# Patient Record
Sex: MALE | Race: WHITE | NOT HISPANIC OR LATINO | URBAN - METROPOLITAN AREA
[De-identification: names, ages, dates, MRNs, and addresses within clinical notes are randomized per-mention and may not be internally consistent; named-entity substitution may affect disease eponyms.]

---

## 2017-03-08 ENCOUNTER — INPATIENT (INPATIENT)
Facility: HOSPITAL | Age: 62
LOS: 10 days | Discharge: HOME HEALTH | End: 2017-03-19
Attending: THORACIC SURGERY (CARDIOTHORACIC VASCULAR SURGERY)

## 2017-04-05 PROBLEM — Z00.00 ENCOUNTER FOR PREVENTIVE HEALTH EXAMINATION: Status: ACTIVE | Noted: 2017-04-05

## 2017-06-27 DIAGNOSIS — I11.0 HYPERTENSIVE HEART DISEASE WITH HEART FAILURE: ICD-10-CM

## 2017-06-27 DIAGNOSIS — D69.6 THROMBOCYTOPENIA, UNSPECIFIED: ICD-10-CM

## 2017-06-27 DIAGNOSIS — I50.22 CHRONIC SYSTOLIC (CONGESTIVE) HEART FAILURE: ICD-10-CM

## 2017-06-27 DIAGNOSIS — Z89.412 ACQUIRED ABSENCE OF LEFT GREAT TOE: ICD-10-CM

## 2017-06-27 DIAGNOSIS — I25.110 ATHEROSCLEROTIC HEART DISEASE OF NATIVE CORONARY ARTERY WITH UNSTABLE ANGINA PECTORIS: ICD-10-CM

## 2017-06-27 DIAGNOSIS — I25.2 OLD MYOCARDIAL INFARCTION: ICD-10-CM

## 2017-06-27 DIAGNOSIS — E87.6 HYPOKALEMIA: ICD-10-CM

## 2017-06-27 DIAGNOSIS — T82.855A STENOSIS OF CORONARY ARTERY STENT, INITIAL ENCOUNTER: ICD-10-CM

## 2017-06-27 DIAGNOSIS — E11.42 TYPE 2 DIABETES MELLITUS WITH DIABETIC POLYNEUROPATHY: ICD-10-CM

## 2017-06-27 DIAGNOSIS — I25.10 ATHEROSCLEROTIC HEART DISEASE OF NATIVE CORONARY ARTERY WITHOUT ANGINA PECTORIS: ICD-10-CM

## 2017-06-27 DIAGNOSIS — M79.7 FIBROMYALGIA: ICD-10-CM

## 2017-06-27 DIAGNOSIS — I97.89 OTHER POSTPROCEDURAL COMPLICATIONS AND DISORDERS OF THE CIRCULATORY SYSTEM, NOT ELSEWHERE CLASSIFIED: ICD-10-CM

## 2017-06-27 DIAGNOSIS — E78.00 PURE HYPERCHOLESTEROLEMIA, UNSPECIFIED: ICD-10-CM

## 2017-06-27 DIAGNOSIS — E11.65 TYPE 2 DIABETES MELLITUS WITH HYPERGLYCEMIA: ICD-10-CM

## 2017-06-27 DIAGNOSIS — D62 ACUTE POSTHEMORRHAGIC ANEMIA: ICD-10-CM

## 2017-06-27 DIAGNOSIS — E87.70 FLUID OVERLOAD, UNSPECIFIED: ICD-10-CM
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
## 2017-06-27 DIAGNOSIS — I48.91 UNSPECIFIED ATRIAL FIBRILLATION: ICD-10-CM

## 2017-06-27 DIAGNOSIS — Z79.4 LONG TERM (CURRENT) USE OF INSULIN: ICD-10-CM

## 2017-06-27 DIAGNOSIS — E11.621 TYPE 2 DIABETES MELLITUS WITH FOOT ULCER: ICD-10-CM

## 2018-08-04 ENCOUNTER — EMERGENCY (EMERGENCY)
Facility: HOSPITAL | Age: 63
LOS: 0 days | Discharge: HOME | End: 2018-08-04
Attending: EMERGENCY MEDICINE | Admitting: EMERGENCY MEDICINE

## 2018-08-04 VITALS
OXYGEN SATURATION: 98 % | HEIGHT: 75 IN | TEMPERATURE: 96 F | DIASTOLIC BLOOD PRESSURE: 50 MMHG | WEIGHT: 240.08 LBS | HEART RATE: 104 BPM | SYSTOLIC BLOOD PRESSURE: 80 MMHG | RESPIRATION RATE: 20 BRPM

## 2018-08-04 VITALS
HEART RATE: 81 BPM | SYSTOLIC BLOOD PRESSURE: 129 MMHG | OXYGEN SATURATION: 100 % | DIASTOLIC BLOOD PRESSURE: 68 MMHG | RESPIRATION RATE: 20 BRPM | TEMPERATURE: 99 F

## 2018-08-04 DIAGNOSIS — E78.00 PURE HYPERCHOLESTEROLEMIA, UNSPECIFIED: ICD-10-CM

## 2018-08-04 DIAGNOSIS — I10 ESSENTIAL (PRIMARY) HYPERTENSION: ICD-10-CM

## 2018-08-04 DIAGNOSIS — E11.9 TYPE 2 DIABETES MELLITUS WITHOUT COMPLICATIONS: ICD-10-CM

## 2018-08-04 DIAGNOSIS — Z95.1 PRESENCE OF AORTOCORONARY BYPASS GRAFT: ICD-10-CM

## 2018-08-04 DIAGNOSIS — R68.83 CHILLS (WITHOUT FEVER): ICD-10-CM

## 2018-08-04 DIAGNOSIS — R42 DIZZINESS AND GIDDINESS: ICD-10-CM

## 2018-08-04 DIAGNOSIS — Z88.1 ALLERGY STATUS TO OTHER ANTIBIOTIC AGENTS STATUS: ICD-10-CM

## 2018-08-04 DIAGNOSIS — R11.2 NAUSEA WITH VOMITING, UNSPECIFIED: ICD-10-CM

## 2018-08-04 DIAGNOSIS — R10.32 LEFT LOWER QUADRANT PAIN: ICD-10-CM

## 2018-08-04 DIAGNOSIS — T81.72XA COMPLICATION OF VEIN FOLLOWING A PROCEDURE, NOT ELSEWHERE CLASSIFIED, INITIAL ENCOUNTER: Chronic | ICD-10-CM

## 2018-08-04 DIAGNOSIS — Z91.041 RADIOGRAPHIC DYE ALLERGY STATUS: ICD-10-CM

## 2018-08-04 LAB
ALBUMIN SERPL ELPH-MCNC: 3.6 G/DL — SIGNIFICANT CHANGE UP (ref 3.5–5.2)
ALP SERPL-CCNC: 107 U/L — SIGNIFICANT CHANGE UP (ref 30–115)
ALT FLD-CCNC: 12 U/L — SIGNIFICANT CHANGE UP (ref 0–41)
ANION GAP SERPL CALC-SCNC: 16 MMOL/L — HIGH (ref 7–14)
APTT BLD: 30.3 SEC — SIGNIFICANT CHANGE UP (ref 27–39.2)
AST SERPL-CCNC: 12 U/L — SIGNIFICANT CHANGE UP (ref 0–41)
BASE EXCESS BLDV CALC-SCNC: -0.6 MMOL/L — SIGNIFICANT CHANGE UP (ref -2–2)
BASOPHILS # BLD AUTO: 0.09 K/UL — SIGNIFICANT CHANGE UP (ref 0–0.2)
BASOPHILS NFR BLD AUTO: 0.8 % — SIGNIFICANT CHANGE UP (ref 0–1)
BILIRUB SERPL-MCNC: 0.4 MG/DL — SIGNIFICANT CHANGE UP (ref 0.2–1.2)
BUN SERPL-MCNC: 22 MG/DL — HIGH (ref 10–20)
CA-I SERPL-SCNC: 1.23 MMOL/L — SIGNIFICANT CHANGE UP (ref 1.12–1.3)
CALCIUM SERPL-MCNC: 9.6 MG/DL — SIGNIFICANT CHANGE UP (ref 8.5–10.1)
CHLORIDE SERPL-SCNC: 97 MMOL/L — LOW (ref 98–110)
CO2 SERPL-SCNC: 19 MMOL/L — SIGNIFICANT CHANGE UP (ref 17–32)
CREAT SERPL-MCNC: 2 MG/DL — HIGH (ref 0.7–1.5)
EOSINOPHIL # BLD AUTO: 0.38 K/UL — SIGNIFICANT CHANGE UP (ref 0–0.7)
EOSINOPHIL NFR BLD AUTO: 3.4 % — SIGNIFICANT CHANGE UP (ref 0–8)
GAS PNL BLDV: 134 MMOL/L — LOW (ref 136–145)
GAS PNL BLDV: SIGNIFICANT CHANGE UP
GLUCOSE SERPL-MCNC: 142 MG/DL — HIGH (ref 70–99)
HCO3 BLDV-SCNC: 23 MMOL/L — SIGNIFICANT CHANGE UP (ref 22–29)
HCT VFR BLD CALC: 34.9 % — LOW (ref 42–52)
HCT VFR BLDA CALC: 36 % — SIGNIFICANT CHANGE UP (ref 34–44)
HGB BLD CALC-MCNC: 11.8 G/DL — LOW (ref 14–18)
HGB BLD-MCNC: 10.9 G/DL — LOW (ref 14–18)
IMM GRANULOCYTES NFR BLD AUTO: 0.3 % — SIGNIFICANT CHANGE UP (ref 0.1–0.3)
INR BLD: 1.17 RATIO — SIGNIFICANT CHANGE UP (ref 0.65–1.3)
LACTATE BLDV-MCNC: 2 MMOL/L — HIGH (ref 0.5–1.6)
LIDOCAIN IGE QN: 39 U/L — SIGNIFICANT CHANGE UP (ref 7–60)
LYMPHOCYTES # BLD AUTO: 29.4 % — SIGNIFICANT CHANGE UP (ref 20.5–51.1)
LYMPHOCYTES # BLD AUTO: 3.28 K/UL — SIGNIFICANT CHANGE UP (ref 1.2–3.4)
MAGNESIUM SERPL-MCNC: 1.9 MG/DL — SIGNIFICANT CHANGE UP (ref 1.8–2.4)
MCHC RBC-ENTMCNC: 22.4 PG — LOW (ref 27–31)
MCHC RBC-ENTMCNC: 31.2 G/DL — LOW (ref 32–37)
MCV RBC AUTO: 71.8 FL — LOW (ref 80–94)
MONOCYTES # BLD AUTO: 0.96 K/UL — HIGH (ref 0.1–0.6)
MONOCYTES NFR BLD AUTO: 8.6 % — SIGNIFICANT CHANGE UP (ref 1.7–9.3)
NEUTROPHILS # BLD AUTO: 6.43 K/UL — SIGNIFICANT CHANGE UP (ref 1.4–6.5)
NEUTROPHILS NFR BLD AUTO: 57.5 % — SIGNIFICANT CHANGE UP (ref 42.2–75.2)
NRBC # BLD: 0 /100 WBCS — SIGNIFICANT CHANGE UP (ref 0–0)
NT-PROBNP SERPL-SCNC: 173 PG/ML — SIGNIFICANT CHANGE UP (ref 0–300)
PCO2 BLDV: 34 MMHG — LOW (ref 41–51)
PH BLDV: 7.44 — HIGH (ref 7.26–7.43)
PLATELET # BLD AUTO: 220 K/UL — SIGNIFICANT CHANGE UP (ref 130–400)
PO2 BLDV: 49 MMHG — HIGH (ref 20–40)
POTASSIUM BLDV-SCNC: 4.2 MMOL/L — SIGNIFICANT CHANGE UP (ref 3.3–5.6)
POTASSIUM SERPL-MCNC: 4.5 MMOL/L — SIGNIFICANT CHANGE UP (ref 3.5–5)
POTASSIUM SERPL-SCNC: 4.5 MMOL/L — SIGNIFICANT CHANGE UP (ref 3.5–5)
PROT SERPL-MCNC: 7.1 G/DL — SIGNIFICANT CHANGE UP (ref 6–8)
PROTHROM AB SERPL-ACNC: 12.6 SEC — SIGNIFICANT CHANGE UP (ref 9.95–12.87)
RBC # BLD: 4.86 M/UL — SIGNIFICANT CHANGE UP (ref 4.7–6.1)
RBC # FLD: 17 % — HIGH (ref 11.5–14.5)
SAO2 % BLDV: 86 % — SIGNIFICANT CHANGE UP
SODIUM SERPL-SCNC: 132 MMOL/L — LOW (ref 135–146)
TROPONIN T SERPL-MCNC: <0.01 NG/ML — SIGNIFICANT CHANGE UP
WBC # BLD: 11.17 K/UL — HIGH (ref 4.8–10.8)
WBC # FLD AUTO: 11.17 K/UL — HIGH (ref 4.8–10.8)

## 2018-08-04 RX ORDER — ONDANSETRON 8 MG/1
4 TABLET, FILM COATED ORAL ONCE
Qty: 0 | Refills: 0 | Status: COMPLETED | OUTPATIENT
Start: 2018-08-04 | End: 2018-08-04

## 2018-08-04 RX ORDER — SODIUM CHLORIDE 9 MG/ML
1500 INJECTION, SOLUTION INTRAVENOUS ONCE
Qty: 0 | Refills: 0 | Status: COMPLETED | OUTPATIENT
Start: 2018-08-04 | End: 2018-08-04

## 2018-08-04 RX ADMIN — SODIUM CHLORIDE 1500 MILLILITER(S): 9 INJECTION, SOLUTION INTRAVENOUS at 22:05

## 2018-08-04 RX ADMIN — ONDANSETRON 4 MILLIGRAM(S): 8 TABLET, FILM COATED ORAL at 22:17

## 2018-08-04 NOTE — ED PROVIDER NOTE - ATTENDING CONTRIBUTION TO CARE
I personally evaluated the patient. I reviewed the Resident’s or Physician Assistant’s note (as assigned above), and agree with the findings and plan except as documented in my note.  pt presents with n/v and ab pain times 4 days. pt denies fever.   ab soft with llq tenderness.   labs, ekg, ct ab, supportive care and re eval.

## 2018-08-04 NOTE — ED PROVIDER NOTE - OBJECTIVE STATEMENT
61y/o HTN, cholesterol, 5 vessel cardiac bypass, diabetes, dfu, toe amputations presents 63y/o HTN, cholesterol, 5 vessel cardiac bypass, diabetes, dfu, toe amputations presents for nb vomiting and abdominal pain for the past 4 days. denies diarrhea.  pt felt some chills. no cough, congestion, runny nose.  denies cp or sob.

## 2018-08-04 NOTE — ED PROVIDER NOTE - NS ED ROS FT
ROS: No fever/chills, No headache/photophobia/eye pain/changes in vision, No ear pain/sore throat/dysphagia, No chest pain/palpitations, no SOB/cough/wheeze/stridor, no /D/melena, no dysuria/frequency/discharge, No neck/back pain, no rash, no changes in neurological status/function.    +abdominal pain

## 2018-08-04 NOTE — ED PROVIDER NOTE - PROGRESS NOTE DETAILS
hg at baseline pt feels well. wants to go home.  discussed precautions to return pt feeling much improved. pt wants to go home. pt instructed to return to ed immediately if symptoms return or worsen. pt verbalizes understanding and agrees with plan.

## 2018-08-04 NOTE — ED PROVIDER NOTE - PHYSICAL EXAMINATION
VITAL SIGNS: I have reviewed nursing notes and confirm.  CONSTITUTIONAL: Well-developed; well-nourished; in no acute distress.  SKIN: skin exam is warm and dry, no acute rash.   HEAD: Normocephalic; atraumatic.  EYES:  EOM intact; conjunctiva and sclera clear.  ENT: No nasal discharge; airway clear. moist oral mucosa;   NECK: Supple; non tender.  CARD: S1, S2 normal; no murmurs, gallops, or rubs. Regular rate and rhythm. posterior tibial and radial pulses 2+  RESP: No wheezes, rales or rhonchi. cta b/l. no use of accessory muscles. no retractions  ABD: Normal bowel sounds; soft; mild tenderness  to palpation to LLQ  EXT: Normal ROM. No  cyanosis or edema.  BACK: No cva tenderness  LYMPH: No acute cervical adenopathy.  NEURO: Alert, oriented, grossly unremarkable.    PSYCH: Cooperative, appropriate.

## 2018-08-04 NOTE — ED ADULT NURSE NOTE - NSIMPLEMENTINTERV_GEN_ALL_ED
Implemented All Fall with Harm Risk Interventions:  [place] to call system. Call bell, personal items and telephone within reach. Instruct patient to call for assistance. Room bathroom lighting operational. Non-slip footwear when patient is off stretcher. Physically safe environment: no spills, clutter or unnecessary equipment. Stretcher in lowest position, wheels locked, appropriate side rails in place. Provide visual cue, wrist band, yellow gown, etc. Monitor gait and stability. Monitor for mental status changes and reorient to person, place, and time. Review medications for side effects contributing to fall risk. Reinforce activity limits and safety measures with patient and family. Provide visual clues: red socks.

## 2018-08-05 RX ORDER — ONDANSETRON 8 MG/1
1 TABLET, FILM COATED ORAL
Qty: 5 | Refills: 0 | OUTPATIENT
Start: 2018-08-05

## 2018-08-20 ENCOUNTER — EMERGENCY (EMERGENCY)
Facility: HOSPITAL | Age: 63
LOS: 1 days | Discharge: LEFT BEFORE DISCHARGE | End: 2018-08-20

## 2018-08-20 VITALS
SYSTOLIC BLOOD PRESSURE: 117 MMHG | HEART RATE: 120 BPM | OXYGEN SATURATION: 97 % | RESPIRATION RATE: 18 BRPM | DIASTOLIC BLOOD PRESSURE: 68 MMHG | TEMPERATURE: 96 F

## 2018-08-20 VITALS
HEART RATE: 105 BPM | SYSTOLIC BLOOD PRESSURE: 124 MMHG | OXYGEN SATURATION: 99 % | RESPIRATION RATE: 18 BRPM | DIASTOLIC BLOOD PRESSURE: 79 MMHG

## 2018-08-20 DIAGNOSIS — Z88.1 ALLERGY STATUS TO OTHER ANTIBIOTIC AGENTS STATUS: ICD-10-CM

## 2018-08-20 DIAGNOSIS — R10.9 UNSPECIFIED ABDOMINAL PAIN: ICD-10-CM

## 2018-08-20 DIAGNOSIS — Z79.899 OTHER LONG TERM (CURRENT) DRUG THERAPY: ICD-10-CM

## 2018-08-20 DIAGNOSIS — Z91.041 RADIOGRAPHIC DYE ALLERGY STATUS: ICD-10-CM

## 2018-08-21 PROBLEM — Z89.429 ACQUIRED ABSENCE OF OTHER TOE(S), UNSPECIFIED SIDE: Chronic | Status: ACTIVE | Noted: 2018-08-04

## 2018-08-21 PROBLEM — I73.9 PERIPHERAL VASCULAR DISEASE, UNSPECIFIED: Chronic | Status: ACTIVE | Noted: 2018-08-04

## 2018-08-21 PROBLEM — E11.621 TYPE 2 DIABETES MELLITUS WITH FOOT ULCER: Chronic | Status: ACTIVE | Noted: 2018-08-04

## 2018-08-21 PROBLEM — E11.9 TYPE 2 DIABETES MELLITUS WITHOUT COMPLICATIONS: Chronic | Status: ACTIVE | Noted: 2018-08-04

## 2018-08-21 PROBLEM — I10 ESSENTIAL (PRIMARY) HYPERTENSION: Chronic | Status: ACTIVE | Noted: 2018-08-04

## 2018-08-21 PROBLEM — E78.4 OTHER HYPERLIPIDEMIA: Chronic | Status: ACTIVE | Noted: 2018-08-04

## 2018-09-25 ENCOUNTER — INPATIENT (INPATIENT)
Facility: HOSPITAL | Age: 63
LOS: 0 days | Discharge: HOME | End: 2018-09-26
Attending: HOSPITALIST | Admitting: HOSPITALIST
Payer: SELF-PAY

## 2018-09-25 VITALS
HEIGHT: 75 IN | RESPIRATION RATE: 20 BRPM | HEART RATE: 81 BPM | SYSTOLIC BLOOD PRESSURE: 137 MMHG | TEMPERATURE: 98 F | OXYGEN SATURATION: 98 % | DIASTOLIC BLOOD PRESSURE: 86 MMHG | WEIGHT: 244.93 LBS
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
## 2018-09-25 DIAGNOSIS — E11.40 TYPE 2 DIABETES MELLITUS WITH DIABETIC NEUROPATHY, UNSPECIFIED: ICD-10-CM
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
## 2018-09-25 DIAGNOSIS — S22.089A UNSPECIFIED FRACTURE OF T11-T12 VERTEBRA, INITIAL ENCOUNTER FOR CLOSED FRACTURE: ICD-10-CM

## 2018-09-25 DIAGNOSIS — Z95.1 PRESENCE OF AORTOCORONARY BYPASS GRAFT: ICD-10-CM

## 2018-09-25 DIAGNOSIS — Z88.1 ALLERGY STATUS TO OTHER ANTIBIOTIC AGENTS STATUS: ICD-10-CM
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
## 2018-09-25 DIAGNOSIS — R55 SYNCOPE AND COLLAPSE: ICD-10-CM

## 2018-09-25 DIAGNOSIS — Z91.041 RADIOGRAPHIC DYE ALLERGY STATUS: ICD-10-CM

## 2018-09-25 DIAGNOSIS — I95.1 ORTHOSTATIC HYPOTENSION: ICD-10-CM

## 2018-09-25 DIAGNOSIS — Z89.429 ACQUIRED ABSENCE OF OTHER TOE(S), UNSPECIFIED SIDE: ICD-10-CM

## 2018-09-25 DIAGNOSIS — M79.7 FIBROMYALGIA: ICD-10-CM

## 2018-09-25 DIAGNOSIS — E78.5 HYPERLIPIDEMIA, UNSPECIFIED: ICD-10-CM

## 2018-09-25 DIAGNOSIS — I25.10 ATHEROSCLEROTIC HEART DISEASE OF NATIVE CORONARY ARTERY WITHOUT ANGINA PECTORIS: ICD-10-CM

## 2018-09-25 RX ORDER — MORPHINE SULFATE 50 MG/1
4 CAPSULE, EXTENDED RELEASE ORAL ONCE
Qty: 0 | Refills: 0 | Status: DISCONTINUED | OUTPATIENT
Start: 2018-09-25 | End: 2018-09-25

## 2018-09-25 NOTE — ED PROVIDER NOTE - PHYSICAL EXAMINATION
Vital Signs: I have reviewed the initial vital signs.  Constitutional: NAD, well-nourished, appears stated age, no acute distress.  HEENT: Airway patent, moist MM, no erythema/swelling/deformity of oral structures. EOMI, PERRLA.  CV: regular rate, regular rhythm, well-perfused extremities, 2+ b/l DP and radial pulses equal.  Lungs: BCTA, no increased WOB.  ABD: Mild diffuse tenderness, no guarding or rebound, no pulsatile mass, no hernias.   MSK: Neck supple, nontender, nl ROM, no stepoff. Chest mildly tender along left ribs. (+) Tenderness along L-spine and lower thoracic spine. Ext nontender, nl rom, no deformity.   INTEG: Skin warm, dry, no rash.  NEURO: A&Ox3, CN II-XII intact, normal strength 5/5 all 4 ext, nl sensation throughout, normal speech and coordination.  PSYCH: Calm, cooperative, normal affect and interaction.

## 2018-09-25 NOTE — ED PROVIDER NOTE - MEDICAL DECISION MAKING DETAILS
Patient presented s/p fall, found to have T11-T12 fractures. Spoke with trauma who will admit patient. Also spoke with neurosurgery who will be on consult and give official recommendations. Patient presented s/p fall, found to have T11-T12 fractures. Spoke with trauma who will admit patient. Also spoke with neurosurgery who will be on consult and give official recommendations. Patient otherwise HD stable, neuro intact but slightly limited due to pain. Pain controlled in ED.

## 2018-09-25 NOTE — ED PROVIDER NOTE - PROGRESS NOTE DETAILS
Patient seen and evaluated by me. Labs/imaging ordered. Given 4mg morphine IV. Will Pan-scan given diffuse body pain and tenderness, unwitnessed fall. Lightheadedness causing the fall may be cardiac related given it seems to be occurring with exertion from patient's story. Labs/imaging back and results discussed with patient. Still feels lightheaded. Will admit for cardiology eval given high risk patient. received call from radiology - patient with T11 and T12 fractures, "chopstick" fractures per radiologist. Will require surgical eval, possibly trauma eval as well. received call from radiology - patient with T11 and T12 fractures, "chopstick" fractures per radiologist, which are potentially unstable fractures. Will require trauma eval. Trauma consult placed. received call from radiology - patient with T11 and T12 fractures, "chalk stick" fractures per radiologist, which are potentially unstable fractures. Will require trauma eval. Trauma consult placed. Per trauma - admit to their service for pain control. Requesting neurosurgery consult as well. Spoke with neurosurgery who will come see patient and give official recommendations.

## 2018-09-25 NOTE — ED PROVIDER NOTE - OBJECTIVE STATEMENT
63 year old male, pmhx HTN, HLD, DM, CAD s/p CABG, presenting with intermittent lightheadedness mainly with exertion, over the past week. States tonight he was walking his dog at which time he had sudden onset lightheadedness, causing him to lose his balance and fall, hitting his head. He states he does not remember the whole event and is unsure if he had LOC. States since the fall he is having diffuse body pain, most pronounced in his head and his lower back. Denies fevers, vision changes, weakness/numbness, confusion, URI symptoms, neck pain, chest pain, back pain, dyspnea, cough, palpitations, nausea, vomiting, abdominal pain, diarrhea, constipation, blood in stool/dark stools, urinary symptoms, penile discharge/testicular pain, leg swelling, rash, recent travel or sick contacts.

## 2018-09-25 NOTE — ED PROVIDER NOTE - PRINCIPAL DIAGNOSIS
Lightheadedness Other closed fracture of thoracic vertebra, unspecified thoracic vertebral level, initial encounter

## 2018-09-25 NOTE — ED ADULT TRIAGE NOTE - CHIEF COMPLAINT QUOTE
BIBA with complaints of dizziness and fell, with low back pain, denies LOC,  did not hit head, no blood thinners

## 2018-09-25 NOTE — ED PROVIDER NOTE - CARE PLAN
Principal Discharge DX:	Lightheadedness  Secondary Diagnosis:	Fall, initial encounter Principal Discharge DX:	Other closed fracture of thoracic vertebra, unspecified thoracic vertebral level, initial encounter  Secondary Diagnosis:	Fall, initial encounter  Secondary Diagnosis:	Lightheadedness

## 2018-09-26 VITALS
OXYGEN SATURATION: 100 % | HEART RATE: 61 BPM | TEMPERATURE: 96 F | SYSTOLIC BLOOD PRESSURE: 120 MMHG | RESPIRATION RATE: 18 BRPM | DIASTOLIC BLOOD PRESSURE: 66 MMHG

## 2018-09-26 DIAGNOSIS — S22.089S UNSPECIFIED FRACTURE OF T11-T12 VERTEBRA, SEQUELA: ICD-10-CM

## 2018-09-26 DIAGNOSIS — Z95.1 PRESENCE OF AORTOCORONARY BYPASS GRAFT: Chronic | ICD-10-CM

## 2018-09-26 LAB
ALBUMIN SERPL ELPH-MCNC: 3.9 G/DL — SIGNIFICANT CHANGE UP (ref 3.5–5.2)
ALP SERPL-CCNC: 133 U/L — HIGH (ref 30–115)
ALT FLD-CCNC: 19 U/L — SIGNIFICANT CHANGE UP (ref 0–41)
ANION GAP SERPL CALC-SCNC: 11 MMOL/L — SIGNIFICANT CHANGE UP (ref 7–14)
AST SERPL-CCNC: 17 U/L — SIGNIFICANT CHANGE UP (ref 0–41)
BASE EXCESS BLDV CALC-SCNC: 1.6 MMOL/L — SIGNIFICANT CHANGE UP (ref -2–2)
BASOPHILS # BLD AUTO: 0.1 K/UL — SIGNIFICANT CHANGE UP (ref 0–0.2)
BASOPHILS NFR BLD AUTO: 0.8 % — SIGNIFICANT CHANGE UP (ref 0–1)
BILIRUB SERPL-MCNC: 0.4 MG/DL — SIGNIFICANT CHANGE UP (ref 0.2–1.2)
BUN SERPL-MCNC: 21 MG/DL — HIGH (ref 10–20)
CA-I SERPL-SCNC: 1.22 MMOL/L — SIGNIFICANT CHANGE UP (ref 1.12–1.3)
CALCIUM SERPL-MCNC: 9.1 MG/DL — SIGNIFICANT CHANGE UP (ref 8.5–10.1)
CHLORIDE SERPL-SCNC: 101 MMOL/L — SIGNIFICANT CHANGE UP (ref 98–110)
CO2 SERPL-SCNC: 25 MMOL/L — SIGNIFICANT CHANGE UP (ref 17–32)
CREAT SERPL-MCNC: 1 MG/DL — SIGNIFICANT CHANGE UP (ref 0.7–1.5)
EOSINOPHIL # BLD AUTO: 0.58 K/UL — SIGNIFICANT CHANGE UP (ref 0–0.7)
EOSINOPHIL NFR BLD AUTO: 4.4 % — SIGNIFICANT CHANGE UP (ref 0–8)
GAS PNL BLDV: 139 MMOL/L — SIGNIFICANT CHANGE UP (ref 136–145)
GAS PNL BLDV: SIGNIFICANT CHANGE UP
GLUCOSE BLDC GLUCOMTR-MCNC: 146 MG/DL — HIGH (ref 70–99)
GLUCOSE BLDC GLUCOMTR-MCNC: 248 MG/DL — HIGH (ref 70–99)
GLUCOSE BLDC GLUCOMTR-MCNC: 64 MG/DL — LOW (ref 70–99)
GLUCOSE BLDC GLUCOMTR-MCNC: 84 MG/DL — SIGNIFICANT CHANGE UP (ref 70–99)
GLUCOSE SERPL-MCNC: 62 MG/DL — LOW (ref 70–99)
HCO3 BLDV-SCNC: 28 MMOL/L — SIGNIFICANT CHANGE UP (ref 22–29)
HCT VFR BLD CALC: 35.5 % — LOW (ref 42–52)
HCT VFR BLDA CALC: 38.7 % — SIGNIFICANT CHANGE UP (ref 34–44)
HGB BLD CALC-MCNC: 12.6 G/DL — LOW (ref 14–18)
HGB BLD-MCNC: 11.3 G/DL — LOW (ref 14–18)
IMM GRANULOCYTES NFR BLD AUTO: 0.5 % — HIGH (ref 0.1–0.3)
LACTATE BLDV-MCNC: 1.3 MMOL/L — SIGNIFICANT CHANGE UP (ref 0.5–1.6)
LYMPHOCYTES # BLD AUTO: 18.7 % — LOW (ref 20.5–51.1)
LYMPHOCYTES # BLD AUTO: 2.44 K/UL — SIGNIFICANT CHANGE UP (ref 1.2–3.4)
MCHC RBC-ENTMCNC: 22.8 PG — LOW (ref 27–31)
MCHC RBC-ENTMCNC: 31.8 G/DL — LOW (ref 32–37)
MCV RBC AUTO: 71.7 FL — LOW (ref 80–94)
MONOCYTES # BLD AUTO: 0.87 K/UL — HIGH (ref 0.1–0.6)
MONOCYTES NFR BLD AUTO: 6.7 % — SIGNIFICANT CHANGE UP (ref 1.7–9.3)
NEUTROPHILS # BLD AUTO: 9 K/UL — HIGH (ref 1.4–6.5)
NEUTROPHILS NFR BLD AUTO: 68.9 % — SIGNIFICANT CHANGE UP (ref 42.2–75.2)
NRBC # BLD: 0 /100 WBCS — SIGNIFICANT CHANGE UP (ref 0–0)
NT-PROBNP SERPL-SCNC: 223 PG/ML — SIGNIFICANT CHANGE UP (ref 0–300)
PCO2 BLDV: 50 MMHG — SIGNIFICANT CHANGE UP (ref 41–51)
PH BLDV: 7.36 — SIGNIFICANT CHANGE UP (ref 7.26–7.43)
PLATELET # BLD AUTO: 167 K/UL — SIGNIFICANT CHANGE UP (ref 130–400)
PO2 BLDV: 32 MMHG — SIGNIFICANT CHANGE UP (ref 20–40)
POTASSIUM BLDV-SCNC: 4 MMOL/L — SIGNIFICANT CHANGE UP (ref 3.3–5.6)
POTASSIUM SERPL-MCNC: 3.9 MMOL/L — SIGNIFICANT CHANGE UP (ref 3.5–5)
POTASSIUM SERPL-SCNC: 3.9 MMOL/L — SIGNIFICANT CHANGE UP (ref 3.5–5)
PROT SERPL-MCNC: 7.2 G/DL — SIGNIFICANT CHANGE UP (ref 6–8)
RBC # BLD: 4.95 M/UL — SIGNIFICANT CHANGE UP (ref 4.7–6.1)
RBC # FLD: 17.8 % — HIGH (ref 11.5–14.5)
SAO2 % BLDV: 54 % — SIGNIFICANT CHANGE UP
SODIUM SERPL-SCNC: 137 MMOL/L — SIGNIFICANT CHANGE UP (ref 135–146)
TROPONIN T SERPL-MCNC: <0.01 NG/ML — SIGNIFICANT CHANGE UP
WBC # BLD: 13.05 K/UL — HIGH (ref 4.8–10.8)
WBC # FLD AUTO: 13.05 K/UL — HIGH (ref 4.8–10.8)

## 2018-09-26 PROCEDURE — 99222 1ST HOSP IP/OBS MODERATE 55: CPT

## 2018-09-26 PROCEDURE — 93880 EXTRACRANIAL BILAT STUDY: CPT | Mod: 26

## 2018-09-26 RX ORDER — IBUPROFEN 200 MG
600 TABLET ORAL EVERY 8 HOURS
Qty: 0 | Refills: 0 | Status: DISCONTINUED | OUTPATIENT
Start: 2018-09-26 | End: 2018-09-26

## 2018-09-26 RX ORDER — INSULIN LISPRO 100/ML
VIAL (ML) SUBCUTANEOUS
Qty: 0 | Refills: 0 | Status: DISCONTINUED | OUTPATIENT
Start: 2018-09-26 | End: 2018-09-26

## 2018-09-26 RX ORDER — MORPHINE SULFATE 50 MG/1
8 CAPSULE, EXTENDED RELEASE ORAL ONCE
Qty: 0 | Refills: 0 | Status: DISCONTINUED | OUTPATIENT
Start: 2018-09-26 | End: 2018-09-26

## 2018-09-26 RX ORDER — PANTOPRAZOLE SODIUM 20 MG/1
40 TABLET, DELAYED RELEASE ORAL DAILY
Qty: 0 | Refills: 0 | Status: DISCONTINUED | OUTPATIENT
Start: 2018-09-26 | End: 2018-09-26

## 2018-09-26 RX ORDER — SODIUM CHLORIDE 9 MG/ML
1000 INJECTION, SOLUTION INTRAVENOUS
Qty: 0 | Refills: 0 | Status: DISCONTINUED | OUTPATIENT
Start: 2018-09-26 | End: 2018-09-26

## 2018-09-26 RX ORDER — HEPARIN SODIUM 5000 [USP'U]/ML
5000 INJECTION INTRAVENOUS; SUBCUTANEOUS EVERY 8 HOURS
Qty: 0 | Refills: 0 | Status: DISCONTINUED | OUTPATIENT
Start: 2018-09-26 | End: 2018-09-26

## 2018-09-26 RX ORDER — ACETAMINOPHEN 500 MG
650 TABLET ORAL EVERY 6 HOURS
Qty: 0 | Refills: 0 | Status: DISCONTINUED | OUTPATIENT
Start: 2018-09-26 | End: 2018-09-26

## 2018-09-26 RX ORDER — GLUCAGON INJECTION, SOLUTION 0.5 MG/.1ML
1 INJECTION, SOLUTION SUBCUTANEOUS ONCE
Qty: 0 | Refills: 0 | Status: DISCONTINUED | OUTPATIENT
Start: 2018-09-26 | End: 2018-09-26

## 2018-09-26 RX ORDER — IBUPROFEN 200 MG
400 TABLET ORAL EVERY 8 HOURS
Qty: 0 | Refills: 0 | Status: DISCONTINUED | OUTPATIENT
Start: 2018-09-26 | End: 2018-09-26

## 2018-09-26 RX ORDER — PANTOPRAZOLE SODIUM 20 MG/1
40 TABLET, DELAYED RELEASE ORAL
Qty: 0 | Refills: 0 | Status: DISCONTINUED | OUTPATIENT
Start: 2018-09-26 | End: 2018-09-26

## 2018-09-26 RX ORDER — DEXTROSE 50 % IN WATER 50 %
25 SYRINGE (ML) INTRAVENOUS ONCE
Qty: 0 | Refills: 0 | Status: DISCONTINUED | OUTPATIENT
Start: 2018-09-26 | End: 2018-09-26

## 2018-09-26 RX ORDER — DEXTROSE 50 % IN WATER 50 %
15 SYRINGE (ML) INTRAVENOUS ONCE
Qty: 0 | Refills: 0 | Status: DISCONTINUED | OUTPATIENT
Start: 2018-09-26 | End: 2018-09-26

## 2018-09-26 RX ORDER — DEXTROSE 50 % IN WATER 50 %
12.5 SYRINGE (ML) INTRAVENOUS ONCE
Qty: 0 | Refills: 0 | Status: DISCONTINUED | OUTPATIENT
Start: 2018-09-26 | End: 2018-09-26

## 2018-09-26 RX ADMIN — Medication 600 MILLIGRAM(S): at 14:23

## 2018-09-26 RX ADMIN — HEPARIN SODIUM 5000 UNIT(S): 5000 INJECTION INTRAVENOUS; SUBCUTANEOUS at 14:25

## 2018-09-26 RX ADMIN — HEPARIN SODIUM 5000 UNIT(S): 5000 INJECTION INTRAVENOUS; SUBCUTANEOUS at 06:06

## 2018-09-26 RX ADMIN — SODIUM CHLORIDE 50 MILLILITER(S): 9 INJECTION, SOLUTION INTRAVENOUS at 08:08

## 2018-09-26 RX ADMIN — Medication 650 MILLIGRAM(S): at 14:24

## 2018-09-26 RX ADMIN — MORPHINE SULFATE 8 MILLIGRAM(S): 50 CAPSULE, EXTENDED RELEASE ORAL at 03:21

## 2018-09-26 RX ADMIN — Medication 650 MILLIGRAM(S): at 06:09

## 2018-09-26 RX ADMIN — MORPHINE SULFATE 4 MILLIGRAM(S): 50 CAPSULE, EXTENDED RELEASE ORAL at 00:04

## 2018-09-26 NOTE — H&P ADULT - HISTORY OF PRESENT ILLNESS
TRAUMA SURGERY  ==============================================================================================================  HPI: 63y with PMH of CAD, s/p CABGx3-5 vessel (patient does not recall) approx 1 year ago, DM, neuropathy, fibromyalgia, who was brought to [facility] ED after falling while walking his dog.  Patient states he was walking his dog when he became lightheaded, got dizzy, and fell onto the sidewalk. When questioned again about the fall, he does recall the fall occurring, denies head trauma.  Patient adds that upon impact to the sidewalk, his back contacted the curb of the sidewalk and since this time he has had severe pain.  Over the past 2-3 weeks, the patient has been experiencing this feeling of lightheadedness/dizziness especially when rising from sitting or from bed quickly to standing.  He denies chest pain or palpitations in association.  No nausea, vomiting.    PAST MEDICAL & SURGICAL HISTORY:  Diabetic foot ulcer  PVD (peripheral vascular disease)  Diabetes  Other hyperlipidemia  Hypertension, unspecified type  Amputated toe, unspecified laterality    Home Meds: Home Medications:    Allergies: Allergies    Cipro (Unknown)  IV contrast (Short breath)  Keflex (Unknown)    Intolerances      Soc:   Advanced Directives: Presumed Full Code     CURRENT MEDICATIONS:   --------------------------------------------------------------------------------------  Neurologic Medications  morphine  - Injectable 8 milliGRAM(s) IV Push Once  --------------------------------------------------------------------------------------    VITAL SIGNS, INS/OUTS (last 24 hours):  --------------------------------------------------------------------------------------  ICU Vital Signs Last 24 Hrs  T(C): 36.4 (25 Sep 2018 21:58), Max: 36.4 (25 Sep 2018 21:58)  T(F): 97.6 (25 Sep 2018 21:58), Max: 97.6 (25 Sep 2018 21:58)  HR: 81 (25 Sep 2018 21:58) (81 - 81)  BP: 137/86 (25 Sep 2018 21:58) (137/86 - 137/86)  RR: 20 (25 Sep 2018 21:58) (20 - 20)  SpO2: 98% (25 Sep 2018 21:58) (98% - 98%)    I&O's Summary    --------------------------------------------------------------------------------------

## 2018-09-26 NOTE — CONSULT NOTE ADULT - SUBJECTIVE AND OBJECTIVE BOX
HPI: 63y with PMH of CAD, s/p CABGx3-5 vessel (patient does not recall) approx 1 year ago, DM, neuropathy, fibromyalgia, who was brought to [facility] ED after falling while walking his dog.  Patient states he was walking his dog when he became lightheaded, got dizzy, and fell onto the sidewalk. When questioned again about the fall, he does recall the fall occurring, denies head trauma.  Patient adds that upon impact to the sidewalk, his back contacted the curb of the sidewalk and since this time he has had severe pain.  Over the past 2-3 weeks, the patient has been experiencing this feeling of lightheadedness/dizziness especially when rising from sitting or from bed quickly to standing.  He denies chest pain or palpitations in association.  No nausea, vomiting.        MEDICATIONS  (STANDING):  heparin  Injectable 5000 Unit(s) SubCutaneous every 8 hours    MEDICATIONS  (PRN):  acetaminophen   Tablet .. 650 milliGRAM(s) Oral every 6 hours PRN Moderate Pain (4 - 6)  ibuprofen  Tablet. 400 milliGRAM(s) Oral every 8 hours PRN Moderate Pain (4 - 6)      Allergies    Cipro (Unknown)  IV contrast (Short breath)  Keflex (Unknown)    Intolerances      Vital Signs Last 24 Hrs  T(C): 36.4 (25 Sep 2018 21:58), Max: 36.4 (25 Sep 2018 21:58)  T(F): 97.6 (25 Sep 2018 21:58), Max: 97.6 (25 Sep 2018 21:58)  HR: 81 (25 Sep 2018 21:58) (81 - 81)  BP: 137/86 (25 Sep 2018 21:58) (137/86 - 137/86)  BP(mean): --  RR: 20 (25 Sep 2018 21:58) (20 - 20)  SpO2: 98% (25 Sep 2018 21:58) (98% - 98%)    PHYSICAL EXAM:    GENERAL: NAD, well-groomed, well-developed  HEAD:  Atraumatic, Normocephalic  EYES: EOMI, PERRLA, conjunctiva and sclera clear  NERVOUS SYSTEM:  Awake  alert oriented to self, place situation   Fund of knowledge, recent and remote memory are intact   Mood; normal affect   CN II-XII intact PERRRL, EOMI, no ptosis, no Nystagmus, normal shoulder shrug   Face symmetrical tongue is midline speech is clear and fluent  Motor: 5/5 UE/LE all muscle groups   Sensory: No deficit to light touch, + tenderness from mid back to lumbar region  Gait is not tested      EXTREMITIES:  2+ Peripheral Pulses, No clubbing, cyanosis, or edema      LABS:                        11.3   13.05 )-----------( 167      ( 25 Sep 2018 23:08 )             35.5     09-25    137  |  101  |  21<H>  ----------------------------<  62<L>  3.9   |  25  |  1.0    Ca    9.1      25 Sep 2018 23:08    TPro  7.2  /  Alb  3.9  /  TBili  0.4  /  DBili  x   /  AST  17  /  ALT  19  /  AlkPhos  133<H>  09-25          RADIOLOGY & ADDITIONAL TESTS:  < from: CT Chest No Cont (09.26.18 @ 01:18) >  IMPRESSION:        No evidence of acute intrathoracic, abdominal or pelvic traumatic injury.    Additional Findings/Recommendations After Attending Radiologist Review:    Acute fracture through the T11-T12 disc space and osteophyte (series 7,   image 47), "chalk stick" fracture with slight anterior widening of the   disc space. No posterior element involvement.    Dr. Newman discussed finding with Dr. Castro 9/26/18 at 2:30 AM    < end of copied text >

## 2018-09-26 NOTE — H&P ADULT - NSHPPHYSICALEXAM_GEN_ALL_CORE
EXAM:  General/Neuro  GCS:   Exam: NAD, although appears uncomfortable.     Respiratory  Exam: Lungs clear to auscultation, Normal expansion/effort.  On palpation to the back, tenderness present.    Cardiovascular  Exam: S1, S2.  Regular rate and rhythm.       GI  Exam: Abdomen soft, Non-tender, Non-distended.        Extremities  Exam: Extremities warm, symmetrical   .

## 2018-09-26 NOTE — ED ADULT NURSE NOTE - PMH
Amputated toe, unspecified laterality    Diabetes    Diabetic foot ulcer    Dyslipidemia    Hypertension, unspecified type    NSTEMI (non-ST elevated myocardial infarction)    Other hyperlipidemia    PVD (peripheral vascular disease)

## 2018-09-26 NOTE — CONSULT NOTE ADULT - REASON FOR ADMISSION
SYNCOPE, FALL FROM STANDING, WITH T11-T12 FRACTURES
SYNCOPE, FALL FROM STANDING, WITH T11-T12 FRACTURES

## 2018-09-26 NOTE — CONSULT NOTE ADULT - SUBJECTIVE AND OBJECTIVE BOX
HPI:  63y male with PMH of CAD s/p CABG (5 vessel according to patient; March 2016); DM, PAD, HTN, HLD brought in after he fell while walking his dog.  Patient states light-headedness followed by dizziness before he fell. He does not recall what happened after but feels that he must have lost consciousness for a couple of minutes.  Patient adds that upon impact to the sidewalk, his back contacted the curb of the sidewalk and since this time he has had severe pain.  Patient denies chest pain and SOB prior to fall.  Over the past 2-3 weeks, the patient has been experiencing this feeling of lightheadedness/dizziness when coming from a supine position to a sitting position or from sitting from sitting to stand.  The light-headedness and dizziness always last for a couple of minutes.  Patient states blurry vision but denies any ringing in the ears or sensation of the room spinning. Patient states nausea and vomiting for several weeks in August for which he sought medical attention.  No vomiting in the last 2 weeks.  Patient denies recent history of exertional chest pain, SOB, orthopnea, PND, or ADAMS. Last follow up visit with cardiologist 4 months ago at [facility] in [place] with no concerns.  Never smoked.       Home medications  Lyrica; metoprolol; enalapril, Humalog, Lantus. ASA,     PAST MEDICAL & SURGICAL HISTORY:  Diabetic foot ulcer  PVD (peripheral vascular disease)  Diabetes  Other hyperlipidemia  Hypertension, unspecified type  Amputated toe, unspecified laterality    Home Meds: Home Medications:    Allergies: Allergies    Cipro (Unknown)  IV contrast (Short breath)  Keflex (Unknown)    Intolerances      Soc:   Advanced Directives: Presumed Full Code     CURRENT MEDICATIONS:   --------------------------------------------------------------------------------------  Neurologic Medications  morphine  - Injectable 8 milliGRAM(s) IV Push Once  --------------------------------------------------------------------------------------    VITAL SIGNS, INS/OUTS (last 24 hours):  --------------------------------------------------------------------------------------  ICU Vital Signs Last 24 Hrs  T(C): 36.4 (25 Sep 2018 21:58), Max: 36.4 (25 Sep 2018 21:58)  T(F): 97.6 (25 Sep 2018 21:58), Max: 97.6 (25 Sep 2018 21:58)  HR: 81 (25 Sep 2018 21:58) (81 - 81)  BP: 137/86 (25 Sep 2018 21:58) (137/86 - 137/86)  RR: 20 (25 Sep 2018 21:58) (20 - 20)  SpO2: 98% (25 Sep 2018 21:58) (98% - 98%)    I&O's Summary    -------------------------------------------------------------------------------------- (26 Sep 2018 03:18)      PAST MEDICAL & SURGICAL HISTORY  Dyslipidemia  NSTEMI (non-ST elevated myocardial infarction)  Diabetic foot ulcer  PVD (peripheral vascular disease)  Diabetes  Other hyperlipidemia  Hypertension, unspecified type  Amputated toe, unspecified laterality  S/P CABG (coronary artery bypass graft)      FAMILY HISTORY:  FAMILY HISTORY:  No pertinent family history in first degree relatives      SOCIAL HISTORY:  []smoker  []Alcohol  []Drug    ALLERGIES:  Cipro (Unknown)  IV contrast (Short breath)  Keflex (Unknown)      MEDICATIONS:  MEDICATIONS  (STANDING):  dextrose 5% + sodium chloride 0.45%. 1000 milliLiter(s) (50 mL/Hr) IV Continuous <Continuous>  heparin  Injectable 5000 Unit(s) SubCutaneous every 8 hours  pantoprazole  Injectable 40 milliGRAM(s) IV Push daily    MEDICATIONS  (PRN):  acetaminophen   Tablet .. 650 milliGRAM(s) Oral every 6 hours PRN Moderate Pain (4 - 6)  ibuprofen  Tablet. 400 milliGRAM(s) Oral every 8 hours PRN Moderate Pain (4 - 6)      HOME MEDICATIONS:  Home Medications:      VITALS:   T(F): 96.4 (09-26 @ 07:40), Max: 97.6 (09-25 @ 21:58)  HR: 61 (09-26 @ 07:40) (61 - 81)  BP: 120/66 (09-26 @ 07:40) (120/66 - 137/86)  BP(mean): --  RR: 18 (09-26 @ 07:40) (18 - 20)  SpO2: 100% (09-26 @ 07:40) (98% - 100%)    I&O's Summary      REVIEW OF SYSTEMS:  CONSTITUTIONAL: No weakness, fevers or chills  EYES/ENT: See HPI    NECK: See HPI   RESPIRATORY: No cough, wheezing, hemoptysis; No shortness of breath  CARDIOVASCULAR: No chest pain or palpitations  GASTROINTESTINAL: No abdominal or epigastric pain. No nausea, vomiting, or hematemesis; No diarrhea or constipation. No melena or hematochezia.  GENITOURINARY: No dysuria, frequency or hematuria  NEUROLOGICAL: No numbness or weakness  SKIN: No itching, no rashes    PHYSICAL EXAM:  NEURO: patient is awake , alert and oriented  GEN: Not in acute distress  NECK: no thyroid enlargement, no JVD  LUNGS: Clear to auscultation bilaterally   CARDIOVASCULAR: S1/S2 present, RRR , no murmurs or rubs,   ABD: Soft, non-tender, non-distended, +BS  EXT: No ADAMS; amputated toes right foot; dressing heel of left foot (dry);   SKIN: Intact    LABS:                        11.3   13.05 )-----------( 167      ( 25 Sep 2018 23:08 )             35.5     09-25    137  |  101  |  21<H>  ----------------------------<  62<L>  3.9   |  25  |  1.0    Ca    9.1      25 Sep 2018 23:08    TPro  7.2  /  Alb  3.9  /  TBili  0.4  /  DBili  x   /  AST  17  /  ALT  19  /  AlkPhos  133<H>  09-25      Troponin T, Serum: <0.01 ng/mL (09-25-18 @ 23:08)    CARDIAC MARKERS ( 25 Sep 2018 23:08 )  x     / <0.01 ng/mL / x     / x     / x            Troponin trend:    Serum Pro-Brain Natriuretic Peptide: 223 pg/mL (09-25-18 @ 23:08)      Hemoglobin A1C   Thyroid      RADIOLOGY:  -CXR:< from: Xray Chest 1 View- PORTABLE-Urgent (08.04.18 @ 22:46) >  Impression:      Status post a median sternotomy.    No radiographicevidence of acute cardiopulmonary disease.      < end of copied text >    -TTE:  -CCTA:  -STRESS TEST:  -CATHETERIZATION:    ECG:< from: 12 Lead ECG (09.25.18 @ 22:15) >  Sinus rhythm with 1st degree A-V block  Cannot rule out Anterior infarct , age undetermined  Abnormal ECG    < end of copied text >      TELEMETRY EVENTS: HPI:  63y male with PMH of CAD s/p CABG (5 vessel according to patient; March 2016); DM, PAD, HTN, HLD brought in after he fell while walking his dog.  Patient states light-headedness followed by dizziness before he fell. He does not recall what happened after but feels that he must have lost consciousness for a couple of minutes.  Patient adds that upon impact to the sidewalk, his back contacted the curb of the sidewalk and since this time he has had severe pain.  Patient denies chest pain and SOB prior to fall.  Over the past 2-3 weeks, the patient has been experiencing this feeling of lightheadedness/dizziness everytime he comes from a supine position to a sitting position or from sitting from sitting to standing.  The light-headedness and dizziness always last for a couple of minutes.  Patient states blurry vision during these episodes but denies any ringing in the ears or sensation of the room spinning. Patient states nausea and vomiting for several weeks in August for which he sought medical attention.  No vomiting/diarrhea in the last 2 weeks.  Patient denies recent history of exertional chest pain, SOB, orthopnea, PND, or ADAMS. Last follow up visit with cardiologist 4 months ago at [facility] in [place] with no concerns.  No cough, fever, or acute illness.  No recent changes in medication dosages.  Never smoked. Occasional alcohol.        Home medications  Lyrica; metoprolol; enalapril, Humalog, Lantus. ASA (pending confirmation)    PAST MEDICAL & SURGICAL HISTORY:  Diabetic foot ulcer  PVD (peripheral vascular disease)  Diabetes  Other hyperlipidemia  Hypertension, unspecified type  Amputated toe, unspecified laterality    Home Meds: Home Medications:    Allergies: Allergies    Cipro (Unknown)  IV contrast (Short breath)  Keflex (Unknown)    Intolerances      Soc:   Advanced Directives: Presumed Full Code     CURRENT MEDICATIONS:   --------------------------------------------------------------------------------------  Neurologic Medications  morphine  - Injectable 8 milliGRAM(s) IV Push Once  --------------------------------------------------------------------------------------    VITAL SIGNS, INS/OUTS (last 24 hours):  --------------------------------------------------------------------------------------  ICU Vital Signs Last 24 Hrs  T(C): 36.4 (25 Sep 2018 21:58), Max: 36.4 (25 Sep 2018 21:58)  T(F): 97.6 (25 Sep 2018 21:58), Max: 97.6 (25 Sep 2018 21:58)  HR: 81 (25 Sep 2018 21:58) (81 - 81)  BP: 137/86 (25 Sep 2018 21:58) (137/86 - 137/86)  RR: 20 (25 Sep 2018 21:58) (20 - 20)  SpO2: 98% (25 Sep 2018 21:58) (98% - 98%)    I&O's Summary    -------------------------------------------------------------------------------------- (26 Sep 2018 03:18)      PAST MEDICAL & SURGICAL HISTORY  Dyslipidemia  NSTEMI (non-ST elevated myocardial infarction)  Diabetic foot ulcer  PVD (peripheral vascular disease)  Diabetes  Other hyperlipidemia  Hypertension, unspecified type  Amputated toe, unspecified laterality  S/P CABG (coronary artery bypass graft)      FAMILY HISTORY:  FAMILY HISTORY:  No pertinent family history in first degree relatives      SOCIAL HISTORY:  []smoker  []Alcohol  []Drug    ALLERGIES:  Cipro (Unknown)  IV contrast (Short breath)  Keflex (Unknown)      MEDICATIONS:  MEDICATIONS  (STANDING):  dextrose 5% + sodium chloride 0.45%. 1000 milliLiter(s) (50 mL/Hr) IV Continuous <Continuous>  heparin  Injectable 5000 Unit(s) SubCutaneous every 8 hours  pantoprazole  Injectable 40 milliGRAM(s) IV Push daily    MEDICATIONS  (PRN):  acetaminophen   Tablet .. 650 milliGRAM(s) Oral every 6 hours PRN Moderate Pain (4 - 6)  ibuprofen  Tablet. 400 milliGRAM(s) Oral every 8 hours PRN Moderate Pain (4 - 6)      HOME MEDICATIONS:  Home Medications:      VITALS:   T(F): 96.4 (09-26 @ 07:40), Max: 97.6 (09-25 @ 21:58)  HR: 61 (09-26 @ 07:40) (61 - 81)  BP: 120/66 (09-26 @ 07:40) (120/66 - 137/86)  BP(mean): --  RR: 18 (09-26 @ 07:40) (18 - 20)  SpO2: 100% (09-26 @ 07:40) (98% - 100%)    I&O's Summary      REVIEW OF SYSTEMS:  CONSTITUTIONAL: No weakness, fevers or chills  EYES/ENT: See HPI    NECK: See HPI   RESPIRATORY: No cough, wheezing, hemoptysis; No shortness of breath  CARDIOVASCULAR: No chest pain or palpitations  GASTROINTESTINAL: No abdominal or epigastric pain. No nausea, vomiting, or hematemesis; No diarrhea or constipation. No melena or hematochezia.  GENITOURINARY: No dysuria, frequency or hematuria  NEUROLOGICAL: No numbness or weakness  SKIN: No itching, no rashes    PHYSICAL EXAM:  NEURO: patient is awake , alert and oriented  GEN: Not in acute distress  NECK: no thyroid enlargement, no JVD  LUNGS: Clear to auscultation bilaterally   CARDIOVASCULAR: S1/S2 present, RRR , no murmurs or rubs,   ABD: Soft, non-tender, non-distended, +BS  EXT: No ADAMS; amputated toes right foot; dressing heel of left foot (dry);   SKIN: Intact    LABS:                        11.3   13.05 )-----------( 167      ( 25 Sep 2018 23:08 )             35.5     09-25    137  |  101  |  21<H>  ----------------------------<  62<L>  3.9   |  25  |  1.0    Ca    9.1      25 Sep 2018 23:08    TPro  7.2  /  Alb  3.9  /  TBili  0.4  /  DBili  x   /  AST  17  /  ALT  19  /  AlkPhos  133<H>  09-25      Troponin T, Serum: <0.01 ng/mL (09-25-18 @ 23:08)    CARDIAC MARKERS ( 25 Sep 2018 23:08 )  x     / <0.01 ng/mL / x     / x     / x            Troponin trend:    Serum Pro-Brain Natriuretic Peptide: 223 pg/mL (09-25-18 @ 23:08)      Hemoglobin A1C   Thyroid      RADIOLOGY:  -CXR:< from: Xray Chest 1 View- PORTABLE-Urgent (08.04.18 @ 22:46) >  Impression:      Status post a median sternotomy.    No radiographicevidence of acute cardiopulmonary disease.      < end of copied text >    -TTE:  -CCTA:  -STRESS TEST:  -CATHETERIZATION:    ECG:< from: 12 Lead ECG (09.25.18 @ 22:15) >  Sinus rhythm with 1st degree A-V block  Cannot rule out Anterior infarct , age undetermined  Abnormal ECG    < end of copied text >      TELEMETRY EVENTS: HPI:  63y male with PMH of CAD, NSTEMI s/p CABG (5 vessel according to patient; March 2016); DM, PAD, HTN, HLD brought in after he fell while walking his dog.  Patient states light-headedness followed by dizziness before he fell. He does not recall what happened after but feels that he must have lost consciousness for a couple of minutes.  Patient adds that upon impact to the sidewalk, his back contacted the curb of the sidewalk and since this time he has had severe pain.  Patient denies trauma to the head.  Patient denies chest pain, chest palpitations, or SOB prior to fall.  Over the past 2-3 weeks, the patient has been experiencing this feeling of lightheadedness/dizziness every time he comes from a supine position to a sitting position or from sitting from sitting to standing.  The light-headedness and dizziness always last for a couple of minutes.  Patient states blurry vision during these episodes but denies any ringing in the ears or sensation of the room spinning. Patient states nausea and vomiting for several weeks in August for which he sought medical attention.  No vomiting/diarrhea in the last 2 weeks.  Patient denies recent history of exertional chest pain, SOB, orthopnea, PND, or ADAMS. Patient does state intermittent left arm numbness and weakness for the past month.  Arm numbness not associated with chest pain.  Last follow up visit with cardiologist 4 months ago at [facility] in [place] with no concerns.  No cough, fever, or chills.  No recent changes in medication dosages.  Never smoked. Occasional alcohol.        Home medications  Lyrica; metoprolol; enalapril, Humalog, Lantus. ASA (pending confirmation)    PAST MEDICAL & SURGICAL HISTORY:  Diabetic foot ulcer  PVD (peripheral vascular disease)  Diabetes  Other hyperlipidemia  Hypertension, unspecified type  Amputated toe, unspecified laterality    Home Meds: Home Medications:    Allergies: Allergies    Cipro (Unknown)  IV contrast (Short breath)  Keflex (Unknown)    Intolerances      Soc:   Advanced Directives: Presumed Full Code     CURRENT MEDICATIONS:   --------------------------------------------------------------------------------------  Neurologic Medications  morphine  - Injectable 8 milliGRAM(s) IV Push Once  --------------------------------------------------------------------------------------    VITAL SIGNS, INS/OUTS (last 24 hours):  --------------------------------------------------------------------------------------  ICU Vital Signs Last 24 Hrs  T(C): 36.4 (25 Sep 2018 21:58), Max: 36.4 (25 Sep 2018 21:58)  T(F): 97.6 (25 Sep 2018 21:58), Max: 97.6 (25 Sep 2018 21:58)  HR: 81 (25 Sep 2018 21:58) (81 - 81)  BP: 137/86 (25 Sep 2018 21:58) (137/86 - 137/86)  RR: 20 (25 Sep 2018 21:58) (20 - 20)  SpO2: 98% (25 Sep 2018 21:58) (98% - 98%)    I&O's Summary    -------------------------------------------------------------------------------------- (26 Sep 2018 03:18)      PAST MEDICAL & SURGICAL HISTORY  Dyslipidemia  NSTEMI (non-ST elevated myocardial infarction)  Diabetic foot ulcer  PVD (peripheral vascular disease)  Diabetes  Other hyperlipidemia  Hypertension, unspecified type  Amputated toe, unspecified laterality  S/P CABG (coronary artery bypass graft)      FAMILY HISTORY:  FAMILY HISTORY:  No pertinent family history in first degree relatives      SOCIAL HISTORY:  []smoker  []Alcohol  []Drug    ALLERGIES:  Cipro (Unknown)  IV contrast (Short breath)  Keflex (Unknown)      MEDICATIONS:  MEDICATIONS  (STANDING):  dextrose 5% + sodium chloride 0.45%. 1000 milliLiter(s) (50 mL/Hr) IV Continuous <Continuous>  heparin  Injectable 5000 Unit(s) SubCutaneous every 8 hours  pantoprazole  Injectable 40 milliGRAM(s) IV Push daily    MEDICATIONS  (PRN):  acetaminophen   Tablet .. 650 milliGRAM(s) Oral every 6 hours PRN Moderate Pain (4 - 6)  ibuprofen  Tablet. 400 milliGRAM(s) Oral every 8 hours PRN Moderate Pain (4 - 6)      HOME MEDICATIONS:  Home Medications:      VITALS:   T(F): 96.4 (09-26 @ 07:40), Max: 97.6 (09-25 @ 21:58)  HR: 61 (09-26 @ 07:40) (61 - 81)  BP: 120/66 (09-26 @ 07:40) (120/66 - 137/86)  BP(mean): --  RR: 18 (09-26 @ 07:40) (18 - 20)  SpO2: 100% (09-26 @ 07:40) (98% - 100%)    I&O's Summary      REVIEW OF SYSTEMS:  CONSTITUTIONAL: No weakness, fevers or chills  EYES/ENT: See HPI    NECK: See HPI   RESPIRATORY: No cough, wheezing, hemoptysis; No shortness of breath  CARDIOVASCULAR: See HPI   GASTROINTESTINAL: See HPI   GENITOURINARY: No dysuria, frequency or hematuria  NEUROLOGICAL: No numbness or weakness  SKIN: No itching, no rashes    PHYSICAL EXAM:  NEURO: patient is awake , alert and oriented  GEN: Not in acute distress  NECK: no thyroid enlargement, no JVD  LUNGS: Clear to auscultation bilaterally   CARDIOVASCULAR: S1/S2 present, RRR , no murmurs or rubs,   ABD: Soft, non-tender, non-distended, +BS  EXT: No ADAMS; amputated toes right foot; dressing heel of left foot (dry);   SKIN: Intact    LABS:                        11.3   13.05 )-----------( 167      ( 25 Sep 2018 23:08 )             35.5     09-25    137  |  101  |  21<H>  ----------------------------<  62<L>  3.9   |  25  |  1.0    Ca    9.1      25 Sep 2018 23:08    TPro  7.2  /  Alb  3.9  /  TBili  0.4  /  DBili  x   /  AST  17  /  ALT  19  /  AlkPhos  133<H>  09-25      Troponin T, Serum: <0.01 ng/mL (09-25-18 @ 23:08)    CARDIAC MARKERS ( 25 Sep 2018 23:08 )  x     / <0.01 ng/mL / x     / x     / x            Troponin trend:    Serum Pro-Brain Natriuretic Peptide: 223 pg/mL (09-25-18 @ 23:08)      Hemoglobin A1C   Thyroid      RADIOLOGY:  -CXR:< from: Xray Chest 1 View- PORTABLE-Urgent (08.04.18 @ 22:46) >  Impression:      Status post a median sternotomy.    No radiographicevidence of acute cardiopulmonary disease.      < end of copied text >    < from: CT Head No Cont (09.26.18 @ 01:13) >  IMPRESSION:     No evidence of acute intracranial pathology.      < end of copied text >  < from: CT Cervical Spine No Cont (09.26.18 @ 01:13) >  IMPRESSION:    No evidence of a cervical spine fracture or subluxation.        < end of copied text >      < from: CT Abdomen and Pelvis No Cont (09.26.18 @ 01:18) >  IMPRESSION:        No evidence of acute intrathoracic, abdominal or pelvic traumatic injury.    Additional Findings/Recommendations After Attending Radiologist Review:    Acute fracture through the T11-T12 disc space and osteophyte (series 7,   image 47), "chalk stick" fracture with slight anterior widening of the   disc space. No posterior element involvement.      < end of copied text >    -TTE:  -CCTA:  -STRESS TEST:  -CATHETERIZATION:    ECG:< from: 12 Lead ECG (09.25.18 @ 22:15) >  Sinus rhythm with 1st degree A-V block  Cannot rule out Anterior infarct , age undetermined  Abnormal ECG    < end of copied text >      TELEMETRY EVENTS: HPI:  63y male with PMH of CAD, NSTEMI s/p CABG (5 vessel according to patient; March 2016); DM, PAD, HTN, HLD brought in after he fell while walking his dog.  Patient states light-headedness followed by dizziness before he fell. He does not recall what happened after but feels that he must have lost consciousness for a couple of minutes.  Patient adds that upon impact to the sidewalk, his back contacted the curb of the sidewalk and since this time he has had severe pain.  Patient denies trauma to the head.  Patient denies chest pain, chest palpitations, or SOB prior to fall.  Over the past 2-3 weeks, the patient has been experiencing this feeling of lightheadedness/dizziness every time he comes from a supine position to a sitting position or from sitting from sitting to standing.  The light-headedness and dizziness always last for a couple of minutes.  Patient states blurry vision during these episodes but denies any ringing in the ears or sensation of the room spinning. Patient states nausea and vomiting for several weeks in August for which he sought medical attention.  No vomiting/diarrhea in the last 2 weeks.  Patient denies recent history of exertional chest pain, SOB, orthopnea, PND, or ADAMS. Patient does state intermittent left arm numbness and weakness for the past month.  Arm numbness not associated with chest pain.  Last follow up visit with cardiologist 4 months ago at [facility] in [place] with no concerns.  No cough, fever, or chills.  No recent changes in medication dosages.  Never smoked. Occasional alcohol.        Home medications  Lyrica; metoprolol; enalapril, Humalog, Lantus. ASA (pending confirmation)    PAST MEDICAL & SURGICAL HISTORY:  Diabetic foot ulcer  PVD (peripheral vascular disease)  Diabetes  Other hyperlipidemia  Hypertension, unspecified type  Amputated toe, unspecified laterality    Home Meds: Home Medications:    Allergies: Allergies    Cipro (Unknown)  IV contrast (Short breath)  Keflex (Unknown)    Intolerances      Soc:   Advanced Directives: Presumed Full Code     CURRENT MEDICATIONS:   --------------------------------------------------------------------------------------  Neurologic Medications  morphine  - Injectable 8 milliGRAM(s) IV Push Once  --------------------------------------------------------------------------------------    VITAL SIGNS, INS/OUTS (last 24 hours):  --------------------------------------------------------------------------------------  ICU Vital Signs Last 24 Hrs  T(C): 36.4 (25 Sep 2018 21:58), Max: 36.4 (25 Sep 2018 21:58)  T(F): 97.6 (25 Sep 2018 21:58), Max: 97.6 (25 Sep 2018 21:58)  HR: 81 (25 Sep 2018 21:58) (81 - 81)  BP: 137/86 (25 Sep 2018 21:58) (137/86 - 137/86)  RR: 20 (25 Sep 2018 21:58) (20 - 20)  SpO2: 98% (25 Sep 2018 21:58) (98% - 98%)    I&O's Summary    -------------------------------------------------------------------------------------- (26 Sep 2018 03:18)      PAST MEDICAL & SURGICAL HISTORY  Dyslipidemia  NSTEMI (non-ST elevated myocardial infarction)  Diabetic foot ulcer  PVD (peripheral vascular disease)  Diabetes  Other hyperlipidemia  Hypertension, unspecified type  Amputated toe, unspecified laterality  S/P CABG (coronary artery bypass graft)      FAMILY HISTORY:  FAMILY HISTORY:  No pertinent family history in first degree relatives      SOCIAL HISTORY:  []smoker  []Alcohol  []Drug    ALLERGIES:  Cipro (Unknown)  IV contrast (Short breath)  Keflex (Unknown)      MEDICATIONS:  MEDICATIONS  (STANDING):  dextrose 5% + sodium chloride 0.45%. 1000 milliLiter(s) (50 mL/Hr) IV Continuous <Continuous>  heparin  Injectable 5000 Unit(s) SubCutaneous every 8 hours  pantoprazole  Injectable 40 milliGRAM(s) IV Push daily    MEDICATIONS  (PRN):  acetaminophen   Tablet .. 650 milliGRAM(s) Oral every 6 hours PRN Moderate Pain (4 - 6)  ibuprofen  Tablet. 400 milliGRAM(s) Oral every 8 hours PRN Moderate Pain (4 - 6)      HOME MEDICATIONS:  Home Medications:      VITALS:   T(F): 96.4 (09-26 @ 07:40), Max: 97.6 (09-25 @ 21:58)  HR: 61 (09-26 @ 07:40) (61 - 81)  BP: 120/66 (09-26 @ 07:40) (120/66 - 137/86)  BP(mean): --  RR: 18 (09-26 @ 07:40) (18 - 20)  SpO2: 100% (09-26 @ 07:40) (98% - 100%)    I&O's Summary      REVIEW OF SYSTEMS:  CONSTITUTIONAL: No weakness, fevers or chills  EYES/ENT: See HPI    NECK: See HPI   RESPIRATORY: No cough, wheezing, hemoptysis; No shortness of breath  CARDIOVASCULAR: See HPI   GASTROINTESTINAL: See HPI   GENITOURINARY: No dysuria, frequency or hematuria  NEUROLOGICAL: No numbness or weakness  SKIN: No itching, no rashes    PHYSICAL EXAM:  NEURO: patient is awake , alert and oriented  PSYCH: AAO x 3  GEN: Not in acute distress  NECK: no thyroid enlargement, no JVD  LUNGS: Clear to auscultation bilaterally   CARDIOVASCULAR: S1/S2 present, RRR , no murmurs or rubs,   ABD: Soft, non-tender, non-distended, +BS  EXT: No ADAMS; amputated toes right foot; dressing heel of left foot (dry);   SKIN: Intact    LABS:                        11.3   13.05 )-----------( 167      ( 25 Sep 2018 23:08 )             35.5     09-25    137  |  101  |  21<H>  ----------------------------<  62<L>  3.9   |  25  |  1.0    Ca    9.1      25 Sep 2018 23:08    TPro  7.2  /  Alb  3.9  /  TBili  0.4  /  DBili  x   /  AST  17  /  ALT  19  /  AlkPhos  133<H>  09-25      Troponin T, Serum: <0.01 ng/mL (09-25-18 @ 23:08)    CARDIAC MARKERS ( 25 Sep 2018 23:08 )  x     / <0.01 ng/mL / x     / x     / x            Troponin trend:    Serum Pro-Brain Natriuretic Peptide: 223 pg/mL (09-25-18 @ 23:08)      Hemoglobin A1C   Thyroid      RADIOLOGY:  -CXR:< from: Xray Chest 1 View- PORTABLE-Urgent (08.04.18 @ 22:46) >  Impression:      Status post a median sternotomy.    No radiographicevidence of acute cardiopulmonary disease.      < end of copied text >    < from: CT Head No Cont (09.26.18 @ 01:13) >  IMPRESSION:     No evidence of acute intracranial pathology.      < end of copied text >  < from: CT Cervical Spine No Cont (09.26.18 @ 01:13) >  IMPRESSION:    No evidence of a cervical spine fracture or subluxation.        < end of copied text >      < from: CT Abdomen and Pelvis No Cont (09.26.18 @ 01:18) >  IMPRESSION:        No evidence of acute intrathoracic, abdominal or pelvic traumatic injury.    Additional Findings/Recommendations After Attending Radiologist Review:    Acute fracture through the T11-T12 disc space and osteophyte (series 7,   image 47), "chalk stick" fracture with slight anterior widening of the   disc space. No posterior element involvement.      < end of copied text >    -TTE:  -CCTA:  -STRESS TEST:  -CATHETERIZATION:    ECG:< from: 12 Lead ECG (09.25.18 @ 22:15) >  Sinus rhythm with 1st degree A-V block  Cannot rule out Anterior infarct , age undetermined  Abnormal ECG    < end of copied text >      TELEMETRY EVENTS:

## 2018-09-26 NOTE — CHART NOTE - NSCHARTNOTEFT_GEN_A_CORE
63y Male 09-26-18 transferred to medicine from trauma service.    SUBJECTIVE: 63y with PMH of CAD, s/p CABGx3-5 vessel  approx 1 year ago, DM, neuropathy, fibromyalgia, who was brought to [facility] ED after falling while walking his dog.  Patient states he was walking his dog when he became lightheaded, got dizzy, and fell onto the sidewalk.  Over the past 2-3 weeks, the patient has been experiencing this feeling of lightheadedness/dizziness especially when rising from sitting or from bed quickly to standing.  Patient was found to have       PMHX:  Dyslipidemia  NSTEMI (non-ST elevated myocardial infarction)  Diabetic foot ulcer  PVD (peripheral vascular disease)  Diabetes  Other hyperlipidemia  Hypertension, unspecified type  Amputated toe, unspecified laterality  Other closed fracture of thoracic vertebra, unspecified thoracic vertebral level, initial encounter  Lightheadedness  Closed fracture of eleventh thoracic vertebra, unspecified fracture morphology, sequela  S/P CABG (coronary artery bypass graft)  Deep vein thrombosis associated with coronary artery bypass graft surgery  FALL                                                                         Lightheadedness  Fall, initial encounter    ALLERGY:  Cipro (Unknown)  IV contrast (Short breath)  Keflex (Unknown)      OBJECTIVE:  VITAL SIGNS (Last 24 hrs):  HR: 61 (09-26-18 @ 07:40) (61 - 81)  BP: 120/66 (09-26-18 @ 07:40) (120/66 - 137/86)  RR: 18 (09-26-18 @ 07:40) (18 - 20)  SpO2: 100% (09-26-18 @ 07:40) (98% - 100%)  Height: 190.5cm (25 Sep 2018 21:58)        LABS:  POCT Blood Glucose.: 146 mg/dL (26 Sep 2018 11:52)  POCT Blood Glucose.: 84 mg/dL (26 Sep 2018 08:33)  POCT Blood Glucose.: 64 mg/dL (26 Sep 2018 07:53)                 11.3   13.05 )-----------( 167      ( 25 Sep 2018 23:08 )             35.5       Auto Neutrophil %: 68.9 % (09-25-18 @ 23:08)  Auto Immature Granulocyte %: 0.5 % (09-25-18 @ 23:08)      137  |  101  |  21<H>  ----------------------------<  62<L>  3.9   |  25  |  1.0    Calcium, Total Serum: 9.1 mg/dL (09-25-18 @ 23:08)    LFTs:             7.2  | 0.4  | 17       ------------------[133     ( 25 Sep 2018 23:08 )  3.9  | x    | 19           Blood Gas Venous - Lactate: 1.3 mmoL/L (09-26-18 @ 00:23)    Coags:    CARDIAC MARKERS ( 25 Sep 2018 23:08 )  x     / <0.01 ng/mL / x     / x     / x          MEDICATIONS  (STANDING):  acetaminophen   Tablet .. 650 milliGRAM(s) Oral every 6 hours  dextrose 5%. 1000 milliLiter(s) (50 mL/Hr) IV Continuous <Continuous>  dextrose 50% Injectable 12.5 Gram(s) IV Push once  dextrose 50% Injectable 25 Gram(s) IV Push once  dextrose 50% Injectable 25 Gram(s) IV Push once  heparin  Injectable 5000 Unit(s) SubCutaneous every 8 hours  ibuprofen  Tablet. 600 milliGRAM(s) Oral every 8 hours  insulin lispro (HumaLOG) corrective regimen sliding scale   SubCutaneous three times a day before meals  insulin lispro (HumaLOG) corrective regimen sliding scale   SubCutaneous three times a day before meals  pantoprazole    Tablet 40 milliGRAM(s) Oral before breakfast    MEDICATIONS  (PRN):  dextrose 40% Gel 15 Gram(s) Oral once PRN Blood Glucose LESS THAN 70 milliGRAM(s)/deciliter  glucagon  Injectable 1 milliGRAM(s) IntraMuscular once PRN Glucose LESS THAN 70 milligrams/deciliter      IMAGING/TESTS:    < from: CT Abdomen and Pelvis No Cont (09.26.18 @ 01:18) >    IMPRESSION:      Acute fracture through the T11-T12 disc space and osteophyte (series 7,   image 47), "chalk stick" fracture with slight anterior widening of the   disc space. No posterior element involvement.    < end of copied text >      < from: VA Duplex Carotid, Bilat (09.26.18 @ 13:09) >    Impression:  20-39% stenosis of the right internal carotid artery.  20-39% stenosis of the left internal carotid artery.    < end of copied text >      A/P: 63 year old male s/p fall with T11-T12 fracture transferred to medical service for medical management and syncope work-up  -follow-up neurosurgery  -follow-up cardiology  -MRI T-spine pending  -echo pending  -eeg pending  -PM labs ordered  -home medications need review as patient is unsure of all doses/medications and unable to contact pharmacy despite multiple attempts to contact (patient is follow at [facility])      Case discussed with Dr. Agee who accepts the patient to medical service (spectra [id number])  Patient signed out to Dr. Galicia (spectra [phone])

## 2018-09-26 NOTE — H&P ADULT - PMH
Amputated toe, unspecified laterality    Diabetes    Diabetic foot ulcer    Hypertension, unspecified type    Other hyperlipidemia    PVD (peripheral vascular disease)

## 2018-09-26 NOTE — CONSULT NOTE ADULT - ASSESSMENT
63y male with PMH of CAD, NSTEMI s/p CABG (5 vessel according to patient; March 2016); DM, PAD, HTN, HLD brought in after syncopal episode while walking his dog.  Acute fracture through the T11-T12 disc space and osteophyte.   Associated presyncopal symptoms of light headedness and dizziness with no chest pain, chest palpitations, SOB.  2-3 week history of postural light headedness and dizziness.  History of vomiting several weeks in August.  Blood sugar of 62 on admission.  Negative troponin. Pro-bnp 223.  EKG unchanged from 08/2018: 1st degree A-V block; Awaiting TTE results       Syncopal episode secondary to orthostatic hypotension vs cardiac arrthymia  - 63y male with PMH of CAD, NSTEMI s/p CABG (5 vessel according to patient; March 2016); DM, PAD, HTN, HLD brought in after syncopal episode while walking his dog.  Acute fracture through the T11-T12 disc space and osteophyte.   Associated presyncopal symptoms of light headedness and dizziness with no chest pain, chest palpitations, SOB.  2-3 week history of postural light headedness and dizziness.  History of vomiting several weeks in August.  Blood sugar of 62 on admission.  Negative troponin. Pro-bnp 223.  EKG unchanged from 08/2018: 1st degree A-V block; Awaiting TTE results       Syncopal episode secondary to orthostatic hypotension vs cardiac arrthymia  - Obtain orthostatic vitals  - Verify home medication and doses  - Continue with medical management for CAD disease 63y male with PMH of CAD, NSTEMI s/p CABG (5 vessel according to patient; March 2016); DM, PAD, HTN, HLD brought in after syncopal episode while walking his dog.  Acute fracture through the T11-T12 disc space and osteophyte.   Associated presyncopal symptoms of light headedness and dizziness with no chest pain, chest palpitations, SOB.  2-3 week history of postural light headedness and dizziness.  History of vomiting several weeks in August.  Blood sugar of 62 on admission.  Negative troponin. Pro-bnp 223.  EKG unchanged from 08/2018: 1st degree A-V block; Awaiting TTE results       Syncopal episode secondary to orthostatic hypotension   - Obtain orthostatic vitals  - Hydration  - Verify home medication and doses  - Continue with medical management for CAD diseas  - check 2D echo  - if stable, d/c home

## 2018-09-26 NOTE — H&P ADULT - ASSESSMENT
ASSESSMENT/ PLAN:  63y Male with significant coronary history s/p syncopal fall from standing, no head trauma, found to have T11-T12 fractures on CT and back tenderness to palpation  -Admit  -Neurosurgery consult  -Cardiology consult  -Incentive spirometry  -Echo  -Labs, troponins   -EKG  -PT/Rehab  -SW  -DVT ppx  -GI ppx  -Fluids    Attending aware and agrees with plan

## 2018-09-26 NOTE — H&P ADULT - NSHPLABSRESULTS_GEN_ALL_CORE
LABS  --------------------------------------------------------------------------------------  Labs:  CAPILLARY BLOOD GLUCOSE                              11.3   13.05 )-----------( 167      ( 25 Sep 2018 23:08 )             35.5       Auto Neutrophil %: 68.9 % (09-25-18 @ 23:08)  Auto Immature Granulocyte %: 0.5 % (09-25-18 @ 23:08)    09-25    137  |  101  |  21<H>  ----------------------------<  62<L>  3.9   |  25  |  1.0      Calcium, Total Serum: 9.1 mg/dL (09-25-18 @ 23:08)      LFTs:             7.2  | 0.4  | 17       ------------------[133     ( 25 Sep 2018 23:08 )  3.9  | x    | 19             Blood Gas Venous - Lactate: 1.3 mmoL/L (09-26-18 @ 00:23)      Coags:    CARDIAC MARKERS ( 25 Sep 2018 23:08 )  x     / <0.01 ng/mL / x     / x     / x                --------------------------------------------------------------------------------------    OTHER LABS    IMAGING RESULTS    < from: CT Abdomen and Pelvis No Cont (09.26.18 @ 01:18) >    IMPRESSION:        No evidence of acute intrathoracic, abdominal or pelvic traumatic injury.    Additional Findings/Recommendations After Attending Radiologist Review:    Acute fracture through the T11-T12 disc space and osteophyte (series 7,   image 47), "chalk stick" fracture with slight anterior widening of the   disc space. No posterior element involvement.    Dr. Newman discussed finding with Dr. Castro 9/26/18 at 2:30 AM        < end of copied text >      < from: CT Chest No Cont (09.26.18 @ 01:18) >        < from: CT Head No Cont (09.26.18 @ 01:13) >      IMPRESSION:     No evidence of acute intracranial pathology.              < end of copied text >

## 2018-09-26 NOTE — ED ADULT NURSE NOTE - NSIMPLEMENTINTERV_GEN_ALL_ED
Implemented All Fall with Harm Risk Interventions:  [place] to call system. Call bell, personal items and telephone within reach. Instruct patient to call for assistance. Room bathroom lighting operational. Non-slip footwear when patient is off stretcher. Physically safe environment: no spills, clutter or unnecessary equipment. Stretcher in lowest position, wheels locked, appropriate side rails in place. Provide visual cue, wrist band, yellow gown, etc. Monitor gait and stability. Monitor for mental status changes and reorient to person, place, and time. Review medications for side effects contributing to fall risk. Reinforce activity limits and safety measures with patient and family. Provide visual clues: red socks.

## 2018-10-29 ENCOUNTER — EMERGENCY (EMERGENCY)
Facility: HOSPITAL | Age: 63
LOS: 0 days | Discharge: HOME | End: 2018-10-29
Attending: EMERGENCY MEDICINE | Admitting: EMERGENCY MEDICINE

## 2018-10-29 VITALS
OXYGEN SATURATION: 94 % | HEART RATE: 124 BPM | SYSTOLIC BLOOD PRESSURE: 111 MMHG | DIASTOLIC BLOOD PRESSURE: 61 MMHG | RESPIRATION RATE: 22 BRPM | HEIGHT: 75 IN | WEIGHT: 240.08 LBS | TEMPERATURE: 100 F

## 2018-10-29 VITALS
TEMPERATURE: 98 F | DIASTOLIC BLOOD PRESSURE: 78 MMHG | HEART RATE: 98 BPM | OXYGEN SATURATION: 100 % | RESPIRATION RATE: 19 BRPM | SYSTOLIC BLOOD PRESSURE: 141 MMHG

## 2018-10-29 DIAGNOSIS — I25.10 ATHEROSCLEROTIC HEART DISEASE OF NATIVE CORONARY ARTERY WITHOUT ANGINA PECTORIS: ICD-10-CM

## 2018-10-29 DIAGNOSIS — R07.89 OTHER CHEST PAIN: ICD-10-CM

## 2018-10-29 DIAGNOSIS — J06.9 ACUTE UPPER RESPIRATORY INFECTION, UNSPECIFIED: ICD-10-CM

## 2018-10-29 DIAGNOSIS — N39.0 URINARY TRACT INFECTION, SITE NOT SPECIFIED: ICD-10-CM

## 2018-10-29 DIAGNOSIS — I10 ESSENTIAL (PRIMARY) HYPERTENSION: ICD-10-CM

## 2018-10-29 DIAGNOSIS — E78.5 HYPERLIPIDEMIA, UNSPECIFIED: ICD-10-CM

## 2018-10-29 DIAGNOSIS — R11.2 NAUSEA WITH VOMITING, UNSPECIFIED: ICD-10-CM

## 2018-10-29 DIAGNOSIS — Z79.899 OTHER LONG TERM (CURRENT) DRUG THERAPY: ICD-10-CM

## 2018-10-29 DIAGNOSIS — Z91.041 RADIOGRAPHIC DYE ALLERGY STATUS: ICD-10-CM

## 2018-10-29 DIAGNOSIS — E11.9 TYPE 2 DIABETES MELLITUS WITHOUT COMPLICATIONS: ICD-10-CM

## 2018-10-29 DIAGNOSIS — R05 COUGH: ICD-10-CM

## 2018-10-29 DIAGNOSIS — Z95.1 PRESENCE OF AORTOCORONARY BYPASS GRAFT: Chronic | ICD-10-CM

## 2018-10-29 DIAGNOSIS — Z88.1 ALLERGY STATUS TO OTHER ANTIBIOTIC AGENTS STATUS: ICD-10-CM

## 2018-10-29 LAB
ALBUMIN SERPL ELPH-MCNC: 3.4 G/DL — LOW (ref 3.5–5.2)
ALP SERPL-CCNC: 173 U/L — HIGH (ref 30–115)
ALT FLD-CCNC: 30 U/L — SIGNIFICANT CHANGE UP (ref 0–41)
ANION GAP SERPL CALC-SCNC: 12 MMOL/L — SIGNIFICANT CHANGE UP (ref 7–14)
APPEARANCE UR: ABNORMAL
AST SERPL-CCNC: 24 U/L — SIGNIFICANT CHANGE UP (ref 0–41)
BACTERIA # UR AUTO: ABNORMAL /HPF
BASE EXCESS BLDV CALC-SCNC: 2.2 MMOL/L — HIGH (ref -2–2)
BASOPHILS # BLD AUTO: 0.06 K/UL — SIGNIFICANT CHANGE UP (ref 0–0.2)
BASOPHILS NFR BLD AUTO: 0.8 % — SIGNIFICANT CHANGE UP (ref 0–1)
BILIRUB SERPL-MCNC: 1.3 MG/DL — HIGH (ref 0.2–1.2)
BILIRUB UR-MCNC: ABNORMAL
BUN SERPL-MCNC: 16 MG/DL — SIGNIFICANT CHANGE UP (ref 10–20)
CA-I SERPL-SCNC: 1.19 MMOL/L — SIGNIFICANT CHANGE UP (ref 1.12–1.3)
CALCIUM SERPL-MCNC: 8.9 MG/DL — SIGNIFICANT CHANGE UP (ref 8.5–10.1)
CHLORIDE SERPL-SCNC: 93 MMOL/L — LOW (ref 98–110)
CO2 SERPL-SCNC: 23 MMOL/L — SIGNIFICANT CHANGE UP (ref 17–32)
COLOR SPEC: SIGNIFICANT CHANGE UP
CREAT SERPL-MCNC: 1.1 MG/DL — SIGNIFICANT CHANGE UP (ref 0.7–1.5)
DIFF PNL FLD: NEGATIVE — SIGNIFICANT CHANGE UP
EOSINOPHIL # BLD AUTO: 0.12 K/UL — SIGNIFICANT CHANGE UP (ref 0–0.7)
EOSINOPHIL NFR BLD AUTO: 1.6 % — SIGNIFICANT CHANGE UP (ref 0–8)
EPI CELLS # UR: ABNORMAL /HPF
GAS PNL BLDV: 134 MMOL/L — LOW (ref 136–145)
GAS PNL BLDV: SIGNIFICANT CHANGE UP
GLUCOSE SERPL-MCNC: 152 MG/DL — HIGH (ref 70–99)
GLUCOSE UR QL: 500 MG/DL
HCO3 BLDV-SCNC: 26 MMOL/L — SIGNIFICANT CHANGE UP (ref 22–29)
HCT VFR BLD CALC: 33.3 % — LOW (ref 42–52)
HCT VFR BLDA CALC: 34.7 % — SIGNIFICANT CHANGE UP (ref 34–44)
HGB BLD CALC-MCNC: 11.3 G/DL — LOW (ref 14–18)
HGB BLD-MCNC: 10.8 G/DL — LOW (ref 14–18)
IMM GRANULOCYTES NFR BLD AUTO: 0.3 % — SIGNIFICANT CHANGE UP (ref 0.1–0.3)
KETONES UR-MCNC: NEGATIVE — SIGNIFICANT CHANGE UP
LACTATE BLDV-MCNC: 1.1 MMOL/L — SIGNIFICANT CHANGE UP (ref 0.5–1.6)
LEUKOCYTE ESTERASE UR-ACNC: ABNORMAL
LYMPHOCYTES # BLD AUTO: 0.68 K/UL — LOW (ref 1.2–3.4)
LYMPHOCYTES # BLD AUTO: 9.1 % — LOW (ref 20.5–51.1)
MCHC RBC-ENTMCNC: 23.3 PG — LOW (ref 27–31)
MCHC RBC-ENTMCNC: 32.4 G/DL — SIGNIFICANT CHANGE UP (ref 32–37)
MCV RBC AUTO: 71.9 FL — LOW (ref 80–94)
MONOCYTES # BLD AUTO: 0.76 K/UL — HIGH (ref 0.1–0.6)
MONOCYTES NFR BLD AUTO: 10.1 % — HIGH (ref 1.7–9.3)
NEUTROPHILS # BLD AUTO: 5.86 K/UL — SIGNIFICANT CHANGE UP (ref 1.4–6.5)
NEUTROPHILS NFR BLD AUTO: 78.1 % — HIGH (ref 42.2–75.2)
NITRITE UR-MCNC: NEGATIVE — SIGNIFICANT CHANGE UP
NRBC # BLD: 0 /100 WBCS — SIGNIFICANT CHANGE UP (ref 0–0)
PCO2 BLDV: 37 MMHG — LOW (ref 41–51)
PH BLDV: 7.46 — HIGH (ref 7.26–7.43)
PH UR: 6.5 — SIGNIFICANT CHANGE UP (ref 5–8)
PLATELET # BLD AUTO: 167 K/UL — SIGNIFICANT CHANGE UP (ref 130–400)
PO2 BLDV: 42 MMHG — HIGH (ref 20–40)
POTASSIUM BLDV-SCNC: 3.8 MMOL/L — SIGNIFICANT CHANGE UP (ref 3.3–5.6)
POTASSIUM SERPL-MCNC: 3.8 MMOL/L — SIGNIFICANT CHANGE UP (ref 3.5–5)
POTASSIUM SERPL-SCNC: 3.8 MMOL/L — SIGNIFICANT CHANGE UP (ref 3.5–5)
PROT SERPL-MCNC: 7.4 G/DL — SIGNIFICANT CHANGE UP (ref 6–8)
PROT UR-MCNC: 30 MG/DL
RBC # BLD: 4.63 M/UL — LOW (ref 4.7–6.1)
RBC # FLD: 18 % — HIGH (ref 11.5–14.5)
RBC CASTS # UR COMP ASSIST: ABNORMAL /HPF
SAO2 % BLDV: 80 % — SIGNIFICANT CHANGE UP
SODIUM SERPL-SCNC: 128 MMOL/L — LOW (ref 135–146)
SP GR SPEC: 1.02 — SIGNIFICANT CHANGE UP (ref 1.01–1.03)
TROPONIN T SERPL-MCNC: <0.01 NG/ML — SIGNIFICANT CHANGE UP
UROBILINOGEN FLD QL: 1 MG/DL (ref 0.2–0.2)
WBC # BLD: 7.5 K/UL — SIGNIFICANT CHANGE UP (ref 4.8–10.8)
WBC # FLD AUTO: 7.5 K/UL — SIGNIFICANT CHANGE UP (ref 4.8–10.8)
WBC UR QL: ABNORMAL /HPF

## 2018-10-29 RX ORDER — SODIUM CHLORIDE 9 MG/ML
3400 INJECTION INTRAMUSCULAR; INTRAVENOUS; SUBCUTANEOUS ONCE
Qty: 0 | Refills: 0 | Status: COMPLETED | OUTPATIENT
Start: 2018-10-29 | End: 2018-10-29

## 2018-10-29 RX ORDER — CEFPODOXIME PROXETIL 100 MG
1 TABLET ORAL
Qty: 14 | Refills: 0 | OUTPATIENT
Start: 2018-10-29 | End: 2018-11-04

## 2018-10-29 RX ORDER — ACETAMINOPHEN 500 MG
650 TABLET ORAL ONCE
Qty: 0 | Refills: 0 | Status: COMPLETED | OUTPATIENT
Start: 2018-10-29 | End: 2018-10-29

## 2018-10-29 RX ADMIN — SODIUM CHLORIDE 3400 MILLILITER(S): 9 INJECTION INTRAMUSCULAR; INTRAVENOUS; SUBCUTANEOUS at 08:31

## 2018-10-29 RX ADMIN — Medication 650 MILLIGRAM(S): at 08:10

## 2018-10-29 NOTE — ED PROVIDER NOTE - NS ED ROS FT
Constitutional: (-) fever  (-)chills  (-)sweats  Eyes/ENT: (-) blurry vision, (-) epistaxis  (-)rhinorrhea   (-) sore throat    Cardiovascular: (+) chest pain with cough, (-) palpitations (-) edema   Respiratory: (+) cough, (-) shortness of breath   Gastrointestinal: (+)nausea  (+)vomiting, (-) diarrhea  (-) abdominal pain   Musculoskeletal: (-) neck pain, (-) back pain, (-) joint pain  Integumentary: (-) rash, (-) edema  Neurological: (-) headache, (-) altered mental status  (-)LOC  Psychiatric: (-) hallucinations  Allergic/Immunologic: (-) pruritus

## 2018-10-29 NOTE — ED PROVIDER NOTE - OBJECTIVE STATEMENT
62 y/o male with hx of CAD with Stent, CABG, IDDM, HTN, Dyslipidemia presents to ED with non productive cough x 3 days.  No fever or chills.  (+) nausea and vomiting.  (+) CP with cough, no SOB/BREWSTER.  No abdominal/flank pain.  No dysuria/frequency or urgency.  PMD Dr. Abreu.

## 2018-10-29 NOTE — ED PROVIDER NOTE - PHYSICAL EXAMINATION
VITAL SIGNS: I have reviewed nursing notes and confirm.  CONSTITUTIONAL: Well-developed; well-nourished; in no acute distress.  SKIN: Skin exam is warm and dry, no acute rash.  HEAD: Normocephalic; atraumatic.  EYES: PERRL, EOM intact; conjunctiva clear.  No pallor or icterus  ENT: No nasal discharge; airway clear. TMs clear.  NECK: Supple; non tender.  CARD: S1, S2 normal; no murmurs, gallops, or rubs. Tachycardic  RESP: No wheezes, rales or rhonchi.  ABD: Soft; non-distended; non-tender; no rebound or guarding.  EXT: Normal ROM. No clubbing, cyanosis or edema.  LYMPH: No acute cervical adenopathy.  NEURO: Alert, oriented. Grossly unremarkable. No focal deficits.  PSYCH: Cooperative, appropriate.

## 2018-10-29 NOTE — ED ADULT NURSE NOTE - NSIMPLEMENTINTERV_GEN_ALL_ED
Implemented All Universal Safety Interventions:  [place] to call system. Call bell, personal items and telephone within reach. Instruct patient to call for assistance. Room bathroom lighting operational. Non-slip footwear when patient is off stretcher. Physically safe environment: no spills, clutter or unnecessary equipment. Stretcher in lowest position, wheels locked, appropriate side rails in place.

## 2018-10-29 NOTE — ED PROVIDER NOTE - MEDICAL DECISION MAKING DETAILS
CXR negative.  UA with (+) LE.  Possible URI vs. early UTI given Fever and likely hx of BPH.  Will Rx Vantin (patient denies Keflex allergy and report he has a Flexeril allergy).  D/C home to F/U with Dr. Abreu.

## 2018-11-03 LAB
CULTURE RESULTS: SIGNIFICANT CHANGE UP
SPECIMEN SOURCE: SIGNIFICANT CHANGE UP

## 2018-11-04 ENCOUNTER — INPATIENT (INPATIENT)
Facility: HOSPITAL | Age: 63
LOS: 9 days | Discharge: HOME HEALTH | End: 2018-11-14
Attending: INTERNAL MEDICINE | Admitting: INTERNAL MEDICINE
Payer: SELF-PAY

## 2018-11-04 VITALS
RESPIRATION RATE: 20 BRPM | SYSTOLIC BLOOD PRESSURE: 92 MMHG | TEMPERATURE: 97 F | HEART RATE: 80 BPM | OXYGEN SATURATION: 100 % | DIASTOLIC BLOOD PRESSURE: 57 MMHG

## 2018-11-04 DIAGNOSIS — Z95.1 PRESENCE OF AORTOCORONARY BYPASS GRAFT: Chronic | ICD-10-CM

## 2018-11-04 LAB
ALBUMIN SERPL ELPH-MCNC: 3.3 G/DL — LOW (ref 3.5–5.2)
ALP SERPL-CCNC: 233 U/L — HIGH (ref 30–115)
ALT FLD-CCNC: 24 U/L — SIGNIFICANT CHANGE UP (ref 0–41)
ANION GAP SERPL CALC-SCNC: 20 MMOL/L — HIGH (ref 7–14)
APPEARANCE UR: CLEAR — SIGNIFICANT CHANGE UP
AST SERPL-CCNC: 22 U/L — SIGNIFICANT CHANGE UP (ref 0–41)
BACTERIA # UR AUTO: ABNORMAL /HPF
BASE EXCESS BLDV CALC-SCNC: 4 MMOL/L — HIGH (ref -2–2)
BASOPHILS # BLD AUTO: 0.06 K/UL — SIGNIFICANT CHANGE UP (ref 0–0.2)
BASOPHILS NFR BLD AUTO: 0.4 % — SIGNIFICANT CHANGE UP (ref 0–1)
BILIRUB SERPL-MCNC: 1.5 MG/DL — HIGH (ref 0.2–1.2)
BILIRUB UR-MCNC: ABNORMAL
BUN SERPL-MCNC: 23 MG/DL — HIGH (ref 10–20)
CA-I SERPL-SCNC: 1.18 MMOL/L — SIGNIFICANT CHANGE UP (ref 1.12–1.3)
CALCIUM SERPL-MCNC: 9.5 MG/DL — SIGNIFICANT CHANGE UP (ref 8.5–10.1)
CHLORIDE SERPL-SCNC: 94 MMOL/L — LOW (ref 98–110)
CK MB CFR SERPL CALC: <1 NG/ML — SIGNIFICANT CHANGE UP (ref 0.6–6.3)
CO2 SERPL-SCNC: 22 MMOL/L — SIGNIFICANT CHANGE UP (ref 17–32)
COLOR SPEC: SIGNIFICANT CHANGE UP
CREAT SERPL-MCNC: 1.2 MG/DL — SIGNIFICANT CHANGE UP (ref 0.7–1.5)
DIFF PNL FLD: NEGATIVE — SIGNIFICANT CHANGE UP
EOSINOPHIL # BLD AUTO: 0.13 K/UL — SIGNIFICANT CHANGE UP (ref 0–0.7)
EOSINOPHIL NFR BLD AUTO: 0.9 % — SIGNIFICANT CHANGE UP (ref 0–8)
GAS PNL BLDV: 136 MMOL/L — SIGNIFICANT CHANGE UP (ref 136–145)
GAS PNL BLDV: SIGNIFICANT CHANGE UP
GAS PNL BLDV: SIGNIFICANT CHANGE UP
GLUCOSE BLDC GLUCOMTR-MCNC: 166 MG/DL — HIGH (ref 70–99)
GLUCOSE BLDC GLUCOMTR-MCNC: 214 MG/DL — HIGH (ref 70–99)
GLUCOSE SERPL-MCNC: 159 MG/DL — HIGH (ref 70–99)
GLUCOSE UR QL: 500 MG/DL
HCO3 BLDV-SCNC: 28 MMOL/L — SIGNIFICANT CHANGE UP (ref 22–29)
HCT VFR BLD CALC: 34.6 % — LOW (ref 42–52)
HCT VFR BLDA CALC: 34.9 % — SIGNIFICANT CHANGE UP (ref 34–44)
HGB BLD CALC-MCNC: 11.4 G/DL — LOW (ref 14–18)
HGB BLD-MCNC: 11.2 G/DL — LOW (ref 14–18)
IMM GRANULOCYTES NFR BLD AUTO: 0.7 % — HIGH (ref 0.1–0.3)
KETONES UR-MCNC: ABNORMAL
LACTATE BLDV-MCNC: 2.4 MMOL/L — HIGH (ref 0.5–1.6)
LEUKOCYTE ESTERASE UR-ACNC: ABNORMAL
LYMPHOCYTES # BLD AUTO: 1.56 K/UL — SIGNIFICANT CHANGE UP (ref 1.2–3.4)
LYMPHOCYTES # BLD AUTO: 10.2 % — LOW (ref 20.5–51.1)
MCHC RBC-ENTMCNC: 23 PG — LOW (ref 27–31)
MCHC RBC-ENTMCNC: 32.4 G/DL — SIGNIFICANT CHANGE UP (ref 32–37)
MCV RBC AUTO: 71 FL — LOW (ref 80–94)
MONOCYTES # BLD AUTO: 1.47 K/UL — HIGH (ref 0.1–0.6)
MONOCYTES NFR BLD AUTO: 9.6 % — HIGH (ref 1.7–9.3)
NEUTROPHILS # BLD AUTO: 11.92 K/UL — HIGH (ref 1.4–6.5)
NEUTROPHILS NFR BLD AUTO: 78.2 % — HIGH (ref 42.2–75.2)
NITRITE UR-MCNC: NEGATIVE — SIGNIFICANT CHANGE UP
NRBC # BLD: 0 /100 WBCS — SIGNIFICANT CHANGE UP (ref 0–0)
PCO2 BLDV: 37 MMHG — LOW (ref 41–51)
PH BLDV: 7.48 — HIGH (ref 7.26–7.43)
PH UR: 6.5 — SIGNIFICANT CHANGE UP (ref 5–8)
PLATELET # BLD AUTO: 292 K/UL — SIGNIFICANT CHANGE UP (ref 130–400)
PO2 BLDV: 41 MMHG — HIGH (ref 20–40)
POTASSIUM BLDV-SCNC: 3.8 MMOL/L — SIGNIFICANT CHANGE UP (ref 3.3–5.6)
POTASSIUM SERPL-MCNC: 4.3 MMOL/L — SIGNIFICANT CHANGE UP (ref 3.5–5)
POTASSIUM SERPL-SCNC: 4.3 MMOL/L — SIGNIFICANT CHANGE UP (ref 3.5–5)
PROT SERPL-MCNC: 7.8 G/DL — SIGNIFICANT CHANGE UP (ref 6–8)
PROT UR-MCNC: 30 MG/DL
RBC # BLD: 4.87 M/UL — SIGNIFICANT CHANGE UP (ref 4.7–6.1)
RBC # FLD: 17.9 % — HIGH (ref 11.5–14.5)
SAO2 % BLDV: 74 % — SIGNIFICANT CHANGE UP
SODIUM SERPL-SCNC: 136 MMOL/L — SIGNIFICANT CHANGE UP (ref 135–146)
SP GR SPEC: 1.02 — SIGNIFICANT CHANGE UP (ref 1.01–1.03)
TROPONIN T SERPL-MCNC: <0.01 NG/ML — SIGNIFICANT CHANGE UP
UROBILINOGEN FLD QL: 2 MG/DL (ref 0.2–0.2)
WBC # BLD: 15.25 K/UL — HIGH (ref 4.8–10.8)
WBC # FLD AUTO: 15.25 K/UL — HIGH (ref 4.8–10.8)
WBC UR QL: ABNORMAL /HPF

## 2018-11-04 PROCEDURE — 93925 LOWER EXTREMITY STUDY: CPT | Mod: 26

## 2018-11-04 PROCEDURE — 93970 EXTREMITY STUDY: CPT | Mod: 26

## 2018-11-04 RX ORDER — SODIUM CHLORIDE 9 MG/ML
1000 INJECTION, SOLUTION INTRAVENOUS ONCE
Qty: 0 | Refills: 0 | Status: COMPLETED | OUTPATIENT
Start: 2018-11-04 | End: 2018-11-04

## 2018-11-04 RX ORDER — INSULIN LISPRO 100/ML
6 VIAL (ML) SUBCUTANEOUS
Qty: 0 | Refills: 0 | Status: DISCONTINUED | OUTPATIENT
Start: 2018-11-04 | End: 2018-11-10

## 2018-11-04 RX ORDER — SENNA PLUS 8.6 MG/1
1 TABLET ORAL AT BEDTIME
Qty: 0 | Refills: 0 | Status: DISCONTINUED | OUTPATIENT
Start: 2018-11-04 | End: 2018-11-10

## 2018-11-04 RX ORDER — CEFEPIME 1 G/1
2000 INJECTION, POWDER, FOR SOLUTION INTRAMUSCULAR; INTRAVENOUS EVERY 8 HOURS
Qty: 0 | Refills: 0 | Status: DISCONTINUED | OUTPATIENT
Start: 2018-11-04 | End: 2018-11-06

## 2018-11-04 RX ORDER — MORPHINE SULFATE 50 MG/1
4 CAPSULE, EXTENDED RELEASE ORAL ONCE
Qty: 0 | Refills: 0 | Status: DISCONTINUED | OUTPATIENT
Start: 2018-11-04 | End: 2018-11-04

## 2018-11-04 RX ORDER — CLOPIDOGREL BISULFATE 75 MG/1
75 TABLET, FILM COATED ORAL DAILY
Qty: 0 | Refills: 0 | Status: DISCONTINUED | OUTPATIENT
Start: 2018-11-04 | End: 2018-11-10

## 2018-11-04 RX ORDER — ONDANSETRON 8 MG/1
4 TABLET, FILM COATED ORAL ONCE
Qty: 0 | Refills: 0 | Status: COMPLETED | OUTPATIENT
Start: 2018-11-04 | End: 2018-11-04

## 2018-11-04 RX ORDER — DOCUSATE SODIUM 100 MG
100 CAPSULE ORAL DAILY
Qty: 0 | Refills: 0 | Status: DISCONTINUED | OUTPATIENT
Start: 2018-11-04 | End: 2018-11-10

## 2018-11-04 RX ORDER — ENOXAPARIN SODIUM 100 MG/ML
40 INJECTION SUBCUTANEOUS DAILY
Qty: 0 | Refills: 0 | Status: DISCONTINUED | OUTPATIENT
Start: 2018-11-04 | End: 2018-11-10

## 2018-11-04 RX ORDER — ONDANSETRON 8 MG/1
4 TABLET, FILM COATED ORAL EVERY 6 HOURS
Qty: 0 | Refills: 0 | Status: DISCONTINUED | OUTPATIENT
Start: 2018-11-04 | End: 2018-11-09

## 2018-11-04 RX ORDER — METOPROLOL TARTRATE 50 MG
25 TABLET ORAL DAILY
Qty: 0 | Refills: 0 | Status: DISCONTINUED | OUTPATIENT
Start: 2018-11-04 | End: 2018-11-10

## 2018-11-04 RX ORDER — PIPERACILLIN AND TAZOBACTAM 4; .5 G/20ML; G/20ML
4.5 INJECTION, POWDER, LYOPHILIZED, FOR SOLUTION INTRAVENOUS ONCE
Qty: 0 | Refills: 0 | Status: COMPLETED | OUTPATIENT
Start: 2018-11-04 | End: 2018-11-04

## 2018-11-04 RX ORDER — ASPIRIN/CALCIUM CARB/MAGNESIUM 324 MG
81 TABLET ORAL DAILY
Qty: 0 | Refills: 0 | Status: DISCONTINUED | OUTPATIENT
Start: 2018-11-04 | End: 2018-11-10

## 2018-11-04 RX ORDER — VANCOMYCIN HCL 1 G
2000 VIAL (EA) INTRAVENOUS ONCE
Qty: 0 | Refills: 0 | Status: COMPLETED | OUTPATIENT
Start: 2018-11-04 | End: 2018-11-04

## 2018-11-04 RX ORDER — ATORVASTATIN CALCIUM 80 MG/1
40 TABLET, FILM COATED ORAL AT BEDTIME
Qty: 0 | Refills: 0 | Status: DISCONTINUED | OUTPATIENT
Start: 2018-11-04 | End: 2018-11-10

## 2018-11-04 RX ORDER — VANCOMYCIN HCL 1 G
1750 VIAL (EA) INTRAVENOUS EVERY 12 HOURS
Qty: 0 | Refills: 0 | Status: DISCONTINUED | OUTPATIENT
Start: 2018-11-04 | End: 2018-11-06

## 2018-11-04 RX ORDER — OXYCODONE AND ACETAMINOPHEN 5; 325 MG/1; MG/1
1 TABLET ORAL EVERY 6 HOURS
Qty: 0 | Refills: 0 | Status: DISCONTINUED | OUTPATIENT
Start: 2018-11-04 | End: 2018-11-10

## 2018-11-04 RX ORDER — INSULIN GLARGINE 100 [IU]/ML
27 INJECTION, SOLUTION SUBCUTANEOUS AT BEDTIME
Qty: 0 | Refills: 0 | Status: DISCONTINUED | OUTPATIENT
Start: 2018-11-04 | End: 2018-11-10

## 2018-11-04 RX ADMIN — Medication 250 MILLIGRAM(S): at 16:53

## 2018-11-04 RX ADMIN — PIPERACILLIN AND TAZOBACTAM 200 GRAM(S): 4; .5 INJECTION, POWDER, LYOPHILIZED, FOR SOLUTION INTRAVENOUS at 15:59

## 2018-11-04 RX ADMIN — ONDANSETRON 4 MILLIGRAM(S): 8 TABLET, FILM COATED ORAL at 21:15

## 2018-11-04 RX ADMIN — SODIUM CHLORIDE 1000 MILLILITER(S): 9 INJECTION, SOLUTION INTRAVENOUS at 13:08

## 2018-11-04 RX ADMIN — SODIUM CHLORIDE 1000 MILLILITER(S): 9 INJECTION, SOLUTION INTRAVENOUS at 14:28

## 2018-11-04 RX ADMIN — MORPHINE SULFATE 4 MILLIGRAM(S): 50 CAPSULE, EXTENDED RELEASE ORAL at 16:23

## 2018-11-04 RX ADMIN — ONDANSETRON 4 MILLIGRAM(S): 8 TABLET, FILM COATED ORAL at 13:08

## 2018-11-04 RX ADMIN — ATORVASTATIN CALCIUM 40 MILLIGRAM(S): 80 TABLET, FILM COATED ORAL at 21:15

## 2018-11-04 RX ADMIN — OXYCODONE AND ACETAMINOPHEN 1 TABLET(S): 5; 325 TABLET ORAL at 19:36

## 2018-11-04 RX ADMIN — INSULIN GLARGINE 27 UNIT(S): 100 INJECTION, SOLUTION SUBCUTANEOUS at 21:16

## 2018-11-04 RX ADMIN — OXYCODONE AND ACETAMINOPHEN 1 TABLET(S): 5; 325 TABLET ORAL at 20:06

## 2018-11-04 RX ADMIN — SODIUM CHLORIDE 1000 MILLILITER(S): 9 INJECTION, SOLUTION INTRAVENOUS at 14:50

## 2018-11-04 RX ADMIN — Medication 250 MILLIGRAM(S): at 18:42

## 2018-11-04 RX ADMIN — SENNA PLUS 1 TABLET(S): 8.6 TABLET ORAL at 21:16

## 2018-11-04 RX ADMIN — MORPHINE SULFATE 4 MILLIGRAM(S): 50 CAPSULE, EXTENDED RELEASE ORAL at 13:53

## 2018-11-04 RX ADMIN — CEFEPIME 100 MILLIGRAM(S): 1 INJECTION, POWDER, FOR SOLUTION INTRAMUSCULAR; INTRAVENOUS at 21:16

## 2018-11-04 NOTE — H&P ADULT - NSHPREVIEWOFSYSTEMS_GEN_ALL_CORE
REVIEW OF SYSTEMS:    CONSTITUTIONAL: No weakness, fevers or chills  EYES/ENT: No visual changes;  No vertigo or throat pain   NECK: No pain or stiffness  RESPIRATORY: No cough, wheezing, hemoptysis; No shortness of breath  CARDIOVASCULAR: No chest pain or palpitations  GASTROINTESTINAL: No abdominal or epigastric pain. No nausea, vomiting, or hematemesis; No diarrhea or constipation. No melena or hematochezia.  GENITOURINARY: No dysuria, frequency or hematuria  NEUROLOGICAL: No numbness or weakness  SKIN: No itching, + left foot ulcer REVIEW OF SYSTEMS:    CONSTITUTIONAL: No weakness, fevers or chills  EYES/ENT: No visual changes;  No vertigo or throat pain   NECK: No pain or stiffness  RESPIRATORY: No cough, wheezing, hemoptysis; No shortness of breath  CARDIOVASCULAR: No chest pain or palpitations  GASTROINTESTINAL: No abdominal or epigastric pain. No nausea, vomiting, or hematemesis; No diarrhea or constipation. No melena or hematochezia.  GENITOURINARY: No dysuria, frequency or hematuria  NEUROLOGICAL: No numbness or weakness  SKIN: No itching, + left foot ulcer  MSK: + back pain

## 2018-11-04 NOTE — ED ADULT NURSE NOTE - LOCATION
Called patient in offered appt, patient agreed to be seen 8/5/17 @ 10:15 am at [facility], patient was provided with clinic's address, phone and doctors name, there was no additional questions or concerns. Patient scheduled.  
Not urgent. Please contact pt to schedule follow up in 2-3 weeks.  
Patient called in mentioned that he was seen in the [place] E/R on 7/12/17 for left shoulder pain.     There were no images taken during that visit, please advise on when patient may be seen. Patient can be reached at [phone] to schedule.      
foot

## 2018-11-04 NOTE — ED ADULT NURSE NOTE - NSIMPLEMENTINTERV_GEN_ALL_ED
Implemented All Fall with Harm Risk Interventions:  [place] to call system. Call bell, personal items and telephone within reach. Instruct patient to call for assistance. Room bathroom lighting operational. Non-slip footwear when patient is off stretcher. Physically safe environment: no spills, clutter or unnecessary equipment. Stretcher in lowest position, wheels locked, appropriate side rails in place. Provide visual cue, wrist band, yellow gown, etc. Monitor gait and stability. Monitor for mental status changes and reorient to person, place, and time. Review medications for side effects contributing to fall risk. Reinforce activity limits and safety measures with patient and family. Provide visual clues: red socks.

## 2018-11-04 NOTE — H&P ADULT - ATTENDING COMMENTS
Patient was evaluated and examined by bedside, no c/o abdominal pain, tolerating diet well.    All labs, radiology studies, VS was reviewed  I agree with medical plan outlined by Medical resident as stated above.  -continue IV abx, f/up Podiatry  -for nausea and cholelithiasis- if symptoms persist consider GI eval  -for chronic medical conditions -resumed on home regimen tx.  -anemia- complete further work up, monitor H/H  -DM2- f/up hga1c, bsfs with ins. co prn.

## 2018-11-04 NOTE — H&P ADULT - HISTORY OF PRESENT ILLNESS
IN PROGRESS    63y male with PMH of CAD, NSTEMI s/p CABG (5 vessel according to patient; March 2016); DM with peripheral neuropathy, HTN, HLD brought in for SOB for 2 days. States that it was associated with cough and nausea and 5 episodes of non-bloody vomiting. Has had chills without fever. Wound care nurse came today to check on his open left foot ulcer earlier today and referred the patient to the ER due to worsening appearance. Denies chest pain, palpitations, dizziness, changes in urination, or diarrhea.      In ED pt received  2L LR stat  Vancomycin 2gm IV stat  Zosyn 4.5gm IV stat  Zofran 4mg IV push  Morphine 4 mg IV push 63y male with PMH of CAD, NSTEMI s/p CABG (5 vessel according to patient; March 2016); DM with peripheral neuropathy, HTN, HLD was sent for worsening chronic left foot ulcer. For the past 2 yrs pt has left foot ulcer that has been taken care by podiatry at [facility] and has wound care nurse (3 days/week) for dressing. Today during dressing, nurse notice significant bleeding from the ulcer site and Q tip goes down to bone and referred the patient to the ER. He also complains of frequent falls approx. once every 2 months. On his last fall, he hit his back on the side walk and now suffering from back pain since. Denies fever, chills, trauma, chest pain, palpitations, dizziness, changes in urination, or diarrhea. Patient was recently discharged from the ED (10/29/18) with UTI and sent home on Vantin. At baseline, patient walk with cane; wife passed away Feb 2018 - coping ok but finds it difficult to manage ADL's.    In ED, Xray L Foot: Erosive change to the medial cuneiform, new since prior examination consistent with osteomyelitis.  He also started to have some nausea with no vomiting or abd pain. US Abdomen Limited (11.04.18): Cholelithiasis without evidence of cholecystitis.    Pt received:  2L LR stat  Vancomycin 2gm IV stat  Zosyn 4.5gm IV stat  Zofran 4mg IV push  Morphine 4 mg IV push 63y male with PMH of CAD, NSTEMI s/p CABG (5 vessel according to patient; March 2016); DM with peripheral neuropathy, Left DFU, HTN, HLD was sent for worsening chronic left foot ulcer. For the past 2 yrs pt has chronic diabetic left foot ulcer that has been taken care by podiatry at [facility] and has wound care nurse (3 days/week) for dressing. Today during dressing, nurse notice significant bleeding from the ulcer site and Q tip goes down to bone and referred the patient to the ER. He also complains of frequent falls approx. once every 2 months. On his last fall, he hit his back on the side walk and now suffering from back pain since. Denies fever, chills, trauma, chest pain, palpitations, dizziness, changes in urination, or diarrhea. Patient was recently discharged from the ED (10/29/18) with UTI and sent home on Vantin. At baseline, patient walk with cane; wife passed away Feb 2018 - coping ok but finds it difficult to manage ADL's.    In ED, Xray L Foot: Erosive change to the medial cuneiform, new since prior examination consistent with osteomyelitis.  He also started to have some nausea with no vomiting or abd pain. US Abdomen Limited (11.04.18): Cholelithiasis without evidence of cholecystitis.    Pt received:  2L LR stat  Vancomycin 2gm IV stat  Zosyn 4.5gm IV stat  Zofran 4mg IV push  Morphine 4 mg IV push

## 2018-11-04 NOTE — H&P ADULT - NSHPSOCIALHISTORY_GEN_ALL_CORE
Marital Status:  ( x  )    (   ) Single    (   )    (  )   Lives with: (  ) alone  (  ) children   ( x ) spouse   (  ) parents  (  ) other  Recent Travel: none    Substance Use (street drugs): ( x ) never used  (  ) other:  Tobacco Usage:  ( x  ) never smoked   (   ) former smoker   (   ) current smoker  (     ) pack year  Alcohol Usage: socially Marital Status:  (   )    (   ) Single    (   )    ( x )   Lives with: ( x ) alone  (  ) children   (  ) spouse   (  ) parents  (  ) other  Recent Travel: none    Substance Use (street drugs): ( x ) never used  (  ) other:  Tobacco Usage:  ( x  ) never smoked   (   ) former smoker   (   ) current smoker  (     ) pack year  Alcohol Usage: socially

## 2018-11-04 NOTE — ED PROVIDER NOTE - OBJECTIVE STATEMENT
64yo M w/ PMH of DM w/ peripheral neuropathy, HTN, HLD, and CAD s/p CABG presents with SOB for 2 days. States that it was associated with cough and nausea and 5 episodes of non-bloody vomiting. Has had chills without fever. Wound care nurse came today to check on his open left foot ulcer earlier today and referred the patient to the ER due to worsening appearance. Denies chest pain, palpitations, dizziness, changes in urination, or diarrhea.

## 2018-11-04 NOTE — H&P ADULT - ASSESSMENT
#) Osteomyelitis of the left foot  - start   - f/u blood culture  - podiatry consulted  - pain control with percocet  - senna and colace    #) CAD, NSTEMI s/p CABG  - c/w ASA, metoprolol, enalapril    #) HTN  - c/w metoprolol, enalapril     #) DM  - monitor finger stick  - start insulin sliding scale (Lantus: ; Lispro: )  - f/u HbA1C  - c/w Lyrica for peripheral neuropathy    #) Diet   - DASH, Carb consistent    #) DVT ppx  - start Lovenox 40mg daily    #) Activity  - out of bed with assistance    #) Disposition  - from home    #) Full code status #) Osteomyelitis of the left foot  - start cefepime 2g IV q8 and Vancomycin 1750mg IV q12  - f/u blood culture  - podiatry consulted  - pain control with percocet  - senna and colace    #) CAD, NSTEMI s/p CABG  - c/w ASA, metoprolol, enalapril    #) HTN  - c/w metoprolol, enalapril     #) DM  - monitor finger stick  - start insulin sliding scale (Lantus: ; Lispro: )  - f/u HbA1C  - c/w Lyrica for peripheral neuropathy    #) Diet   - DASH, Carb consistent    #) DVT ppx  - start Lovenox 40mg daily    #) Activity  - out of bed to chair    #) Disposition  - from home    #) Full code status 63y male with PMH of CAD, NSTEMI s/p CABG (5 vessel according to patient; March 2016); DM with peripheral neuropathy, HTN, HLD was sent for worsening chronic left foot ulcer.     #) Osteomyelitis of the left foot  - start cefepime 2g IV q8 and Vancomycin 1750mg IV q12  - f/u blood culture  - podiatry consulted  - pain control with percocet  - senna and colace    #) Nausea  - zofran 4mg IV prn q6 hrs  - f/u CE 2nd set   - US Abdomen Limited (11.04.18): Cholelithiasis without evidence of cholecystitis --> outpt management    #) Right CVA tenderness  - f/u US kidney    #) Frequent falls  - Rehab consult placed    #) CAD, NSTEMI s/p CABG  - c/w ASA, metoprolol, statin  - will hold Plavix for now    #) HTN  - c/w metoprolol     #) DM  - monitor finger stick  - start insulin sliding scale (Lantus: ; Lispro: )  - f/u HbA1C  - c/w Lyrica for peripheral neuropathy    #) Diet   - DASH, Carb consistent    #) DVT ppx  - start Lovenox 40mg daily    #) Activity  - out of bed to chair    #) Disposition  - from home    #) Full code status 63y male with PMH of CAD, NSTEMI s/p CABG (5 vessel according to patient; March 2016); DM with peripheral neuropathy, HTN, HLD was sent for worsening chronic left foot ulcer.     #) Osteomyelitis of the left foot  - start cefepime 2g IV q8 and Vancomycin 1750mg IV q12  - f/u blood culture  - podiatry consulted  - pain control with percocet  - senna and colace    #) Nausea  - zofran 4mg IV prn q6 hrs  - f/u CE 2nd set   - US Abdomen Limited (11.04.18): Cholelithiasis without evidence of cholecystitis --> outpt management    #) Right CVA tenderness  - f/u US kidney    #) Frequent falls  - Rehab consult placed    #) CAD, NSTEMI s/p CABG  - c/w ASA, metoprolol, statin  - will hold Plavix for now    #) HTN  - c/w metoprolol     #) DM  - monitor finger stick  - start insulin   - f/u HbA1C  - c/w Lyrica for peripheral neuropathy    #) Diet   - DASH, Carb consistent    #) DVT ppx  - start Lovenox 40mg daily    #) Activity  - out of bed to chair    #) Disposition  - from home    #) Full code status 63y male with PMH of CAD, NSTEMI s/p CABG (5 vessel according to patient; March 2016); DM with peripheral neuropathy, Left DFU, HTN, HLD was sent for worsening chronic left foot ulcer.     #) Osteomyelitis of the left foot  - start cefepime 2g IV q8 and Vancomycin 1750mg IV q12  - f/u blood culture  - podiatry consulted  - pain control with percocet  - senna and colace    #) Nausea  - zofran 4mg IV prn q6 hrs  - f/u CE 2nd set   - US Abdomen Limited (11.04.18): Cholelithiasis without evidence of cholecystitis --> outpt management    #) Right CVA tenderness  - f/u US kidney    #) Frequent falls  - Rehab consult placed    #) CAD, NSTEMI s/p CABG  - c/w ASA, metoprolol, statin  - will hold Plavix for now    #) HTN  - c/w metoprolol     #) DM  - monitor finger stick  - start insulin   - f/u HbA1C  - c/w Lyrica for peripheral neuropathy    #) Diet   - DASH, Carb consistent    #) DVT ppx  - start Lovenox 40mg daily    #) Activity  - out of bed to chair    #) Disposition  - from home    #) Full code status

## 2018-11-04 NOTE — ED PROVIDER NOTE - NS ED ROS FT
Eyes:  No visual changes, eye pain or discharge.  ENMT:  No hearing changes, pain, no sore throat or runny nose, no difficulty swallowing  Cardiac:  No chest pain, or edema. No chest pain with exertion. + SOB  Respiratory:  No respiratory distress. No hemoptysis. No history of asthma or RAD. + cough  GI:  No diarrhea or abdominal pain. + nausea/ vomiting   :  No dysuria, frequency or burning.  MS:  No myalgia, muscle weakness, joint pain or back pain.  Neuro:  No headache or weakness.  No LOC.  Skin:  No skin rash. + foot ulcer  Endocrine: No history of thyroid disease or diabetes.

## 2018-11-04 NOTE — ED PROVIDER NOTE - CARE PLAN
Principal Discharge DX:	Osteomyelitis of left foot Principal Discharge DX:	Osteomyelitis of left foot  Secondary Diagnosis:	Sepsis, due to unspecified organism

## 2018-11-04 NOTE — ED ADULT NURSE NOTE - ALCOHOL PRE SCREEN (AUDIT - C)
7/2/2018      Fern Alston  [address] [facility] [address]    Dear Ms. Alston,      Surgery Date Change To 9/20/18      Your procedure is scheduled with Dr. TATE Madera on September 20, 2018 at 7:30 am at:    [facility]   [address] [id number]  [phone]      Please register at [facility] on September 20 2018 at 6:00 am.    You can expect to be contacted 1 to 3 days prior to the surgery to confirm arrival and surgery time. These times may change due to various OR schedule needs. We will call you ASAP if this happens.    The following appointment(s) have been scheduled for you:       · Post-op with Dr. TATE Madera at the [facility] on October 8, 2018 at 9:00 am.    To better prepare for your surgery, please follow these instructions:    Do not take any aspirin, aspirin products or other anti-inflammatories for five (5) days prior surgery.    You will need  to schedule a  pre-op exam two weeks prior to your surgery date with your  primary care doctor .    Do not eat or drink after midnight the night before your surgery.        Our PreAuthorization Team will be contacting your insurance company to ensure that they have all of the information they need from us.  We will let you know if we encounter any issues or have any concerns in advance of your scheduled surgery.  If you have any  questions regarding your surgery authorization, please check with your insurance company or call our PreAut Inquiry Line at [phone]. (Please see attached for more information.)    If you have any work related and/or disability forms that need to be completed, please contact the Forms Completion Department at [phone]. Forms can be dropped off at any of our [facility] locations. Please be advised that it can take 7 to 10 business days to complete these requests.    If you have questions regarding the procedure,  medications, rehab, etc., please contact the nursing staff at [facility] scheduling line at [phone].    If you have any scheduling questions or need to reschedule, please contact me at the telephone number and extension listed below.       Thank you,        Joyce VO at [phone]  Surgery Scheduler for Dr. TATE Madera  [facility]    Enclosures: [facility] [place] Booklet      \"Help us grow our quality of service. We want to improve - and you can help us. You may receive a survey in the mail. This is your opportunity to tell us what we did well, and where we could use some improvement. We value your input.\"                                                                Insurance Authorization Need to Know’s    Prior to your surgical procedure, our team will contact your Insurance Company to initiate a PreAuthorization request.      This is not a guarantee of payment from your insurance company, but rather a step taken to ensure that we have all of the information and documentation for them to confirm the procedure is one that is eligible for coverage under your plan.    We will contact you if we either need more information from you to fulfill the requirements of your insurance company, or if we need to discuss any concerns that may lead to postponement or cancellation of your procedure.     What to do if… My Insurance Changes:  If, at any time, your insurance company, plan or even card changes… Please call our office so that our team can be sure to update your records.  We will need to make sure to submit any PreAuth or shikha to the correct, up-to-date insurance plan.      What to do if… My Insurance Requires A Referral:  If your insurance company requires a Referral for Specialty Care or to see a Specialist, you will need to confirm with them if you have one on file.    - If your insurance carrier does not have a referral, then you will need to contact your Primary Care Physician  to have one directly submitted to your insurance company ASAP.    - Without a referral on file, your insurance company will not Pre-Authorize your surgery and may not cover any of your care with our specialty.    What to do if… I have a Work Comp (W/C) Claim:  If you have a W/C claim, please be sure to provide our reception team with the information you have regarding your claim ASAP.  We will contact your W/C carrier/adjustor to inform them of your upcoming surgery and check the status of your claim (open vs closed).  We will let you know if they advise of any concerns or issues with your claim.  - Even if you have an open W/C claim, please also provide us with your personal/family insurance.  We will want to be sure this plan is loaded into your account.  We always PreAuth with personal insurance as a back-up to W/C.  Otherwise, if W/C doesn’t cover something along the way, you will receive a bill for the services.    What to do if… I have Month-to-Month Coverage/Premiums:  If you have an insurance plan that is paid for month to month, or is subject to plan change on a monthly basis, please be aware we cannot initiate PreAuth until just before the month of your surgery, as your insurance company will need to verify your premium payments/eligibility first.    What to do if… I Do Not Have Insurance Coverage:  If you do not have insurance coverage, please call our Patient Contact Center:  [phone] or a  at the hospital to discuss possible coverage options and/or billing options.     What to do if… I have other Insurance/Billing questions:If you have questions regarding our billing process, setting up payment plans, our fee schedule, etc… Please call our Patient Contact Center:  [phone].    If you need information regarding your level of benefits or out-of-pocket expenses, please contact your insurance company directly.  They can also confirm for you whether or not we (the surgeon and the  hospital/surgery center) are in your plan’s preferred network (aka ‘in-network’).   Statement Selected

## 2018-11-04 NOTE — ED PROVIDER NOTE - CRITICAL CARE PROVIDED
documentation/direct patient care (not related to procedure)/interpretation of diagnostic studies/consultation with other physicians/consult w/ pt's family directly relating to pts condition/additional history taking

## 2018-11-04 NOTE — H&P ADULT - NSHPPHYSICALEXAM_GEN_ALL_CORE
T(C): 36.6 (11-04-18 @ 16:55), Max: 36.6 (11-04-18 @ 16:55)  HR: 92 (11-04-18 @ 16:55) (76 - 119)  BP: 156/80 (11-04-18 @ 16:55) (92/50 - 156/80)  RR: 18 (11-04-18 @ 16:55) (18 - 20)  SpO2: 98% (11-04-18 @ 16:55) (98% - 100%)    PHYSICAL EXAM:  GENERAL: NAD, well-developed  HEAD:  Atraumatic, Normocephalic  EYES: EOMI, PERRLA, conjunctiva and sclera clear  ENT: Normal tympanic membrane. No nasal obstruction or discharge. No tonsillar exudate, swelling or erythema.  NECK: Supple, No JVD  CHEST/LUNG: Clear to auscultation bilaterally; No wheeze  HEART: Regular rate and rhythm; No murmurs, rubs, or gallops  ABDOMEN: Soft, Nontender, Nondistended; Bowel sounds present  EXTREMITIES:  2+ Peripheral Pulses, No clubbing, cyanosis, or edema  PSYCH: AAOx3  NEUROLOGY: non-focal  SKIN: left foot 1cm x 1 cm ulcer in the plantar aspect T(C): 36.6 (11-04-18 @ 16:55), Max: 36.6 (11-04-18 @ 16:55)  HR: 92 (11-04-18 @ 16:55) (76 - 119)  BP: 156/80 (11-04-18 @ 16:55) (92/50 - 156/80)  RR: 18 (11-04-18 @ 16:55) (18 - 20)  SpO2: 98% (11-04-18 @ 16:55) (98% - 100%)    PHYSICAL EXAM:  GENERAL: NAD, well-developed  HEAD:  Atraumatic, Normocephalic  EYES: EOMI, PERRLA, conjunctiva and sclera clear  ENT: Normal tympanic membrane. No nasal obstruction or discharge. No tonsillar exudate, swelling or erythema.  NECK: Supple, No JVD  CHEST/LUNG: Clear to auscultation bilaterally; No wheeze  HEART: Regular rate and rhythm; No murmurs, rubs, or gallops  ABDOMEN: Soft, Right CVA tenderness, Nondistended; Bowel sounds present  EXTREMITIES:  2+ Peripheral Pulses, No clubbing, cyanosis, or edema  PSYCH: AAOx3  NEUROLOGY: non-focal  SKIN: left foot bandage, multiple scabs on both shin from frequent falls  MSK: LE B/L strength 4/5, straight leg test negative

## 2018-11-04 NOTE — ED PROVIDER NOTE - MEDICAL DECISION MAKING DETAILS
62yo M w/ PMH of DM w/ peripheral neuropathy, HTN, HLD, and CAD s/p CABG presents with SOB for 2 days. States that it was associated with cough and nausea and 5 episodes of non-bloody vomiting. Has had chills without fever. Wound care nurse came today to check on his open left foot ulcer earlier today and referred the patient to the ER due to worsening appearance. Denies chest pain, palpitations, dizziness, changes in urination, or diarrhea.    Patient has a benign abdominal exam but was vomiting bilious fluid. Patient given GI meds. X-ray done and concerning for osteomyelitis. Podiatry consulted and patient already received broad spectrum antibiotics to cover MRSA/Pseudomonas.  RUQ Ultrasound done and shows cholecystitis but not signs of cholecystitis.  Repeat abdominal exam remains benign.  Labs show elevated WBC count.  Patient pending urine sample.  Will admit for IV antibiotics and further resuscitation/treatment.

## 2018-11-04 NOTE — ED PROVIDER NOTE - PROGRESS NOTE DETAILS
Discussed with podiatry. Recommends venous and arterial duplex of the affected extremity. Will follow up with patient. Sepsis suspected at this time due to eleavted WBC count and HR > 90 in the setting of osteomyelitis. IV fluids given as bolus (Total of 3.5 L of LR) and appropriate IV antibiotics.

## 2018-11-04 NOTE — CHART NOTE - NSCHARTNOTEFT_GEN_A_CORE
Was called by RN to start insulin on this patient for elevated FSG.   I personally spoke with patient who said that he takes the following diabetic medications:  Humalog 10 U TID with meals  Victoza- unsure of the dose- 1 SubQ injection in AM   Lantus 40 U at bedtime    ----    The basal/bolus insulin was restarted at slightly lower than home doses.   Monitor FSG

## 2018-11-04 NOTE — H&P ADULT - NSHPLABSRESULTS_GEN_ALL_CORE
US Abdomen Limited (11.04.18)  Cholelithiasis without evidence of cholecystitis.    Xray Foot AP + Lateral, Left (11.04.18)  Erosive change to the medial cuneiform, new since prior examination consistent with osteomyelitis.  Diffuse soft tissue swelling consistent with cellulitis.    Xray Chest 1 View-PORTABLE IMMEDIATE (11.04.18)  Left lower lobe atelectasis

## 2018-11-04 NOTE — ED PROVIDER NOTE - PHYSICAL EXAMINATION
CONSTITUTIONAL: Fatigue-appearing obese male.  SKIN: 1cm x 1cm open foot ulcer on the plantar surface proximal to the 1st/2nd digit without purulence.   HEAD: Normocephalic; atraumatic.  EYES: PERRL, EOMI, no conjunctival erythema  ENT: No nasal discharge; airway clear.  NECK: Supple; non tender.  CARD: S1, S2 normal; no murmurs, gallops, or rubs. Regular rate and rhythm.   RESP: No wheezes, rales or rhonchi.  ABD: soft ntnd  EXT: Normal ROM.  No clubbing, cyanosis or edema.   LYMPH: No acute cervical adenopathy.  NEURO: Alert, oriented, grossly unremarkable  PSYCH: Cooperative, appropriate.

## 2018-11-05 LAB
ANION GAP SERPL CALC-SCNC: 13 MMOL/L — SIGNIFICANT CHANGE UP (ref 7–14)
BASOPHILS # BLD AUTO: 0.05 K/UL — SIGNIFICANT CHANGE UP (ref 0–0.2)
BASOPHILS NFR BLD AUTO: 0.4 % — SIGNIFICANT CHANGE UP (ref 0–1)
BLD GP AB SCN SERPL QL: SIGNIFICANT CHANGE UP
BUN SERPL-MCNC: 16 MG/DL — SIGNIFICANT CHANGE UP (ref 10–20)
CALCIUM SERPL-MCNC: 9.3 MG/DL — SIGNIFICANT CHANGE UP (ref 8.5–10.1)
CHLORIDE SERPL-SCNC: 95 MMOL/L — LOW (ref 98–110)
CO2 SERPL-SCNC: 25 MMOL/L — SIGNIFICANT CHANGE UP (ref 17–32)
CREAT SERPL-MCNC: 1 MG/DL — SIGNIFICANT CHANGE UP (ref 0.7–1.5)
EOSINOPHIL # BLD AUTO: 0.37 K/UL — SIGNIFICANT CHANGE UP (ref 0–0.7)
EOSINOPHIL NFR BLD AUTO: 3.3 % — SIGNIFICANT CHANGE UP (ref 0–8)
ESTIMATED AVERAGE GLUCOSE: 154 MG/DL — HIGH (ref 68–114)
GLUCOSE BLDC GLUCOMTR-MCNC: 123 MG/DL — HIGH (ref 70–99)
GLUCOSE BLDC GLUCOMTR-MCNC: 131 MG/DL — HIGH (ref 70–99)
GLUCOSE BLDC GLUCOMTR-MCNC: 133 MG/DL — HIGH (ref 70–99)
GLUCOSE BLDC GLUCOMTR-MCNC: 170 MG/DL — HIGH (ref 70–99)
GLUCOSE BLDC GLUCOMTR-MCNC: 84 MG/DL — SIGNIFICANT CHANGE UP (ref 70–99)
GLUCOSE SERPL-MCNC: 146 MG/DL — HIGH (ref 70–99)
HBA1C BLD-MCNC: 7 % — HIGH (ref 4–5.6)
HCT VFR BLD CALC: 31.1 % — LOW (ref 42–52)
HGB BLD-MCNC: 9.8 G/DL — LOW (ref 14–18)
IMM GRANULOCYTES NFR BLD AUTO: 0.8 % — HIGH (ref 0.1–0.3)
LYMPHOCYTES # BLD AUTO: 1.09 K/UL — LOW (ref 1.2–3.4)
LYMPHOCYTES # BLD AUTO: 9.8 % — LOW (ref 20.5–51.1)
MAGNESIUM SERPL-MCNC: 1.9 MG/DL — SIGNIFICANT CHANGE UP (ref 1.8–2.4)
MCHC RBC-ENTMCNC: 22.8 PG — LOW (ref 27–31)
MCHC RBC-ENTMCNC: 31.5 G/DL — LOW (ref 32–37)
MCV RBC AUTO: 72.5 FL — LOW (ref 80–94)
MONOCYTES # BLD AUTO: 0.7 K/UL — HIGH (ref 0.1–0.6)
MONOCYTES NFR BLD AUTO: 6.3 % — SIGNIFICANT CHANGE UP (ref 1.7–9.3)
NEUTROPHILS # BLD AUTO: 8.82 K/UL — HIGH (ref 1.4–6.5)
NEUTROPHILS NFR BLD AUTO: 79.4 % — HIGH (ref 42.2–75.2)
NRBC # BLD: 0 /100 WBCS — SIGNIFICANT CHANGE UP (ref 0–0)
PLATELET # BLD AUTO: 289 K/UL — SIGNIFICANT CHANGE UP (ref 130–400)
POTASSIUM SERPL-MCNC: 4 MMOL/L — SIGNIFICANT CHANGE UP (ref 3.5–5)
POTASSIUM SERPL-SCNC: 4 MMOL/L — SIGNIFICANT CHANGE UP (ref 3.5–5)
RBC # BLD: 4.29 M/UL — LOW (ref 4.7–6.1)
RBC # FLD: 17.9 % — HIGH (ref 11.5–14.5)
SODIUM SERPL-SCNC: 133 MMOL/L — LOW (ref 135–146)
TROPONIN T SERPL-MCNC: <0.01 NG/ML — SIGNIFICANT CHANGE UP
TYPE + AB SCN PNL BLD: SIGNIFICANT CHANGE UP
WBC # BLD: 11.12 K/UL — HIGH (ref 4.8–10.8)
WBC # FLD AUTO: 11.12 K/UL — HIGH (ref 4.8–10.8)

## 2018-11-05 RX ADMIN — CLOPIDOGREL BISULFATE 75 MILLIGRAM(S): 75 TABLET, FILM COATED ORAL at 12:37

## 2018-11-05 RX ADMIN — CEFEPIME 100 MILLIGRAM(S): 1 INJECTION, POWDER, FOR SOLUTION INTRAMUSCULAR; INTRAVENOUS at 05:24

## 2018-11-05 RX ADMIN — Medication 250 MILLIGRAM(S): at 18:26

## 2018-11-05 RX ADMIN — OXYCODONE AND ACETAMINOPHEN 1 TABLET(S): 5; 325 TABLET ORAL at 19:30

## 2018-11-05 RX ADMIN — Medication 100 MILLIGRAM(S): at 12:37

## 2018-11-05 RX ADMIN — ONDANSETRON 4 MILLIGRAM(S): 8 TABLET, FILM COATED ORAL at 03:48

## 2018-11-05 RX ADMIN — Medication 100 MILLIGRAM(S): at 18:27

## 2018-11-05 RX ADMIN — OXYCODONE AND ACETAMINOPHEN 1 TABLET(S): 5; 325 TABLET ORAL at 18:50

## 2018-11-05 RX ADMIN — Medication 6 UNIT(S): at 09:07

## 2018-11-05 RX ADMIN — Medication 81 MILLIGRAM(S): at 12:37

## 2018-11-05 RX ADMIN — Medication 100 MILLIGRAM(S): at 05:26

## 2018-11-05 RX ADMIN — Medication 6 UNIT(S): at 18:22

## 2018-11-05 RX ADMIN — CEFEPIME 100 MILLIGRAM(S): 1 INJECTION, POWDER, FOR SOLUTION INTRAMUSCULAR; INTRAVENOUS at 22:01

## 2018-11-05 RX ADMIN — SENNA PLUS 1 TABLET(S): 8.6 TABLET ORAL at 22:01

## 2018-11-05 RX ADMIN — ENOXAPARIN SODIUM 40 MILLIGRAM(S): 100 INJECTION SUBCUTANEOUS at 12:37

## 2018-11-05 RX ADMIN — ONDANSETRON 4 MILLIGRAM(S): 8 TABLET, FILM COATED ORAL at 22:27

## 2018-11-05 RX ADMIN — OXYCODONE AND ACETAMINOPHEN 1 TABLET(S): 5; 325 TABLET ORAL at 12:39

## 2018-11-05 RX ADMIN — Medication 250 MILLIGRAM(S): at 07:40

## 2018-11-05 RX ADMIN — ATORVASTATIN CALCIUM 40 MILLIGRAM(S): 80 TABLET, FILM COATED ORAL at 22:02

## 2018-11-05 RX ADMIN — Medication 6 UNIT(S): at 12:36

## 2018-11-05 RX ADMIN — CEFEPIME 100 MILLIGRAM(S): 1 INJECTION, POWDER, FOR SOLUTION INTRAMUSCULAR; INTRAVENOUS at 14:57

## 2018-11-05 RX ADMIN — Medication 25 MILLIGRAM(S): at 05:26

## 2018-11-05 RX ADMIN — OXYCODONE AND ACETAMINOPHEN 1 TABLET(S): 5; 325 TABLET ORAL at 03:47

## 2018-11-05 NOTE — CONSULT NOTE ADULT - SUBJECTIVE AND OBJECTIVE BOX
PROGRESS NOTE   Patient is a 63y old  Male who presents with a chief complaint of worsening chronic left foot ulcer (05 Nov 2018 10:25).       HPI:  63y male with PMH of CAD, NSTEMI s/p CABG (5 vessel according to patient; March 2016); DM with peripheral neuropathy, Left DFU, HTN, HLD was sent for worsening chronic left foot ulcer. For the past 2 yrs pt has chronic diabetic left foot ulcer that has been taken care by podiatry at [facility] and has wound care nurse (3 days/week) for dressing. Today during dressing, nurse notice significant bleeding from the ulcer site and Q tip goes down to bone and referred the patient to the ER. He also complains of frequent falls approx. once every 2 months. On his last fall, he hit his back on the side walk and now suffering from back pain since. Denies fever, chills, trauma, chest pain, palpitations, dizziness, changes in urination, or diarrhea. Patient was recently discharged from the ED (10/29/18) with UTI and sent home on Vantin. At baseline, patient walk with cane; wife passed away Feb 2018 - coping ok but finds it difficult to manage ADL's.     In ED, Xray L Foot: Erosive change to the medial cuneiform, new since prior examination consistent with osteomyelitis.  He also started to have some nausea with no vomiting or abd pain. US Abdomen Limited (11.04.18): Cholelithiasis without evidence of cholecystitis.    Pt received:  2L LR stat  Vancomycin 2gm IV stat  Zosyn 4.5gm IV stat  Zofran 4mg IV push  Morphine 4 mg IV push (04 Nov 2018 17:17)      OSTEOMYELITIS OF FOOT  ^OSTEOMYELITIS OF FOOT  No pertinent family history in first degree relatives  Handoff  MEWS Score  Dyslipidemia  NSTEMI (non-ST elevated myocardial infarction)  Diabetic foot ulcer  PVD (peripheral vascular disease)  Diabetes  Other hyperlipidemia  Hypertension, unspecified type  Amputated toe, unspecified laterality  Osteomyelitis of left foot  S/P CABG (coronary artery bypass graft)  Deep vein thrombosis associated with coronary artery bypass graft surgery  MED EVAL                                                                      UPDATED CONTACTS 11/4 MM  MED EVAL  6  Sepsis, due to unspecified organism      VITALS:  Vital Signs Last 24 Hrs  T(C): 36.3 (05 Nov 2018 05:39), Max: 36.6 (04 Nov 2018 16:55)  T(F): 97.4 (05 Nov 2018 05:39), Max: 97.9 (04 Nov 2018 16:55)  HR: 100 (05 Nov 2018 05:39) (76 - 119)  BP: 138/72 (05 Nov 2018 05:39) (92/50 - 170/89)  BP(mean): --  RR: 17 (05 Nov 2018 05:39) (17 - 20)  SpO2: 97% (04 Nov 2018 19:27) (97% - 100%)    LABS:                        9.8    11.12 )-----------( 289      ( 05 Nov 2018 10:01 )             31.1     11-04    136  |  94<L>  |  23<H>  ----------------------------<  159<H>  4.3   |  22  |  1.2    Ca    9.5      04 Nov 2018 12:40    TPro  7.8  /  Alb  3.3<L>  /  TBili  1.5<H>  /  DBili  x   /  AST  22  /  ALT  24  /  AlkPhos  233<H>  11-04      Hemoglobin A1C     PHYSICAL EXAM  GEN: ROSALIE ESCOBEDO is a pleasant well-nourished, well developed 63y Male in no acute distress, alert awake, and oriented to person, place and time.     Medication(s):   aspirin  chewable 81 milliGRAM(s) Oral daily  atorvastatin 40 milliGRAM(s) Oral at bedtime  cefepime   IVPB 2000 milliGRAM(s) IV Intermittent every 8 hours  clopidogrel Tablet 75 milliGRAM(s) Oral daily  docusate sodium 100 milliGRAM(s) Oral daily  enoxaparin Injectable 40 milliGRAM(s) SubCutaneous daily  insulin glargine Injectable (LANTUS) 27 Unit(s) SubCutaneous at bedtime  insulin lispro Injectable (HumaLOG) 6 Unit(s) SubCutaneous three times a day before meals  metoprolol succinate ER 25 milliGRAM(s) Oral daily  ondansetron    Tablet 4 milliGRAM(s) Oral every 6 hours PRN  oxyCODONE    5 mG/acetaminophen 325 mG 1 Tablet(s) Oral every 6 hours PRN  pregabalin 100 milliGRAM(s) Oral two times a day  senna 1 Tablet(s) Oral at bedtime  vancomycin  IVPB 1750 milliGRAM(s) IV Intermittent every 12 hours      LE Focused Exam:      Vasc:    - DP/PT pulses 1/4 B/L   - Skin temp warm to warm, proximal to distal B/L  - CFT < 3 sec B/L    Neuro:   - Gross sensation diminished to level of digits B/L    Derm:   - No interdigital macerations B/L   - Left sub 1st metatarsal ulceration +erythema, + edema , - purulence    MSK:   - Muscle strength 3/5 in all quadrants w/ no defects B/L         < from: Xray Foot AP + Lateral, Left (11.04.18 @ 13:21) >  Erosive change to the medial cuneiform, new since prior examination   consistent with osteomyelitis.    < from: VA Duplex Lower Extrem Arterial, Bilat (11.04.18 @ 15:47) >  Moderate atherosclerotic plaque is noted. All vessels appear to be patent   however thereis retrograde flow noted in the right posterior tibial   artery suggestive of proximal  occlusion. Please correlate clinically.

## 2018-11-05 NOTE — CONSULT NOTE ADULT - ASSESSMENT
Assessment:   - left sub 1st metatarsal ulceration + edema, + erythema, - purulence  - Foot xray consistent w/ osteomyelitis medial cuneiform    Plan:   - pt seen/evaluated @ bedside  - c/w IV abx  - dressed w/ hydrogel/DSD  - plan discussed w/ attending   - podiatry will f/u

## 2018-11-05 NOTE — PROGRESS NOTE ADULT - ASSESSMENT
63y male with PMH of CAD, NSTEMI s/p CABG (5 vessel according to patient; March 2016); DM with peripheral neuropathy, Left DFU, HTN, HLD was sent for worsening chronic left foot ulcer.     #) Worsening chronic Left foot ulcer w/ overlying Osteomyelitis   -X-ray: Erosive changes to medial cuneiform consistent w/ OM. New since prior examination.  - started on cefepime 2g IV q8 and Vancomycin 1750mg IV q12 in ED. Vanco trough tmrw AM  - F/u Blood CX  - podiatry consulted  - pain control with percocet      #) Nausea  - zofran 4mg IV prn q6 hrs  -CE x3 -ve  - US Abdomen Limited (11.04.18): Cholelithiasis without evidence of cholecystitis --> outpt management    #) Right CVA tenderness - resolved  -Renal US - unremarkable      #) Frequent falls likely 2/2 unstable weight bearing on left foot  - F/U podiatry for recs regarding ulcer  -Follow up Pt/rehab recs    #) CAD, NSTEMI s/p CABG  - c/w ASA, metoprolol, statin  - will hold Plavix for now    #) HTN  - c/w metoprolol     #) DM  - FS qAC and qHS  - On Lantus/Lispro 27/6/6/6  - c/w Lyrica for peripheral neuropathy    #) Diet   - DASH, Carb consistent    #) DVT ppx  - Lovenox 40mg daily    #) Activity  - out of bed to chair    #) Disposition  - from home    #) Full code status 63y male with PMH of CAD, NSTEMI s/p CABG (5 vessel according to patient; March 2016); DM with peripheral neuropathy, Left DFU, HTN, HLD was sent for worsening chronic left foot ulcer.     #) Worsening chronic Left foot ulcer w/ overlying Osteomyelitis   -X-ray: Erosive changes to medial cuneiform consistent w/ OM. New since prior examination.  - started on cefepime 2g IV q8 and Vancomycin 1750mg IV q12 in ED. Vanco trough tmrw AM  - F/u Blood CX  - podiatry consulted  - pain control with percocet  -F/U ID consult      #) Nausea  - zofran 4mg IV prn q6 hrs  -CE x3 -ve  - US Abdomen Limited (11.04.18): Cholelithiasis without evidence of cholecystitis --> outpt management    #) Right CVA tenderness - resolved  -Renal US - unremarkable      #) Frequent falls likely 2/2 unstable weight bearing on left foot  - F/U podiatry for recs regarding ulcer  -Follow up Pt/rehab recs    #) CAD, NSTEMI s/p CABG  - c/w ASA, metoprolol, statin  - will hold Plavix for now    #) HTN  - c/w metoprolol     #) DM  - FS qAC and qHS  - On Lantus/Lispro 27/6/6/6  - c/w Lyrica for peripheral neuropathy    #) Diet   - DASH, Carb consistent    #) DVT ppx  - Lovenox 40mg daily    #) Activity  - out of bed to chair    #) Disposition  - from home    #) Full code status

## 2018-11-05 NOTE — PROGRESS NOTE ADULT - SUBJECTIVE AND OBJECTIVE BOX
ROSALIE ESCOBEDO, male, 63y (51),   MRN-[id number]  Admit Date: 18 (1d)      Chief Complaint:  Patient is a 63y old  Male who presents with a chief complaint of worsening chronic left foot ulcer (2018 17:17)      Interval History:  Pt lying comfortably in bed. US abdomen today. Podiatry consulted for L foot OM.    Past Medical and Surgical History:  PAST MEDICAL & SURGICAL HISTORY:  Dyslipidemia  NSTEMI (non-ST elevated myocardial infarction)  Diabetic foot ulcer  PVD (peripheral vascular disease)  Diabetes  Other hyperlipidemia  Hypertension, unspecified type  Amputated toe, unspecified laterality  S/P CABG (coronary artery bypass graft)      Current Medications:  MEDICATIONS  (STANDING):  aspirin  chewable 81 milliGRAM(s) Oral daily  atorvastatin 40 milliGRAM(s) Oral at bedtime  cefepime   IVPB 2000 milliGRAM(s) IV Intermittent every 8 hours  clopidogrel Tablet 75 milliGRAM(s) Oral daily  docusate sodium 100 milliGRAM(s) Oral daily  enoxaparin Injectable 40 milliGRAM(s) SubCutaneous daily  insulin glargine Injectable (LANTUS) 27 Unit(s) SubCutaneous at bedtime  insulin lispro Injectable (HumaLOG) 6 Unit(s) SubCutaneous three times a day before meals  metoprolol succinate ER 25 milliGRAM(s) Oral daily  pregabalin 100 milliGRAM(s) Oral two times a day  senna 1 Tablet(s) Oral at bedtime  vancomycin  IVPB 1750 milliGRAM(s) IV Intermittent every 12 hours    MEDICATIONS  (PRN):  ondansetron    Tablet 4 milliGRAM(s) Oral every 6 hours PRN Nausea and/or Vomiting  oxyCODONE    5 mG/acetaminophen 325 mG 1 Tablet(s) Oral every 6 hours PRN Moderate Pain (4 - 6)        Vital Signs:  T(F): 97.4 (18 @ 05:39), Max: 97.9 (18 @ 16:55)  HR: 100 (18 @ 05:39) (76 - 119)  BP: 138/72 (18 @ 05:39) (92/50 - 170/89)  RR: 17 (18 @ 05:39) (17 - 20)  SpO2: 97% (18 @ 19:27) (97% - 100%)  CAPILLARY BLOOD GLUCOSE      POCT Blood Glucose.: 131 mg/dL (2018 08:09)  POCT Blood Glucose.: 166 mg/dL (2018 21:14)  POCT Blood Glucose.: 214 mg/dL (2018 18:00)      Physical Exam:  General: Not in distress.   HEENT: Moist mucus membranes. PERRLA.  Cardio: Regular rate and rhythm, S1, S@  Pulm: Clear to auscultation bilaterally. No wheezing, or rhonchi  Abdomen: Soft, non-tender, non-distended. Normoactive bowel sounds.  Extremities: L foot dressing  Neuro: A&O x3. No focal deficits.     Labs and Imaging:  CBC Full  -  ( 2018 12:40 )  WBC Count : 15.25 K/uL  Hemoglobin : 11.2 g/dL  Hematocrit : 34.6 %  Platelet Count - Automated : 292 K/uL  Mean Cell Volume : 71.0 fL  Mean Cell Hemoglobin : 23.0 pg  Mean Cell Hemoglobin Concentration : 32.4 g/dL  Auto Neutrophil # : 11.92 K/uL  Auto Lymphocyte # : 1.56 K/uL  Auto Monocyte # : 1.47 K/uL  Auto Eosinophil # : 0.13 K/uL  Auto Basophil # : 0.06 K/uL  Auto Neutrophil % : 78.2 %  Auto Lymphocyte % : 10.2 %  Auto Monocyte % : 9.6 %  Auto Eosinophil % : 0.9 %  Auto Basophil % : 0.4 %    RDW: 17.9      BMP: 18 @ 12:40  136 | 94 | 23   -----------------< 159  4.3  | 22 | 1.2  eGFR(AA): 73, eGFR (non-AA): 63  Ca 9.5, Mg --, P --    LFTs: 18 @ 12:40  TP  7.8  | 3.3 Alb   ---------------  TB  1.5  | --  DB   ---------------  ALT 24  | 22  AST            ^          233 ALK      LFTs: 18 @ 12:40  Ca  9.5  | 22 AST   -----------------  TP  7.8  | 24 ALT  -----------------  Alb 3.3  | 233 ALK          ^        1.5         TB      Cardiac Enzymes:    Urinalysis:  Urinalysis Basic - ( 2018 18:00 )    Color: Dark Yellow / Appearance: Clear / S.025 / pH: x  Gluc: x / Ketone: Trace  / Bili: Small / Urobili: 2.0 mg/dL   Blood: x / Protein: 30 mg/dL / Nitrite: Negative   Leuk Esterase: Small / RBC: x / WBC 10-25 /HPF   Sq Epi: x / Non Sq Epi: x / Bacteria: Few /HPF      Cultures:     (collected 10-29-18 @ 07:33)  Source: .Blood Blood-Peripheral  Final Report:    No growth at 5 days.          Radiology:     < from: US Kidney and Bladder (18 @ 09:21) >  Impression:  Normal renal ultrasound.    Decompressed urinary bladder limiting evaluation    < end of copied text >      < from: US Abdomen Limited (18 @ 15:49) >  IMPRESSION:    Cholelithiasis without evidence of cholecystitis.    < end of copied text >      < from: VA Duplex Lower Extrem Arterial, Bilat (18 @ 15:47) >    Impression:    Moderate atherosclerotic plaque is noted. All vessels appear to be patent   however thereis retrograde flow noted in the right posterior tibial   artery suggestive of proximal  occlusion. Please correlate clinically.    < end of copied text >      < from: VA Duplex Lower Ext Vein Scan, Bilat (18 @ 15:46) >  Impression:    No evidence of deep venous thrombosis in the bilateral lower extremities.    < end of copied text >

## 2018-11-06 ENCOUNTER — TRANSCRIPTION ENCOUNTER (OUTPATIENT)
Age: 63
End: 2018-11-06

## 2018-11-06 LAB
ALBUMIN SERPL ELPH-MCNC: 2.6 G/DL — LOW (ref 3.5–5.2)
ALP SERPL-CCNC: 180 U/L — HIGH (ref 30–115)
ALT FLD-CCNC: 13 U/L — SIGNIFICANT CHANGE UP (ref 0–41)
ANION GAP SERPL CALC-SCNC: 13 MMOL/L — SIGNIFICANT CHANGE UP (ref 7–14)
AST SERPL-CCNC: 14 U/L — SIGNIFICANT CHANGE UP (ref 0–41)
BILIRUB SERPL-MCNC: 1.2 MG/DL — SIGNIFICANT CHANGE UP (ref 0.2–1.2)
BUN SERPL-MCNC: 12 MG/DL — SIGNIFICANT CHANGE UP (ref 10–20)
CALCIUM SERPL-MCNC: 8.8 MG/DL — SIGNIFICANT CHANGE UP (ref 8.5–10.1)
CHLORIDE SERPL-SCNC: 99 MMOL/L — SIGNIFICANT CHANGE UP (ref 98–110)
CO2 SERPL-SCNC: 25 MMOL/L — SIGNIFICANT CHANGE UP (ref 17–32)
CREAT SERPL-MCNC: 0.8 MG/DL — SIGNIFICANT CHANGE UP (ref 0.7–1.5)
GLUCOSE BLDC GLUCOMTR-MCNC: 124 MG/DL — HIGH (ref 70–99)
GLUCOSE BLDC GLUCOMTR-MCNC: 145 MG/DL — HIGH (ref 70–99)
GLUCOSE BLDC GLUCOMTR-MCNC: 153 MG/DL — HIGH (ref 70–99)
GLUCOSE BLDC GLUCOMTR-MCNC: 157 MG/DL — HIGH (ref 70–99)
GLUCOSE BLDC GLUCOMTR-MCNC: 87 MG/DL — SIGNIFICANT CHANGE UP (ref 70–99)
GLUCOSE SERPL-MCNC: 95 MG/DL — SIGNIFICANT CHANGE UP (ref 70–99)
HCT VFR BLD CALC: 27 % — LOW (ref 42–52)
HGB BLD-MCNC: 8.6 G/DL — LOW (ref 14–18)
MAGNESIUM SERPL-MCNC: 1.9 MG/DL — SIGNIFICANT CHANGE UP (ref 1.8–2.4)
MCHC RBC-ENTMCNC: 23.2 PG — LOW (ref 27–31)
MCHC RBC-ENTMCNC: 31.9 G/DL — LOW (ref 32–37)
MCV RBC AUTO: 72.8 FL — LOW (ref 80–94)
NRBC # BLD: 0 /100 WBCS — SIGNIFICANT CHANGE UP (ref 0–0)
PLATELET # BLD AUTO: 300 K/UL — SIGNIFICANT CHANGE UP (ref 130–400)
POTASSIUM SERPL-MCNC: 4.5 MMOL/L — SIGNIFICANT CHANGE UP (ref 3.5–5)
POTASSIUM SERPL-SCNC: 4.5 MMOL/L — SIGNIFICANT CHANGE UP (ref 3.5–5)
PROT SERPL-MCNC: 6.1 G/DL — SIGNIFICANT CHANGE UP (ref 6–8)
RBC # BLD: 3.71 M/UL — LOW (ref 4.7–6.1)
RBC # FLD: 17.8 % — HIGH (ref 11.5–14.5)
SODIUM SERPL-SCNC: 137 MMOL/L — SIGNIFICANT CHANGE UP (ref 135–146)
VANCOMYCIN TROUGH SERPL-MCNC: 23.4 UG/ML — HIGH (ref 5–10)
VIT B12 SERPL-MCNC: 1731 PG/ML — HIGH (ref 232–1245)
WBC # BLD: 10.25 K/UL — SIGNIFICANT CHANGE UP (ref 4.8–10.8)
WBC # FLD AUTO: 10.25 K/UL — SIGNIFICANT CHANGE UP (ref 4.8–10.8)

## 2018-11-06 RX ORDER — CEFEPIME 1 G/1
2000 INJECTION, POWDER, FOR SOLUTION INTRAMUSCULAR; INTRAVENOUS EVERY 12 HOURS
Qty: 0 | Refills: 0 | Status: DISCONTINUED | OUTPATIENT
Start: 2018-11-06 | End: 2018-11-10

## 2018-11-06 RX ORDER — VANCOMYCIN HCL 1 G
1250 VIAL (EA) INTRAVENOUS EVERY 12 HOURS
Qty: 0 | Refills: 0 | Status: DISCONTINUED | OUTPATIENT
Start: 2018-11-06 | End: 2018-11-10

## 2018-11-06 RX ORDER — VANCOMYCIN HCL 1 G
1500 VIAL (EA) INTRAVENOUS EVERY 12 HOURS
Qty: 0 | Refills: 0 | Status: DISCONTINUED | OUTPATIENT
Start: 2018-11-06 | End: 2018-11-06

## 2018-11-06 RX ADMIN — Medication 25 MILLIGRAM(S): at 05:41

## 2018-11-06 RX ADMIN — OXYCODONE AND ACETAMINOPHEN 1 TABLET(S): 5; 325 TABLET ORAL at 06:25

## 2018-11-06 RX ADMIN — OXYCODONE AND ACETAMINOPHEN 1 TABLET(S): 5; 325 TABLET ORAL at 12:56

## 2018-11-06 RX ADMIN — OXYCODONE AND ACETAMINOPHEN 1 TABLET(S): 5; 325 TABLET ORAL at 05:48

## 2018-11-06 RX ADMIN — INSULIN GLARGINE 27 UNIT(S): 100 INJECTION, SOLUTION SUBCUTANEOUS at 21:50

## 2018-11-06 RX ADMIN — Medication 166.67 MILLIGRAM(S): at 19:16

## 2018-11-06 RX ADMIN — ONDANSETRON 4 MILLIGRAM(S): 8 TABLET, FILM COATED ORAL at 12:57

## 2018-11-06 RX ADMIN — Medication 6 UNIT(S): at 09:14

## 2018-11-06 RX ADMIN — Medication 250 MILLIGRAM(S): at 06:41

## 2018-11-06 RX ADMIN — OXYCODONE AND ACETAMINOPHEN 1 TABLET(S): 5; 325 TABLET ORAL at 20:34

## 2018-11-06 RX ADMIN — Medication 100 MILLIGRAM(S): at 11:22

## 2018-11-06 RX ADMIN — OXYCODONE AND ACETAMINOPHEN 1 TABLET(S): 5; 325 TABLET ORAL at 20:04

## 2018-11-06 RX ADMIN — CLOPIDOGREL BISULFATE 75 MILLIGRAM(S): 75 TABLET, FILM COATED ORAL at 11:22

## 2018-11-06 RX ADMIN — Medication 100 MILLIGRAM(S): at 05:42

## 2018-11-06 RX ADMIN — Medication 100 MILLIGRAM(S): at 17:31

## 2018-11-06 RX ADMIN — Medication 81 MILLIGRAM(S): at 11:22

## 2018-11-06 RX ADMIN — Medication 6 UNIT(S): at 13:02

## 2018-11-06 RX ADMIN — ENOXAPARIN SODIUM 40 MILLIGRAM(S): 100 INJECTION SUBCUTANEOUS at 11:22

## 2018-11-06 RX ADMIN — CEFEPIME 100 MILLIGRAM(S): 1 INJECTION, POWDER, FOR SOLUTION INTRAMUSCULAR; INTRAVENOUS at 05:41

## 2018-11-06 RX ADMIN — CEFEPIME 100 MILLIGRAM(S): 1 INJECTION, POWDER, FOR SOLUTION INTRAMUSCULAR; INTRAVENOUS at 19:06

## 2018-11-06 RX ADMIN — Medication 6 UNIT(S): at 17:29

## 2018-11-06 RX ADMIN — SENNA PLUS 1 TABLET(S): 8.6 TABLET ORAL at 21:50

## 2018-11-06 RX ADMIN — ATORVASTATIN CALCIUM 40 MILLIGRAM(S): 80 TABLET, FILM COATED ORAL at 21:50

## 2018-11-06 NOTE — DISCHARGE NOTE ADULT - CARE PROVIDER_API CALL
Edilma Arellano), Internal Medicine  [address] [place] [address]  Phone: [phone]  Fax: [phone]    Lance Humphreys (MALATHI), Foot and Ankle Surgery; Podiatric Medicine and Surgery  [address] [place] [address]  Phone: [phone]  Fax: [phone]    Dallin Abreu  Primary Care Doctor  Phone: (   )    -  Fax: (   )    -    John Parker), Neurological Surgery  [address] [place] [address]  Phone: [phone]  Fax: [phone]

## 2018-11-06 NOTE — CONSULT NOTE ADULT - ASSESSMENT
63M    DM  NSTEMI (non-ST elevated myocardial infarction)  PVD  HTN  Amputated toe  CAD S/P CABG (coronary artery bypass graft)    Admitted with worsening L DFU, xray with cuneiform OM  US with R post tib occlusion  Sepsis on admission P119, WBC 11  Systolic hypotension on admission  Hyponatremia  BMI 30    - Decrease vanc to 1.25 q12h  - Decrease cefepime to 2g q12h  - Pending MRI  - ESR/CRP  - Podiatry following    Spectra [id number]

## 2018-11-06 NOTE — PROGRESS NOTE ADULT - ASSESSMENT
63y male with PMH of CAD, NSTEMI s/p CABG (5 vessel according to patient; March 2016); DM with peripheral neuropathy, Left DFU, HTN, HLD was sent for worsening chronic left foot ulcer.     #) L sub 1st metatarsal ulceration w/ overlying osteomyelitis  +erythema +edema - purulence  -X-ray: Erosive changes to medial cuneiform consistent w/ OM.   VA Duplex: Retrograde flow in R posterior tibial artery suggestive of proximal occlusion  - On cefepime 2g IV q8 and Vancomycin 1750mg IV q12 in ED. Vanco trough: 23.3 --> Vancomycin adjusted to 1500mg q12hr  -Podiatry: dressed w/ plain gauze packing/dsd/kerlix left foot. MRI to r/o OM: If evident--> OR by podiatry   - Blood Cx: NGTD  -F/u wound cx  - pain control with percocet  -F/U ID consult      #) Nausea - resolved  - zofran 4mg IV prn q6 hrs  -If recurs, will consult GI  -CE x3 -ve  - US Abdomen Limited (11.04.18): Cholelithiasis without evidence of cholecystitis --> outpt management      #) Frequent falls likely 2/2 unstable weight bearing on left foot  - F/U podiatry for recs regarding ulcer  -Follow up Pt/rehab recs    #) CAD, NSTEMI s/p CABG  - c/w ASA, Plavix, metoprolol, statin      #) HTN  - c/w metoprolol     #) DM  - FS qAC and qHS  - On Lantus/Lispro 27/6/6/6  - c/w Lyrica for peripheral neuropathy    #) Diet   - DASH, Carb consistent    #) DVT ppx  - Lovenox 40mg daily    #) Activity  - out of bed to chair    #) Disposition  - from home    #) Full code status 63y male with PMH of CAD, NSTEMI s/p CABG (5 vessel according to patient; March 2016); DM with peripheral neuropathy, Left DFU, HTN, HLD was sent for worsening chronic left foot ulcer.     #) L sub 1st metatarsal ulceration w/ overlying osteomyelitis  +erythema +edema - purulence  -X-ray: Erosive changes to medial cuneiform consistent w/ OM.   VA Duplex: Retrograde flow in R posterior tibial artery suggestive of proximal occlusion  - On cefepime 2g IV q12 and Vancomycin 1750mg IV q12 in ED. Vanco trough: 23.3 --> Vancomycin adjusted to 1250mg q12hr  -Podiatry: dressed w/ plain gauze packing/dsd/kerlix left foot. MRI to r/o OM: If evident--> OR by podiatry   - Blood Cx: NGTD  -F/u wound cx  - pain control with percocet  -F/U ID consult      #) Nausea - resolved  - zofran 4mg IV prn q6 hrs  -If recurs, will consult GI  -CE x3 -ve  - US Abdomen Limited (11.04.18): Cholelithiasis without evidence of cholecystitis --> outpt management      #) Frequent falls likely 2/2 unstable weight bearing on left foot  - F/U podiatry for recs regarding ulcer  -Follow up Pt/rehab recs    #) CAD, NSTEMI s/p CABG  - c/w ASA, Plavix, metoprolol, statin      #) HTN  - c/w metoprolol     #) DM  - FS qAC and qHS  - On Lantus/Lispro 27/6/6/6  - c/w Lyrica for peripheral neuropathy    #) Diet   - DASH, Carb consistent    #) DVT ppx  - Lovenox 40mg daily    #) Activity  - out of bed to chair    #) Disposition  - from home    #) Full code status 63y male with PMH of CAD, NSTEMI s/p CABG (5 vessel according to patient; March 2016); DM with peripheral neuropathy, Left DFU, HTN, HLD was sent for worsening chronic left foot ulcer.     #) L sub 1st metatarsal ulceration w/ overlying osteomyelitis  +erythema +edema - purulence  -X-ray: Erosive changes to medial cuneiform consistent w/ OM.   VA Duplex: Retrograde flow in R posterior tibial artery suggestive of proximal occlusion  - On cefepime 2g IV q12 and Vancomycin 1750mg IV q12 in ED. Vanco trough: 23.3 --> Vancomycin adjusted to 1250mg q12hr. ID eval appreciated  -Podiatry: dressed w/ plain gauze packing/dsd/kerlix left foot. MRI to r/o OM: If evident--> OR by podiatry   - Blood Cx: NGTD  -F/u wound cx  - pain control with percocet        #) Nausea - resolved  - zofran 4mg IV prn q6 hrs  -If recurs, will consult GI  -CE x3 -ve  - US Abdomen Limited (11.04.18): Cholelithiasis without evidence of cholecystitis --> outpt management      #) Frequent falls likely 2/2 unstable weight bearing on left foot  - F/U podiatry for recs regarding ulcer  -Follow up Pt/rehab recs    #) CAD, NSTEMI s/p CABG  - c/w ASA, Plavix, metoprolol, statin      #) HTN  - c/w metoprolol     #) DM  - FS qAC and qHS  - On Lantus/Lispro 27/6/6/6  - c/w Lyrica for peripheral neuropathy    #) Diet   - DASH, Carb consistent    #) DVT ppx  - Lovenox 40mg daily    #) Activity  - out of bed to chair    #) Disposition  - from home    #) Full code status    #PT/REhab: Home w/ outpt or VN services

## 2018-11-06 NOTE — CONSULT NOTE ADULT - SUBJECTIVE AND OBJECTIVE BOX
HPI:  63y male with PMH of CAD, NSTEMI s/p CABG (5 vessel according to patient; 2016); DM with peripheral neuropathy, Left DFU, HTN, HLD was sent for worsening chronic left foot ulcer. For the past 2 yrs pt has chronic diabetic left foot ulcer that has been taken care by podiatry at [facility] and has wound care nurse (3 days/week) for dressing. Today during dressing, nurse notice significant bleeding from the ulcer site and Q tip goes down to bone and referred the patient to the ER. He also complains of frequent falls approx. once every 2 months. On his last fall, he hit his back on the side walk and now suffering from back pain since. Denies fever, chills, trauma, chest pain, palpitations, dizziness, changes in urination, or diarrhea. Patient was recently discharged from the ED (10/29/18) with UTI and sent home on Vantin. At baseline, patient walk with cane; wife passed away 2018 - coping ok but finds it difficult to manage ADL's.    In ED, Xray L Foot: Erosive change to the medial cuneiform, new since prior examination consistent with osteomyelitis.  He also started to have some nausea with no vomiting or abd pain. US Abdomen Limited (18): Cholelithiasis without evidence of cholecystitis.    Pt received:  2L LR stat  Vancomycin 2gm IV stat  Zosyn 4.5gm IV stat  Zofran 4mg IV push  Morphine 4 mg IV push (2018 17:17)      PAST MEDICAL & SURGICAL HISTORY:  Dyslipidemia  NSTEMI (non-ST elevated myocardial infarction)  Diabetic foot ulcer  PVD (peripheral vascular disease)  Diabetes  Other hyperlipidemia  Hypertension, unspecified type  Amputated toe, unspecified laterality  S/P CABG (coronary artery bypass graft)      Hospital Course:  He had fallen 3 weeks ago and has some back pain. CT shows T11-T12 FX. I discussed with intern that a neurosurgery consult is suggested. He is going for an MRI of the left foot to see the extent of the disease OM in the left foot. He has been falling every 2 weeks ambulating with a straight cane. He should do better with a walker. He can try to walk with a walker shifting weight off the fromt of his left foot on to his heel.   TODAY'S SUBJECTIVE & REVIEW OF SYMPTOMS:     Constitutional WNL   Cardio WNL   Resp WNL   GI WNL  Heme WNL  Endo WNL  Skin WNL  MSK WNL  Neuro WNL  Cognitive WNL  Psych WNL      MEDICATIONS  (STANDING):  aspirin  chewable 81 milliGRAM(s) Oral daily  atorvastatin 40 milliGRAM(s) Oral at bedtime  cefepime   IVPB 2000 milliGRAM(s) IV Intermittent every 12 hours  clopidogrel Tablet 75 milliGRAM(s) Oral daily  docusate sodium 100 milliGRAM(s) Oral daily  enoxaparin Injectable 40 milliGRAM(s) SubCutaneous daily  insulin glargine Injectable (LANTUS) 27 Unit(s) SubCutaneous at bedtime  insulin lispro Injectable (HumaLOG) 6 Unit(s) SubCutaneous three times a day before meals  metoprolol succinate ER 25 milliGRAM(s) Oral daily  pregabalin 100 milliGRAM(s) Oral two times a day  senna 1 Tablet(s) Oral at bedtime  vancomycin  IVPB 1250 milliGRAM(s) IV Intermittent every 12 hours    MEDICATIONS  (PRN):  ondansetron    Tablet 4 milliGRAM(s) Oral every 6 hours PRN Nausea and/or Vomiting  oxyCODONE    5 mG/acetaminophen 325 mG 1 Tablet(s) Oral every 6 hours PRN Moderate Pain (4 - 6)      FAMILY HISTORY:  No pertinent family history in first degree relatives      Allergies    Cipro (Unknown)  IV contrast (Short breath)  Keflex (Unknown)    Intolerances        SOCIAL HISTORY:    [  ] Etoh  [  ] Smoking  [  ] Substance abuse     Home Environment:  [x  ] Home Alone  [  ] Lives with Family  [  ] Home Health Aid    Dwelling:  [  ] Apartment  [  ] Private House  [  ] Adult Home  [  ] Skilled Nursing Facility      [  ] Short Term  [  ] Long Term  [ x ] Stairs- 3 steps to enter       Elevator [  ]    FUNCTIONAL STATUS PTA: (Check all that apply)  Ambulation: [ x  ]Independent    [  ] Dependent     [  ] Non-Ambulatory  Assistive Device: [ x ] SA Cane  [  ]  Q Cane  [  ] Walker  [  ]  Wheelchair  ADL : [  ] Independent  [  ]  Dependent       Vital Signs Last 24 Hrs  T(C): 36.1 (2018 06:22), Max: 36.3 (2018 20:10)  T(F): 96.9 (2018 06:22), Max: 97.3 (2018 20:10)  HR: 78 (2018 06:22) (73 - 78)  BP: 127/79 (2018 06:22) (127/79 - 148/69)  BP(mean): --  RR: 18 (2018 06:22) (15 - 18)  SpO2: 98% (2018 20:56) (98% - 98%)      PHYSICAL EXAM: Alert & Oriented X3  GENERAL: NAD, well-groomed, well-developed  HEAD:  Atraumatic, Normocephalic  EYES: EOMI, PERRLA, conjunctiva and sclera clear  NECK: Supple, No JVD, Normal thyroid  CHEST/LUNG: Clear to percussion bilaterally; No rales, rhonchi, wheezing, or rubs  HEART: Regular rate and rhythm; No murmurs, rubs, or gallops  ABDOMEN: Soft, Nontender, Nondistended; Bowel sounds present  EXTREMITIES:  2+ Peripheral Pulses, No clubbing, cyanosis, or edema    NERVOUS SYSTEM:  Cranial Nerves 2-12 intact [  ] Abnormal  [  ]  ROM: WFL all extremities [  ]  Abnormal [  ]  Motor Strength: WFL all extremities  [ x ]  Abnormal [  ]  Sensation: intact to light touch [  ] Abnormal [  ]  Reflexes: Symmetric [  ]  Abnormal [  ]    FUNCTIONAL STATUS:  Bed Mobility: Independent [  ]  Supervision [  ]  Needs Assistance [  ]  N/A [  ]  Transfers: Independent [  ]  Supervision [  ]  Needs Assistance [  ]  N/A [  ]   Ambulation: Independent [  ]  Supervision [  ]  Needs Assistance [  ]  N/A [  ]  ADL: Independent [  ] Requires Assistance [  ] N/A [  ]      LABS:                        8.6    10.25 )-----------( 300      ( 2018 06:58 )             27.0         137  |  99  |  12  ----------------------------<  95  4.5   |  25  |  0.8    Ca    8.8      2018 06:58  Mg     1.9     -    TPro  6.1  /  Alb  2.6<L>  /  TBili  1.2  /  DBili  x   /  AST  14  /  ALT  13  /  AlkPhos  180<H>  11      Urinalysis Basic - ( 2018 18:00 )    Color: Dark Yellow / Appearance: Clear / S.025 / pH: x  Gluc: x / Ketone: Trace  / Bili: Small / Urobili: 2.0 mg/dL   Blood: x / Protein: 30 mg/dL / Nitrite: Negative   Leuk Esterase: Small / RBC: x / WBC 10-25 /HPF   Sq Epi: x / Non Sq Epi: x / Bacteria: Few /HPF        RADIOLOGY & ADDITIONAL STUDIES:    Assesment:

## 2018-11-06 NOTE — CONSULT NOTE ADULT - SUBJECTIVE AND OBJECTIVE BOX
ROSALIE ESCOBEDO  63y, Male  Allergy: Cipro (Unknown)  IV contrast (Short breath)  Keflex (Unknown)      HPI:  63y male with PMH of CAD, NSTEMI s/p CABG (5 vessel according to patient; 2016); DM with peripheral neuropathy, Left DFU, HTN, HLD was sent for worsening chronic left foot ulcer. For the past 2 yrs pt has chronic diabetic left foot ulcer that has been taken care by podiatry at [facility] and has wound care nurse (3 days/week) for dressing. Today during dressing, nurse notice significant bleeding from the ulcer site and Q tip goes down to bone and referred the patient to the ER. He also complains of frequent falls approx. once every 2 months. On his last fall, he hit his back on the side walk and now suffering from back pain since. Denies fever, chills, trauma, chest pain, palpitations, dizziness, changes in urination, or diarrhea. Patient was recently discharged from the ED (10/29/18) with UTI and sent home on Vantin. At baseline, patient walk with cane; wife passed away 2018 - coping ok but finds it difficult to manage ADL's.    In ED, Xray L Foot: Erosive change to the medial cuneiform, new since prior examination consistent with osteomyelitis.  He also started to have some nausea with no vomiting or abd pain. US Abdomen Limited (18): Cholelithiasis without evidence of cholecystitis.    Pt received:  2L LR stat  Vancomycin 2gm IV stat  Zosyn 4.5gm IV stat  Zofran 4mg IV push  Morphine 4 mg IV push (2018 17:17)    FAMILY HISTORY:  No pertinent family history in first degree relatives    PAST MEDICAL & SURGICAL HISTORY:  Dyslipidemia  NSTEMI (non-ST elevated myocardial infarction)  Diabetic foot ulcer  PVD (peripheral vascular disease)  Diabetes  Other hyperlipidemia  Hypertension, unspecified type  Amputated toe, unspecified laterality  S/P CABG (coronary artery bypass graft)      ROS negative except as per HPI    VITALS:  T(F): 96.9, Max: 97.3 (18 @ 20:10)  HR: 78  BP: 127/79  RR: 18Vital Signs Last 24 Hrs  T(C): 36.1 (2018 06:22), Max: 36.3 (2018 20:10)  T(F): 96.9 (2018 06:22), Max: 97.3 (2018 20:10)  HR: 78 (2018 06:22) (73 - 78)  BP: 127/79 (2018 06:22) (127/79 - 148/69)  BP(mean): --  RR: 18 (2018 06:22) (18 - 18)  SpO2: 98% (2018 20:56) (98% - 98%)    PHYSICAL EXAM:  Gen: Awake and alert, non-toxic appearing, NAD  HEENT: NCAT. EOMI. MMM.   Neck: Supple, no cervical LAD  CV: RRR, no murmurs  Lungs: CTAB, no w/r/r  Abd: Soft. NTND  Extr: wwp L 1st met. DFU + tract, no purulence  Skin: no rash  Neuro: No focal deficits  Lines: clean      TESTS & MEASUREMENTS:                        8.6    10.25 )-----------( 300      ( 2018 06:58 )             27.0         137  |  99  |  12  ----------------------------<  95  4.5   |  25  |  0.8    Ca    8.8      2018 06:58  Mg     1.9         TPro  6.1  /  Alb  2.6<L>  /  TBili  1.2  /  DBili  x   /  AST  14  /  ALT  13  /  AlkPhos  180<H>      LIVER FUNCTIONS - ( 2018 06:58 )  Alb: 2.6 g/dL / Pro: 6.1 g/dL / ALK PHOS: 180 U/L / ALT: 13 U/L / AST: 14 U/L / GGT: x             Culture - Blood (collected 18 @ 12:40)  Source: .Blood Blood-Peripheral  Preliminary Report (18 @ 23:01):    No growth to date.    Culture - Blood (collected 18 @ 12:32)  Source: .Blood Blood-Peripheral  Preliminary Report (18 @ 21:01):    No growth to date.      Urinalysis Basic - ( 2018 18:00 )    Color: Dark Yellow / Appearance: Clear / S.025 / pH: x  Gluc: x / Ketone: Trace  / Bili: Small / Urobili: 2.0 mg/dL   Blood: x / Protein: 30 mg/dL / Nitrite: Negative   Leuk Esterase: Small / RBC: x / WBC 10-25 /HPF   Sq Epi: x / Non Sq Epi: x / Bacteria: Few /HPF          RADIOLOGY & ADDITIONAL TESTS:    ANTIBIOTICS:  cefepime   IVPB   100 mL/Hr IV Intermittent (18 @ 05:41)   100 mL/Hr IV Intermittent (18 @ 22:01)   100 mL/Hr IV Intermittent (18 @ 14:57)   100 mL/Hr IV Intermittent (18 @ 05:24)   100 mL/Hr IV Intermittent (18 @ 21:16)    piperacillin/tazobactam IVPB.   200 mL/Hr IV Intermittent (18 @ 15:59)    vancomycin  IVPB   250 mL/Hr IV Intermittent (18 @ 16:53)    vancomycin  IVPB   250 mL/Hr IV Intermittent (18 @ 06:41)   250 mL/Hr IV Intermittent (18 @ 18:26)   250 mL/Hr IV Intermittent (18 @ 07:40)   250 mL/Hr IV Intermittent (18 @ 18:42)

## 2018-11-06 NOTE — DISCHARGE NOTE ADULT - PLAN OF CARE
symptom resolution - take all medication as prescribed.   - follow up with primary care doctor, Dr. Abreu within 1-2 weeks of discharge.   - follow up with podiatry, Dr. Humphreys within 2 weeks of discharge.   - use CAM boot and 4 point walker at all times  - Visiting nurse services to flush and pack wound daily.  - follow up with infectious disease, at [facility]. Call [phone] to make an appointment. - take all medication as prescribed.   - use TLSO brace for comfort.  - make appointment to follow up with neurosurgery, Dr. Parker [phone] resume baseline functional status - take all medication as prescribed.   - follow up with primary care doctor within 2 weeks of discharge.   - return to ER if symptoms persist or worsen. maintain baseline health

## 2018-11-06 NOTE — DISCHARGE NOTE ADULT - MEDICATION SUMMARY - MEDICATIONS TO TAKE
I will START or STAY ON the medications listed below when I get home from the hospital:    aspirin 81 mg oral tablet  -- 1 tab(s) by mouth once a day  -- Indication: For CAD    Lyrica 100 mg oral capsule  -- 1 cap(s) by mouth 2 times a day  -- Indication: For peripheral neuropathy    atorvastatin 40 mg oral tablet  -- 1 tab(s) by mouth once a day  -- Indication: For Dyslipidemia     Plavix 75 mg oral tablet  -- 1 tab(s) by mouth once a day  -- Indication: For CAD    metoprolol succinate 25 mg oral tablet, extended release  -- 1 tab(s) by mouth once a day  -- Indication: For CAD    bacitracin 500 units/g topical ointment  -- 1 application on skin once a day  -- Indication: For OSTEOMYELITIS OF FOOT    docusate sodium 100 mg oral capsule  -- 1 cap(s) by mouth once a day PRN for constipation    -- Indication: For constipation    senna oral tablet  -- 1 tab(s) by mouth once a day (at bedtime) PRN for constipation  -- Indication: For constipation    Augmentin 875 mg-125 mg oral tablet  -- 1 tab(s) by mouth 2 times a day   -- Finish all this medication unless otherwise directed by prescriber.  Take with food or milk.    -- Indication: For OSTEOMYELITIS OF FOOT I will START or STAY ON the medications listed below when I get home from the hospital:    aspirin 81 mg oral tablet  -- 1 tab(s) by mouth once a day  -- Indication: For CAD    traMADol 50 mg oral tablet  -- 1 tab(s) by mouth every 8 hours, As Needed MDD:3  for moderate to severe pain  -- Caution federal law prohibits the transfer of this drug to any person other  than the person for whom it was prescribed.  May cause drowsiness.  Alcohol may intensify this effect.  Use care when operating dangerous machinery.  Obtain medical advice before taking any non-prescription drugs as some may affect the action of this medication.    -- Indication: For pain    Lyrica 100 mg oral capsule  -- 1 cap(s) by mouth 2 times a day  -- Indication: For peripheral neuropathy    atorvastatin 40 mg oral tablet  -- 1 tab(s) by mouth once a day  -- Indication: For Dyslipidemia     Plavix 75 mg oral tablet  -- 1 tab(s) by mouth once a day  -- Indication: For CAD    metoprolol succinate 25 mg oral tablet, extended release  -- 1 tab(s) by mouth once a day  -- Indication: For CAD    bacitracin 500 units/g topical ointment  -- 1 application on skin once a day  -- Indication: For OSTEOMYELITIS OF FOOT    docusate sodium 100 mg oral capsule  -- 1 cap(s) by mouth once a day PRN for constipation    -- Indication: For constipation    senna oral tablet  -- 1 tab(s) by mouth once a day (at bedtime) PRN for constipation  -- Indication: For constipation    Augmentin 875 mg-125 mg oral tablet  -- 1 tab(s) by mouth 2 times a day   -- Finish all this medication unless otherwise directed by prescriber.  Take with food or milk.    -- Indication: For OSTEOMYELITIS OF FOOT

## 2018-11-06 NOTE — DISCHARGE NOTE ADULT - CARE PLAN
Principal Discharge DX:	Osteomyelitis of left foot  Goal:	symptom resolution  Assessment and plan of treatment:	- take all medication as prescribed.   - follow up with primary care doctor, Dr. Abreu within 1-2 weeks of discharge.   - follow up with podiatry, Dr. Humphreys within 2 weeks of discharge.   - use CAM boot and 4 point walker at all times  - Visiting nurse services to flush and pack wound daily.  - follow up with infectious disease, at [facility]. Call [phone] to make an appointment.  Secondary Diagnosis:	Other closed fracture of eleventh thoracic vertebra, initial encounter  Goal:	symptom resolution  Assessment and plan of treatment:	- take all medication as prescribed.   - use TLSO brace for comfort.  - make appointment to follow up with neurosurgery, Dr. Parker [phone]  Secondary Diagnosis:	S/P CABG (coronary artery bypass graft)  Goal:	resume baseline functional status  Assessment and plan of treatment:	- take all medication as prescribed.   - follow up with primary care doctor within 2 weeks of discharge.   - return to ER if symptoms persist or worsen.  Secondary Diagnosis:	Hypertension, unspecified type  Goal:	maintain baseline health  Assessment and plan of treatment:	- take all medication as prescribed.   - follow up with primary care doctor within 2 weeks of discharge.   - return to ER if symptoms persist or worsen.  Secondary Diagnosis:	Type 2 diabetes mellitus with autonomic neuropathy, unspecified whether long term insulin use

## 2018-11-06 NOTE — CONSULT NOTE ADULT - SUBJECTIVE AND OBJECTIVE BOX
Pt Name: ROSALIE ESCOBEDO 63y Male  MRN: [id number]      Current Issues:  Patient recalls having a fall last month presented to the ER 9.26.18 and was discharged home.  since then has been c/o back pain to lower back.  denies  radicular pain.  no motor or sensory changes reported.    baseline neuropathy with ulcers to foot which did not change after the fall.        HPI:  63y male with PMH of CAD, NSTEMI s/p CABG (5 vessel according to patient; March 2016); DM with peripheral neuropathy, Left DFU, HTN, HLD was sent for worsening chronic left foot ulcer. For the past 2 yrs pt has chronic diabetic left foot ulcer that has been taken care by podiatry at [facility] and has wound care nurse (3 days/week) for dressing. Today during dressing, nurse notice significant bleeding from the ulcer site and Q tip goes down to bone and referred the patient to the ER. He also complains of frequent falls approx. once every 2 months. On his last fall, he hit his back on the side walk and now suffering from back pain since. Denies fever, chills, trauma, chest pain, palpitations, dizziness, changes in urination, or diarrhea. Patient was recently discharged from the ED (10/29/18) with UTI and sent home on V[place]. At baseline, patient walk with cane; wife passed away Feb 2018 - coping ok but finds it difficult to manage ADL's.    In ED, Xray L Foot: Erosive change to the medial cuneiform, new since prior examination consistent with osteomyelitis.  He also started to have some nausea with no vomiting or abd pain. US Abdomen Limited (11.04.18): Cholelithiasis without evidence of cholecystitis.    Pt received:  2L LR stat  Vancomycin 2gm IV stat  Zosyn 4.5gm IV stat  Zofran 4mg IV push  Morphine 4 mg IV push (04 Nov 2018 17:17)      PAST MEDICAL & SURGICAL HISTORY:  Dyslipidemia  NSTEMI (non-ST elevated myocardial infarction)  Diabetic foot ulcer  PVD (peripheral vascular disease)  Diabetes  Other hyperlipidemia  Hypertension, unspecified type  Amputated toe, unspecified laterality  S/P CABG (coronary artery bypass graft)    Social History:   Allergies: Cipro (Unknown)  IV contrast (Short breath)  Keflex (Unknown)      PE:   AA&0 x 3,    speech clear, follows commands  Neurological: Sensation is grossly intact to light touch. No focal deficits or weaknesses found.        tender to L3-4 region off to the right flank.    Motor exam: [  ]          [ ] Upper extremity                   Deltoid   Bicep      Tricep     Strength                                               R         5/5          5/5        5/5             5/5                                               L          5/5         5/5         5/5            5/5           [ ] Lower extremeity                  HF/HE       KF/KE       EHL          DF/PF                                               R        5/5           5/5          5/5            5/5                                                     L         5/5          5/5           5/5            5/5             Straight Leg Raise:  RLE :             neg              LLE: neg                            8.6    10.25 )-----------( 300      ( 06 Nov 2018 06:58 )             27.0     11-06    137  |  99  |  12  ----------------------------<  95  4.5   |  25  |  0.8    Ca    8.8      06 Nov 2018 06:58  Mg     1.9     11-06    TPro  6.1  /  Alb  2.6<L>  /  TBili  1.2  /  DBili  x   /  AST  14  /  ALT  13  /  AlkPhos  180<H>  11-06        Vital Signs Last 24 Hrs  T(C): 36.9 (06 Nov 2018 14:15), Max: 36.9 (06 Nov 2018 14:15)  T(F): 98.5 (06 Nov 2018 14:15), Max: 98.5 (06 Nov 2018 14:15)  HR: 73 (06 Nov 2018 14:15) (73 - 78)  BP: 147/79 (06 Nov 2018 14:15) (127/79 - 148/69)  RR: 18 (06 Nov 2018 14:15) (18 - 18)  SpO2: 98% (05 Nov 2018 20:56) (98% - 98%)      Medications:   aspirin  chewable 81 milliGRAM(s) Oral daily  atorvastatin 40 milliGRAM(s) Oral at bedtime  cefepime   IVPB 2000 milliGRAM(s) IV Intermittent every 12 hours  clopidogrel Tablet 75 milliGRAM(s) Oral daily  docusate sodium 100 milliGRAM(s) Oral daily  enoxaparin Injectable 40 milliGRAM(s) SubCutaneous daily  insulin glargine Injectable (LANTUS) 27 Unit(s) SubCutaneous at bedtime  insulin lispro Injectable (HumaLOG) 6 Unit(s) SubCutaneous three times a day before meals  metoprolol succinate ER 25 milliGRAM(s) Oral daily  ondansetron    Tablet 4 milliGRAM(s) Oral every 6 hours PRN  oxyCODONE    5 mG/acetaminophen 325 mG 1 Tablet(s) Oral every 6 hours PRN  pregabalin 100 milliGRAM(s) Oral two times a day  senna 1 Tablet(s) Oral at bedtime  vancomycin  IVPB 1250 milliGRAM(s) IV Intermittent every 12 hours      Imaging Studies:  	  < from: CT Chest No Cont (09.26.18 @ 01:18) >  Acute fracture through the T11-T12 disc space and osteophyte (series 7,   image 47), "chalk stick" fracture with slight anterior widening of the   disc space.    < end of copied text >      PLAN:   needs an MRI T LS spine non contract if no contraindications.

## 2018-11-06 NOTE — DISCHARGE NOTE ADULT - SECONDARY DIAGNOSIS.
Other closed fracture of eleventh thoracic vertebra, initial encounter S/P CABG (coronary artery bypass graft) Hypertension, unspecified type Type 2 diabetes mellitus with autonomic neuropathy, unspecified whether long term insulin use

## 2018-11-06 NOTE — CONSULT NOTE ADULT - ASSESSMENT
IMPRESSION: Rehab of left foot OM, T11-T2 FX--neurosurgery to see    PRECAUTIONS: [ x ] Cardiac  [  ] Respiratory  [  ] Seizures [  ] Contact Isolation  [  ] Droplet Isolation  [  ] Other    Weight Bearing Status:     RECOMMENDATION:  Amb with walker to shift weight off front of foot to the heel WBAT LLE.    Out of Bed to Chair     DVT/Decubiti Prophylaxis    REHAB PLAN:     [xx   ] Bedside P/T 3-5 times a week   [   ]   Bedside O/T  2-3 times a week             [   ] No Rehab Therapy Indicated                   [   ]  Speech Therapy   Conditioning/ROM                                    ADL  Bed Mobility                                               Conditioning/ROM  Transfers                                                     Bed Mobility  Sitting /Standing Balance                         Transfers                                        Gait Training                                               Sitting/Standing Balance  Stair Training [   ]Applicable                    Home equipment Eval                                                                        Splinting  [   ] Only      GOALS:   ADL   [x   ]   Independent                    Transfers  [x   ] Independent                          Ambulation  [x   ] Independent     [    ] With device                            [  ]  CG                                                         [   ]  CG                                                                  [   ] CG                            [    ] Min A                                                   [   ] Min A                                                              [   ] Min  A          DISCHARGE PLAN:   [   ]  Good candidate for Intensive Rehabilitation/Hospital based-[facility]                                             Will tolerate 3hrs Intensive Rehab Daily                                       [    ]  Short Term Rehab in Skilled Nursing Facility                                       [ xx   ]  Home with Outpatient or VN services                                         [    ]  Possible Candidate for Intensive Hospital based Rehab

## 2018-11-06 NOTE — PROGRESS NOTE ADULT - SUBJECTIVE AND OBJECTIVE BOX
ROSALIE ESCOBEDO, male, 63y (51),   MRN-[id number]  Admit Date: 18 (2d)      Chief Complaint:  Patient is a 63y old  Male who presents with a chief complaint of worsening chronic left foot ulcer (2018 09:59)      Interval History:  Pt lying comfortably in bed. Seen by Podiatry today, wound dressing changed. plan for MRI of the foot, and OR if +ve for Osteomyelitis.    Past Medical and Surgical History:  PAST MEDICAL & SURGICAL HISTORY:  Dyslipidemia  NSTEMI (non-ST elevated myocardial infarction)  Diabetic foot ulcer  PVD (peripheral vascular disease)  Diabetes  Other hyperlipidemia  Hypertension, unspecified type  Amputated toe, unspecified laterality  S/P CABG (coronary artery bypass graft)      Current Medications:  MEDICATIONS  (STANDING):  aspirin  chewable 81 milliGRAM(s) Oral daily  atorvastatin 40 milliGRAM(s) Oral at bedtime  cefepime   IVPB 2000 milliGRAM(s) IV Intermittent every 8 hours  clopidogrel Tablet 75 milliGRAM(s) Oral daily  docusate sodium 100 milliGRAM(s) Oral daily  enoxaparin Injectable 40 milliGRAM(s) SubCutaneous daily  insulin glargine Injectable (LANTUS) 27 Unit(s) SubCutaneous at bedtime  insulin lispro Injectable (HumaLOG) 6 Unit(s) SubCutaneous three times a day before meals  metoprolol succinate ER 25 milliGRAM(s) Oral daily  pregabalin 100 milliGRAM(s) Oral two times a day  senna 1 Tablet(s) Oral at bedtime  vancomycin  IVPB 1750 milliGRAM(s) IV Intermittent every 12 hours    MEDICATIONS  (PRN):  ondansetron    Tablet 4 milliGRAM(s) Oral every 6 hours PRN Nausea and/or Vomiting  oxyCODONE    5 mG/acetaminophen 325 mG 1 Tablet(s) Oral every 6 hours PRN Moderate Pain (4 - 6)        Vital Signs:  T(F): 96.9 (18 @ 06:22), Max: 97.9 (18 @ 16:55)  HR: 78 (18 @ 06:22) (73 - 119)  BP: 127/79 (18 @ 06:22) (92/50 - 170/89)  RR: 18 (18 @ 06:22) (15 - 20)  SpO2: 98% (18 @ 20:56) (97% - 100%)  CAPILLARY BLOOD GLUCOSE      POCT Blood Glucose.: 145 mg/dL (2018 08:20)  POCT Blood Glucose.: 87 mg/dL (2018 02:06)  POCT Blood Glucose.: 84 mg/dL (2018 21:56)  POCT Blood Glucose.: 123 mg/dL (2018 18:08)  POCT Blood Glucose.: 133 mg/dL (2018 17:04)  POCT Blood Glucose.: 170 mg/dL (2018 11:49)      Physical Exam:  General: Not in distress.   HEENT: Moist mucus membranes. PERRLA.  Cardio: Regular rate and rhythm, S1, S2, no murmur, rub, or gallop.  Pulm: Clear to auscultation bilaterally. No wheezing, or rhonchi  Abdomen: Soft, non-tender, non-distended. Normoactive bowel sounds.  Extremities: L foot wrapped in dressing, no edema appreciated outside the dressing area  Neuro: A&O x3. No focal deficits.     Labs and Imaging:  CBC Full  -  ( 2018 06:58 )  WBC Count : 10.25 K/uL  Hemoglobin : 8.6 g/dL  Hematocrit : 27.0 %  Platelet Count - Automated : 300 K/uL  Mean Cell Volume : 72.8 fL  Mean Cell Hemoglobin : 23.2 pg  Mean Cell Hemoglobin Concentration : 31.9 g/dL  Auto Neutrophil # : x  Auto Lymphocyte # : x  Auto Monocyte # : x  Auto Eosinophil # : x  Auto Basophil # : x  Auto Neutrophil % : x  Auto Lymphocyte % : x  Auto Monocyte % : x  Auto Eosinophil % : x  Auto Basophil % : x    RDW: 17.8      BMP: 18 @ 06:58  137 | 99 | 12   -----------------< 95  4.5  | 25 | 0.8  eGFR(AA): 109, eGFR (non-AA): 94  Ca 8.8, Mg 1.9, P --    LFTs: 18 @ 06:58  TP  6.1  | 2.6 Alb   ---------------  TB  1.2  | --  DB   ---------------  ALT 13  | 14  AST            ^          180 ALK  LFTs: 18 @ 12:40  TP  7.8  | 3.3 Alb   ---------------  TB  1.5  | --  DB   ---------------  ALT 24  | 22  AST            ^          233 ALK      LFTs: 18 @ 06:58  Ca  8.8  | 14 AST   -----------------  TP  6.1  | 13 ALT  -----------------  Alb 2.6  | 180 ALK          ^        1.2         TB      Cardiac Enzymes:    Urinalysis:  Urinalysis Basic - ( 2018 18:00 )    Color: Dark Yellow / Appearance: Clear / S.025 / pH: x  Gluc: x / Ketone: Trace  / Bili: Small / Urobili: 2.0 mg/dL   Blood: x / Protein: 30 mg/dL / Nitrite: Negative   Leuk Esterase: Small / RBC: x / WBC 10-25 /HPF   Sq Epi: x / Non Sq Epi: x / Bacteria: Few /HPF      Cultures:     (collected 18 @ 12:40)  Source: .Blood Blood-Peripheral  Preliminary Report:    No growth to date.     (collected 18 @ 12:32)  Source: .Blood Blood-Peripheral  Preliminary Report:    No growth to date.     (collected 10-29-18 @ 07:33)  Source: .Blood Blood-Peripheral  Final Report:    No growth at 5 days.          Radiology:     < from: US Kidney and Bladder (18 @ 09:21) >  Impression:  Normal renal ultrasound.    Decompressed urinary bladder limiting evaluation.    < end of copied text >      < from: US Abdomen Limited (18 @ 15:49) >  MPRESSION:    Cholelithiasis without evidence of cholecystitis.    < end of copied text >    < from: VA Duplex Lower Extrem Arterial, Bilat (18 @ 15:47) >    Moderate atherosclerotic plaque is noted. All vessels appear to be patent   however thereis retrograde flow noted in the right posterior tibial   artery suggestive of proximal  occlusion. Please correlate clinically.    < end of copied text >      < from: VA Duplex Lower Ext Vein Scan, Bilat (18 @ 15:46) >  Impression:    No evidence of deep venous thrombosis in the bilateral lower extremities.    ICD-10: M79.89      < end of copied text >    < from: Xray Foot AP + Lateral, Left (18 @ 13:21) >    Impression    Erosive change to the medial cuneiform, new since prior examination   consistent with osteomyelitis.    Diffuse soft tissue swelling consistent with cellulitis.    < end of copied text >      < from: CT Chest No Cont (18 @ 01:18) >  IMPRESSION:        No evidence of acute intrathoracic, abdominal or pelvic traumatic injury.    Additional Findings/Recommendations After Attending Radiologist Review:    Acute fracture through the T11-T12 disc space and osteophyte (series 7,   image 47), "chalk stick" fracture with slight anterior widening of the   disc space. No posterior element involvement.    < end of copied text >

## 2018-11-06 NOTE — PROGRESS NOTE ADULT - SUBJECTIVE AND OBJECTIVE BOX
PODIATRY PROGRESS NOTE   [id number] ROSALIE ESCOBEDO is a pleasant well-nourished, well developed 63y Male in no acute distress, alert awake, and oriented to person, place and time.   Patient is a 63y old  Male who presents with a chief complaint of worsening chronic left foot ulcer (05 Nov 2018 11:03)    HPI:  63y male with PMH of CAD, NSTEMI s/p CABG (5 vessel according to patient; March 2016); DM with peripheral neuropathy, Left DFU, HTN, HLD was sent for worsening chronic left foot ulcer. For the past 2 yrs pt has chronic diabetic left foot ulcer that has been taken care by podiatry at [facility] and has wound care nurse (3 days/week) for dressing. Today during dressing, nurse notice significant bleeding from the ulcer site and Q tip goes down to bone and referred the patient to the ER. He also complains of frequent falls approx. once every 2 months. On his last fall, he hit his back on the side walk and now suffering from back pain since. Denies fever, chills, trauma, chest pain, palpitations, dizziness, changes in urination, or diarrhea. Patient was recently discharged from the ED (10/29/18) with UTI and sent home on Vantin. At baseline, patient walk with cane; wife passed away Feb 2018 - coping ok but finds it difficult to manage ADL's.    In ED, Xray L Foot: Erosive change to the medial cuneiform, new since prior examination consistent with osteomyelitis.  He also started to have some nausea with no vomiting or abd pain. US Abdomen Limited (11.04.18): Cholelithiasis without evidence of cholecystitis.    Pt received:  2L LR stat  Vancomycin 2gm IV stat  Zosyn 4.5gm IV stat  Zofran 4mg IV push  Morphine 4 mg IV push (04 Nov 2018 17:17)    pt was seen and assessed am rounds at bedside with attending Dr. Humphreys.  pt denies any n/v/f/c/sob at this time.  dressing clean dry intact.    Vital Signs Last 24 Hrs  T(C): 36.1 (06 Nov 2018 06:22), Max: 36.3 (05 Nov 2018 20:10)  T(F): 96.9 (06 Nov 2018 06:22), Max: 97.3 (05 Nov 2018 20:10)  HR: 78 (06 Nov 2018 06:22) (73 - 78)  BP: 127/79 (06 Nov 2018 06:22) (127/79 - 148/69)  RR: 18 (06 Nov 2018 06:22) (15 - 18)  SpO2: 98% (05 Nov 2018 20:56) (98% - 98%)                        8.6    10.25 )-----------( 300      ( 06 Nov 2018 06:58 )             27.0                 11-06    137  |  99  |  12  ----------------------------<  95  4.5   |  25  |  0.8    Ca    8.8      06 Nov 2018 06:58  Mg     1.9     11-06    TPro  6.1  /  Alb  2.6<L>  /  TBili  1.2  /  DBili  x   /  AST  14  /  ALT  13  /  AlkPhos  180<H>  11-06    < from: Xray Foot AP + Lateral, Left (11.04.18 @ 13:21) >    EXAM:  XR FOOT 2 VIEWS LT            PROCEDURE DATE:  11/04/2018            INTERPRETATION:  Clinical History / Reason for exam: Evaluate for   infection.    Multiple views of left foot are provided for review and compared to   September 25, 2016.    The patient status post amputation of the first digit to the level of the   base of the first metatarsal. Again seen is amputation of the second   digit at the level of the proximal interphalangeal joint. There is no   acute displaced fracture or dislocation. There is erosive change to the   medial cuneiform, new since prior examination. There is adjacent soft   tissue swelling. There is soft tissue swelling along the dorsum of the   foot. There are chronic changes in the midfoot. There is a calcaneal spur   There are chronic changes at the Lisfranc joint.    Impression    Erosive change to the medial cuneiform, new since prior examination   consistent with osteomyelitis.    Diffuse soft tissue swelling consistent with cellulitis.    JUDI KEATING M.D., ATTENDING RADIOLOGIST  This document has been electronically signed. Nov 4 2018  2:25PM    < end of copied text>    Derm: fibrogranular wound base with yellowish drainage noted on the dressing, +tunneling, + PTB, macerated rim around the wound, measures about 3 cm x 2.8cm x 5cm, no clinical signs of infection, no malodor.  Neuro: Gross sensation diminished to level of digits left foot    assessment  - left sub 1st metatarsal DFU  - Foot xray consistent w/ osteomyelitis medial cuneiform    Plan:   - pt seen/evaluated @ bedside  - c/w IV abx  - dressed w/ plain gauze packing/dsd/kerlix left foot  - obtained wound culture  - ordered MRI to r/o OM  - plan discussed w/ attending   - podiatry will f/u    11-06-18 @ 09:59 PODIATRY PROGRESS NOTE   [id number] ROSALIE ESCOBEDO is a pleasant well-nourished, well developed 63y Male in no acute distress, alert awake, and oriented to person, place and time.   Patient is a 63y old  Male who presents with a chief complaint of worsening chronic left foot ulcer (05 Nov 2018 11:03)    HPI:  63y male with PMH of CAD, NSTEMI s/p CABG (5 vessel according to patient; March 2016); DM with peripheral neuropathy, Left DFU, HTN, HLD was sent for worsening chronic left foot ulcer. For the past 2 yrs pt has chronic diabetic left foot ulcer that has been taken care by podiatry at [facility] and has wound care nurse (3 days/week) for dressing. Today during dressing, nurse notice significant bleeding from the ulcer site and Q tip goes down to bone and referred the patient to the ER. He also complains of frequent falls approx. once every 2 months. On his last fall, he hit his back on the side walk and now suffering from back pain since. Denies fever, chills, trauma, chest pain, palpitations, dizziness, changes in urination, or diarrhea. Patient was recently discharged from the ED (10/29/18) with UTI and sent home on Vantin. At baseline, patient walk with cane; wife passed away Feb 2018 - coping ok but finds it difficult to manage ADL's.    In ED, Xray L Foot: Erosive change to the medial cuneiform, new since prior examination consistent with osteomyelitis.  He also started to have some nausea with no vomiting or abd pain. US Abdomen Limited (11.04.18): Cholelithiasis without evidence of cholecystitis.    Pt received:  2L LR stat  Vancomycin 2gm IV stat  Zosyn 4.5gm IV stat  Zofran 4mg IV push  Morphine 4 mg IV push (04 Nov 2018 17:17)    pt was seen and assessed am rounds at bedside with attending Dr. Humphreys.  pt denies any n/v/f/c/sob at this time.  dressing clean dry intact.    Vital Signs Last 24 Hrs  T(C): 36.1 (06 Nov 2018 06:22), Max: 36.3 (05 Nov 2018 20:10)  T(F): 96.9 (06 Nov 2018 06:22), Max: 97.3 (05 Nov 2018 20:10)  HR: 78 (06 Nov 2018 06:22) (73 - 78)  BP: 127/79 (06 Nov 2018 06:22) (127/79 - 148/69)  RR: 18 (06 Nov 2018 06:22) (15 - 18)  SpO2: 98% (05 Nov 2018 20:56) (98% - 98%)                        8.6    10.25 )-----------( 300      ( 06 Nov 2018 06:58 )             27.0                 11-06    137  |  99  |  12  ----------------------------<  95  4.5   |  25  |  0.8    Ca    8.8      06 Nov 2018 06:58  Mg     1.9     11-06    TPro  6.1  /  Alb  2.6<L>  /  TBili  1.2  /  DBili  x   /  AST  14  /  ALT  13  /  AlkPhos  180<H>  11-06    < from: Xray Foot AP + Lateral, Left (11.04.18 @ 13:21) >    EXAM:  XR FOOT 2 VIEWS LT            PROCEDURE DATE:  11/04/2018            INTERPRETATION:  Clinical History / Reason for exam: Evaluate for   infection.    Multiple views of left foot are provided for review and compared to   September 25, 2016.    The patient status post amputation of the first digit to the level of the   base of the first metatarsal. Again seen is amputation of the second   digit at the level of the proximal interphalangeal joint. There is no   acute displaced fracture or dislocation. There is erosive change to the   medial cuneiform, new since prior examination. There is adjacent soft   tissue swelling. There is soft tissue swelling along the dorsum of the   foot. There are chronic changes in the midfoot. There is a calcaneal spur   There are chronic changes at the Lisfranc joint.    Impression    Erosive change to the medial cuneiform, new since prior examination   consistent with osteomyelitis.    Diffuse soft tissue swelling consistent with cellulitis.    JUDI KEATING M.D., ATTENDING RADIOLOGIST  This document has been electronically signed. Nov 4 2018  2:25PM    < end of copied text>    Derm: fibrogranular wound base with yellowish drainage noted on the dressing, +tunneling, + PTB, macerated rim around the wound, measures about 3 cm x 2.8cm x 5cm, no clinical signs of infection, no malodor.  Neuro: Gross sensation diminished to level of digits left foot    assessment  - left sub 1st metatarsal DFU  - Foot xray consistent w/ osteomyelitis medial cuneiform    Plan:   - pt seen/evaluated @ bedside  - c/w IV abx  - dressed w/ plain gauze packing/dsd/kerlix left foot  - obtained wound culture  - ordered MRI to r/o OM.  - plan discussed w/ attending   - podiatry will f/u    11-06-18 @ 09:59 PODIATRY PROGRESS NOTE   [id number] ROSALIE ESCOBEDO is a pleasant well-nourished, well developed 63y Male in no acute distress, alert awake, and oriented to person, place and time.   Patient is a 63y old  Male who presents with a chief complaint of worsening chronic left foot ulcer (05 Nov 2018 11:03)    HPI:  63y male with PMH of CAD, NSTEMI s/p CABG (5 vessel according to patient; March 2016); DM with peripheral neuropathy, Left DFU, HTN, HLD was sent for worsening chronic left foot ulcer. For the past 2 yrs pt has chronic diabetic left foot ulcer that has been taken care by podiatry at [facility] and has wound care nurse (3 days/week) for dressing. Today during dressing, nurse notice significant bleeding from the ulcer site and Q tip goes down to bone and referred the patient to the ER. He also complains of frequent falls approx. once every 2 months. On his last fall, he hit his back on the side walk and now suffering from back pain since. Denies fever, chills, trauma, chest pain, palpitations, dizziness, changes in urination, or diarrhea. Patient was recently discharged from the ED (10/29/18) with UTI and sent home on Vantin. At baseline, patient walk with cane; wife passed away Feb 2018 - coping ok but finds it difficult to manage ADL's.    In ED, Xray L Foot: Erosive change to the medial cuneiform, new since prior examination consistent with osteomyelitis.  He also started to have some nausea with no vomiting or abd pain. US Abdomen Limited (11.04.18): Cholelithiasis without evidence of cholecystitis.    Pt received:  2L LR stat  Vancomycin 2gm IV stat  Zosyn 4.5gm IV stat  Zofran 4mg IV push  Morphine 4 mg IV push (04 Nov 2018 17:17)    pt was seen and assessed am rounds at bedside with attending Dr. Humphreys.  pt denies any n/v/f/c/sob at this time.  dressing clean dry intact.    Vital Signs Last 24 Hrs  T(C): 36.1 (06 Nov 2018 06:22), Max: 36.3 (05 Nov 2018 20:10)  T(F): 96.9 (06 Nov 2018 06:22), Max: 97.3 (05 Nov 2018 20:10)  HR: 78 (06 Nov 2018 06:22) (73 - 78)  BP: 127/79 (06 Nov 2018 06:22) (127/79 - 148/69)  RR: 18 (06 Nov 2018 06:22) (15 - 18)  SpO2: 98% (05 Nov 2018 20:56) (98% - 98%)                        8.6    10.25 )-----------( 300      ( 06 Nov 2018 06:58 )             27.0                 11-06    137  |  99  |  12  ----------------------------<  95  4.5   |  25  |  0.8    Ca    8.8      06 Nov 2018 06:58  Mg     1.9     11-06    TPro  6.1  /  Alb  2.6<L>  /  TBili  1.2  /  DBili  x   /  AST  14  /  ALT  13  /  AlkPhos  180<H>  11-06    < from: Xray Foot AP + Lateral, Left (11.04.18 @ 13:21) >    EXAM:  XR FOOT 2 VIEWS LT            PROCEDURE DATE:  11/04/2018            INTERPRETATION:  Clinical History / Reason for exam: Evaluate for   infection.    Multiple views of left foot are provided for review and compared to   September 25, 2016.    The patient status post amputation of the first digit to the level of the   base of the first metatarsal. Again seen is amputation of the second   digit at the level of the proximal interphalangeal joint. There is no   acute displaced fracture or dislocation. There is erosive change to the   medial cuneiform, new since prior examination. There is adjacent soft   tissue swelling. There is soft tissue swelling along the dorsum of the   foot. There are chronic changes in the midfoot. There is a calcaneal spur   There are chronic changes at the Lisfranc joint.    Impression    Erosive change to the medial cuneiform, new since prior examination   consistent with osteomyelitis.    Diffuse soft tissue swelling consistent with cellulitis.    JUDI KEATING M.D., ATTENDING RADIOLOGIST  This document has been electronically signed. Nov 4 2018  2:25PM    < end of copied text>    Derm: fibrogranular wound base with yellowish drainage noted on the dressing, +tunneling, + PTB, macerated rim around the wound, measures about 3 cm x 2.8cm x 5cm, no clinical signs of infection, no malodor.  Neuro: Gross sensation diminished to level of digits left foot    assessment  - left sub 1st metatarsal DFU  - Foot xray consistent w/ osteomyelitis medial cuneiform    Plan:   - pt seen/evaluated @ bedside  - c/w IV abx  - dressed w/ plain gauze packing/dsd/kerlix left foot  - obtained wound culture  - ordered MRI to r/o OM. if comes back positive, podiatry will planning on doing surgical intervention.  - plan discussed w/ attending   - podiatry will f/u    11-06-18 @ 09:59

## 2018-11-06 NOTE — DISCHARGE NOTE ADULT - PATIENT PORTAL LINK FT
You can access the [facility] Patient Portal, offered by [facility], by registering with the following website: [email or web address]

## 2018-11-06 NOTE — DISCHARGE NOTE ADULT - HOSPITAL COURSE
63y male with PMH of CAD, NSTEMI s/p CABG (5 vessel according to patient; March 2016); DM with peripheral neuropathy, Left DFU, HTN, HLD was sent for worsening chronic left foot ulcer. For the past 2 yrs pt has chronic diabetic left foot ulcer that has been taken care by podiatry at [facility] and has wound care nurse (3 days/week) for dressing. Today during dressing, nurse notice significant bleeding from the ulcer site and Q tip goes down to bone and referred the patient to the ER. He also complains of frequent falls approx. once every 2 months. On his last fall, he hit his back on the side walk and now suffering from back pain since. Patient was recently discharged from the ED (10/29/18) with UTI and sent home on Vantin. At baseline, patient walk with cane.    In ED, Xray L Foot: Erosive change to the medial cuneiform, new since prior examination consistent with osteomyelitis.  Was seen by podiatry team, who took him to the OR for excisional debridement. Will have visiting nurse services come to his home daily for dressing changes. Will use CAM boot and 4 point walker for ambulation at all times. Seen ambulating 25 ft in hallway. To be discharged home with visiting nurse services with close outpatient follow up with podiatry and infectious disease.   Workup revealed T11 vertebral fracture. To use brace while at home. Follow up with neurosurgery.

## 2018-11-07 LAB
ALBUMIN SERPL ELPH-MCNC: 2.6 G/DL — LOW (ref 3.5–5.2)
ALP SERPL-CCNC: 199 U/L — HIGH (ref 30–115)
ALT FLD-CCNC: 13 U/L — SIGNIFICANT CHANGE UP (ref 0–41)
ANION GAP SERPL CALC-SCNC: 12 MMOL/L — SIGNIFICANT CHANGE UP (ref 7–14)
APTT BLD: 32.1 SEC — SIGNIFICANT CHANGE UP (ref 27–39.2)
AST SERPL-CCNC: 15 U/L — SIGNIFICANT CHANGE UP (ref 0–41)
BILIRUB SERPL-MCNC: 0.8 MG/DL — SIGNIFICANT CHANGE UP (ref 0.2–1.2)
BUN SERPL-MCNC: 9 MG/DL — LOW (ref 10–20)
CALCIUM SERPL-MCNC: 9 MG/DL — SIGNIFICANT CHANGE UP (ref 8.5–10.1)
CHLORIDE SERPL-SCNC: 99 MMOL/L — SIGNIFICANT CHANGE UP (ref 98–110)
CO2 SERPL-SCNC: 27 MMOL/L — SIGNIFICANT CHANGE UP (ref 17–32)
CREAT SERPL-MCNC: 0.9 MG/DL — SIGNIFICANT CHANGE UP (ref 0.7–1.5)
ERYTHROCYTE [SEDIMENTATION RATE] IN BLOOD: >140 MM/HR — HIGH (ref 0–10)
FOLATE SERPL-MCNC: <2 NG/ML — LOW
GLUCOSE BLDC GLUCOMTR-MCNC: 105 MG/DL — HIGH (ref 70–99)
GLUCOSE BLDC GLUCOMTR-MCNC: 126 MG/DL — HIGH (ref 70–99)
GLUCOSE BLDC GLUCOMTR-MCNC: 128 MG/DL — HIGH (ref 70–99)
GLUCOSE BLDC GLUCOMTR-MCNC: 160 MG/DL — HIGH (ref 70–99)
GLUCOSE SERPL-MCNC: 100 MG/DL — HIGH (ref 70–99)
HCT VFR BLD CALC: 28.6 % — LOW (ref 42–52)
HGB BLD-MCNC: 9.2 G/DL — LOW (ref 14–18)
INR BLD: 1.14 RATIO — SIGNIFICANT CHANGE UP (ref 0.65–1.3)
MAGNESIUM SERPL-MCNC: 2 MG/DL — SIGNIFICANT CHANGE UP (ref 1.8–2.4)
MCHC RBC-ENTMCNC: 23.3 PG — LOW (ref 27–31)
MCHC RBC-ENTMCNC: 32.2 G/DL — SIGNIFICANT CHANGE UP (ref 32–37)
MCV RBC AUTO: 72.4 FL — LOW (ref 80–94)
NRBC # BLD: 0 /100 WBCS — SIGNIFICANT CHANGE UP (ref 0–0)
PLATELET # BLD AUTO: 336 K/UL — SIGNIFICANT CHANGE UP (ref 130–400)
POTASSIUM SERPL-MCNC: 4.3 MMOL/L — SIGNIFICANT CHANGE UP (ref 3.5–5)
POTASSIUM SERPL-SCNC: 4.3 MMOL/L — SIGNIFICANT CHANGE UP (ref 3.5–5)
PROT SERPL-MCNC: 6.6 G/DL — SIGNIFICANT CHANGE UP (ref 6–8)
PROTHROM AB SERPL-ACNC: 13.1 SEC — HIGH (ref 9.95–12.87)
RBC # BLD: 3.95 M/UL — LOW (ref 4.7–6.1)
RBC # FLD: 18 % — HIGH (ref 11.5–14.5)
SODIUM SERPL-SCNC: 138 MMOL/L — SIGNIFICANT CHANGE UP (ref 135–146)
WBC # BLD: 8.38 K/UL — SIGNIFICANT CHANGE UP (ref 4.8–10.8)
WBC # FLD AUTO: 8.38 K/UL — SIGNIFICANT CHANGE UP (ref 4.8–10.8)

## 2018-11-07 PROCEDURE — 99221 1ST HOSP IP/OBS SF/LOW 40: CPT

## 2018-11-07 RX ORDER — CHLORHEXIDINE GLUCONATE 213 G/1000ML
1 SOLUTION TOPICAL
Qty: 0 | Refills: 0 | Status: DISCONTINUED | OUTPATIENT
Start: 2018-11-07 | End: 2018-11-10

## 2018-11-07 RX ADMIN — Medication 6 UNIT(S): at 12:30

## 2018-11-07 RX ADMIN — Medication 100 MILLIGRAM(S): at 05:18

## 2018-11-07 RX ADMIN — Medication 166.67 MILLIGRAM(S): at 17:47

## 2018-11-07 RX ADMIN — OXYCODONE AND ACETAMINOPHEN 1 TABLET(S): 5; 325 TABLET ORAL at 11:46

## 2018-11-07 RX ADMIN — Medication 100 MILLIGRAM(S): at 11:22

## 2018-11-07 RX ADMIN — ENOXAPARIN SODIUM 40 MILLIGRAM(S): 100 INJECTION SUBCUTANEOUS at 11:22

## 2018-11-07 RX ADMIN — SENNA PLUS 1 TABLET(S): 8.6 TABLET ORAL at 21:26

## 2018-11-07 RX ADMIN — CEFEPIME 100 MILLIGRAM(S): 1 INJECTION, POWDER, FOR SOLUTION INTRAMUSCULAR; INTRAVENOUS at 05:10

## 2018-11-07 RX ADMIN — Medication 81 MILLIGRAM(S): at 11:22

## 2018-11-07 RX ADMIN — Medication 166.67 MILLIGRAM(S): at 05:10

## 2018-11-07 RX ADMIN — CEFEPIME 100 MILLIGRAM(S): 1 INJECTION, POWDER, FOR SOLUTION INTRAMUSCULAR; INTRAVENOUS at 17:09

## 2018-11-07 RX ADMIN — Medication 100 MILLIGRAM(S): at 17:11

## 2018-11-07 RX ADMIN — Medication 6 UNIT(S): at 08:23

## 2018-11-07 RX ADMIN — ATORVASTATIN CALCIUM 40 MILLIGRAM(S): 80 TABLET, FILM COATED ORAL at 21:26

## 2018-11-07 RX ADMIN — INSULIN GLARGINE 27 UNIT(S): 100 INJECTION, SOLUTION SUBCUTANEOUS at 21:27

## 2018-11-07 RX ADMIN — Medication 25 MILLIGRAM(S): at 05:18

## 2018-11-07 RX ADMIN — Medication 6 UNIT(S): at 17:11

## 2018-11-07 RX ADMIN — OXYCODONE AND ACETAMINOPHEN 1 TABLET(S): 5; 325 TABLET ORAL at 18:17

## 2018-11-07 RX ADMIN — CLOPIDOGREL BISULFATE 75 MILLIGRAM(S): 75 TABLET, FILM COATED ORAL at 11:22

## 2018-11-07 RX ADMIN — OXYCODONE AND ACETAMINOPHEN 1 TABLET(S): 5; 325 TABLET ORAL at 17:47

## 2018-11-07 NOTE — PROGRESS NOTE ADULT - ASSESSMENT
63y male with PMH of CAD, NSTEMI s/p CABG (5 vessel according to patient; March 2016); DM with peripheral neuropathy, Left DFU, HTN, HLD was sent for worsening chronic left foot ulcer.     #) L sub 1st metatarsal ulceration w/ overlying osteomyelitis  +erythema +edema - purulence  -X-ray: Erosive changes to medial cuneiform consistent w/ OM.   VA Duplex: Retrograde flow in R posterior tibial artery suggestive of proximal occlusion  - MRI Foot: Large plantar ulcer (5.9 cm) w/ direct extension into 1st tarsometatarsal joint. First tarsometatarsal joint septic arthritis w/ OM and pathologic fracture of the medial cuneiform.  - On cefepime 2g IV q12 and Vanco 1250mg q12hr. Vanco trough in AM  -Podiatry: No surgical intervention this admission due to pt having wedding to attend. Local wound care. Visiting nurse for dressing change 4 times a week. Dispense DARCO left foot; partial weight-bearing to the heel. Will f/u as outpt w/ Dr. Humphreys in 1 week. Podiatry will f/u while in house   - Blood Cx: NGTD  -F/u wound cx  - pain control with percocet    #Acute back pain due to thoracic spine T11- T12 fracture   - Neruo Sx: needs an MRI T LS spine non contract if no contraindications.  F/u MRI results      #) Nausea   - zofran 4mg IV prn q6 hrs  -outpt w/u by GI  -CE x3 -ve  - US Abdomen Limited (11.04.18): Cholelithiasis without evidence of cholecystitis --> outpt management    #) CAD, NSTEMI s/p CABG  - c/w ASA, Plavix, metoprolol, statin      #) HTN  - c/w metoprolol     #) DM  - FS qAC and qHS  - On Lantus/Lispro 27/6/6/6  - c/w Lyrica for peripheral neuropathy    #) Diet   - DASH, Carb consistent    #) DVT ppx  - Lovenox 40mg daily    #) Activity  - out of bed to chair    #) Disposition  - from home    #) Full code status    #PT/REhab: Home w/ outpt or VN services

## 2018-11-07 NOTE — PROGRESS NOTE ADULT - SUBJECTIVE AND OBJECTIVE BOX
ROSALIE ESCOBEDO  63y, Male      OVERNIGHT EVENTS:  MRI L foot Large plantar medial midfoot ulcer with subjacent 5.9 cm phlegmon, increased in size since September 2016, demonstrating direct extension into the first tarsometatarsal joint. First tarsometatarsal joint septic arthritis with osteomyelitis and pathologic fractureof the medial cuneiform, and osteomyelitis of the plantar first metatarsal base stump. Worsening neuropathic osteoarthropathy of the second through fifth tarsometatarsal joints since 2016, with superimposed septic arthritis,   and likely superimposed osteomyelitis of the plantar second metatarsal base. Septic arthritis of the navicular-cuneiform joints    ROS negative except as per above    VITALS:  T(F): 96.8, Max: 98.5 (11-06-18 @ 14:15)  HR: 68  BP: 138/76  RR: 17Vital Signs Last 24 Hrs  T(C): 36 (07 Nov 2018 06:01), Max: 36.9 (06 Nov 2018 14:15)  T(F): 96.8 (07 Nov 2018 06:01), Max: 98.5 (06 Nov 2018 14:15)  HR: 68 (07 Nov 2018 06:01) (68 - 73)  BP: 138/76 (07 Nov 2018 06:01) (138/76 - 170/79)  BP(mean): --  RR: 17 (07 Nov 2018 06:01) (17 - 18)  SpO2: --    PHYSICAL EXAM:  Gen: Awake and alert, non-toxic appearing, NAD  HEENT: NCAT. EOMI. MMM.   Neck: Supple, no cervical LAD  CV: RRR, no murmurs  Lungs: CTAB, no w/r/r  Abd: Soft. NTND  Extr: wwp L 1st met. DFU + tract, no purulence  Skin: no rash  Neuro: No focal deficits  Lines: clean    TESTS & MEASUREMENTS:                        9.2    8.38  )-----------( 336      ( 07 Nov 2018 08:11 )             28.6     11-07    138  |  99  |  9<L>  ----------------------------<  100<H>  4.3   |  27  |  0.9    Ca    9.0      07 Nov 2018 08:11  Mg     2.0     11-07    TPro  6.6  /  Alb  2.6<L>  /  TBili  0.8  /  DBili  x   /  AST  15  /  ALT  13  /  AlkPhos  199<H>  11-07    LIVER FUNCTIONS - ( 07 Nov 2018 08:11 )  Alb: 2.6 g/dL / Pro: 6.6 g/dL / ALK PHOS: 199 U/L / ALT: 13 U/L / AST: 15 U/L / GGT: x             Culture - Blood (collected 11-05-18 @ 10:01)  Source: .Blood None  Preliminary Report (11-06-18 @ 18:00):    No growth to date.    Culture - Blood (collected 11-04-18 @ 12:40)  Source: .Blood Blood-Peripheral  Preliminary Report (11-05-18 @ 23:01):    No growth to date.    Culture - Blood (collected 11-04-18 @ 12:32)  Source: .Blood Blood-Peripheral  Preliminary Report (11-05-18 @ 21:01):    No growth to date.          RADIOLOGY & ADDITIONAL TESTS:    ANTIBIOTICS:  cefepime   IVPB   100 mL/Hr IV Intermittent (11-06-18 @ 05:41)   100 mL/Hr IV Intermittent (11-05-18 @ 22:01)   100 mL/Hr IV Intermittent (11-05-18 @ 14:57)   100 mL/Hr IV Intermittent (11-05-18 @ 05:24)   100 mL/Hr IV Intermittent (11-04-18 @ 21:16)    cefepime   IVPB   100 mL/Hr IV Intermittent (11-07-18 @ 05:10)   100 mL/Hr IV Intermittent (11-06-18 @ 19:06)    piperacillin/tazobactam IVPB.   200 mL/Hr IV Intermittent (11-04-18 @ 15:59)    vancomycin  IVPB   250 mL/Hr IV Intermittent (11-04-18 @ 16:53)    vancomycin  IVPB   250 mL/Hr IV Intermittent (11-06-18 @ 06:41)   250 mL/Hr IV Intermittent (11-05-18 @ 18:26)   250 mL/Hr IV Intermittent (11-05-18 @ 07:40)   250 mL/Hr IV Intermittent (11-04-18 @ 18:42)    vancomycin  IVPB   166.67 mL/Hr IV Intermittent (11-07-18 @ 05:10)   166.67 mL/Hr IV Intermittent (11-06-18 @ 19:16)        cefepime   IVPB 2000 milliGRAM(s) IV Intermittent every 12 hours  vancomycin  IVPB 1250 milliGRAM(s) IV Intermittent every 12 hours

## 2018-11-07 NOTE — PROGRESS NOTE ADULT - SUBJECTIVE AND OBJECTIVE BOX
Pt seen and examined at bedside with Dr. Parker. Pt complaining on mid back pain around the t11-12 area. The fracture seen and ct scan is through an osteophyte. We recommend MRI T spine, lidoderm patch, and possible rib series xray due to pain in the rib area. d/w attending Dr. Parker

## 2018-11-07 NOTE — PROGRESS NOTE ADULT - ASSESSMENT
63M    DM  NSTEMI (non-ST elevated myocardial infarction)  PVD  HTN  Amputated toe  CAD S/P CABG (coronary artery bypass graft)    Admitted with worsening L DFU, xray with cuneiform OM  US with R post tib occlusion  Sepsis on admission P119, WBC 11  Systolic hypotension on admission  Hyponatremia  BMI 30  MRI L foot Large plantar medial midfoot ulcer with subjacent 5.9 cm phlegmon, increased in size since September 2016, demonstrating direct extension into the first tarsometatarsal joint. First tarsometatarsal joint septic arthritis with osteomyelitis and pathologic fractureof the medial cuneiform, and osteomyelitis of the plantar first metatarsal base stump. Worsening neuropathic osteoarthropathy of the second through fifth tarsometatarsal joints since 2016, with superimposed septic arthritis,   and likely superimposed osteomyelitis of the plantar second metatarsal base. Septic arthritis of the navicular-cuneiform joints  ESR >140    - vanc to 1.25 q12h  - Please check vanc trough 30 min prior to 4th dose   - cefepime to 2g q12h  - MRSA nasal PCR  - Podiatry following    Spectra [id number] 63M    DM  NSTEMI (non-ST elevated myocardial infarction)  PVD  HTN  Amputated toe  CAD S/P CABG (coronary artery bypass graft)    Admitted with worsening L DFU, xray with cuneiform OM  US with R post tib occlusion  Sepsis on admission P119, WBC 11  Systolic hypotension on admission  Hyponatremia  BMI 30  MRI L foot Large plantar medial midfoot ulcer with subjacent 5.9 cm phlegmon, increased in size since September 2016, demonstrating direct extension into the first tarsometatarsal joint. First tarsometatarsal joint septic arthritis with osteomyelitis and pathologic fractureof the medial cuneiform, and osteomyelitis of the plantar first metatarsal base stump. Worsening neuropathic osteoarthropathy of the second through fifth tarsometatarsal joints since 2016, with superimposed septic arthritis,   and likely superimposed osteomyelitis of the plantar second metatarsal base. Septic arthritis of the navicular-cuneiform joints  ESR >140    - son's wedding is 11/23 so may favor IV Abx PICC and possible surgery after  - vanc to 1.25 q12h  - Please check vanc trough 30 min prior to 4th dose   - cefepime to 2g q12h  - MRSA nasal PCR  - Podiatry following    Spectra [phone]

## 2018-11-07 NOTE — PROGRESS NOTE ADULT - SUBJECTIVE AND OBJECTIVE BOX
ROSALIE ESCOBEDO, male, 63y ([birth year]),   MRN-[id number]  Admit Date: 11-04-18 (3d)      Chief Complaint:  Patient is a 63y old  Male who presents with a chief complaint of worsening chronic left foot ulcer (07 Nov 2018 10:48)      Interval History:  Pt sitting comfortably in bed, no acute overnight events. Pt has wedding to go to, so will consult IR for PICC line, and Podiatry will f/u as outpt. MRI Thoracic/Lumbar spine ordered as per Neurosurgery.    Past Medical and Surgical History:  PAST MEDICAL & SURGICAL HISTORY:  Dyslipidemia  NSTEMI (non-ST elevated myocardial infarction)  Diabetic foot ulcer  PVD (peripheral vascular disease)  Diabetes  Other hyperlipidemia  Hypertension, unspecified type  Amputated toe, unspecified laterality  S/P CABG (coronary artery bypass graft)      Current Medications:  MEDICATIONS  (STANDING):  aspirin  chewable 81 milliGRAM(s) Oral daily  atorvastatin 40 milliGRAM(s) Oral at bedtime  cefepime   IVPB 2000 milliGRAM(s) IV Intermittent every 12 hours  clopidogrel Tablet 75 milliGRAM(s) Oral daily  docusate sodium 100 milliGRAM(s) Oral daily  enoxaparin Injectable 40 milliGRAM(s) SubCutaneous daily  insulin glargine Injectable (LANTUS) 27 Unit(s) SubCutaneous at bedtime  insulin lispro Injectable (HumaLOG) 6 Unit(s) SubCutaneous three times a day before meals  metoprolol succinate ER 25 milliGRAM(s) Oral daily  pregabalin 100 milliGRAM(s) Oral two times a day  senna 1 Tablet(s) Oral at bedtime  vancomycin  IVPB 1250 milliGRAM(s) IV Intermittent every 12 hours    MEDICATIONS  (PRN):  ondansetron    Tablet 4 milliGRAM(s) Oral every 6 hours PRN Nausea and/or Vomiting  oxyCODONE    5 mG/acetaminophen 325 mG 1 Tablet(s) Oral every 6 hours PRN Moderate Pain (4 - 6)        Vital Signs:  T(F): 96.8 (11-07-18 @ 06:01), Max: 98.5 (11-06-18 @ 14:15)  HR: 68 (11-07-18 @ 06:01) (68 - 78)  BP: 138/76 (11-07-18 @ 06:01) (127/79 - 170/79)  RR: 17 (11-07-18 @ 06:01) (17 - 18)  SpO2: 98% (11-05-18 @ 20:56) (98% - 98%)  CAPILLARY BLOOD GLUCOSE      POCT Blood Glucose.: 160 mg/dL (07 Nov 2018 12:25)  POCT Blood Glucose.: 105 mg/dL (07 Nov 2018 08:13)  POCT Blood Glucose.: 124 mg/dL (06 Nov 2018 21:46)  POCT Blood Glucose.: 157 mg/dL (06 Nov 2018 16:50)      Physical Exam:  General: Not in distress.   HEENT: Moist mucus membranes. PERRLA.  Cardio: Regular rate and rhythm, S1, S2, no murmur, rub, or gallop.  Pulm: Clear to auscultation bilaterally. No wheezing, or rhonchi  Abdomen: Soft, non-tender, non-distended. Normoactive bowel sounds.  Extremities: L foot wrapped in dressing,   Neuro: A&O x3. No focal deficits.   Labs and Imaging:  CBC Full  -  ( 07 Nov 2018 08:11 )  WBC Count : 8.38 K/uL  Hemoglobin : 9.2 g/dL  Hematocrit : 28.6 %  Platelet Count - Automated : 336 K/uL  Mean Cell Volume : 72.4 fL  Mean Cell Hemoglobin : 23.3 pg  Mean Cell Hemoglobin Concentration : 32.2 g/dL  Auto Neutrophil # : x  Auto Lymphocyte # : x  Auto Monocyte # : x  Auto Eosinophil # : x  Auto Basophil # : x  Auto Neutrophil % : x  Auto Lymphocyte % : x  Auto Monocyte % : x  Auto Eosinophil % : x  Auto Basophil % : x    RDW: 18.0      BMP: 11-07-18 @ 08:11  138 | 99 | 9    -----------------< 100  4.3  | 27 | 0.9  eGFR(AA): 104, eGFR (non-AA): 89  Ca 9.0, Mg 2.0, P --    LFTs: 11-07-18 @ 08:11  TP  6.6  | 2.6 Alb   ---------------  TB  0.8  | --  DB   ---------------  ALT 13  | 15  AST            ^          199 ALK  LFTs: 11-06-18 @ 06:58  TP  6.1  | 2.6 Alb   ---------------  TB  1.2  | --  DB   ---------------  ALT 13  | 14  AST            ^          180 ALK      LFTs: 11-07-18 @ 08:11  Ca  9.0  | 15 AST   -----------------  TP  6.6  | 13 ALT  -----------------  Alb 2.6  | 199 ALK          ^        0.8         TB         (collected 11-05-18 @ 10:01)  Source: .Blood None  Preliminary Report:    No growth to date.     (collected 11-04-18 @ 12:40)  Source: .Blood Blood-Peripheral  Preliminary Report:    No growth to date.     (collected 11-04-18 @ 12:32)  Source: .Blood Blood-Peripheral  Preliminary Report:    No growth to date.     (collected 10-29-18 @ 07:33)  Source: .Blood Blood-Peripheral  Final Report:    No growth at 5 days.          Radiology:     < from: MR Foot No Cont, Left (11.06.18 @ 18:56) >  Impression:    Large plantar medial midfoot ulcer with subjacent 5.9 cm phlegmon,   increased in size since September 2016, demonstrating direct extension   into the first tarsometatarsal joint.    First tarsometatarsal joint septic arthritis with osteomyelitis and   pathologic fractureof the medial cuneiform, and osteomyelitis of the   plantar first metatarsal base stump.    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    Septic arthritis of the navicular-cuneiform joints.    < end of copied text >    < from: CT Abdomen and Pelvis No Cont (09.26.18 @ 01:18) >  IMPRESSION:        No evidence of acute intrathoracic, abdominal or pelvic traumatic injury.    Additional Findings/Recommendations After Attending Radiologist Review:    Acute fracture through the T11-T12 disc space and osteophyte (series 7,   image 47), "chalk stick" fracture with slight anterior widening of the   disc space. No posterior element involvement.    Dr. Newman discussed finding with Dr. Castro 9/26/18 at 2:30 AM    < end of copied text >

## 2018-11-08 LAB
ALBUMIN SERPL ELPH-MCNC: 2.9 G/DL — LOW (ref 3.5–5.2)
ALP SERPL-CCNC: 243 U/L — HIGH (ref 30–115)
ALT FLD-CCNC: 15 U/L — SIGNIFICANT CHANGE UP (ref 0–41)
ANION GAP SERPL CALC-SCNC: 11 MMOL/L — SIGNIFICANT CHANGE UP (ref 7–14)
AST SERPL-CCNC: 18 U/L — SIGNIFICANT CHANGE UP (ref 0–41)
BILIRUB SERPL-MCNC: 0.7 MG/DL — SIGNIFICANT CHANGE UP (ref 0.2–1.2)
BUN SERPL-MCNC: 11 MG/DL — SIGNIFICANT CHANGE UP (ref 10–20)
CALCIUM SERPL-MCNC: 9.5 MG/DL — SIGNIFICANT CHANGE UP (ref 8.5–10.1)
CHLORIDE SERPL-SCNC: 96 MMOL/L — LOW (ref 98–110)
CO2 SERPL-SCNC: 29 MMOL/L — SIGNIFICANT CHANGE UP (ref 17–32)
CREAT SERPL-MCNC: 0.9 MG/DL — SIGNIFICANT CHANGE UP (ref 0.7–1.5)
GLUCOSE BLDC GLUCOMTR-MCNC: 117 MG/DL — HIGH (ref 70–99)
GLUCOSE BLDC GLUCOMTR-MCNC: 157 MG/DL — HIGH (ref 70–99)
GLUCOSE BLDC GLUCOMTR-MCNC: 176 MG/DL — HIGH (ref 70–99)
GLUCOSE BLDC GLUCOMTR-MCNC: 202 MG/DL — HIGH (ref 70–99)
GLUCOSE SERPL-MCNC: 143 MG/DL — HIGH (ref 70–99)
HCT VFR BLD CALC: 31.4 % — LOW (ref 42–52)
HGB BLD-MCNC: 9.9 G/DL — LOW (ref 14–18)
MAGNESIUM SERPL-MCNC: 2 MG/DL — SIGNIFICANT CHANGE UP (ref 1.8–2.4)
MCHC RBC-ENTMCNC: 22.8 PG — LOW (ref 27–31)
MCHC RBC-ENTMCNC: 31.5 G/DL — LOW (ref 32–37)
MCV RBC AUTO: 72.2 FL — LOW (ref 80–94)
NRBC # BLD: 0 /100 WBCS — SIGNIFICANT CHANGE UP (ref 0–0)
PLATELET # BLD AUTO: 390 K/UL — SIGNIFICANT CHANGE UP (ref 130–400)
POTASSIUM SERPL-MCNC: 4.5 MMOL/L — SIGNIFICANT CHANGE UP (ref 3.5–5)
POTASSIUM SERPL-SCNC: 4.5 MMOL/L — SIGNIFICANT CHANGE UP (ref 3.5–5)
PROT SERPL-MCNC: 7.6 G/DL — SIGNIFICANT CHANGE UP (ref 6–8)
RBC # BLD: 4.35 M/UL — LOW (ref 4.7–6.1)
RBC # FLD: 17.7 % — HIGH (ref 11.5–14.5)
SODIUM SERPL-SCNC: 136 MMOL/L — SIGNIFICANT CHANGE UP (ref 135–146)
VANCOMYCIN TROUGH SERPL-MCNC: 17.8 UG/ML — HIGH (ref 5–10)
WBC # BLD: 7.82 K/UL — SIGNIFICANT CHANGE UP (ref 4.8–10.8)
WBC # FLD AUTO: 7.82 K/UL — SIGNIFICANT CHANGE UP (ref 4.8–10.8)

## 2018-11-08 RX ORDER — LIDOCAINE 4 G/100G
1 CREAM TOPICAL DAILY
Qty: 0 | Refills: 0 | Status: DISCONTINUED | OUTPATIENT
Start: 2018-11-08 | End: 2018-11-10

## 2018-11-08 RX ADMIN — OXYCODONE AND ACETAMINOPHEN 1 TABLET(S): 5; 325 TABLET ORAL at 02:12

## 2018-11-08 RX ADMIN — LIDOCAINE 1 PATCH: 4 CREAM TOPICAL at 16:28

## 2018-11-08 RX ADMIN — Medication 6 UNIT(S): at 08:14

## 2018-11-08 RX ADMIN — Medication 81 MILLIGRAM(S): at 11:25

## 2018-11-08 RX ADMIN — OXYCODONE AND ACETAMINOPHEN 1 TABLET(S): 5; 325 TABLET ORAL at 09:54

## 2018-11-08 RX ADMIN — Medication 100 MILLIGRAM(S): at 05:43

## 2018-11-08 RX ADMIN — ENOXAPARIN SODIUM 40 MILLIGRAM(S): 100 INJECTION SUBCUTANEOUS at 11:25

## 2018-11-08 RX ADMIN — CEFEPIME 100 MILLIGRAM(S): 1 INJECTION, POWDER, FOR SOLUTION INTRAMUSCULAR; INTRAVENOUS at 17:07

## 2018-11-08 RX ADMIN — ATORVASTATIN CALCIUM 40 MILLIGRAM(S): 80 TABLET, FILM COATED ORAL at 21:55

## 2018-11-08 RX ADMIN — SENNA PLUS 1 TABLET(S): 8.6 TABLET ORAL at 21:55

## 2018-11-08 RX ADMIN — Medication 6 UNIT(S): at 17:07

## 2018-11-08 RX ADMIN — OXYCODONE AND ACETAMINOPHEN 1 TABLET(S): 5; 325 TABLET ORAL at 17:05

## 2018-11-08 RX ADMIN — Medication 166.67 MILLIGRAM(S): at 08:09

## 2018-11-08 RX ADMIN — OXYCODONE AND ACETAMINOPHEN 1 TABLET(S): 5; 325 TABLET ORAL at 10:25

## 2018-11-08 RX ADMIN — CEFEPIME 100 MILLIGRAM(S): 1 INJECTION, POWDER, FOR SOLUTION INTRAMUSCULAR; INTRAVENOUS at 05:43

## 2018-11-08 RX ADMIN — LIDOCAINE 1 PATCH: 4 CREAM TOPICAL at 21:58

## 2018-11-08 RX ADMIN — CLOPIDOGREL BISULFATE 75 MILLIGRAM(S): 75 TABLET, FILM COATED ORAL at 11:25

## 2018-11-08 RX ADMIN — OXYCODONE AND ACETAMINOPHEN 1 TABLET(S): 5; 325 TABLET ORAL at 02:42

## 2018-11-08 RX ADMIN — INSULIN GLARGINE 27 UNIT(S): 100 INJECTION, SOLUTION SUBCUTANEOUS at 22:09

## 2018-11-08 RX ADMIN — Medication 25 MILLIGRAM(S): at 05:43

## 2018-11-08 RX ADMIN — Medication 166.67 MILLIGRAM(S): at 18:24

## 2018-11-08 RX ADMIN — Medication 100 MILLIGRAM(S): at 11:25

## 2018-11-08 RX ADMIN — Medication 6 UNIT(S): at 12:31

## 2018-11-08 RX ADMIN — OXYCODONE AND ACETAMINOPHEN 1 TABLET(S): 5; 325 TABLET ORAL at 16:35

## 2018-11-08 RX ADMIN — Medication 100 MILLIGRAM(S): at 17:07

## 2018-11-08 NOTE — PROGRESS NOTE ADULT - SUBJECTIVE AND OBJECTIVE BOX
ROSALIE ESCOBEDO  63y, Male      OVERNIGHT EVENTS:  no acute events overnight  vanc trough 17.8    ROS negative except as per above    VITALS:  T(F): 96.4, Max: 98.6 (11-07-18 @ 20:24)  HR: 66  BP: 158/82  RR: 17Vital Signs Last 24 Hrs  T(C): 35.8 (08 Nov 2018 05:38), Max: 37 (07 Nov 2018 20:24)  T(F): 96.4 (08 Nov 2018 05:38), Max: 98.6 (07 Nov 2018 20:24)  HR: 66 (08 Nov 2018 05:38) (66 - 72)  BP: 158/82 (08 Nov 2018 05:38) (149/76 - 160/77)  BP(mean): --  RR: 17 (08 Nov 2018 05:38) (17 - 18)  SpO2: --    PHYSICAL EXAM:  Gen: Awake and alert, non-toxic appearing, NAD  HEENT: NCAT. EOMI. MMM.   Neck: Supple, no cervical LAD  CV: RRR, no murmurs  Lungs: CTAB, no w/r/r  Abd: Soft. NTND  Extr: wwp L 1st met. DFU + tract, no purulence  Skin: no rash  Neuro: No focal deficits  Lines: clean      TESTS & MEASUREMENTS:                        9.9    7.82  )-----------( 390      ( 08 Nov 2018 08:07 )             31.4     11-08    136  |  96<L>  |  11  ----------------------------<  143<H>  4.5   |  29  |  0.9    Ca    9.5      08 Nov 2018 08:07  Mg     2.0     11-08    TPro  7.6  /  Alb  2.9<L>  /  TBili  0.7  /  DBili  x   /  AST  18  /  ALT  15  /  AlkPhos  243<H>  11-08    LIVER FUNCTIONS - ( 08 Nov 2018 08:07 )  Alb: 2.9 g/dL / Pro: 7.6 g/dL / ALK PHOS: 243 U/L / ALT: 15 U/L / AST: 18 U/L / GGT: x             Culture - Other (collected 11-06-18 @ 11:31)  Source: .Other Wound (L foot ulcer)  Preliminary Report (11-07-18 @ 18:21):    No growth to date.    Culture - Blood (collected 11-05-18 @ 10:01)  Source: .Blood None  Preliminary Report (11-06-18 @ 18:00):    No growth to date.    Culture - Blood (collected 11-04-18 @ 12:40)  Source: .Blood Blood-Peripheral  Preliminary Report (11-05-18 @ 23:01):    No growth to date.    Culture - Blood (collected 11-04-18 @ 12:32)  Source: .Blood Blood-Peripheral  Preliminary Report (11-05-18 @ 21:01):    No growth to date.          RADIOLOGY & ADDITIONAL TESTS:    ANTIBIOTICS:  cefepime   IVPB   100 mL/Hr IV Intermittent (11-06-18 @ 05:41)   100 mL/Hr IV Intermittent (11-05-18 @ 22:01)   100 mL/Hr IV Intermittent (11-05-18 @ 14:57)   100 mL/Hr IV Intermittent (11-05-18 @ 05:24)   100 mL/Hr IV Intermittent (11-04-18 @ 21:16)    cefepime   IVPB   100 mL/Hr IV Intermittent (11-08-18 @ 05:43)   100 mL/Hr IV Intermittent (11-07-18 @ 17:09)   100 mL/Hr IV Intermittent (11-07-18 @ 05:10)   100 mL/Hr IV Intermittent (11-06-18 @ 19:06)    piperacillin/tazobactam IVPB.   200 mL/Hr IV Intermittent (11-04-18 @ 15:59)    vancomycin  IVPB   250 mL/Hr IV Intermittent (11-04-18 @ 16:53)    vancomycin  IVPB   250 mL/Hr IV Intermittent (11-06-18 @ 06:41)   250 mL/Hr IV Intermittent (11-05-18 @ 18:26)   250 mL/Hr IV Intermittent (11-05-18 @ 07:40)   250 mL/Hr IV Intermittent (11-04-18 @ 18:42)    vancomycin  IVPB   166.67 mL/Hr IV Intermittent (11-08-18 @ 08:09)   166.67 mL/Hr IV Intermittent (11-07-18 @ 17:47)   166.67 mL/Hr IV Intermittent (11-07-18 @ 05:10)   166.67 mL/Hr IV Intermittent (11-06-18 @ 19:16)        cefepime   IVPB 2000 milliGRAM(s) IV Intermittent every 12 hours  vancomycin  IVPB 1250 milliGRAM(s) IV Intermittent every 12 hours

## 2018-11-08 NOTE — CONSULT NOTE ADULT - SUBJECTIVE AND OBJECTIVE BOX
CHIEF COMPLAINT:Patient is a 63y old  Male who presents with a chief complaint of worsening chronic left foot ulcer (08 Nov 2018 12:39)      HISTORY OF PRESENT ILLNESS:   HPI:  63y male with PMH of CAD, NSTEMI s/p CABG (4 vessel according to patient; March 2016. done here at [facility] but he does not remember his surgeon); DM with peripheral neuropathy, Left DFU, HTN, HLD was sent for worsening chronic left foot ulcer. He also complains of frequent falls approx. once every 2 months.. The falls are not associated with any LOC, dizziness or lightheadedness. Denies fever, chills, trauma, chest pain, palpitations, dizziness, changes in urination, or diarrhea At baseline, patient walk with cane. His activity is limited by his lower extremity pain from osteoarthritis and fibromyalgia as per the patient. He can walk half a block and stops because of limb pain. He has not experienced any chest discomfort since his CABG. He denies orthopnea. Patient is hemodynamically stable without evidence of heart failure.     He has not followed up with a cardiologist since his CABG in 2016 since his insurance ran out. [facility].       PAST MEDICAL & SURGICAL HISTORY:  S/P CABG (coronary artery bypass graft)  Dyslipidemia  NSTEMI (non-ST elevated myocardial infarction)  Diabetic foot ulcer  PVD (peripheral vascular disease)  Diabetes  Other hyperlipidemia  Hypertension, unspecified type  Amputated toe, unspecified laterality      FAMILY HISTORY:  No pertinent family history in first degree relatives    Allergies    Cipro (Unknown)  IV contrast (Short breath)  Keflex (Unknown)    Intolerances    	  Home Medications:  aspirin 81 mg oral tablet: 1 tab(s) orally once a day (04 Nov 2018 19:07)  atorvastatin 40 mg oral tablet: 1 tab(s) orally once a day (04 Nov 2018 19:07)  Lyrica 100 mg oral capsule: 1 cap(s) orally 2 times a day (04 Nov 2018 19:05)  metoprolol succinate 25 mg oral tablet, extended release: 1 tab(s) orally once a day (04 Nov 2018 19:06)  Plavix 75 mg oral tablet: 1 tab(s) orally once a day (04 Nov 2018 19:07)  Vantin 200 mg oral tablet: 1 tab(s) orally every 12 hours (04 Nov 2018 19:07)    MEDICATIONS  (STANDING):  aspirin  chewable 81 milliGRAM(s) Oral daily  atorvastatin 40 milliGRAM(s) Oral at bedtime  cefepime   IVPB 2000 milliGRAM(s) IV Intermittent every 12 hours  chlorhexidine 4% Liquid 1 Application(s) Topical <User Schedule>  clopidogrel Tablet 75 milliGRAM(s) Oral daily  docusate sodium 100 milliGRAM(s) Oral daily  enoxaparin Injectable 40 milliGRAM(s) SubCutaneous daily  insulin glargine Injectable (LANTUS) 27 Unit(s) SubCutaneous at bedtime  insulin lispro Injectable (HumaLOG) 6 Unit(s) SubCutaneous three times a day before meals  lidocaine   Patch 1 Patch Transdermal daily  metoprolol succinate ER 25 milliGRAM(s) Oral daily  pregabalin 100 milliGRAM(s) Oral two times a day  senna 1 Tablet(s) Oral at bedtime  vancomycin  IVPB 1250 milliGRAM(s) IV Intermittent every 12 hours    MEDICATIONS  (PRN):  ondansetron    Tablet 4 milliGRAM(s) Oral every 6 hours PRN Nausea and/or Vomiting  oxyCODONE    5 mG/acetaminophen 325 mG 1 Tablet(s) Oral every 6 hours PRN Moderate Pain (4 - 6)        SOCIAL HISTORY:    The patient denies any history of tobacco, alcohol or other substance use.        REVIEW OF SYSTEMS:  CONSTITUTIONAL: No fever, weight loss, or fatigue  CARDIOLOGY: Patient denies chest pain, shortness of breath or syncopal episodes. history is significant for recurrent falls of unknown etiology.   RESPIRATORY: denies shortness of breath, wheezing   NEUROLOGICAL: NO weakness, no focal deficits to report.  ENDOCRINOLOGICAL: no recent change in diabetic medications.   GI: no BRBPR, no N,V,diarrhea.    MUSCULOSKELETAL: Full range of motion x4. pain in lower extremity joints.     PHYSICAL EXAM:  T(C): 36.6 (11-08-18 @ 12:45), Max: 37 (11-07-18 @ 20:24)  HR: 71 (11-08-18 @ 12:45) (66 - 72)  BP: 145/79 (11-08-18 @ 12:45) (145/79 - 160/77)  RR: 18 (11-08-18 @ 12:45) (17 - 18)  SpO2: --  Wt(kg): --      General Appearance: Normal	  Cardiovascular: Normal S1 S2, No JVD, No murmurs, No edema  Respiratory: Lungs clear to auscultation	  Extremities: Normal range of motion, No clubbing, cyanosis or edema. ulcer noted as per HPI  Vascular: Peripheral pulses +1 in lower extremities         LABS:	 	                        9.9    7.82  )-----------( 390      ( 08 Nov 2018 08:07 )             31.4     11-08    136  |  96<L>  |  11  ----------------------------<  143<H>  4.5   |  29  |  0.9  11-07    138  |  99  |  9<L>  ----------------------------<  100<H>  4.3   |  27  |  0.9    Ca    9.5      08 Nov 2018 08:07  Ca    9.0      07 Nov 2018 08:11  Mg     2.0     11-08  Mg     2.0     11-07    TPro  7.6  /  Alb  2.9<L>  /  TBili  0.7  /  DBili  x   /  AST  18  /  ALT  15  /  AlkPhos  243<H>  11-08  TPro  6.6  /  Alb  2.6<L>  /  TBili  0.8  /  DBili  x   /  AST  15  /  ALT  13  /  AlkPhos  199<H>  11-07      proBNP: Serum Pro-Brain Natriuretic Peptide (09.25.18 @ 23:08)    Serum Pro-Brain Natriuretic Peptide: 223 pg/mL        HgA1c: Hemoglobin A1C with Mean Plasma Glucose (11.05.18 @ 10:01)    Hemoglobin A1C, Whole Blood: 7.0: Method: Immunoassay       Reference Range                4.0-5.6%       High risk (prediabetic)        5.7-6.4%       Diabetic, diagnostic             >=6.5%       ADA diabetic treatment goal       <7.0%  The Hemoglobin A1c testing is NGSP-certified.Reference ranges are based  upon the 2010 recommendations of  the American Diabetes Association.  Interpretation may vary for children  and adolescents. %    Mean Plasma Glucose: 154 mg/dL      CARDIAC MARKERS:     Troponin T, Serum (10.29.18 @ 07:33)    Troponin T, Serum: <0.01 ng/mL        Troponin T, Serum (11.04.18 @ 12:40)    Troponin T, Serum: <0.01 ng/mL      Troponin T, Serum in AM (11.05.18 @ 10:01)    Troponin T, Serum: <0.01 ng/mL                  TELEMETRY EVENTS: 	  None    ECG:   	< from: 12 Lead ECG (11.04.18 @ 13:30) >  Diagnosis Line Normal sinus rhythm  Inferior infarct , age undetermined  Abnormal ECG    < end of copied text >    RADIOLOGY:    < from: [facility] Duplex Lower Extrem Arterial, Bilat (11.04.18 @ 15:47) >  Impression:    Moderate atherosclerotic plaque is noted. All vessels appear to be patent   however thereis retrograde flow noted in the right posterior tibial   artery suggestive of proximal  occlusion. Please correlate clinically.    < end of copied text >        < from: [facility] Duplex Lower Ext Vein Scan, Bilat (11.04.18 @ 15:46) >  Impression:    No evidence of deep venous thrombosis in the bilateral lower extremities.    < end of copied text >        < from: [facility] Duplex Carotid, Bilat (09.26.18 @ 13:09) >  Impression:    20-39% stenosis of the right internal carotid artery.  20-39% stenosis of the left internal carotid artery.    < end of copied text >    	    PREVIOUS DIAGNOSTIC TESTING:    [ ] Echocardiogram:	N/A  [ ]  Catheterization:       N/A  [ ] Stress Test:  		N/A CHIEF COMPLAINT:Patient is a 63y old  Male who presents with a chief complaint of worsening chronic left foot ulcer (08 Nov 2018 12:39)      HISTORY OF PRESENT ILLNESS:   63y male with PMH of CAD, NSTEMI s/p CABG (4 vessel according to patient; March 2016. done here at [facility] but he does not remember his surgeon); DM with peripheral neuropathy, Left DFU, HTN, HLD was sent for worsening chronic left foot ulcer. He also complains of frequent falls approx. once every 2 months.. The falls are not associated with any LOC, dizziness or lightheadedness. Denies fever, chills, trauma, chest pain, palpitations, dizziness, changes in urination, or diarrhea At baseline, patient walk with cane. His activity is limited by his lower extremity pain from osteoarthritis and fibromyalgia as per the patient. He can walk half a block and stops because of limb pain. He has not experienced any chest discomfort, since his CABG. He denies orthopnea. Patient is hemodynamically stable without evidence of heart failure.     He has not followed up with a cardiologist since his CABG in 2016 since his insurance ran out. [facility].   His baseline activity is limited due to osteoarthritis     PAST MEDICAL & SURGICAL HISTORY:  S/P CABG (coronary artery bypass graft)  Dyslipidemia  NSTEMI (non-ST elevated myocardial infarction)  Diabetic foot ulcer  PVD (peripheral vascular disease)  Diabetes  Other hyperlipidemia  Hypertension, unspecified type  Amputated toe, unspecified laterality      FAMILY HISTORY:  No pertinent family history in first degree relatives    Allergies    Cipro (Unknown)  IV contrast (Short breath)  Keflex (Unknown)    Intolerances    	  Home Medications:  aspirin 81 mg oral tablet: 1 tab(s) orally once a day (04 Nov 2018 19:07)  atorvastatin 40 mg oral tablet: 1 tab(s) orally once a day (04 Nov 2018 19:07)  Lyrica 100 mg oral capsule: 1 cap(s) orally 2 times a day (04 Nov 2018 19:05)  metoprolol succinate 25 mg oral tablet, extended release: 1 tab(s) orally once a day (04 Nov 2018 19:06)  Plavix 75 mg oral tablet: 1 tab(s) orally once a day (04 Nov 2018 19:07)  Vantin 200 mg oral tablet: 1 tab(s) orally every 12 hours (04 Nov 2018 19:07)    MEDICATIONS  (STANDING):  aspirin  chewable 81 milliGRAM(s) Oral daily  atorvastatin 40 milliGRAM(s) Oral at bedtime  cefepime   IVPB 2000 milliGRAM(s) IV Intermittent every 12 hours  chlorhexidine 4% Liquid 1 Application(s) Topical <User Schedule>  clopidogrel Tablet 75 milliGRAM(s) Oral daily  docusate sodium 100 milliGRAM(s) Oral daily  enoxaparin Injectable 40 milliGRAM(s) SubCutaneous daily  insulin glargine Injectable (LANTUS) 27 Unit(s) SubCutaneous at bedtime  insulin lispro Injectable (HumaLOG) 6 Unit(s) SubCutaneous three times a day before meals  lidocaine   Patch 1 Patch Transdermal daily  metoprolol succinate ER 25 milliGRAM(s) Oral daily  pregabalin 100 milliGRAM(s) Oral two times a day  senna 1 Tablet(s) Oral at bedtime  vancomycin  IVPB 1250 milliGRAM(s) IV Intermittent every 12 hours    MEDICATIONS  (PRN):  ondansetron    Tablet 4 milliGRAM(s) Oral every 6 hours PRN Nausea and/or Vomiting  oxyCODONE    5 mG/acetaminophen 325 mG 1 Tablet(s) Oral every 6 hours PRN Moderate Pain (4 - 6)        SOCIAL HISTORY:    The patient denies any history of tobacco, alcohol or other substance use.        REVIEW OF SYSTEMS:  CONSTITUTIONAL: No fever, weight loss, or fatigue  CARDIOLOGY: Patient denies chest pain, shortness of breath or syncopal episodes. history is significant for recurrent falls of unknown etiology.   RESPIRATORY: denies shortness of breath, wheezing   NEUROLOGICAL: NO weakness, no focal deficits to report.  ENDOCRINOLOGICAL: no recent change in diabetic medications.   GI: no BRBPR, no N,V,diarrhea.    MUSCULOSKELETAL: Full range of motion x4. pain in lower extremity joints.     PHYSICAL EXAM:  T(C): 36.6 (11-08-18 @ 12:45), Max: 37 (11-07-18 @ 20:24)  HR: 71 (11-08-18 @ 12:45) (66 - 72)  BP: 145/79 (11-08-18 @ 12:45) (145/79 - 160/77)  RR: 18 (11-08-18 @ 12:45) (17 - 18)  SpO2: --  Wt(kg): --      General Appearance: Normal	  Cardiovascular: Normal S1 S2, No JVD, No murmurs, No edema  Respiratory: Lungs clear to auscultation	  Extremities: Normal range of motion, No clubbing, cyanosis or edema. ulcer noted as per HPI  Vascular: Peripheral pulses +1 in lower extremities         LABS:	 	                        9.9    7.82  )-----------( 390      ( 08 Nov 2018 08:07 )             31.4     11-08    136  |  96<L>  |  11  ----------------------------<  143<H>  4.5   |  29  |  0.9  11-07    138  |  99  |  9<L>  ----------------------------<  100<H>  4.3   |  27  |  0.9    Ca    9.5      08 Nov 2018 08:07  Ca    9.0      07 Nov 2018 08:11  Mg     2.0     11-08  Mg     2.0     11-07    TPro  7.6  /  Alb  2.9<L>  /  TBili  0.7  /  DBili  x   /  AST  18  /  ALT  15  /  AlkPhos  243<H>  11-08  TPro  6.6  /  Alb  2.6<L>  /  TBili  0.8  /  DBili  x   /  AST  15  /  ALT  13  /  AlkPhos  199<H>  11-07      proBNP: Serum Pro-Brain Natriuretic Peptide (09.25.18 @ 23:08)    Serum Pro-Brain Natriuretic Peptide: 223 pg/mL        HgA1c: Hemoglobin A1C with Mean Plasma Glucose (11.05.18 @ 10:01)    Hemoglobin A1C, Whole Blood: 7.0: Method: Immunoassay       Reference Range                4.0-5.6%       High risk (prediabetic)        5.7-6.4%       Diabetic, diagnostic             >=6.5%       ADA diabetic treatment goal       <7.0%  The Hemoglobin A1c testing is NGSP-certified.Reference ranges are based  upon the 2010 recommendations of  the American Diabetes Association.  Interpretation may vary for children  and adolescents. %    Mean Plasma Glucose: 154 mg/dL      CARDIAC MARKERS:     Troponin T, Serum (10.29.18 @ 07:33)    Troponin T, Serum: <0.01 ng/mL        Troponin T, Serum (11.04.18 @ 12:40)    Troponin T, Serum: <0.01 ng/mL      Troponin T, Serum in AM (11.05.18 @ 10:01)    Troponin T, Serum: <0.01 ng/mL                  TELEMETRY EVENTS: 	  None    ECG:   	< from: 12 Lead ECG (11.04.18 @ 13:30) >  Diagnosis Line Normal sinus rhythm  Inferior infarct , age undetermined  Abnormal ECG    < end of copied text >    RADIOLOGY:    < from: [facility] Duplex Lower Extrem Arterial, Bilat (11.04.18 @ 15:47) >  Impression:    Moderate atherosclerotic plaque is noted. All vessels appear to be patent   however thereis retrograde flow noted in the right posterior tibial   artery suggestive of proximal  occlusion. Please correlate clinically.    < end of copied text >        < from: [facility] Duplex Lower Ext Vein Scan, Bilat (11.04.18 @ 15:46) >  Impression:    No evidence of deep venous thrombosis in the bilateral lower extremities.    < end of copied text >        < from: [facility] Duplex Carotid, Bilat (09.26.18 @ 13:09) >  Impression:    20-39% stenosis of the right internal carotid artery.  20-39% stenosis of the left internal carotid artery.    < end of copied text >    	    PREVIOUS DIAGNOSTIC TESTING:    [ ] Echocardiogram:	 2016  Left ventricle: Systolic function was normal. Ejection fraction was estimated   in the range of 55 % to 65 %. There were no regional wall motion   abnormalities. Wall thickness was mildly increased. Doppler parameters were   consistent with abnormal left ventricular relaxation (grade 1 diastolic   dysfunction).   Aorta, systemic arteries: The root exhibited normal size and mild   fibrocalcific change.   Left atrium: The atrium was mildly dilated.   Tricuspid valve: There was mild regurgitation.         [ ]  Catheterization: 2016  CORONARY CIRCULATION: The coronary circulation is right dominant. There was   significant 3-vessel coronary artery disease (LAD, RCA, and circumflex). Left   main: The vessel was medium to large sized. Angiography showed no evidence of   disease. Proximal LAD: The vessel was medium sized. There was a diffuse 70 %   stenosis at a site with no prior intervention. There was SOFY grade 3 flow   through the vessel (brisk flow). Mid LAD: There was a diffuse 70 % stenosis.   There was SOFY grade 3 flow through the vessel (brisk flow). Distal LAD:   There was a diffuse 70 % stenosis. There was SOFY grade 3 flow through the   vessel (brisk flow). 1st diagonal: The vessel was large sized. There was a   discrete 90 % stenosis at the ostium of the vessel segment. There was SOFY   grade 3 flow through the vessel (brisk flow). Circumflex: The vessel was   medium sized. Angiography showed severe atherosclerosis. Proximal circumflex:   There was a diffuse 70 % stenosis. There was SOFY grade 3 flow through the   vessel (brisk flow). RCA: The vessel was medium sized. Proximal RCA: There   was a tubular 80 % stenosis at the site of a prior stent. There was SOFY   grade 3 flow through the vessel (brisk flow). Mid RCA: There was a tubular 80   % stenosis at the site of a prior stent. There was SOFY grade 3 flow through   the vessel (brisk flow). CHIEF COMPLAINT:Patient is a 63y old  Male who presents with a chief complaint of worsening chronic left foot ulcer (08 Nov 2018 12:39)      HISTORY OF PRESENT ILLNESS:   63y male with PMH of CAD, NSTEMI s/p CABG (4 vessel according to patient; March 2016. done here at [facility] but he does not remember his surgeon); DM with peripheral neuropathy, Left DFU, HTN, HLD was sent for worsening chronic left foot ulcer. He also complains of frequent falls approx. once every 2 months.. The falls are not associated with any LOC, dizziness or lightheadedness. Denies fever, chills, trauma, chest pain, palpitations, dizziness, changes in urination, or diarrhea At baseline, patient walk with cane. His activity is limited by his lower extremity pain from osteoarthritis and fibromyalgia as per the patient. He can walk half a block and stops because of limb pain. He has not experienced any chest discomfort, since his CABG. He denies orthopnea. Patient is hemodynamically stable without evidence of heart failure.     He has not followed up with a cardiologist since his CABG in 2016 since his insurance ran out. [facility].   His baseline activity is limited due to osteoarthritis     PAST MEDICAL & SURGICAL HISTORY:  S/P CABG (coronary artery bypass graft)  Dyslipidemia  NSTEMI (non-ST elevated myocardial infarction)  Diabetic foot ulcer  PVD (peripheral vascular disease)  Diabetes  Other hyperlipidemia  Hypertension, unspecified type  Amputated toe, unspecified laterality      FAMILY HISTORY:  No pertinent family history in first degree relatives    Allergies    Cipro (Unknown)  IV contrast (Short breath)  Keflex (Unknown)    Intolerances    	  Home Medications:  aspirin 81 mg oral tablet: 1 tab(s) orally once a day (04 Nov 2018 19:07)  atorvastatin 40 mg oral tablet: 1 tab(s) orally once a day (04 Nov 2018 19:07)  Lyrica 100 mg oral capsule: 1 cap(s) orally 2 times a day (04 Nov 2018 19:05)  metoprolol succinate 25 mg oral tablet, extended release: 1 tab(s) orally once a day (04 Nov 2018 19:06)  Plavix 75 mg oral tablet: 1 tab(s) orally once a day (04 Nov 2018 19:07)  Vantin 200 mg oral tablet: 1 tab(s) orally every 12 hours (04 Nov 2018 19:07)    MEDICATIONS  (STANDING):  aspirin  chewable 81 milliGRAM(s) Oral daily  atorvastatin 40 milliGRAM(s) Oral at bedtime  cefepime   IVPB 2000 milliGRAM(s) IV Intermittent every 12 hours  chlorhexidine 4% Liquid 1 Application(s) Topical <User Schedule>  clopidogrel Tablet 75 milliGRAM(s) Oral daily  docusate sodium 100 milliGRAM(s) Oral daily  enoxaparin Injectable 40 milliGRAM(s) SubCutaneous daily  insulin glargine Injectable (LANTUS) 27 Unit(s) SubCutaneous at bedtime  insulin lispro Injectable (HumaLOG) 6 Unit(s) SubCutaneous three times a day before meals  lidocaine   Patch 1 Patch Transdermal daily  metoprolol succinate ER 25 milliGRAM(s) Oral daily  pregabalin 100 milliGRAM(s) Oral two times a day  senna 1 Tablet(s) Oral at bedtime  vancomycin  IVPB 1250 milliGRAM(s) IV Intermittent every 12 hours    MEDICATIONS  (PRN):  ondansetron    Tablet 4 milliGRAM(s) Oral every 6 hours PRN Nausea and/or Vomiting  oxyCODONE    5 mG/acetaminophen 325 mG 1 Tablet(s) Oral every 6 hours PRN Moderate Pain (4 - 6)        SOCIAL HISTORY:    The patient denies any history of tobacco, alcohol or other substance use.        REVIEW OF SYSTEMS:  CONSTITUTIONAL: No fever, weight loss, or fatigue  CARDIOLOGY: Patient denies chest pain, shortness of breath or syncopal episodes. history is significant for recurrent falls of unknown etiology.   RESPIRATORY: denies shortness of breath, wheezing   NEUROLOGICAL: NO weakness, no focal deficits to report.  ENDOCRINOLOGICAL: no recent change in diabetic medications.   GI: no BRBPR, no N,V,diarrhea.    MUSCULOSKELETAL: Full range of motion x4. pain in lower extremity joints.     PHYSICAL EXAM:  T(C): 36.6 (11-08-18 @ 12:45), Max: 37 (11-07-18 @ 20:24)  HR: 71 (11-08-18 @ 12:45) (66 - 72)  BP: 145/79 (11-08-18 @ 12:45) (145/79 - 160/77)  RR: 18 (11-08-18 @ 12:45) (17 - 18)  SpO2: --  Wt(kg): --      General Appearance: Normal  Neck: Supple, no jvd  Heent: normal mucosa	  Cardiovascular: Normal S1 S2, No JVD, No murmurs, No edema  Respiratory: Lungs clear to auscultation	  Extremities/MS: Normal range of motion, No clubbing, cyanosis or edema. ulcer noted as per HPI  Vascular: Peripheral pulses +1 in lower extremities   Neuro: A/A/O x3        LABS:	 	                        9.9    7.82  )-----------( 390      ( 08 Nov 2018 08:07 )             31.4     11-08    136  |  96<L>  |  11  ----------------------------<  143<H>  4.5   |  29  |  0.9  11-07    138  |  99  |  9<L>  ----------------------------<  100<H>  4.3   |  27  |  0.9    Ca    9.5      08 Nov 2018 08:07  Ca    9.0      07 Nov 2018 08:11  Mg     2.0     11-08  Mg     2.0     11-07    TPro  7.6  /  Alb  2.9<L>  /  TBili  0.7  /  DBili  x   /  AST  18  /  ALT  15  /  AlkPhos  243<H>  11-08  TPro  6.6  /  Alb  2.6<L>  /  TBili  0.8  /  DBili  x   /  AST  15  /  ALT  13  /  AlkPhos  199<H>  11-07      proBNP: Serum Pro-Brain Natriuretic Peptide (09.25.18 @ 23:08)    Serum Pro-Brain Natriuretic Peptide: 223 pg/mL        HgA1c: Hemoglobin A1C with Mean Plasma Glucose (11.05.18 @ 10:01)    Hemoglobin A1C, Whole Blood: 7.0: Method: Immunoassay       Reference Range                4.0-5.6%       High risk (prediabetic)        5.7-6.4%       Diabetic, diagnostic             >=6.5%       ADA diabetic treatment goal       <7.0%  The Hemoglobin A1c testing is NGSP-certified.Reference ranges are based  upon the 2010 recommendations of  the American Diabetes Association.  Interpretation may vary for children  and adolescents. %    Mean Plasma Glucose: 154 mg/dL      CARDIAC MARKERS:     Troponin T, Serum (10.29.18 @ 07:33)    Troponin T, Serum: <0.01 ng/mL        Troponin T, Serum (11.04.18 @ 12:40)    Troponin T, Serum: <0.01 ng/mL      Troponin T, Serum in AM (11.05.18 @ 10:01)    Troponin T, Serum: <0.01 ng/mL                  TELEMETRY EVENTS: 	  None    ECG:   	< from: 12 Lead ECG (11.04.18 @ 13:30) >  Diagnosis Line Normal sinus rhythm  Inferior infarct , age undetermined  Abnormal ECG    < end of copied text >    RADIOLOGY:    < from: [facility] Duplex Lower Extrem Arterial, Bilat (11.04.18 @ 15:47) >  Impression:    Moderate atherosclerotic plaque is noted. All vessels appear to be patent   however thereis retrograde flow noted in the right posterior tibial   artery suggestive of proximal  occlusion. Please correlate clinically.    < end of copied text >        < from: [facility] Duplex Lower Ext Vein Scan, Bilat (11.04.18 @ 15:46) >  Impression:    No evidence of deep venous thrombosis in the bilateral lower extremities.    < end of copied text >        < from: [facility] Duplex Carotid, Bilat (09.26.18 @ 13:09) >  Impression:    20-39% stenosis of the right internal carotid artery.  20-39% stenosis of the left internal carotid artery.    < end of copied text >    	    PREVIOUS DIAGNOSTIC TESTING:    [ ] Echocardiogram:	 2016  Left ventricle: Systolic function was normal. Ejection fraction was estimated   in the range of 55 % to 65 %. There were no regional wall motion   abnormalities. Wall thickness was mildly increased. Doppler parameters were   consistent with abnormal left ventricular relaxation (grade 1 diastolic   dysfunction).   Aorta, systemic arteries: The root exhibited normal size and mild   fibrocalcific change.   Left atrium: The atrium was mildly dilated.   Tricuspid valve: There was mild regurgitation.         [ ]  Catheterization: 2016  CORONARY CIRCULATION: The coronary circulation is right dominant. There was   significant 3-vessel coronary artery disease (LAD, RCA, and circumflex). Left   main: The vessel was medium to large sized. Angiography showed no evidence of   disease. Proximal LAD: The vessel was medium sized. There was a diffuse 70 %   stenosis at a site with no prior intervention. There was SOFY grade 3 flow   through the vessel (brisk flow). Mid LAD: There was a diffuse 70 % stenosis.   There was SOFY grade 3 flow through the vessel (brisk flow). Distal LAD:   There was a diffuse 70 % stenosis. There was SOFY grade 3 flow through the   vessel (brisk flow). 1st diagonal: The vessel was large sized. There was a   discrete 90 % stenosis at the ostium of the vessel segment. There was SOFY   grade 3 flow through the vessel (brisk flow). Circumflex: The vessel was   medium sized. Angiography showed severe atherosclerosis. Proximal circumflex:   There was a diffuse 70 % stenosis. There was SOFY grade 3 flow through the   vessel (brisk flow). RCA: The vessel was medium sized. Proximal RCA: There   was a tubular 80 % stenosis at the site of a prior stent. There was SOFY   grade 3 flow through the vessel (brisk flow). Mid RCA: There was a tubular 80   % stenosis at the site of a prior stent. There was SOFY grade 3 flow through   the vessel (brisk flow).

## 2018-11-08 NOTE — CONSULT NOTE ADULT - ASSESSMENT
Patient is 63 with extensive cardiac hx of quadruple bypass CABG in 2016, Peripheral vasc disease, DM, NSTEMI, HTN, DLD presenting with a foot ulcer requiring debridement. Cardiology consulted for OR clearance.  Surgery is planned for Saturday Nov 10, 2018.     -Patient denies any cardiac symptoms.   -no acute cardiovascular events.   -patient has appropriate cardiac functionality. functionality limited by osteoarthritis.   - Revised cardiac risk assessment is class III 6.6%      Plan:  -TTE   -continue medical therapy with ASA, Plavix, Metoprolol and Atorvastatin.   -Patient needs a cardiologist for follow up.   -Clearance pending TTE results.   Will follow. Patient is 63 with extensive cardiac hx of quadruple bypass CABG in 2016, Peripheral vasc disease, DM, NSTEMI, HTN, DLD presenting with a foot ulcer requiring debridement. Cardiology consulted for OR clearance.  Surgery is planned for Saturday Nov 10, 2018.     -Patient denies any cardiac symptoms.   -no acute cardiovascular events.   -patient has appropriate cardiac functionality. functionality limited by osteoarthritis.   - Revised cardiac risk assessment is class III 6.6%      Plan:  -TTE   -continue ASA if possible  -Can hold plavix if needed by surgical team and resume it when bleeding risk permit  -Continue Metoprolol and Atorvastatin.   Patient is at moderate risk for low-intermediate risk surgery  -Maintain electrolyte within normal ranges Patient is 63 with extensive cardiac hx of quadruple bypass CABG in 2016, Peripheral vasc disease, DM, NSTEMI, HTN, DLD presenting with a foot ulcer requiring debridement. Cardiology consulted for OR clearance.  Surgery is planned for Saturday Nov 10, 2018.     1.	-Patient denies any cardiac symptoms.   -no acute cardiovascular events.   -patient has appropriate cardiac functionality. functionality limited by osteoarthritis.   - Revised cardiac risk assessment is class III 6.6%      Plan:  -TTE   -continue ASA   -Can hold plavix if needed by surgical team and resume it when bleeding risk permit  -Continue Metoprolol and Atorvastatin.   -Patient is at moderate risk for low-intermediate risk surgery  -Maintain electrolyte within normal ranges

## 2018-11-08 NOTE — PROGRESS NOTE ADULT - SUBJECTIVE AND OBJECTIVE BOX
ROSALIE ESCOBEDO, male, 63y ([birth year]),   MRN-[id number]  Admit Date: 11-04-18 (4d)      Chief Complaint:  Patient is a 63y old  Male who presents with a chief complaint of worsening chronic left foot ulcer (08 Nov 2018 08:48)      Interval History:  Pt lying comfortably in bed. Reports no acute events overnight. Plan for OR by Podiatry on Saturday. Will hold off PICC line, and follow up ID recs after the procedure.    Past Medical and Surgical History:  PAST MEDICAL & SURGICAL HISTORY:  Dyslipidemia  NSTEMI (non-ST elevated myocardial infarction)  Diabetic foot ulcer  PVD (peripheral vascular disease)  Diabetes  Other hyperlipidemia  Hypertension, unspecified type  Amputated toe, unspecified laterality  S/P CABG (coronary artery bypass graft)      Current Medications:  MEDICATIONS  (STANDING):  aspirin  chewable 81 milliGRAM(s) Oral daily  atorvastatin 40 milliGRAM(s) Oral at bedtime  cefepime   IVPB 2000 milliGRAM(s) IV Intermittent every 12 hours  chlorhexidine 4% Liquid 1 Application(s) Topical <User Schedule>  clopidogrel Tablet 75 milliGRAM(s) Oral daily  docusate sodium 100 milliGRAM(s) Oral daily  enoxaparin Injectable 40 milliGRAM(s) SubCutaneous daily  insulin glargine Injectable (LANTUS) 27 Unit(s) SubCutaneous at bedtime  insulin lispro Injectable (HumaLOG) 6 Unit(s) SubCutaneous three times a day before meals  metoprolol succinate ER 25 milliGRAM(s) Oral daily  pregabalin 100 milliGRAM(s) Oral two times a day  senna 1 Tablet(s) Oral at bedtime  vancomycin  IVPB 1250 milliGRAM(s) IV Intermittent every 12 hours    MEDICATIONS  (PRN):  ondansetron    Tablet 4 milliGRAM(s) Oral every 6 hours PRN Nausea and/or Vomiting  oxyCODONE    5 mG/acetaminophen 325 mG 1 Tablet(s) Oral every 6 hours PRN Moderate Pain (4 - 6)        Vital Signs:  T(F): 96.4 (11-08-18 @ 05:38), Max: 98.6 (11-07-18 @ 20:24)  HR: 66 (11-08-18 @ 05:38) (66 - 73)  BP: 158/82 (11-08-18 @ 05:38) (138/76 - 170/79)  RR: 17 (11-08-18 @ 05:38) (17 - 18)  SpO2: --  CAPILLARY BLOOD GLUCOSE      POCT Blood Glucose.: 157 mg/dL (08 Nov 2018 08:09)  POCT Blood Glucose.: 126 mg/dL (07 Nov 2018 21:17)  POCT Blood Glucose.: 128 mg/dL (07 Nov 2018 17:07)  POCT Blood Glucose.: 160 mg/dL (07 Nov 2018 12:25)      Physical Exam:  General: Not in distress.   HEENT: Moist mucus membranes. PERRLA.  Cardio: Regular rate and rhythm,   Pulm: Clear to auscultation bilaterally. No wheezing, or rhonchi  Abdomen: Soft, non-tender, non-distended. Normoactive bowel sounds.  Extremities: L foot wrapped in Kerlex  Neuro: A&O x3. No focal deficits.     Labs and Imaging:  CBC Full  -  ( 08 Nov 2018 08:07 )  WBC Count : 7.82 K/uL  Hemoglobin : 9.9 g/dL  Hematocrit : 31.4 %  Platelet Count - Automated : 390 K/uL  Mean Cell Volume : 72.2 fL  Mean Cell Hemoglobin : 22.8 pg  Mean Cell Hemoglobin Concentration : 31.5 g/dL  Auto Neutrophil # : x  Auto Lymphocyte # : x  Auto Monocyte # : x  Auto Eosinophil # : x  Auto Basophil # : x  Auto Neutrophil % : x  Auto Lymphocyte % : x  Auto Monocyte % : x  Auto Eosinophil % : x  Auto Basophil % : x    RDW: 17.7    PT/INR/PTT: 11-07-18 @ 17:51  13.10 | 32.1        ^      1.14    BMP: 11-08-18 @ 08:07  136 | 96 | 11   -----------------< 143  4.5  | 29 | 0.9  eGFR(AA): 104, eGFR (non-AA): 89  Ca 9.5, Mg 2.0, P --    LFTs: 11-08-18 @ 08:07  TP  7.6  | 2.9 Alb   ---------------  TB  0.7  | --  DB   ---------------  ALT 15  | 18  AST            ^          243 ALK  LFTs: 11-07-18 @ 08:11  TP  6.6  | 2.6 Alb   ---------------  TB  0.8  | --  DB   ---------------  ALT 13  | 15  AST            ^          199 ALK      LFTs: 11-08-18 @ 08:07  Ca  9.5  | 18 AST   -----------------  TP  7.6  | 15 ALT  -----------------  Alb 2.9  | 243 ALK          ^        0.7         TB      Cardiac Enzymes:    Urinalysis:    Cultures:     (collected 11-05-18 @ 10:01)  Source: .Blood None  Preliminary Report:    No growth to date.     (collected 11-04-18 @ 12:40)  Source: .Blood Blood-Peripheral  Preliminary Report:    No growth to date.     (collected 11-04-18 @ 12:32)  Source: .Blood Blood-Peripheral  Preliminary Report:    No growth to date.          Radiology:     < from: MR Thoracic Spine No Cont (11.07.18 @ 16:39) >  IMPRESSION:  As correlated with the CT chest of 9/26/2018:    1.  Residual bone marrow edema at the anterior aspect of the T11 inferior   endplate corresponding to fracture seen on CT. Minimal extension of   edema/fracture into the T11-12 disc space.    2.  No evidence of spinal cord compression or cord edema.    3.  Minimal degenerative changes.      < end of copied text >    < from: MR Foot No Cont, Left (11.06.18 @ 18:56) >  Large plantar medial midfoot ulcer with subjacent 5.9 cm phlegmon,   increased in size since September 2016, demonstrating direct extension   into the first tarsometatarsal joint.    First tarsometatarsal joint septic arthritis with osteomyelitis and   pathologic fractureof the medial cuneiform, and osteomyelitis of the   plantar first metatarsal base stump.    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    Septic arthritis of the navicular-cuneiform joints.    < end of copied text >

## 2018-11-08 NOTE — PROGRESS NOTE ADULT - ASSESSMENT
63y male with PMH of CAD, NSTEMI s/p CABG (5 vessel according to patient; March 2016); DM with peripheral neuropathy, Left DFU, HTN, HLD was sent for worsening chronic left foot ulcer.     #) L sub 1st metatarsal ulceration w/ overlying osteomyelitis  +erythema +edema - purulence  -X-ray: Erosive changes to medial cuneiform consistent w/ OM.   VA Duplex: Retrograde flow in R posterior tibial artery suggestive of proximal occlusion  - MRI Foot: Large plantar ulcer (5.9 cm) w/ direct extension into 1st tarsometatarsal joint. First tarsometatarsal joint septic arthritis w/ OM and pathologic fracture of the medial cuneiform.  - On cefepime 2g IV q12 and Vanco 1250mg q12hr. Vanco trough 17.8  -Podiatry: Pt now agreeing to stay for OR. Will Be going on Saturday AM    - Blood Cx: NGTD  - Wound Cx (nov 6th): NGTD  - pain control with percocet    #Acute back pain due to thoracic spine T11- T12 fracture  -MRI: bone marrow edema at the anterior aspect of the T11 inferior endplate corresponding to fracture seen on CT  - NeuroSx to see the pt today. Will follow up their recs    #) Nausea   - zofran 4mg IV prn q6 hrs  -outpt w/u by GI  -CE x3 -ve  - US Abdomen Limited (11.04.18): Cholelithiasis without evidence of cholecystitis --> outpt management    #) CAD, NSTEMI s/p CABG  - c/w ASA, Plavix, metoprolol, statin      #) HTN  - c/w metoprolol     #) DM  - FS qAC and qHS  - On Lantus/Lispro 27/6/6/6  - c/w Lyrica for peripheral neuropathy    #) Diet   - DASH, Carb consistent    #) DVT ppx  - Lovenox 40mg daily    #) Activity  - out of bed to chair    #) Disposition  - from home    #) Full code status    #PT/REhab: Home w/ outpt or VN services 63y male with PMH of CAD, NSTEMI s/p CABG (5 vessel according to patient; March 2016); DM with peripheral neuropathy, Left DFU, HTN, HLD was sent for worsening chronic left foot ulcer.     #) L sub 1st metatarsal ulceration w/ overlying osteomyelitis  +erythema +edema - purulence  -X-ray: Erosive changes to medial cuneiform consistent w/ OM.   VA Duplex: Retrograde flow in R posterior tibial artery suggestive of proximal occlusion  - MRI Foot: Large plantar ulcer (5.9 cm) w/ direct extension into 1st tarsometatarsal joint. First tarsometatarsal joint septic arthritis w/ OM and pathologic fracture of the medial cuneiform.  - On cefepime 2g IV q12 and Vanco 1250mg q12hr. Vanco trough 17.8  -Podiatry: Pt now agreeing to stay for OR. Will Be going on Saturday AM. Ordered WBC Ceretec bone scan to r/o charcot vs acute OM.  -Cardio consulted for cardiac clearance. Pt had CABG in 2016, has not been seeing a cardiologist since because his insurance ran out. Service consulted   - Blood Cx: NGTD  - Wound Cx (nov 6th): NGTD  - pain control with percocet    #Acute back pain due to thoracic spine T11- T12 fracture  -MRI: bone marrow edema at the anterior aspect of the T11 inferior endplate corresponding to fracture seen on CT  - NeuroSx to see the pt today. Will follow up their recs    #) Nausea   - zofran 4mg IV prn q6 hrs  -outpt w/u by GI  -CE x3 -ve  - US Abdomen Limited (11.04.18): Cholelithiasis without evidence of cholecystitis --> outpt management    #) CAD, NSTEMI s/p CABG  - c/w ASA, Plavix, metoprolol, statin      #) HTN  - c/w metoprolol     #) DM  - FS qAC and qHS  - On Lantus/Lispro 27/6/6/6  - c/w Lyrica for peripheral neuropathy    #) Diet   - DASH, Carb consistent    #) DVT ppx  - Lovenox 40mg daily    #) Activity  - out of bed to chair    #) Disposition  - from home    #) Full code status    #PT/REhab: Home w/ outpt or VN services

## 2018-11-08 NOTE — CONSULT NOTE ADULT - ATTENDING COMMENTS
will f/u xray and other reports mri    will need sx once he decides when he can have it done
Pt seen and examined  Agree with above  Moderate risk for the proposed sx  Proceed as planned  Reconsult PRN

## 2018-11-08 NOTE — PROGRESS NOTE ADULT - SUBJECTIVE AND OBJECTIVE BOX
S :  Pt seen at bedside NAD . MRI pos for OM      Patient admits to  (-) Fevers, (-) Chills, (-) Nausea, (-) Vomiting, (-) Shortness of Breath (-) calf pain (-) chest pain   Dressings C/D/I    PMH: Dyslipidemia  NSTEMI (non-ST elevated myocardial infarction)  Diabetic foot ulcer  PVD (peripheral vascular disease)  Diabetes  Other hyperlipidemia  Hypertension, unspecified type  Amputated toe, unspecified laterality    PSH:S/P CABG (coronary artery bypass graft)  Deep vein thrombosis associated with coronary artery bypass graft surgery      Allergies:Cipro (Unknown)  IV contrast (Short breath)  Keflex (Unknown)      Labs:                        9.2    8.38  )-----------( 336      ( 07 Nov 2018 08:11 )             28.6       WBC Trend  8.38 Date (11-07 @ 08:11)  10.25 Date (11-06 @ 06:58)  11.12<H> Date (11-05 @ 10:01)  15.25<H> Date (11-04 @ 12:40)  7.50 Date (10-29 @ 07:33)      Chem  11-07    138  |  99  |  9<L>  ----------------------------<  100<H>  4.3   |  27  |  0.9    Ca    9.0      07 Nov 2018 08:11  Mg     2.0     11-07    TPro  6.6  /  Alb  2.6<L>  /  TBili  0.8  /  DBili  x   /  AST  15  /  ALT  13  /  AlkPhos  199<H>  11-07        Culture - Other (collected 11-06-18 @ 11:31)  Source: .Other Wound (L foot ulcer)  Preliminary Report (11-07-18 @ 18:21):    No growth to date.    Culture - Blood (collected 11-05-18 @ 10:01)  Source: .Blood None  Preliminary Report (11-06-18 @ 18:00):    No growth to date.    Culture - Blood (collected 11-04-18 @ 12:40)  Source: .Blood Blood-Peripheral  Preliminary Report (11-05-18 @ 23:01):    No growth to date.    Culture - Blood (collected 11-04-18 @ 12:32)  Source: .Blood Blood-Peripheral  Preliminary Report (11-05-18 @ 21:01):    No growth to date.        T(F): 96.4 (11-08-18 @ 05:38), Max: 98.6 (11-07-18 @ 20:24)  HR: 66 (11-08-18 @ 05:38) (66 - 72)  BP: 158/82 (11-08-18 @ 05:38) (149/76 - 160/77)  RR: 17 (11-08-18 @ 05:38) (17 - 18)  SpO2: --  Wt(kg): --  O  hx of charcot and OM, ulcer base granular  left foot no edema no rubor no calor no dolor  ulcer probes to met cun jnt - om  positive serous drainage, no mal odor  will discuss with Rad for possible WBC labeled scan to distinguish btwn OM vs Charcot lateral foot    A:   multi system DZ, PVD DM neuropathy  continue with IV abx and wound flushed and packed open  he is being scheduled for sx debridement of soft tissue and bone  will need long term IV abx-picc  will need med clearance for local with sedation        P: Continue with local wound care and IV ABX per ID   Wound flushed and packed open

## 2018-11-08 NOTE — PROGRESS NOTE ADULT - ASSESSMENT
63M    DM  NSTEMI (non-ST elevated myocardial infarction)  PVD  HTN  Amputated toe  CAD S/P CABG (coronary artery bypass graft)    Admitted with worsening L DFU, xray with cuneiform OM  US with R post tib occlusion  Sepsis on admission P119, WBC 11  Systolic hypotension on admission  Hyponatremia  BMI 30  MRI L foot Large plantar medial midfoot ulcer with subjacent 5.9 cm phlegmon, increased in size since September 2016, demonstrating direct extension into the first tarsometatarsal joint. First tarsometatarsal joint septic arthritis with osteomyelitis and pathologic fractureof the medial cuneiform, and osteomyelitis of the plantar first metatarsal base stump. Worsening neuropathic osteoarthropathy of the second through fifth tarsometatarsal joints since 2016, with superimposed septic arthritis,   and likely superimposed osteomyelitis of the plantar second metatarsal base. Septic arthritis of the navicular-cuneiform joints  ESR >140    - plan for other scan per podiatry ?charcot  - MRSA nasal PCR, if negative then D/C vanc. For now vanc 1.25 q12h (trough 17) monitor Cr  - cefepime to 2g q12h  - Podiatry following    Spectra [id number]

## 2018-11-08 NOTE — CONSULT NOTE ADULT - REASON FOR ADMISSION
worsening chronic left foot ulcer

## 2018-11-09 LAB
ALBUMIN SERPL ELPH-MCNC: 2.6 G/DL — LOW (ref 3.5–5.2)
ALP SERPL-CCNC: 223 U/L — HIGH (ref 30–115)
ALT FLD-CCNC: 17 U/L — SIGNIFICANT CHANGE UP (ref 0–41)
ANION GAP SERPL CALC-SCNC: 11 MMOL/L — SIGNIFICANT CHANGE UP (ref 7–14)
APTT BLD: 31.5 SEC — SIGNIFICANT CHANGE UP (ref 27–39.2)
AST SERPL-CCNC: 24 U/L — SIGNIFICANT CHANGE UP (ref 0–41)
BILIRUB SERPL-MCNC: 0.5 MG/DL — SIGNIFICANT CHANGE UP (ref 0.2–1.2)
BLD GP AB SCN SERPL QL: SIGNIFICANT CHANGE UP
BUN SERPL-MCNC: 10 MG/DL — SIGNIFICANT CHANGE UP (ref 10–20)
CALCIUM SERPL-MCNC: 8.6 MG/DL — SIGNIFICANT CHANGE UP (ref 8.5–10.1)
CHLORIDE SERPL-SCNC: 97 MMOL/L — LOW (ref 98–110)
CO2 SERPL-SCNC: 29 MMOL/L — SIGNIFICANT CHANGE UP (ref 17–32)
CREAT SERPL-MCNC: 0.9 MG/DL — SIGNIFICANT CHANGE UP (ref 0.7–1.5)
CULTURE RESULTS: SIGNIFICANT CHANGE UP
GLUCOSE BLDC GLUCOMTR-MCNC: 105 MG/DL — HIGH (ref 70–99)
GLUCOSE BLDC GLUCOMTR-MCNC: 116 MG/DL — HIGH (ref 70–99)
GLUCOSE BLDC GLUCOMTR-MCNC: 142 MG/DL — HIGH (ref 70–99)
GLUCOSE BLDC GLUCOMTR-MCNC: 169 MG/DL — HIGH (ref 70–99)
GLUCOSE BLDC GLUCOMTR-MCNC: 97 MG/DL — SIGNIFICANT CHANGE UP (ref 70–99)
GLUCOSE SERPL-MCNC: 195 MG/DL — HIGH (ref 70–99)
HCT VFR BLD CALC: 28.6 % — LOW (ref 42–52)
HGB BLD-MCNC: 9 G/DL — LOW (ref 14–18)
INR BLD: 1.12 RATIO — SIGNIFICANT CHANGE UP (ref 0.65–1.3)
MAGNESIUM SERPL-MCNC: 2 MG/DL — SIGNIFICANT CHANGE UP (ref 1.8–2.4)
MCHC RBC-ENTMCNC: 22.8 PG — LOW (ref 27–31)
MCHC RBC-ENTMCNC: 31.5 G/DL — LOW (ref 32–37)
MCV RBC AUTO: 72.6 FL — LOW (ref 80–94)
NRBC # BLD: 0 /100 WBCS — SIGNIFICANT CHANGE UP (ref 0–0)
PHOSPHATE SERPL-MCNC: 2.6 MG/DL — SIGNIFICANT CHANGE UP (ref 2.1–4.9)
PLATELET # BLD AUTO: 318 K/UL — SIGNIFICANT CHANGE UP (ref 130–400)
POTASSIUM SERPL-MCNC: 4.3 MMOL/L — SIGNIFICANT CHANGE UP (ref 3.5–5)
POTASSIUM SERPL-SCNC: 4.3 MMOL/L — SIGNIFICANT CHANGE UP (ref 3.5–5)
PROT SERPL-MCNC: 6.7 G/DL — SIGNIFICANT CHANGE UP (ref 6–8)
PROTHROM AB SERPL-ACNC: 12.9 SEC — HIGH (ref 9.95–12.87)
RBC # BLD: 3.94 M/UL — LOW (ref 4.7–6.1)
RBC # FLD: 17.7 % — HIGH (ref 11.5–14.5)
SODIUM SERPL-SCNC: 137 MMOL/L — SIGNIFICANT CHANGE UP (ref 135–146)
SPECIMEN SOURCE: SIGNIFICANT CHANGE UP
TYPE + AB SCN PNL BLD: SIGNIFICANT CHANGE UP
WBC # BLD: 7.12 K/UL — SIGNIFICANT CHANGE UP (ref 4.8–10.8)
WBC # FLD AUTO: 7.12 K/UL — SIGNIFICANT CHANGE UP (ref 4.8–10.8)

## 2018-11-09 RX ORDER — ONDANSETRON 8 MG/1
4 TABLET, FILM COATED ORAL EVERY 6 HOURS
Qty: 0 | Refills: 0 | Status: DISCONTINUED | OUTPATIENT
Start: 2018-11-09 | End: 2018-11-10

## 2018-11-09 RX ORDER — INSULIN GLARGINE 100 [IU]/ML
13 INJECTION, SOLUTION SUBCUTANEOUS ONCE
Qty: 0 | Refills: 0 | Status: COMPLETED | OUTPATIENT
Start: 2018-11-09 | End: 2018-11-09

## 2018-11-09 RX ADMIN — Medication 100 MILLIGRAM(S): at 11:44

## 2018-11-09 RX ADMIN — OXYCODONE AND ACETAMINOPHEN 1 TABLET(S): 5; 325 TABLET ORAL at 21:19

## 2018-11-09 RX ADMIN — Medication 6 UNIT(S): at 12:33

## 2018-11-09 RX ADMIN — LIDOCAINE 1 PATCH: 4 CREAM TOPICAL at 23:00

## 2018-11-09 RX ADMIN — Medication 166.67 MILLIGRAM(S): at 17:31

## 2018-11-09 RX ADMIN — Medication 100 MILLIGRAM(S): at 17:29

## 2018-11-09 RX ADMIN — CLOPIDOGREL BISULFATE 75 MILLIGRAM(S): 75 TABLET, FILM COATED ORAL at 11:44

## 2018-11-09 RX ADMIN — Medication 25 MILLIGRAM(S): at 05:55

## 2018-11-09 RX ADMIN — ATORVASTATIN CALCIUM 40 MILLIGRAM(S): 80 TABLET, FILM COATED ORAL at 21:20

## 2018-11-09 RX ADMIN — CEFEPIME 100 MILLIGRAM(S): 1 INJECTION, POWDER, FOR SOLUTION INTRAMUSCULAR; INTRAVENOUS at 17:31

## 2018-11-09 RX ADMIN — INSULIN GLARGINE 13 UNIT(S): 100 INJECTION, SOLUTION SUBCUTANEOUS at 22:57

## 2018-11-09 RX ADMIN — SENNA PLUS 1 TABLET(S): 8.6 TABLET ORAL at 21:20

## 2018-11-09 RX ADMIN — CHLORHEXIDINE GLUCONATE 1 APPLICATION(S): 213 SOLUTION TOPICAL at 05:55

## 2018-11-09 RX ADMIN — CEFEPIME 100 MILLIGRAM(S): 1 INJECTION, POWDER, FOR SOLUTION INTRAMUSCULAR; INTRAVENOUS at 05:54

## 2018-11-09 RX ADMIN — Medication 100 MILLIGRAM(S): at 05:55

## 2018-11-09 RX ADMIN — Medication 166.67 MILLIGRAM(S): at 06:29

## 2018-11-09 RX ADMIN — Medication 81 MILLIGRAM(S): at 11:44

## 2018-11-09 RX ADMIN — LIDOCAINE 1 PATCH: 4 CREAM TOPICAL at 04:46

## 2018-11-09 RX ADMIN — LIDOCAINE 1 PATCH: 4 CREAM TOPICAL at 11:44

## 2018-11-09 RX ADMIN — ENOXAPARIN SODIUM 40 MILLIGRAM(S): 100 INJECTION SUBCUTANEOUS at 11:45

## 2018-11-09 RX ADMIN — OXYCODONE AND ACETAMINOPHEN 1 TABLET(S): 5; 325 TABLET ORAL at 00:37

## 2018-11-09 RX ADMIN — OXYCODONE AND ACETAMINOPHEN 1 TABLET(S): 5; 325 TABLET ORAL at 21:50

## 2018-11-09 RX ADMIN — LIDOCAINE 1 PATCH: 4 CREAM TOPICAL at 21:16

## 2018-11-09 RX ADMIN — ONDANSETRON 4 MILLIGRAM(S): 8 TABLET, FILM COATED ORAL at 21:19

## 2018-11-09 RX ADMIN — OXYCODONE AND ACETAMINOPHEN 1 TABLET(S): 5; 325 TABLET ORAL at 09:11

## 2018-11-09 RX ADMIN — OXYCODONE AND ACETAMINOPHEN 1 TABLET(S): 5; 325 TABLET ORAL at 09:41

## 2018-11-09 RX ADMIN — Medication 6 UNIT(S): at 08:22

## 2018-11-09 NOTE — PROGRESS NOTE ADULT - SUBJECTIVE AND OBJECTIVE BOX
Podiatry Pre-Operative Note   ROSALIE ESCOBEDO is a 63y year old Male patient presenting to the operating room for surgical management of the left foot. The patient has failed all conservative measures and requires surgical intervention at this time.       PAST MEDICAL & SURGICAL HISTORY:  Dyslipidemia  NSTEMI (non-ST elevated myocardial infarction)  Diabetic foot ulcer  PVD (peripheral vascular disease)  Diabetes  Other hyperlipidemia  Hypertension, unspecified type  Amputated toe, unspecified laterality  S/P CABG (coronary artery bypass graft)      Medications:   aspirin  chewable 81 milliGRAM(s) Oral daily  atorvastatin 40 milliGRAM(s) Oral at bedtime  cefepime   IVPB 2000 milliGRAM(s) IV Intermittent every 12 hours  chlorhexidine 4% Liquid 1 Application(s) Topical <User Schedule>  clopidogrel Tablet 75 milliGRAM(s) Oral daily  docusate sodium 100 milliGRAM(s) Oral daily  enoxaparin Injectable 40 milliGRAM(s) SubCutaneous daily  insulin glargine Injectable (LANTUS) 27 Unit(s) SubCutaneous at bedtime  insulin lispro Injectable (HumaLOG) 6 Unit(s) SubCutaneous three times a day before meals  lidocaine   Patch 1 Patch Transdermal daily  metoprolol succinate ER 25 milliGRAM(s) Oral daily  ondansetron   Disintegrating Tablet 4 milliGRAM(s) Oral every 6 hours PRN  oxyCODONE    5 mG/acetaminophen 325 mG 1 Tablet(s) Oral every 6 hours PRN  pregabalin 100 milliGRAM(s) Oral two times a day  senna 1 Tablet(s) Oral at bedtime  vancomycin  IVPB 1250 milliGRAM(s) IV Intermittent every 12 hours    Allergies    Cipro (Unknown)  IV contrast (Short breath)  Keflex (Unknown)    Intolerances        Consent [x_]  H&P [x_]  Medical Clearance [_x]  EKG [_x]  CXR [_x]  UCG [n/a_]  T&S [x_]                          9.0    7.12  )-----------( 318      ( 09 Nov 2018 10:01 )             28.6     11-09    137  |  97<L>  |  10  ----------------------------<  195<H>  4.3   |  29  |  0.9    Ca    8.6      09 Nov 2018 10:01  Phos  2.6     11-09  Mg     2.0     11-09    TPro  6.7  /  Alb  2.6<L>  /  TBili  0.5  /  DBili  x   /  AST  24  /  ALT  17  /  AlkPhos  223<H>  11-09    PT/INR - ( 09 Nov 2018 10:01 )   PT: 12.90 sec;   INR: 1.12 ratio         PTT - ( 09 Nov 2018 10:01 )  PTT:31.5 sec    CAPILLARY BLOOD GLUCOSE      POCT Blood Glucose.: 142 mg/dL (09 Nov 2018 12:10)  POCT Blood Glucose.: 169 mg/dL (09 Nov 2018 08:17)  POCT Blood Glucose.: 176 mg/dL (08 Nov 2018 22:05)  POCT Blood Glucose.: 117 mg/dL (08 Nov 2018 16:51)            T(C): 36.6 (11-09-18 @ 14:15), Max: 36.6 (11-09-18 @ 14:15)  HR: 63 (11-09-18 @ 14:15) (63 - 73)  BP: 145/71 (11-09-18 @ 14:15) (129/65 - 162/78)  RR: 17 (11-09-18 @ 14:15) (17 - 18)  SpO2: 95% (11-08-18 @ 19:55) (95% - 95%)    Surgeon: Dr. Lance Humphreys  Assistant (s): [facility] residents   Pre Operative Diagnosis: osteomyelitis   Planned Procedure: excisional debridement soft tissue/bone left foot     The patient has beenn educated on the above procedure, with all risks and benefits described. No guarentees were given or implied for the aforementioned procedure.  The above assistant(s) have introduced themselves to the patient. The patient consents to their participation in the procedure listed above.

## 2018-11-09 NOTE — PROGRESS NOTE ADULT - ASSESSMENT
63y male with PMH of CAD, NSTEMI s/p CABG (5 vessel according to patient; March 2016); DM with peripheral neuropathy, Left DFU, HTN, HLD was sent for worsening chronic left foot ulcer.     #) L sub 1st metatarsal ulceration w/ overlying osteomyelitis  +erythema +edema - purulence  -X-ray: Erosive changes to medial cuneiform consistent w/ OM.   VA Duplex: Retrograde flow in R posterior tibial artery suggestive of proximal occlusion  - MRI Foot: Large plantar ulcer (5.9 cm) w/ direct extension into 1st tarsometatarsal joint. First tarsometatarsal joint septic arthritis w/ OM and pathologic fracture of the medial cuneiform.  - On cefepime 2g IV q12 . Will D/C vanco today as per ID  -Podiatry: Pt now agreeing to stay for OR. Will Be going on Saturday AM. Ordered WBC Ceretec bone scan to r/o charcot vs acute OM.  -Cardio: Can hold Plavix if requested by surgical team. PT is moderate risk for low-intermediate risk surgery.  -F/U 2-D echo   - Blood Cx: NGTD  - Wound Cx (nov 6th): NGTD  - pain control with percocet    #Acute back pain due to thoracic spine T11- T12 fracture  -MRI: bone marrow edema at the anterior aspect of the T11 inferior endplate corresponding to fracture seen on CT  - NeuroSx to see the pt today. Will follow up their recs    #) Nausea   - zofran 4mg PO prn q6 hrs  -outpt w/u by GI  - US Abdomen Limited (11.04.18): Cholelithiasis without evidence of cholecystitis --> outpt management    #) CAD, NSTEMI s/p CABG  - c/w ASA, Plavix, metoprolol, statin      #) HTN  - c/w metoprolol     #) DM  - FS qAC and qHS  - On Lantus/Lispro 27/6/6/6  - c/w Lyrica for peripheral neuropathy    #) Diet   - DASH, Carb consistent    #) DVT ppx  - Lovenox 40mg daily    #) Activity  - out of bed to chair    #) Disposition  - from home    #) Full code status    #PT/REhab: Home w/ outpt or VN services

## 2018-11-09 NOTE — PROGRESS NOTE ADULT - SUBJECTIVE AND OBJECTIVE BOX
ROSALIE ESCOBEDO  63y, Male      OVERNIGHT EVENTS:  no acute events overnight    ROS negative except as per above    VITALS:  T(F): 97.8, Max: 97.8 (11-09-18 @ 14:15)  HR: 63  BP: 145/71  RR: 17Vital Signs Last 24 Hrs  T(C): 36.6 (09 Nov 2018 14:15), Max: 36.6 (09 Nov 2018 14:15)  T(F): 97.8 (09 Nov 2018 14:15), Max: 97.8 (09 Nov 2018 14:15)  HR: 63 (09 Nov 2018 14:15) (63 - 73)  BP: 145/71 (09 Nov 2018 14:15) (129/65 - 162/78)  BP(mean): --  RR: 17 (09 Nov 2018 14:15) (17 - 18)  SpO2: 95% (08 Nov 2018 19:55) (95% - 95%)    PHYSICAL EXAM:  Gen: Awake and alert, non-toxic appearing, NAD  HEENT: NCAT. EOMI. MMM.   Neck: Supple, no cervical LAD  CV: RRR, no murmurs  Lungs: CTAB, no w/r/r  Abd: Soft. NTND  Extr: wwp L foot dressings  Skin: no rash  Neuro: No focal deficits  Lines: clean    TESTS & MEASUREMENTS:                        9.0    7.12  )-----------( 318      ( 09 Nov 2018 10:01 )             28.6     11-09    137  |  97<L>  |  10  ----------------------------<  195<H>  4.3   |  29  |  0.9    Ca    8.6      09 Nov 2018 10:01  Phos  2.6     11-09  Mg     2.0     11-09    TPro  6.7  /  Alb  2.6<L>  /  TBili  0.5  /  DBili  x   /  AST  24  /  ALT  17  /  AlkPhos  223<H>  11-09    LIVER FUNCTIONS - ( 09 Nov 2018 10:01 )  Alb: 2.6 g/dL / Pro: 6.7 g/dL / ALK PHOS: 223 U/L / ALT: 17 U/L / AST: 24 U/L / GGT: x             Culture - Other (collected 11-06-18 @ 11:31)  Source: .Other Wound (L foot ulcer)  Final Report (11-09-18 @ 00:48):    Rare Streptococcus agalactiae (Group B) isolated    Group B streptococci are susceptible to ampicillin,    penicillin and cefazolin, but may be resistant to    erythromycin and clindamycin.    Recommendations for intrapartum prophylaxis for Group B    streptococci are penicillin or ampicillin.    Culture - Blood (collected 11-05-18 @ 10:01)  Source: .Blood None  Preliminary Report (11-06-18 @ 18:00):    No growth to date.    Culture - Blood (collected 11-04-18 @ 12:40)  Source: .Blood Blood-Peripheral  Preliminary Report (11-05-18 @ 23:01):    No growth to date.    Culture - Blood (collected 11-04-18 @ 12:32)  Source: .Blood Blood-Peripheral  Preliminary Report (11-05-18 @ 21:01):    No growth to date.          RADIOLOGY & ADDITIONAL TESTS:    ANTIBIOTICS:  cefepime   IVPB   100 mL/Hr IV Intermittent (11-06-18 @ 05:41)   100 mL/Hr IV Intermittent (11-05-18 @ 22:01)   100 mL/Hr IV Intermittent (11-05-18 @ 14:57)   100 mL/Hr IV Intermittent (11-05-18 @ 05:24)   100 mL/Hr IV Intermittent (11-04-18 @ 21:16)    cefepime   IVPB   100 mL/Hr IV Intermittent (11-09-18 @ 05:54)   100 mL/Hr IV Intermittent (11-08-18 @ 17:07)   100 mL/Hr IV Intermittent (11-08-18 @ 05:43)   100 mL/Hr IV Intermittent (11-07-18 @ 17:09)   100 mL/Hr IV Intermittent (11-07-18 @ 05:10)   100 mL/Hr IV Intermittent (11-06-18 @ 19:06)    piperacillin/tazobactam IVPB.   200 mL/Hr IV Intermittent (11-04-18 @ 15:59)    vancomycin  IVPB   250 mL/Hr IV Intermittent (11-04-18 @ 16:53)    vancomycin  IVPB   250 mL/Hr IV Intermittent (11-06-18 @ 06:41)   250 mL/Hr IV Intermittent (11-05-18 @ 18:26)   250 mL/Hr IV Intermittent (11-05-18 @ 07:40)   250 mL/Hr IV Intermittent (11-04-18 @ 18:42)    vancomycin  IVPB   166.67 mL/Hr IV Intermittent (11-09-18 @ 06:29)   166.67 mL/Hr IV Intermittent (11-08-18 @ 18:24)   166.67 mL/Hr IV Intermittent (11-08-18 @ 08:09)   166.67 mL/Hr IV Intermittent (11-07-18 @ 17:47)   166.67 mL/Hr IV Intermittent (11-07-18 @ 05:10)   166.67 mL/Hr IV Intermittent (11-06-18 @ 19:16)        cefepime   IVPB 2000 milliGRAM(s) IV Intermittent every 12 hours  vancomycin  IVPB 1250 milliGRAM(s) IV Intermittent every 12 hours

## 2018-11-09 NOTE — PROGRESS NOTE ADULT - SUBJECTIVE AND OBJECTIVE BOX
PODIATRY PROGRESS NOTE   [id number] ROSALIE ESCOBEDO is a pleasant well-nourished, well developed 63y Male in no acute distress, alert awake, and oriented to person, place and time.   Patient is a 63y old  Male who presents with a chief complaint of worsening chronic left foot ulcer (08 Nov 2018 14:04)    HPI:  63y male with PMH of CAD, NSTEMI s/p CABG (5 vessel according to patient; March 2016); DM with peripheral neuropathy, Left DFU, HTN, HLD was sent for worsening chronic left foot ulcer. For the past 2 yrs pt has chronic diabetic left foot ulcer that has been taken care by podiatry at [facility] and has wound care nurse (3 days/week) for dressing. Today during dressing, nurse notice significant bleeding from the ulcer site and Q tip goes down to bone and referred the patient to the ER. He also complains of frequent falls approx. once every 2 months. On his last fall, he hit his back on the side walk and now suffering from back pain since. Denies fever, chills, trauma, chest pain, palpitations, dizziness, changes in urination, or diarrhea. Patient was recently discharged from the ED (10/29/18) with UTI and sent home on Vantin. At baseline, patient walk with cane; wife passed away Feb 2018 - coping ok but finds it difficult to manage ADL's.    In ED, Xray L Foot: Erosive change to the medial cuneiform, new since prior examination consistent with osteomyelitis.  He also started to have some nausea with no vomiting or abd pain. US Abdomen Limited (11.04.18): Cholelithiasis without evidence of cholecystitis.    Pt received:  2L LR stat  Vancomycin 2gm IV stat  Zosyn 4.5gm IV stat  Zofran 4mg IV push  Morphine 4 mg IV push (04 Nov 2018 17:17)    Vital Signs Last 24 Hrs  T(C): 35.8 (09 Nov 2018 06:06), Max: 36.2 (08 Nov 2018 19:50)  T(F): 96.5 (09 Nov 2018 06:06), Max: 97.2 (08 Nov 2018 19:50)  HR: 73 (09 Nov 2018 06:06) (67 - 73)  BP: 129/65 (09 Nov 2018 06:06) (129/65 - 162/78)  RR: 18 (09 Nov 2018 06:06) (18 - 18)  SpO2: 95% (08 Nov 2018 19:55) (95% - 95%)                        9.0    7.12  )-----------( 318      ( 09 Nov 2018 10:01 )             28.6                 11-09    137  |  97<L>  |  10  ----------------------------<  195<H>  4.3   |  29  |  0.9    Ca    8.6      09 Nov 2018 10:01  Phos  2.6     11-09  Mg     2.0     11-09    TPro  6.7  /  Alb  2.6<L>  /  TBili  0.5  /  DBili  x   /  AST  24  /  ALT  17  /  AlkPhos  223<H>  11-09      assessment  - left sub 1st metatarsal DFU  - Foot xray consistent w/ osteomyelitis medial cuneiform  - MRI: OM first met base stump.   Plan:   - pt seen/evaluated @ bedside  - c/w IV abx as per ID  - dressed w/ iodoform packing/dsd/ABD/kerlix left foot  - wound culture: no growth  - MRI reviewed- Positive OM  - Ordered WBC Ceretec bone scan to r/o charcot vs acute om  - onsent signed.   - podiatry planning sx saturday 11/9, exc debridement of soft tissue and bone left foot.  - req cardiac clearance and OR strtification  - NPO MN  - podiaty will f/u while in house.    11-09-18 @ 13:57

## 2018-11-09 NOTE — PROGRESS NOTE ADULT - ASSESSMENT
63M    DM  NSTEMI (non-ST elevated myocardial infarction)  PVD  HTN  Amputated toe  CAD S/P CABG (coronary artery bypass graft)    Admitted with worsening L DFU, xray with cuneiform OM  US with R post tib occlusion  Sepsis on admission P119, WBC 11  Systolic hypotension on admission  Hyponatremia  BMI 30  MRI L foot Large plantar medial midfoot ulcer with subjacent 5.9 cm phlegmon, increased in size since September 2016, demonstrating direct extension into the first tarsometatarsal joint. First tarsometatarsal joint septic arthritis with osteomyelitis and pathologic fractureof the medial cuneiform, and osteomyelitis of the plantar first metatarsal base stump. Worsening neuropathic osteoarthropathy of the second through fifth tarsometatarsal joints since 2016, with superimposed septic arthritis,   and likely superimposed osteomyelitis of the plantar second metatarsal base. Septic arthritis of the navicular-cuneiform joints  ESR >140  11/6 culture GBS    - plan for other scan per podiatry ?lourdes  - NIURKA VANC  - cefepime to 2g q12h  - Podiatry following    Spectra [id number]

## 2018-11-09 NOTE — PROGRESS NOTE ADULT - SUBJECTIVE AND OBJECTIVE BOX
ROSALIE ESCOBEDO, male, 63y ([birth year]),   MRN-[id number]  Admit Date: 11-04-18 (5d)      Chief Complaint:  Patient is a 63y old  Male who presents with a chief complaint of worsening chronic left foot ulcer (09 Nov 2018 15:05)      Interval History:  Pt lying comfortably in bed. Reports no acute events overnight.    Past Medical and Surgical History:  PAST MEDICAL & SURGICAL HISTORY:  Dyslipidemia  NSTEMI (non-ST elevated myocardial infarction)  Diabetic foot ulcer  PVD (peripheral vascular disease)  Diabetes  Other hyperlipidemia  Hypertension, unspecified type  Amputated toe, unspecified laterality  S/P CABG (coronary artery bypass graft)      Current Medications:  MEDICATIONS  (STANDING):  aspirin  chewable 81 milliGRAM(s) Oral daily  atorvastatin 40 milliGRAM(s) Oral at bedtime  cefepime   IVPB 2000 milliGRAM(s) IV Intermittent every 12 hours  chlorhexidine 4% Liquid 1 Application(s) Topical <User Schedule>  clopidogrel Tablet 75 milliGRAM(s) Oral daily  docusate sodium 100 milliGRAM(s) Oral daily  enoxaparin Injectable 40 milliGRAM(s) SubCutaneous daily  insulin glargine Injectable (LANTUS) 27 Unit(s) SubCutaneous at bedtime  insulin lispro Injectable (HumaLOG) 6 Unit(s) SubCutaneous three times a day before meals  lidocaine   Patch 1 Patch Transdermal daily  metoprolol succinate ER 25 milliGRAM(s) Oral daily  pregabalin 100 milliGRAM(s) Oral two times a day  senna 1 Tablet(s) Oral at bedtime  vancomycin  IVPB 1250 milliGRAM(s) IV Intermittent every 12 hours    MEDICATIONS  (PRN):  oxyCODONE    5 mG/acetaminophen 325 mG 1 Tablet(s) Oral every 6 hours PRN Moderate Pain (4 - 6)        Vital Signs:  T(F): 97.8 (11-09-18 @ 14:15), Max: 98.6 (11-07-18 @ 20:24)  HR: 63 (11-09-18 @ 14:15) (63 - 73)  BP: 145/71 (11-09-18 @ 14:15) (129/65 - 162/78)  RR: 17 (11-09-18 @ 14:15) (17 - 18)  SpO2: 95% (11-08-18 @ 19:55) (95% - 95%)  CAPILLARY BLOOD GLUCOSE      POCT Blood Glucose.: 142 mg/dL (09 Nov 2018 12:10)  POCT Blood Glucose.: 169 mg/dL (09 Nov 2018 08:17)  POCT Blood Glucose.: 176 mg/dL (08 Nov 2018 22:05)  POCT Blood Glucose.: 117 mg/dL (08 Nov 2018 16:51)      Physical Exam:  General: Not in distress.   HEENT: Moist mucus membranes. PERRLA.  Cardio: Regular rate and rhythm,   Pulm: Clear to auscultation bilaterally. No wheezing, or rhonchi  Abdomen: Soft, non-tender, non-distended. Normoactive bowel sounds.  Extremities: L foot wrapped in Kerlex  Neuro: A&O x3. No focal deficits.     Labs and Imaging:  CBC Full  -  ( 09 Nov 2018 10:01 )  WBC Count : 7.12 K/uL  Hemoglobin : 9.0 g/dL  Hematocrit : 28.6 %  Platelet Count - Automated : 318 K/uL  Mean Cell Volume : 72.6 fL  Mean Cell Hemoglobin : 22.8 pg  Mean Cell Hemoglobin Concentration : 31.5 g/dL  Auto Neutrophil # : x  Auto Lymphocyte # : x  Auto Monocyte # : x  Auto Eosinophil # : x  Auto Basophil # : x  Auto Neutrophil % : x  Auto Lymphocyte % : x  Auto Monocyte % : x  Auto Eosinophil % : x  Auto Basophil % : x    RDW: 17.7    PT/INR/PTT: 11-09-18 @ 10:01  12.90 | 31.5        ^      1.12  PT/INR/PTT: 11-07-18 @ 17:51  13.10 | 32.1        ^      1.14    BMP: 11-09-18 @ 10:01  137 | 97 | 10   -----------------< 195  4.3  | 29 | 0.9  eGFR(AA): 104, eGFR (non-AA): 89  Ca 8.6, Mg 2.0, P 2.6    LFTs: 11-09-18 @ 10:01  TP  6.7  | 2.6 Alb   ---------------  TB  0.5  | --  DB   ---------------  ALT 17  | 24  AST            ^          223 ALK  LFTs: 11-08-18 @ 08:07  TP  7.6  | 2.9 Alb   ---------------  TB  0.7  | --  DB   ---------------  ALT 15  | 18  AST            ^          243 ALK      LFTs: 11-09-18 @ 10:01  Ca  8.6  | 24 AST   -----------------  TP  6.7  | 17 ALT  -----------------  Alb 2.6  | 223 ALK          ^        0.5         TB           (collected 11-05-18 @ 10:01)  Source: .Blood None  Preliminary Report:    No growth to date.     (collected 11-04-18 @ 12:40)  Source: .Blood Blood-Peripheral  Preliminary Report:    No growth to date.     (collected 11-04-18 @ 12:32)  Source: .Blood Blood-Peripheral  Preliminary Report:    No growth to date.          Radiology:     < from: MR Thoracic Spine No Cont (11.07.18 @ 16:39) >  MPRESSION:  As correlated with the CT chest of 9/26/2018:    1.  Residual bone marrow edema at the anterior aspect of the T11 inferior   endplate corresponding to fracture seen on CT. Minimal extension of   edema/fracture into the T11-12 disc space.    2.  No evidence of spinal cord compression or cord edema.    3.  Minimal degenerative changes.        < end of copied text >      < from: MR Foot No Cont, Left (11.06.18 @ 18:56) >  Impression:    Large plantar medial midfoot ulcer with subjacent 5.9 cm phlegmon,   increased in size since September 2016, demonstrating direct extension   into the first tarsometatarsal joint.    First tarsometatarsal joint septic arthritis with osteomyelitis and   pathologic fractureof the medial cuneiform, and osteomyelitis of the   plantar first metatarsal base stump.    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    Septic arthritis of the navicular-cuneiform joints.    < end of copied text >

## 2018-11-10 ENCOUNTER — RESULT REVIEW (OUTPATIENT)
Age: 63
End: 2018-11-10

## 2018-11-10 LAB
CULTURE RESULTS: SIGNIFICANT CHANGE UP
GLUCOSE BLDC GLUCOMTR-MCNC: 103 MG/DL — HIGH (ref 70–99)
GLUCOSE BLDC GLUCOMTR-MCNC: 136 MG/DL — HIGH (ref 70–99)
GLUCOSE BLDC GLUCOMTR-MCNC: 174 MG/DL — HIGH (ref 70–99)
GLUCOSE BLDC GLUCOMTR-MCNC: 97 MG/DL — SIGNIFICANT CHANGE UP (ref 70–99)
SPECIMEN SOURCE: SIGNIFICANT CHANGE UP

## 2018-11-10 RX ORDER — CEFEPIME 1 G/1
2000 INJECTION, POWDER, FOR SOLUTION INTRAMUSCULAR; INTRAVENOUS EVERY 12 HOURS
Qty: 0 | Refills: 0 | Status: DISCONTINUED | OUTPATIENT
Start: 2018-11-10 | End: 2018-11-10

## 2018-11-10 RX ORDER — MORPHINE SULFATE 50 MG/1
4 CAPSULE, EXTENDED RELEASE ORAL
Qty: 0 | Refills: 0 | Status: DISCONTINUED | OUTPATIENT
Start: 2018-11-10 | End: 2018-11-10

## 2018-11-10 RX ORDER — LIDOCAINE 4 G/100G
1 CREAM TOPICAL DAILY
Qty: 0 | Refills: 0 | Status: DISCONTINUED | OUTPATIENT
Start: 2018-11-10 | End: 2018-11-14

## 2018-11-10 RX ORDER — ASPIRIN/CALCIUM CARB/MAGNESIUM 324 MG
81 TABLET ORAL DAILY
Qty: 0 | Refills: 0 | Status: DISCONTINUED | OUTPATIENT
Start: 2018-11-10 | End: 2018-11-14

## 2018-11-10 RX ORDER — CLOPIDOGREL BISULFATE 75 MG/1
75 TABLET, FILM COATED ORAL DAILY
Qty: 0 | Refills: 0 | Status: DISCONTINUED | OUTPATIENT
Start: 2018-11-10 | End: 2018-11-14

## 2018-11-10 RX ORDER — INSULIN GLARGINE 100 [IU]/ML
27 INJECTION, SOLUTION SUBCUTANEOUS AT BEDTIME
Qty: 0 | Refills: 0 | Status: DISCONTINUED | OUTPATIENT
Start: 2018-11-10 | End: 2018-11-14

## 2018-11-10 RX ORDER — OXYCODONE AND ACETAMINOPHEN 5; 325 MG/1; MG/1
1 TABLET ORAL EVERY 6 HOURS
Qty: 0 | Refills: 0 | Status: DISCONTINUED | OUTPATIENT
Start: 2018-11-10 | End: 2018-11-14

## 2018-11-10 RX ORDER — INSULIN LISPRO 100/ML
6 VIAL (ML) SUBCUTANEOUS
Qty: 0 | Refills: 0 | Status: DISCONTINUED | OUTPATIENT
Start: 2018-11-10 | End: 2018-11-14

## 2018-11-10 RX ORDER — SENNA PLUS 8.6 MG/1
1 TABLET ORAL AT BEDTIME
Qty: 0 | Refills: 0 | Status: DISCONTINUED | OUTPATIENT
Start: 2018-11-10 | End: 2018-11-14

## 2018-11-10 RX ORDER — ATORVASTATIN CALCIUM 80 MG/1
40 TABLET, FILM COATED ORAL AT BEDTIME
Qty: 0 | Refills: 0 | Status: DISCONTINUED | OUTPATIENT
Start: 2018-11-10 | End: 2018-11-14

## 2018-11-10 RX ORDER — METOPROLOL TARTRATE 50 MG
25 TABLET ORAL DAILY
Qty: 0 | Refills: 0 | Status: DISCONTINUED | OUTPATIENT
Start: 2018-11-10 | End: 2018-11-14

## 2018-11-10 RX ORDER — ONDANSETRON 8 MG/1
4 TABLET, FILM COATED ORAL EVERY 6 HOURS
Qty: 0 | Refills: 0 | Status: DISCONTINUED | OUTPATIENT
Start: 2018-11-10 | End: 2018-11-14

## 2018-11-10 RX ORDER — DOCUSATE SODIUM 100 MG
100 CAPSULE ORAL DAILY
Qty: 0 | Refills: 0 | Status: DISCONTINUED | OUTPATIENT
Start: 2018-11-10 | End: 2018-11-14

## 2018-11-10 RX ORDER — SODIUM CHLORIDE 9 MG/ML
1000 INJECTION, SOLUTION INTRAVENOUS
Qty: 0 | Refills: 0 | Status: DISCONTINUED | OUTPATIENT
Start: 2018-11-10 | End: 2018-11-10

## 2018-11-10 RX ORDER — VANCOMYCIN HCL 1 G
1250 VIAL (EA) INTRAVENOUS EVERY 12 HOURS
Qty: 0 | Refills: 0 | Status: DISCONTINUED | OUTPATIENT
Start: 2018-11-10 | End: 2018-11-12

## 2018-11-10 RX ORDER — ENOXAPARIN SODIUM 100 MG/ML
40 INJECTION SUBCUTANEOUS DAILY
Qty: 0 | Refills: 0 | Status: DISCONTINUED | OUTPATIENT
Start: 2018-11-10 | End: 2018-11-14

## 2018-11-10 RX ORDER — MORPHINE SULFATE 50 MG/1
2 CAPSULE, EXTENDED RELEASE ORAL
Qty: 0 | Refills: 0 | Status: DISCONTINUED | OUTPATIENT
Start: 2018-11-10 | End: 2018-11-10

## 2018-11-10 RX ADMIN — OXYCODONE AND ACETAMINOPHEN 1 TABLET(S): 5; 325 TABLET ORAL at 22:30

## 2018-11-10 RX ADMIN — LIDOCAINE 1 PATCH: 4 CREAM TOPICAL at 11:45

## 2018-11-10 RX ADMIN — OXYCODONE AND ACETAMINOPHEN 1 TABLET(S): 5; 325 TABLET ORAL at 03:53

## 2018-11-10 RX ADMIN — ATORVASTATIN CALCIUM 40 MILLIGRAM(S): 80 TABLET, FILM COATED ORAL at 21:18

## 2018-11-10 RX ADMIN — OXYCODONE AND ACETAMINOPHEN 1 TABLET(S): 5; 325 TABLET ORAL at 22:08

## 2018-11-10 RX ADMIN — SENNA PLUS 1 TABLET(S): 8.6 TABLET ORAL at 21:18

## 2018-11-10 RX ADMIN — SODIUM CHLORIDE 100 MILLILITER(S): 9 INJECTION, SOLUTION INTRAVENOUS at 10:15

## 2018-11-10 RX ADMIN — Medication 6 UNIT(S): at 17:14

## 2018-11-10 RX ADMIN — ONDANSETRON 4 MILLIGRAM(S): 8 TABLET, FILM COATED ORAL at 03:19

## 2018-11-10 RX ADMIN — Medication 25 MILLIGRAM(S): at 18:32

## 2018-11-10 RX ADMIN — CLOPIDOGREL BISULFATE 75 MILLIGRAM(S): 75 TABLET, FILM COATED ORAL at 11:45

## 2018-11-10 RX ADMIN — Medication 100 MILLIGRAM(S): at 18:32

## 2018-11-10 RX ADMIN — MORPHINE SULFATE 4 MILLIGRAM(S): 50 CAPSULE, EXTENDED RELEASE ORAL at 11:23

## 2018-11-10 RX ADMIN — INSULIN GLARGINE 27 UNIT(S): 100 INJECTION, SOLUTION SUBCUTANEOUS at 22:05

## 2018-11-10 RX ADMIN — MORPHINE SULFATE 4 MILLIGRAM(S): 50 CAPSULE, EXTENDED RELEASE ORAL at 10:47

## 2018-11-10 RX ADMIN — LIDOCAINE 1 PATCH: 4 CREAM TOPICAL at 22:12

## 2018-11-10 RX ADMIN — LIDOCAINE 1 PATCH: 4 CREAM TOPICAL at 21:20

## 2018-11-10 RX ADMIN — Medication 81 MILLIGRAM(S): at 11:45

## 2018-11-10 RX ADMIN — CHLORHEXIDINE GLUCONATE 1 APPLICATION(S): 213 SOLUTION TOPICAL at 05:37

## 2018-11-10 RX ADMIN — Medication 100 MILLIGRAM(S): at 11:45

## 2018-11-10 RX ADMIN — Medication 25 MILLIGRAM(S): at 05:38

## 2018-11-10 RX ADMIN — Medication 166.67 MILLIGRAM(S): at 06:07

## 2018-11-10 RX ADMIN — CEFEPIME 100 MILLIGRAM(S): 1 INJECTION, POWDER, FOR SOLUTION INTRAMUSCULAR; INTRAVENOUS at 05:37

## 2018-11-10 RX ADMIN — Medication 100 MILLIGRAM(S): at 05:38

## 2018-11-10 RX ADMIN — OXYCODONE AND ACETAMINOPHEN 1 TABLET(S): 5; 325 TABLET ORAL at 03:19

## 2018-11-10 RX ADMIN — Medication 166.67 MILLIGRAM(S): at 17:53

## 2018-11-10 RX ADMIN — ENOXAPARIN SODIUM 40 MILLIGRAM(S): 100 INJECTION SUBCUTANEOUS at 11:47

## 2018-11-10 NOTE — PROGRESS NOTE ADULT - ASSESSMENT
63y male with PMH of CAD, NSTEMI s/p CABG (5 vessel according to patient; March 2016); DM with peripheral neuropathy, Left DFU, HTN, HLD was sent for worsening chronic left foot ulcer.     #) L sub 1st metatarsal ulceration w/ overlying osteomyelitis  +erythema +edema - purulence  -X-ray: Erosive changes to medial cuneiform consistent w/ OM.   VA Duplex: Retrograde flow in R posterior tibial artery suggestive of proximal occlusion  - MRI Foot: Large plantar ulcer (5.9 cm) w/ direct extension into 1st tarsometatarsal joint. First tarsometatarsal joint septic arthritis w/ OM and pathologic fracture of the medial cuneiform.  - On cefepime 2g IV q12. Follow ID recs post-procedure  -Podiatry: OR today for surgical debridement. Ordered WBC Ceretec bone scan to r/o charcot vs acute OM.  -Cardio: Can hold Plavix if requested by surgical team. PT is moderate risk for low-intermediate risk surgery.  -F/U 2-D echo    #Acute back pain due to thoracic spine T11- T12 fracture  -MRI: bone marrow edema at the anterior aspect of the T11 inferior endplate corresponding to fracture seen on CT  -neurosurgery contacted on Thursday with MRI results. Awaiting follow up. Contact again on Monday.    #) Nausea   - zofran 4mg PO prn q6 hrs  -outpt w/u by GI  - US Abdomen Limited (11.04.18): Cholelithiasis without evidence of cholecystitis --> outpt management    #) CAD, NSTEMI s/p CABG  - c/w ASA, Plavix, metoprolol, statin      #) HTN  - c/w metoprolol     #) DM  - FS qAC and qHS  - On Lantus/Lispro 27/6/6/6  - c/w Lyrica for peripheral neuropathy    #) Diet   - DASH, Carb consistent    #) DVT ppx  - Lovenox 40mg daily    #) Activity  - out of bed to chair    #) Disposition  - from home    #) Full code status    #PT/REhab: Home w/ outpt or VN services

## 2018-11-10 NOTE — BRIEF OPERATIVE NOTE - PROCEDURE
<<-----Click on this checkbox to enter Procedure Debridement of foot  11/10/2018  Excisional debridement soft tissue and bone left foot  Active  [email or web address]

## 2018-11-10 NOTE — PROGRESS NOTE ADULT - SUBJECTIVE AND OBJECTIVE BOX
RSOALIE ESCOBEDO, male, 63y ([birth year]),   MRN-[id number]  Admit Date: 11-04-18 (6d)      Chief Complaint:  Patient is a 63y old  Male who presents with a chief complaint of worsening chronic left foot ulcer (09 Nov 2018 16:19)      Interval History:  No acute events overnight. Pt taken to OR today by Podiatry.     Past Medical and Surgical History:  PAST MEDICAL & SURGICAL HISTORY:  Dyslipidemia  NSTEMI (non-ST elevated myocardial infarction)  Diabetic foot ulcer  PVD (peripheral vascular disease)  Diabetes  Other hyperlipidemia  Hypertension, unspecified type  Amputated toe, unspecified laterality  S/P CABG (coronary artery bypass graft)      Current Medications:  MEDICATIONS  (STANDING):    MEDICATIONS  (PRN):        Vital Signs:  T(F): 97.4 (11-10-18 @ 06:57), Max: 98.9 (11-09-18 @ 19:52)  HR: 68 (11-10-18 @ 08:50) (63 - 73)  BP: 153/75 (11-10-18 @ 08:50) (129/65 - 162/78)  RR: 17 (11-10-18 @ 08:50) (17 - 18)  SpO2: 97% (11-10-18 @ 08:50) (95% - 97%)  CAPILLARY BLOOD GLUCOSE      POCT Blood Glucose.: 103 mg/dL (10 Nov 2018 08:12)  POCT Blood Glucose.: 116 mg/dL (09 Nov 2018 22:17)  POCT Blood Glucose.: 105 mg/dL (09 Nov 2018 21:51)  POCT Blood Glucose.: 97 mg/dL (09 Nov 2018 16:36)  POCT Blood Glucose.: 142 mg/dL (09 Nov 2018 12:10)      Physical Exam:  General: Not in distress.   HEENT: Moist mucus membranes. PERRLA.  Cardio: Regular rate and rhythm,   Pulm: Clear to auscultation bilaterally. No wheezing, or rhonchi  Abdomen: Soft, non-tender, non-distended. Normoactive bowel sounds.  Extremities: L foot wrapped in Kerlex  Neuro: A&O x3. No focal deficits.     Labs and Imaging:  CBC Full  -  ( 09 Nov 2018 10:01 )  WBC Count : 7.12 K/uL  Hemoglobin : 9.0 g/dL  Hematocrit : 28.6 %  Platelet Count - Automated : 318 K/uL  Mean Cell Volume : 72.6 fL  Mean Cell Hemoglobin : 22.8 pg  Mean Cell Hemoglobin Concentration : 31.5 g/dL  Auto Neutrophil # : x  Auto Lymphocyte # : x  Auto Monocyte # : x  Auto Eosinophil # : x  Auto Basophil # : x  Auto Neutrophil % : x  Auto Lymphocyte % : x  Auto Monocyte % : x  Auto Eosinophil % : x  Auto Basophil % : x    RDW: 17.7    PT/INR/PTT: 11-09-18 @ 10:01  12.90 | 31.5        ^      1.12    BMP: 11-09-18 @ 10:01  137 | 97 | 10   -----------------< 195  4.3  | 29 | 0.9  eGFR(AA): 104, eGFR (non-AA): 89  Ca 8.6, Mg 2.0, P 2.6    LFTs: 11-09-18 @ 10:01  TP  6.7  | 2.6 Alb   ---------------  TB  0.5  | --  DB   ---------------  ALT 17  | 24  AST            ^          223 ALK      LFTs: 11-09-18 @ 10:01  Ca  8.6  | 24 AST   -----------------  TP  6.7  | 17 ALT  -----------------  Alb 2.6  | 223 ALK          ^        0.5         TB       (collected 11-05-18 @ 10:01)  Source: .Blood None  Preliminary Report:    No growth to date.     (collected 11-04-18 @ 12:40)  Source: .Blood Blood-Peripheral  Final Report:    No growth at 5 days.     (collected 11-04-18 @ 12:32)  Source: .Blood Blood-Peripheral  Final Report:    No growth at 5 days.          Radiology:

## 2018-11-10 NOTE — CHART NOTE - NSCHARTNOTEFT_GEN_A_CORE
PACU ANESTHESIA ADMISSION NOTE      Procedure: Debridement of foot: Excisional debridement soft tissue and bone left foot    Post op diagnosis:  Osteomyelitis of foot      ____  Intubated  TV:______       Rate: ______      FiO2: ______    _x___  Patent Airway    _x___  Full return of protective reflexes    _x___  Full recovery from anesthesia / back to baseline status    Vitals:  T(F): 98   HR: 66  BP: 119/66  RR: 15  SpO2: 99%     Mental Status:  _x___ Awake   ___x__ Alert   _____ Drowsy   _____ Sedated    Nausea/Vomiting:  _x___  NO       ______Yes,   See Post - Op Orders         Pain Scale (0-10):  __0___    Treatment: ____ None    __x__ See Post - Op/PCA Orders    Post - Operative Fluids:   ____ Oral   __x__ See Post - Op Orders    Plan: Discharge:   ____Home       _x____Floor     _____Critical Care    _____  Other:_________________    Comments:  No anesthesia issues or complications noted.  Discharge when criteria met.

## 2018-11-10 NOTE — PRE-ANESTHESIA EVALUATION ADULT - NSANTHOSAYNRD_GEN_A_CORE
No. SWETHA screening performed.  STOP BANG Legend: 0-2 = LOW Risk; 3-4 = INTERMEDIATE Risk; 5-8 = HIGH Risk

## 2018-11-11 LAB
ANION GAP SERPL CALC-SCNC: 10 MMOL/L — SIGNIFICANT CHANGE UP (ref 7–14)
BUN SERPL-MCNC: 9 MG/DL — LOW (ref 10–20)
CALCIUM SERPL-MCNC: 8.5 MG/DL — SIGNIFICANT CHANGE UP (ref 8.5–10.1)
CHLORIDE SERPL-SCNC: 97 MMOL/L — LOW (ref 98–110)
CO2 SERPL-SCNC: 28 MMOL/L — SIGNIFICANT CHANGE UP (ref 17–32)
CREAT SERPL-MCNC: 0.9 MG/DL — SIGNIFICANT CHANGE UP (ref 0.7–1.5)
GLUCOSE BLDC GLUCOMTR-MCNC: 102 MG/DL — HIGH (ref 70–99)
GLUCOSE BLDC GLUCOMTR-MCNC: 114 MG/DL — HIGH (ref 70–99)
GLUCOSE BLDC GLUCOMTR-MCNC: 126 MG/DL — HIGH (ref 70–99)
GLUCOSE BLDC GLUCOMTR-MCNC: 126 MG/DL — HIGH (ref 70–99)
GLUCOSE BLDC GLUCOMTR-MCNC: 201 MG/DL — HIGH (ref 70–99)
GLUCOSE SERPL-MCNC: 97 MG/DL — SIGNIFICANT CHANGE UP (ref 70–99)
HCT VFR BLD CALC: 26.7 % — LOW (ref 42–52)
HGB BLD-MCNC: 8.5 G/DL — LOW (ref 14–18)
MCHC RBC-ENTMCNC: 23.1 PG — LOW (ref 27–31)
MCHC RBC-ENTMCNC: 31.8 G/DL — LOW (ref 32–37)
MCV RBC AUTO: 72.6 FL — LOW (ref 80–94)
NRBC # BLD: 0 /100 WBCS — SIGNIFICANT CHANGE UP (ref 0–0)
PLATELET # BLD AUTO: 342 K/UL — SIGNIFICANT CHANGE UP (ref 130–400)
POTASSIUM SERPL-MCNC: 4.6 MMOL/L — SIGNIFICANT CHANGE UP (ref 3.5–5)
POTASSIUM SERPL-SCNC: 4.6 MMOL/L — SIGNIFICANT CHANGE UP (ref 3.5–5)
RBC # BLD: 3.68 M/UL — LOW (ref 4.7–6.1)
RBC # FLD: 17.5 % — HIGH (ref 11.5–14.5)
SODIUM SERPL-SCNC: 135 MMOL/L — SIGNIFICANT CHANGE UP (ref 135–146)
WBC # BLD: 9.13 K/UL — SIGNIFICANT CHANGE UP (ref 4.8–10.8)
WBC # FLD AUTO: 9.13 K/UL — SIGNIFICANT CHANGE UP (ref 4.8–10.8)

## 2018-11-11 RX ADMIN — Medication 6 UNIT(S): at 17:29

## 2018-11-11 RX ADMIN — OXYCODONE AND ACETAMINOPHEN 1 TABLET(S): 5; 325 TABLET ORAL at 13:30

## 2018-11-11 RX ADMIN — OXYCODONE AND ACETAMINOPHEN 1 TABLET(S): 5; 325 TABLET ORAL at 20:00

## 2018-11-11 RX ADMIN — LIDOCAINE 1 PATCH: 4 CREAM TOPICAL at 11:16

## 2018-11-11 RX ADMIN — INSULIN GLARGINE 27 UNIT(S): 100 INJECTION, SOLUTION SUBCUTANEOUS at 21:56

## 2018-11-11 RX ADMIN — OXYCODONE AND ACETAMINOPHEN 1 TABLET(S): 5; 325 TABLET ORAL at 19:30

## 2018-11-11 RX ADMIN — OXYCODONE AND ACETAMINOPHEN 1 TABLET(S): 5; 325 TABLET ORAL at 09:55

## 2018-11-11 RX ADMIN — Medication 25 MILLIGRAM(S): at 05:27

## 2018-11-11 RX ADMIN — ENOXAPARIN SODIUM 40 MILLIGRAM(S): 100 INJECTION SUBCUTANEOUS at 11:16

## 2018-11-11 RX ADMIN — OXYCODONE AND ACETAMINOPHEN 1 TABLET(S): 5; 325 TABLET ORAL at 04:45

## 2018-11-11 RX ADMIN — Medication 6 UNIT(S): at 12:01

## 2018-11-11 RX ADMIN — Medication 100 MILLIGRAM(S): at 11:16

## 2018-11-11 RX ADMIN — SENNA PLUS 1 TABLET(S): 8.6 TABLET ORAL at 21:32

## 2018-11-11 RX ADMIN — OXYCODONE AND ACETAMINOPHEN 1 TABLET(S): 5; 325 TABLET ORAL at 05:15

## 2018-11-11 RX ADMIN — Medication 166.67 MILLIGRAM(S): at 05:26

## 2018-11-11 RX ADMIN — Medication 100 MILLIGRAM(S): at 17:29

## 2018-11-11 RX ADMIN — ONDANSETRON 4 MILLIGRAM(S): 8 TABLET, FILM COATED ORAL at 19:30

## 2018-11-11 RX ADMIN — LIDOCAINE 1 PATCH: 4 CREAM TOPICAL at 21:35

## 2018-11-11 RX ADMIN — CLOPIDOGREL BISULFATE 75 MILLIGRAM(S): 75 TABLET, FILM COATED ORAL at 11:16

## 2018-11-11 RX ADMIN — ATORVASTATIN CALCIUM 40 MILLIGRAM(S): 80 TABLET, FILM COATED ORAL at 21:32

## 2018-11-11 RX ADMIN — Medication 166.67 MILLIGRAM(S): at 17:29

## 2018-11-11 RX ADMIN — LIDOCAINE 1 PATCH: 4 CREAM TOPICAL at 22:00

## 2018-11-11 RX ADMIN — OXYCODONE AND ACETAMINOPHEN 1 TABLET(S): 5; 325 TABLET ORAL at 12:50

## 2018-11-11 RX ADMIN — Medication 6 UNIT(S): at 08:09

## 2018-11-11 RX ADMIN — Medication 81 MILLIGRAM(S): at 11:16

## 2018-11-11 RX ADMIN — Medication 100 MILLIGRAM(S): at 05:59

## 2018-11-11 NOTE — PROGRESS NOTE ADULT - SUBJECTIVE AND OBJECTIVE BOX
S : No new events/complaints  No CP/SOB  In BEd  Eating fine.  Had wound redressed   PAin controlled.      All other pertinent ROS negative.      11-10-18 @ 07:01  -  11-11-18 @ 07:00  --------------------------------------------------------  IN: 220 mL / OUT: 960 mL / NET: -740 mL    11-11-18 @ 07:01  -  11-11-18 @ 20:00  --------------------------------------------------------  IN: 250 mL / OUT: 1300 mL / NET: -1050 mL      Vital Signs Last 24 Hrs  T(C): 36.9 (11 Nov 2018 13:01), Max: 36.9 (11 Nov 2018 13:01)  T(F): 98.4 (11 Nov 2018 13:01), Max: 98.4 (11 Nov 2018 13:01)  HR: 69 (11 Nov 2018 13:01) (64 - 69)  BP: 132/66 (11 Nov 2018 13:01) (129/66 - 143/77)  BP(mean): --  RR: 18 (11 Nov 2018 13:01) (18 - 18)  SpO2: --  PHYSICAL EXAM:    no change from prior. Foot dressed    MEDICATIONS:  MEDICATIONS  (STANDING):  aspirin  chewable 81 milliGRAM(s) Oral daily  atorvastatin 40 milliGRAM(s) Oral at bedtime  clopidogrel Tablet 75 milliGRAM(s) Oral daily  docusate sodium 100 milliGRAM(s) Oral daily  enoxaparin Injectable 40 milliGRAM(s) SubCutaneous daily  insulin glargine Injectable (LANTUS) 27 Unit(s) SubCutaneous at bedtime  insulin lispro Injectable (HumaLOG) 6 Unit(s) SubCutaneous three times a day before meals  lidocaine   Patch 1 Patch Transdermal daily  metoprolol succinate ER 25 milliGRAM(s) Oral daily  pregabalin 100 milliGRAM(s) Oral two times a day  senna 1 Tablet(s) Oral at bedtime  vancomycin  IVPB 1250 milliGRAM(s) IV Intermittent every 12 hours      LABS: All Labs Reviewed:                        8.5    9.13  )-----------( 342      ( 11 Nov 2018 06:53 )             26.7     11-11    135  |  97<L>  |  9<L>  ----------------------------<  97  4.6   |  28  |  0.9    Ca    8.5      11 Nov 2018 06:53            Blood Culture: 11-10 @ 12:38  Organism --  Gram Stain Blood -- Gram Stain   No polymorphonuclear cells seen per low power field  No organisms seen per oil power field  Specimen Source .Tissue None  Culture-Blood --        Radiology: reviewed

## 2018-11-11 NOTE — PHYSICAL THERAPY INITIAL EVALUATION ADULT - DISCHARGE DISPOSITION, PT EVAL
depends on patient's ability to ambulate once the CAM walker arrives; he lives alone, has h/o falls/not able to get up

## 2018-11-11 NOTE — PHYSICAL THERAPY INITIAL EVALUATION ADULT - NS ASR WT BEARING DETAIL LLE
nonweight-bearing/NWB LT LE until CAM walker boot arrives (ordered today by podiatry resident); with LT CAM walker boot on, LT foot will be PWB with RW

## 2018-11-11 NOTE — DIETITIAN INITIAL EVALUATION ADULT. - OTHER INFO
Initial assessment for LOS. P/w: worsening chronic left foot ulcer.  sub 1st metatarsal ulceration w/ overlying osteomyelitis+ erythema +edema - purulence. Acute back pain due to thoracic spine T11- T12 fracture: neurosurgery following. DM2: on insulin. Dispo: home.

## 2018-11-11 NOTE — DIETITIAN INITIAL EVALUATION ADULT. - DIET TYPE
DASH/TLC (sodium and cholesterol restricted diet)/~25-50% PO at meals , per PCA better intake at breakfast usually. Pt. agreeable to try Glucerna/consistent carbohydrate (no snacks)

## 2018-11-11 NOTE — DIETITIAN INITIAL EVALUATION ADULT. - ENERGY NEEDS
calorie 1964-2160kcal (MSJ x 1.1-1.2 AF) for BMI=30  protein 89-107g (1-1.2g/kg IBW) for BMI=30  fluid 1ml/kcal or per LIP

## 2018-11-11 NOTE — PROGRESS NOTE ADULT - SUBJECTIVE AND OBJECTIVE BOX
PODIATRY PROGRESS NOTE   [id number] ROSALIE ESCOBEDO is a pleasant well-nourished, well developed 63y Male in no acute distress, alert awake, and oriented to person, place and time.   Patient is a 63y old  Male who presents with a chief complaint of worsening chronic left foot ulcer (10 Nov 2018 09:31)    HPI:  63y male with PMH of CAD, NSTEMI s/p CABG (5 vessel according to patient; March 2016); DM with peripheral neuropathy, Left DFU, HTN, HLD was sent for worsening chronic left foot ulcer. For the past 2 yrs pt has chronic diabetic left foot ulcer that has been taken care by podiatry at [facility] and has wound care nurse (3 days/week) for dressing. Today during dressing, nurse notice significant bleeding from the ulcer site and Q tip goes down to bone and referred the patient to the ER. He also complains of frequent falls approx. once every 2 months. On his last fall, he hit his back on the side walk and now suffering from back pain since. Denies fever, chills, trauma, chest pain, palpitations, dizziness, changes in urination, or diarrhea. Patient was recently discharged from the ED (10/29/18) with UTI and sent home on Vantin. At baseline, patient walk with cane; wife passed away Feb 2018 - coping ok but finds it difficult to manage ADL's.    In ED, Xray L Foot: Erosive change to the medial cuneiform, new since prior examination consistent with osteomyelitis.  He also started to have some nausea with no vomiting or abd pain. US Abdomen Limited (11.04.18): Cholelithiasis without evidence of cholecystitis.    Pt received:  2L LR stat  Vancomycin 2gm IV stat  Zosyn 4.5gm IV stat  Zofran 4mg IV push  Morphine 4 mg IV push (04 Nov 2018 17:17)      pt was seen and assessed am rounds at bedside.  pt denies any n/v/f/c/sob at this time.  sx dressing clean dry intact. mild strikethrough on the dressing noted.    s/p exc dbx st/b with ex of medial cuneiform and navicular left foot 11/10    Vital Signs Last 24 Hrs  T(C): 36.5 (11 Nov 2018 04:42), Max: 36.6 (10 Nov 2018 11:16)  T(F): 97.7 (11 Nov 2018 04:42), Max: 97.9 (10 Nov 2018 11:16)  HR: 64 (11 Nov 2018 04:42) (61 - 72)  BP: 129/66 (11 Nov 2018 04:42) (119/59 - 156/82)  RR: 18 (11 Nov 2018 04:42) (11 - 18)  SpO2: 95% (10 Nov 2018 19:35) (95% - 98%)                        8.5    9.13  )-----------( 342      ( 11 Nov 2018 06:53 )             26.7                 11-11    135  |  97<L>  |  9<L>  ----------------------------<  97  4.6   |  28  |  0.9    Ca    8.5      11 Nov 2018 06:53        Culture - Tissue with Gram Stain (collected 11-10-18 @ 12:38)  Source: .Tissue None  Gram Stain (11-10-18 @ 23:19):    No polymorphonuclear cells seen per low power field    No organisms seen per oil power field    Derm: surgical site coapted with sutures left medial and plantar foot with opening, mild ertyhema/edema noted. no signs of infection, no active bleeding, no malodor.    A:  DFU left foot, +PTB  s/p exc dbx st/b with ex of medial cuneiform and navicular left foot 11/10    P:  pt was evaluated and trated  plain packing removed and irrigated with normal saline  repacked with plain packing/dsd/abd/kerlix/ ACE medial and plantar foot daily  continue elevation when bed bound  prescription in the chart for CAM boots  strict non weight bearing until he gets the CAM Boots, req Physical therapy (PWB with 4 point walker and CAM boots evaluation)  recommend PICC line  continue IV abx as per ID  podiatry will f/u while in house  Attending updated and added to note for review.   11-11-18 @ 10:54

## 2018-11-11 NOTE — PROGRESS NOTE ADULT - ASSESSMENT
63y male with PMH of CAD, NSTEMI s/p CABG (5 vessel according to patient; March 2016); DM with peripheral neuropathy, Left DFU, HTN, HLD was sent for worsening chronic left foot ulcer.     #) L sub 1st metatarsal ulceration w/ overlying osteomyelitis  +erythema +edema - purulence  -X-ray: Erosive changes to medial cuneiform consistent w/ OM.   VA Duplex: Retrograde flow in R posterior tibial artery suggestive of proximal occlusion  - MRI Foot: Large plantar ulcer (5.9 cm) w/ direct extension into 1st tarsometatarsal joint. First tarsometatarsal joint septic arthritis w/ OM and pathologic fracture of the medial cuneiform.  - On cefepime 2g IV q12 . D/[place] Vanco Eventual PICC   -Podiatry: did Excisional debridement on 11/10. Found Necrotic soft tissue, extensive soft tissue scarring and osteomyelitic bone.  f/u cultures.   Ceretec scan to r/o charcot.  Dispense DARCO left foot; partial weight-bearing to the heel.     11/11: Podiatry redressed the wound.   STrict non weight bearing until he gets the CAM boots. F/u PT.        - Blood Cx: NGTD  -F/u wound cx  - pain control with percocet      #Acute back pain due to thoracic spine T11- T12 fracture   - Neruo Sx: needs an MRI T LS spine non contrast if no contraindications.    MRI confirms T11 fracture. f/u Neurosx  TLSO brace ??       #) Nausea   - zofran 4mg IV prn q6 hrs  -outpt w/u by GI  -CE x3 -ve  - US Abdomen Limited (11.04.18): Cholelithiasis without evidence of cholecystitis --> outpt management    #) CAD, h/o NSTEMI s/p CABG  - c/w ASA, Plavix, metoprolol, statin  Echo - WNL. EF 54%    #) HTN  - c/w metoprolol     #) DM  - FS qAC and qHS  - On Lantus/Lispro 27/6/6/6  - c/w Lyrica for peripheral neuropathy    #) Diet   - DASH, Carb consistent    #) DVT ppx  - Lovenox 40mg daily    #) Activity  - out of bed to chair    #) Disposition  - from home    #) Full code status    #PT/REhab: Home w/ outpt or VN services .

## 2018-11-11 NOTE — DIETITIAN INITIAL EVALUATION ADULT. - FACTORS AFF FOOD INTAKE
persistent lack of appetite/nausea occasionally, no chew/swallow difficulty, unsure of last BM- on bowel regimen

## 2018-11-11 NOTE — PHYSICAL THERAPY INITIAL EVALUATION ADULT - GENERAL OBSERVATIONS, REHAB EVAL
14:45. Pt currently off unit at MRI. Will f/u with PT.
[id number] am. patient rec'd/left in bed, nad, LT foot with post-op drsg and ace-wrap. h/o RT foot partial TMA. patient is 6'3"

## 2018-11-11 NOTE — DIETITIAN INITIAL EVALUATION ADULT. - ORAL INTAKE PTA
reports 1 big meal daily and snacks frequently, pt. states he tried Ensure but didn't like it. Per pt. appetite poor during admissions to the hospital./fair

## 2018-11-11 NOTE — PHYSICAL THERAPY INITIAL EVALUATION ADULT - ASR EQUIP NEEDS DISCH PT EVAL
? TLSO; outpatient: needs an evaluation for bilat custom made orthopedic shoes/rolling walker (5 inch wheels)

## 2018-11-12 LAB
ANION GAP SERPL CALC-SCNC: 10 MMOL/L — SIGNIFICANT CHANGE UP (ref 7–14)
BUN SERPL-MCNC: 10 MG/DL — SIGNIFICANT CHANGE UP (ref 10–20)
CALCIUM SERPL-MCNC: 9 MG/DL — SIGNIFICANT CHANGE UP (ref 8.5–10.1)
CHLORIDE SERPL-SCNC: 97 MMOL/L — LOW (ref 98–110)
CO2 SERPL-SCNC: 29 MMOL/L — SIGNIFICANT CHANGE UP (ref 17–32)
CREAT SERPL-MCNC: 1 MG/DL — SIGNIFICANT CHANGE UP (ref 0.7–1.5)
GLUCOSE BLDC GLUCOMTR-MCNC: 124 MG/DL — HIGH (ref 70–99)
GLUCOSE BLDC GLUCOMTR-MCNC: 132 MG/DL — HIGH (ref 70–99)
GLUCOSE BLDC GLUCOMTR-MCNC: 136 MG/DL — HIGH (ref 70–99)
GLUCOSE BLDC GLUCOMTR-MCNC: 146 MG/DL — HIGH (ref 70–99)
GLUCOSE SERPL-MCNC: 132 MG/DL — HIGH (ref 70–99)
HCT VFR BLD CALC: 28.8 % — LOW (ref 42–52)
HGB BLD-MCNC: 8.9 G/DL — LOW (ref 14–18)
MCHC RBC-ENTMCNC: 22.5 PG — LOW (ref 27–31)
MCHC RBC-ENTMCNC: 30.9 G/DL — LOW (ref 32–37)
MCV RBC AUTO: 72.9 FL — LOW (ref 80–94)
NRBC # BLD: 0 /100 WBCS — SIGNIFICANT CHANGE UP (ref 0–0)
PLATELET # BLD AUTO: 358 K/UL — SIGNIFICANT CHANGE UP (ref 130–400)
POTASSIUM SERPL-MCNC: 4.4 MMOL/L — SIGNIFICANT CHANGE UP (ref 3.5–5)
POTASSIUM SERPL-SCNC: 4.4 MMOL/L — SIGNIFICANT CHANGE UP (ref 3.5–5)
RBC # BLD: 3.95 M/UL — LOW (ref 4.7–6.1)
RBC # FLD: 17.3 % — HIGH (ref 11.5–14.5)
SODIUM SERPL-SCNC: 136 MMOL/L — SIGNIFICANT CHANGE UP (ref 135–146)
WBC # BLD: 8.57 K/UL — SIGNIFICANT CHANGE UP (ref 4.8–10.8)
WBC # FLD AUTO: 8.57 K/UL — SIGNIFICANT CHANGE UP (ref 4.8–10.8)

## 2018-11-12 RX ORDER — CEFTRIAXONE 500 MG/1
2 INJECTION, POWDER, FOR SOLUTION INTRAMUSCULAR; INTRAVENOUS EVERY 24 HOURS
Qty: 0 | Refills: 0 | Status: DISCONTINUED | OUTPATIENT
Start: 2018-11-12 | End: 2018-11-14

## 2018-11-12 RX ADMIN — Medication 166.67 MILLIGRAM(S): at 05:13

## 2018-11-12 RX ADMIN — CLOPIDOGREL BISULFATE 75 MILLIGRAM(S): 75 TABLET, FILM COATED ORAL at 11:19

## 2018-11-12 RX ADMIN — OXYCODONE AND ACETAMINOPHEN 1 TABLET(S): 5; 325 TABLET ORAL at 22:14

## 2018-11-12 RX ADMIN — Medication 6 UNIT(S): at 12:24

## 2018-11-12 RX ADMIN — LIDOCAINE 1 PATCH: 4 CREAM TOPICAL at 17:37

## 2018-11-12 RX ADMIN — Medication 100 MILLIGRAM(S): at 17:34

## 2018-11-12 RX ADMIN — INSULIN GLARGINE 27 UNIT(S): 100 INJECTION, SOLUTION SUBCUTANEOUS at 21:44

## 2018-11-12 RX ADMIN — ENOXAPARIN SODIUM 40 MILLIGRAM(S): 100 INJECTION SUBCUTANEOUS at 11:19

## 2018-11-12 RX ADMIN — Medication 6 UNIT(S): at 17:34

## 2018-11-12 RX ADMIN — OXYCODONE AND ACETAMINOPHEN 1 TABLET(S): 5; 325 TABLET ORAL at 04:45

## 2018-11-12 RX ADMIN — Medication 25 MILLIGRAM(S): at 05:13

## 2018-11-12 RX ADMIN — LIDOCAINE 1 PATCH: 4 CREAM TOPICAL at 11:19

## 2018-11-12 RX ADMIN — CEFTRIAXONE 100 GRAM(S): 500 INJECTION, POWDER, FOR SOLUTION INTRAMUSCULAR; INTRAVENOUS at 14:32

## 2018-11-12 RX ADMIN — OXYCODONE AND ACETAMINOPHEN 1 TABLET(S): 5; 325 TABLET ORAL at 14:04

## 2018-11-12 RX ADMIN — OXYCODONE AND ACETAMINOPHEN 1 TABLET(S): 5; 325 TABLET ORAL at 05:15

## 2018-11-12 RX ADMIN — Medication 6 UNIT(S): at 07:57

## 2018-11-12 RX ADMIN — Medication 100 MILLIGRAM(S): at 11:19

## 2018-11-12 RX ADMIN — OXYCODONE AND ACETAMINOPHEN 1 TABLET(S): 5; 325 TABLET ORAL at 21:44

## 2018-11-12 RX ADMIN — Medication 81 MILLIGRAM(S): at 11:19

## 2018-11-12 RX ADMIN — OXYCODONE AND ACETAMINOPHEN 1 TABLET(S): 5; 325 TABLET ORAL at 14:34

## 2018-11-12 RX ADMIN — LIDOCAINE 1 PATCH: 4 CREAM TOPICAL at 21:42

## 2018-11-12 RX ADMIN — Medication 100 MILLIGRAM(S): at 06:20

## 2018-11-12 RX ADMIN — ATORVASTATIN CALCIUM 40 MILLIGRAM(S): 80 TABLET, FILM COATED ORAL at 21:44

## 2018-11-12 NOTE — PROGRESS NOTE ADULT - SUBJECTIVE AND OBJECTIVE BOX
S :  Pt seen at bedside NAD  dressing clean dry and intact. POD # 2    Patient admits to  (-) Fevers, (-) Chills, (-) Nausea, (-) Vomiting, (-) Shortness of Breath (-) calf pain (-) chest pain   Dressings C/D/I    PMH: Dyslipidemia  NSTEMI (non-ST elevated myocardial infarction)  Diabetic foot ulcer  PVD (peripheral vascular disease)  Diabetes  Other hyperlipidemia  Hypertension, unspecified type  Amputated toe, unspecified laterality    PSH:S/P CABG (coronary artery bypass graft)  Deep vein thrombosis associated with coronary artery bypass graft surgery      Allergies:Cipro (Unknown)  IV contrast (Short breath)  Keflex (Unknown)      Labs:                        8.5    9.13  )-----------( 342      ( 11 Nov 2018 06:53 )             26.7       WBC Trend  9.13 Date (11-11 @ 06:53)  7.12 Date (11-09 @ 10:01)  7.82 Date (11-08 @ 08:07)  8.38 Date (11-07 @ 08:11)  10.25 Date (11-06 @ 06:58)  11.12<H> Date (11-05 @ 10:01)  15.25<H> Date (11-04 @ 12:40)  7.50 Date (10-29 @ 07:33)      Chem  11-11    135  |  97<L>  |  9<L>  ----------------------------<  97  4.6   |  28  |  0.9    Ca    8.5      11 Nov 2018 06:53          Culture - Tissue with Gram Stain (collected 11-10-18 @ 12:38)  Source: .Tissue None  Gram Stain (11-10-18 @ 23:19):    No polymorphonuclear cells seen per low power field    No organisms seen per oil power field  Preliminary Report (11-11-18 @ 16:32):    No growth        T(F): 97.1 (11-12-18 @ 04:44), Max: 99.2 (11-11-18 @ 20:16)  HR: 68 (11-12-18 @ 04:44) (68 - 71)  BP: 124/71 (11-12-18 @ 04:44) (118/65 - 132/66)  RR: 18 (11-12-18 @ 04:44) (18 - 18)  SpO2: 96% (11-11-18 @ 19:30) (96% - 96%)  Wt(kg): --  O  dorsal and plantar wounds are partially closed with sutures  margins of the wounds healthy no ischemic changes  base of wounds with increased granulation tissue   no purulent drainage with expression of sites   no edema no calor  no dolor no rubor no mal odor    A:   POD # 2  healing well s/p debridement of soft tissue and bone foot    P: Continue with local wound care and IV ABX per ID needs PICC for long term ABX  will need PT PWB with four point walker and CAM walker  will need VNA to flush and pack wound open q24 , pt has had VNA care in past   Wound flushed and packed open today

## 2018-11-12 NOTE — PROGRESS NOTE ADULT - ASSESSMENT
63y male with PMH of CAD, NSTEMI s/p CABG (5 vessel according to patient; March 2016); DM with peripheral neuropathy, Left DFU, HTN, HLD was sent for worsening chronic left foot ulcer.     # L sub 1st metatarsal ulceration w/ overlying osteomyelitis: +erythema +edema - purulence  - X-ray: Erosive changes to medial cuneiform consistent w/ OM.   - VA Duplex: Retrograde flow in R posterior tibial artery suggestive of proximal occlusion  - MRI Foot: Large plantar ulcer (5.9 cm) w/ direct extension into 1st tarsometatarsal joint. First tarsometatarsal joint septic arthritis w/ OM and pathologic fracture of the medial cuneiform.  - ID following. Switch to Ceftriaxone 2 g q24 hours   -Podiatry: did Excisional debridement on 11/10. Found Necrotic soft tissue, extensive soft tissue scarring and osteomyelitic bone.  will need PT PWB with four point walker and CAM walker. will need VNA to flush and pack wound open q24 , pt has had VNA care in past.    # Acute back pain due to thoracic spine T11- T12 fracture  - Neruo Sx: needs an MRI T LS spine non contrast if no contraindications. MRI confirms T11 fracture.   - f/u Neurosurgery recommendations. TLSO brace ??     # Nausea   - zofran 4mg IV prn q6 hrs  - outpt w/u by GI  - CE x3 -ve  - US Abdomen Limited (11.04.18): Cholelithiasis without evidence of cholecystitis --> outpt management    # CAD, h/o NSTEMI s/p CABG  - c/w ASA, Plavix, metoprolol, statin  Echo - WNL. EF 54%    # HTN  - c/w metoprolol     # DM  - FS qAC and qHS  - On Lantus/Lispro 27/6/6/6  - c/w Lyrica for peripheral neuropathy    Diet: DASH, Carb consistent  DVT ppx Lovenox 40mg daily  Activity out of bed to chair  Disposition: home with visiting nurse services.     #) Full code status    #PT/REhab: Home w/ outpt or VN services . 63y male with PMH of CAD, NSTEMI s/p CABG (5 vessel according to patient; March 2016); DM with peripheral neuropathy, Left DFU, HTN, HLD was sent for worsening chronic left foot ulcer.     # L sub 1st metatarsal ulceration w/ overlying osteomyelitis: +erythema +edema - purulence  - X-ray: Erosive changes to medial cuneiform consistent w/ OM.   - VA Duplex: Retrograde flow in R posterior tibial artery suggestive of proximal occlusion  - MRI Foot: Large plantar ulcer (5.9 cm) w/ direct extension into 1st tarsometatarsal joint. First tarsometatarsal joint septic arthritis w/ OM and pathologic fracture of the medial cuneiform.  - ID following. Switch to Ceftriaxone 2 g q24 hours   -Podiatry: did Excisional debridement on 11/10. Found Necrotic soft tissue, extensive soft tissue scarring and osteomyelitic bone. need PT PWB with four point walker and CAM walker. will need VNA to flush and pack wound open q24 , pt has had VNA care in past.    # Acute back pain due to thoracic spine T11- T12 fracture  - Neruo Sx: needs an MRI T LS spine non contrast if no contraindications. MRI confirms T11 fracture.   - f/u Neurosurgery recommendations. TLSO brace ??     # Nausea   - zofran 4mg IV prn q6 hrs  - outpt w/u by GI  - CE x3 -ve  - US Abdomen Limited (11.04.18): Cholelithiasis without evidence of cholecystitis --> outpt management    # CAD, h/o NSTEMI s/p CABG  - c/w ASA, Plavix, metoprolol, statin  Echo - WNL. EF 54%    # HTN  - c/w metoprolol     # DM  - FS qAC and qHS  - On Lantus/Lispro 27/6/6/6  - c/w Lyrica for peripheral neuropathy    Diet: DASH, Carb consistent  DVT ppx Lovenox 40mg daily  Activity out of bed to chair  Disposition: home with visiting nurse services.   Full code status

## 2018-11-12 NOTE — PROGRESS NOTE ADULT - SUBJECTIVE AND OBJECTIVE BOX
SUBJECTIVE:    Patient is a 63y old  Male who presents with a chief complaint of worsening chronic left foot ulcer (12 Nov 2018 07:52)    Currently admitted to medicine with the primary diagnosis of Osteomyelitis of left foot     Today is hospital day 8. Patient was seen and examined at bedside. This morning he is resting comfortably in bed and reports no new issues or overnight events.     PAST MEDICAL & SURGICAL HISTORY  PAST MEDICAL & SURGICAL HISTORY:  Dyslipidemia  NSTEMI (non-ST elevated myocardial infarction)  Diabetic foot ulcer  PVD (peripheral vascular disease)  Diabetes  Other hyperlipidemia  Hypertension, unspecified type  Amputated toe, unspecified laterality  S/P CABG (coronary artery bypass graft)    ALLERGIES:  Cipro (Unknown)  IV contrast (Short breath)  Keflex (Unknown)    MEDICATIONS:  STANDING MEDICATIONS  aspirin  chewable 81 milliGRAM(s) Oral daily  atorvastatin 40 milliGRAM(s) Oral at bedtime  cefTRIAXone   IVPB 2 Gram(s) IV Intermittent every 24 hours  clopidogrel Tablet 75 milliGRAM(s) Oral daily  docusate sodium 100 milliGRAM(s) Oral daily  enoxaparin Injectable 40 milliGRAM(s) SubCutaneous daily  insulin glargine Injectable (LANTUS) 27 Unit(s) SubCutaneous at bedtime  insulin lispro Injectable (HumaLOG) 6 Unit(s) SubCutaneous three times a day before meals  lidocaine   Patch 1 Patch Transdermal daily  metoprolol succinate ER 25 milliGRAM(s) Oral daily  pregabalin 100 milliGRAM(s) Oral two times a day  senna 1 Tablet(s) Oral at bedtime    PRN MEDICATIONS  ondansetron   Disintegrating Tablet 4 milliGRAM(s) Oral every 6 hours PRN  oxyCODONE    5 mG/acetaminophen 325 mG 1 Tablet(s) Oral every 6 hours PRN    VITALS:   T(F): 97.1  HR: 68  BP: 124/71  RR: 18  SpO2: 96%    LABS:                        8.9    8.57  )-----------( 358      ( 12 Nov 2018 10:00 )             28.8     11-12    136  |  97<L>  |  10  ----------------------------<  132<H>  4.4   |  29  |  1.0    Ca    9.0      12 Nov 2018 10:00                Culture - Tissue with Gram Stain (collected 10 Nov 2018 12:38)  Source: .Tissue None  Gram Stain (10 Nov 2018 23:19):    No polymorphonuclear cells seen per low power field    No organisms seen per oil power field  Preliminary Report (11 Nov 2018 16:32):    No growth          RADIOLOGY:    < from: NM SPECT Inflammation Imaging, Disc (11.09.18 @ 21:47) >  IMPRESSION:   Intense focal white blood cell uptake in the left medial plantar region   of foot near region of first and second metatarsal base consistent with   osteomyelitis in light of the history and recent imaging findings.     Increased white blood cell uptake in thesoft tissues of the plantar left   foot corresponding with the clinical ulcer    < end of copied text >      PHYSICAL EXAM:  GENERAL: NAD, speaks in full sentences, no signs of respiratory distress, well-developed  HEAD: Atraumatic, Normocephalic  NECK: Supple, No JVD  CHEST/LUNG: Clear to auscultation bilaterally; No wheeze or crackles  HEART: S1, S2; RRR; No murmurs, rubs, or gallops  ABDOMEN: BS+; Soft, Non-tender, Non-distended  EXTREMITIES:  2+ Peripheral Pulses. 10 cm incision on left foot. No erythema or discharge noted.   PSYCH: AAOx3  NEUROLOGY: non-focal  SKIN: No rashes or lesions

## 2018-11-13 LAB
ANION GAP SERPL CALC-SCNC: 13 MMOL/L — SIGNIFICANT CHANGE UP (ref 7–14)
BUN SERPL-MCNC: 10 MG/DL — SIGNIFICANT CHANGE UP (ref 10–20)
CALCIUM SERPL-MCNC: 8.8 MG/DL — SIGNIFICANT CHANGE UP (ref 8.5–10.1)
CHLORIDE SERPL-SCNC: 93 MMOL/L — LOW (ref 98–110)
CO2 SERPL-SCNC: 29 MMOL/L — SIGNIFICANT CHANGE UP (ref 17–32)
CREAT SERPL-MCNC: 1 MG/DL — SIGNIFICANT CHANGE UP (ref 0.7–1.5)
GLUCOSE BLDC GLUCOMTR-MCNC: 104 MG/DL — HIGH (ref 70–99)
GLUCOSE BLDC GLUCOMTR-MCNC: 141 MG/DL — HIGH (ref 70–99)
GLUCOSE BLDC GLUCOMTR-MCNC: 141 MG/DL — HIGH (ref 70–99)
GLUCOSE BLDC GLUCOMTR-MCNC: 173 MG/DL — HIGH (ref 70–99)
GLUCOSE SERPL-MCNC: 182 MG/DL — HIGH (ref 70–99)
HCT VFR BLD CALC: 27.6 % — LOW (ref 42–52)
HGB BLD-MCNC: 8.5 G/DL — LOW (ref 14–18)
MCHC RBC-ENTMCNC: 22.5 PG — LOW (ref 27–31)
MCHC RBC-ENTMCNC: 30.8 G/DL — LOW (ref 32–37)
MCV RBC AUTO: 73 FL — LOW (ref 80–94)
NRBC # BLD: 0 /100 WBCS — SIGNIFICANT CHANGE UP (ref 0–0)
PLATELET # BLD AUTO: 319 K/UL — SIGNIFICANT CHANGE UP (ref 130–400)
POTASSIUM SERPL-MCNC: 4.3 MMOL/L — SIGNIFICANT CHANGE UP (ref 3.5–5)
POTASSIUM SERPL-SCNC: 4.3 MMOL/L — SIGNIFICANT CHANGE UP (ref 3.5–5)
RBC # BLD: 3.78 M/UL — LOW (ref 4.7–6.1)
RBC # FLD: 17.5 % — HIGH (ref 11.5–14.5)
SODIUM SERPL-SCNC: 135 MMOL/L — SIGNIFICANT CHANGE UP (ref 135–146)
WBC # BLD: 7.17 K/UL — SIGNIFICANT CHANGE UP (ref 4.8–10.8)
WBC # FLD AUTO: 7.17 K/UL — SIGNIFICANT CHANGE UP (ref 4.8–10.8)

## 2018-11-13 RX ORDER — CHLORHEXIDINE GLUCONATE 213 G/1000ML
1 SOLUTION TOPICAL
Qty: 0 | Refills: 0 | Status: DISCONTINUED | OUTPATIENT
Start: 2018-11-13 | End: 2018-11-14

## 2018-11-13 RX ADMIN — CEFTRIAXONE 100 GRAM(S): 500 INJECTION, POWDER, FOR SOLUTION INTRAMUSCULAR; INTRAVENOUS at 14:41

## 2018-11-13 RX ADMIN — Medication 100 MILLIGRAM(S): at 11:18

## 2018-11-13 RX ADMIN — OXYCODONE AND ACETAMINOPHEN 1 TABLET(S): 5; 325 TABLET ORAL at 14:43

## 2018-11-13 RX ADMIN — Medication 6 UNIT(S): at 12:25

## 2018-11-13 RX ADMIN — Medication 81 MILLIGRAM(S): at 11:18

## 2018-11-13 RX ADMIN — LIDOCAINE 1 PATCH: 4 CREAM TOPICAL at 17:21

## 2018-11-13 RX ADMIN — Medication 6 UNIT(S): at 17:22

## 2018-11-13 RX ADMIN — Medication 100 MILLIGRAM(S): at 05:54

## 2018-11-13 RX ADMIN — LIDOCAINE 1 PATCH: 4 CREAM TOPICAL at 23:00

## 2018-11-13 RX ADMIN — OXYCODONE AND ACETAMINOPHEN 1 TABLET(S): 5; 325 TABLET ORAL at 03:50

## 2018-11-13 RX ADMIN — OXYCODONE AND ACETAMINOPHEN 1 TABLET(S): 5; 325 TABLET ORAL at 21:16

## 2018-11-13 RX ADMIN — OXYCODONE AND ACETAMINOPHEN 1 TABLET(S): 5; 325 TABLET ORAL at 21:46

## 2018-11-13 RX ADMIN — LIDOCAINE 1 PATCH: 4 CREAM TOPICAL at 11:19

## 2018-11-13 RX ADMIN — ATORVASTATIN CALCIUM 40 MILLIGRAM(S): 80 TABLET, FILM COATED ORAL at 22:03

## 2018-11-13 RX ADMIN — OXYCODONE AND ACETAMINOPHEN 1 TABLET(S): 5; 325 TABLET ORAL at 04:20

## 2018-11-13 RX ADMIN — CLOPIDOGREL BISULFATE 75 MILLIGRAM(S): 75 TABLET, FILM COATED ORAL at 11:18

## 2018-11-13 RX ADMIN — OXYCODONE AND ACETAMINOPHEN 1 TABLET(S): 5; 325 TABLET ORAL at 15:15

## 2018-11-13 RX ADMIN — Medication 6 UNIT(S): at 08:23

## 2018-11-13 RX ADMIN — Medication 25 MILLIGRAM(S): at 05:54

## 2018-11-13 RX ADMIN — INSULIN GLARGINE 27 UNIT(S): 100 INJECTION, SOLUTION SUBCUTANEOUS at 22:03

## 2018-11-13 RX ADMIN — Medication 100 MILLIGRAM(S): at 17:22

## 2018-11-13 RX ADMIN — ENOXAPARIN SODIUM 40 MILLIGRAM(S): 100 INJECTION SUBCUTANEOUS at 11:18

## 2018-11-13 NOTE — PROGRESS NOTE ADULT - ATTENDING COMMENTS
as above
agree with tx and tx plan
63y male with PMH of CAD, NSTEMI s/p CABG (5 vessel according to patient; March 2016); DM with peripheral neuropathy, Left DFU, HTN, HLD was sent for worsening chronic left foot ulcer.     #) L sub 1st metatarsal ulceration w/ overlying osteomyelitis  +erythema +edema - purulence  -X-ray: Erosive changes to medial cuneiform consistent w/ OM.   VA Duplex: Retrograde flow in R posterior tibial artery suggestive of proximal occlusion  - MRI Foot: Large plantar ulcer (5.9 cm) w/ direct extension into 1st tarsometatarsal joint. First tarsometatarsal joint septic arthritis w/ OM and pathologic fracture of the medial cuneiform.  - On cefepime 2g IV q12 . D/myrna Vanco Eventual PICC   -Podiatry: OR on satturday. Got cardiac clearance.   Ceretec scan to r/o charcot.  Dispense DARCO left foot; partial weight-bearing to the heel.      - Blood Cx: NGTD  -F/u wound cx  - pain control with percocet    #Pre-operative optimization:  Patient has poor functional status. Gets SOB even walking 4 steps up a flight of stairs.   Got Cardio for clearance d/t his h/o multiple vessel CABG.   No current cardiac symptoms  EKG WNL.  F/u Echo    #Acute back pain due to thoracic spine T11- T12 fracture   - Neruo Sx: needs an MRI T LS spine non contrast if no contraindications.    MRI confirms T11 fracture. f/u Neurosx  TLSO brace ??       #) Nausea   - zofran 4mg IV prn q6 hrs  -outpt w/u by GI  -CE x3 -ve  - US Abdomen Limited (11.04.18): Cholelithiasis without evidence of cholecystitis --> outpt management    #) CAD, h/o NSTEMI s/p CABG  - c/w ASA, Plavix, metoprolol, statin      #) HTN  - c/w metoprolol     #) DM  - FS qAC and qHS  - On Lantus/Lispro 27/6/6/6  - c/w Lyrica for peripheral neuropathy    #) Diet   - DASH, Carb consistent    #) DVT ppx  - Lovenox 40mg daily    #) Activity  - out of bed to chair    #) Disposition  - from home    #) Full code status    #PT/REhab: Home w/ outpt or VN services .
63y male with PMH of CAD, NSTEMI s/p CABG (5 vessel according to patient; March 2016); DM with peripheral neuropathy, Left DFU, HTN, HLD was sent for worsening chronic left foot ulcer.     #) L sub 1st metatarsal ulceration w/ overlying osteomyelitis  +erythema +edema - purulence  -X-ray: Erosive changes to medial cuneiform consistent w/ OM.   VA Duplex: Retrograde flow in R posterior tibial artery suggestive of proximal occlusion  - MRI Foot: Large plantar ulcer (5.9 cm) w/ direct extension into 1st tarsometatarsal joint. First tarsometatarsal joint septic arthritis w/ OM and pathologic fracture of the medial cuneiform.  - On cefepime 2g IV q12 . D/myrna Vanco Eventual PICC   -Podiatry: did Excisional debridement on 11/10. Found Necrotic soft tissue, extensive soft tissue scarring and osteomyelitic bone.  f/u cultures.   Ceretec scan to r/o charcot.  Dispense DARCO left foot; partial weight-bearing to the heel.      - Blood Cx: NGTD  -F/u wound cx  - pain control with percocet      #Acute back pain due to thoracic spine T11- T12 fracture   - Neruo Sx: needs an MRI T LS spine non contrast if no contraindications.    MRI confirms T11 fracture. f/u Neurosx  TLSO brace ??       #) Nausea   - zofran 4mg IV prn q6 hrs  -outpt w/u by GI  -CE x3 -ve  - US Abdomen Limited (11.04.18): Cholelithiasis without evidence of cholecystitis --> outpt management    #) CAD, h/o NSTEMI s/p CABG  - c/w ASA, Plavix, metoprolol, statin  Echo - WNL. EF 54%    #) HTN  - c/w metoprolol     #) DM  - FS qAC and qHS  - On Lantus/Lispro 27/6/6/6  - c/w Lyrica for peripheral neuropathy    #) Diet   - DASH, Carb consistent    #) DVT ppx  - Lovenox 40mg daily    #) Activity  - out of bed to chair    #) Disposition  - from home    #) Full code status    #PT/REhab: Home w/ outpt or VN services .
63y male with PMH of CAD, NSTEMI s/p CABG (5 vessel according to patient; March 2016); DM with peripheral neuropathy, Left DFU, HTN, HLD was sent for worsening chronic left foot ulcer.     #) L sub 1st metatarsal ulceration w/ overlying osteomyelitis  +erythema +edema - purulence  -X-ray: Erosive changes to medial cuneiform consistent w/ OM.   VA Duplex: Retrograde flow in R posterior tibial artery suggestive of proximal occlusion  - MRI Foot: Large plantar ulcer (5.9 cm) w/ direct extension into 1st tarsometatarsal joint. First tarsometatarsal joint septic arthritis w/ OM and pathologic fracture of the medial cuneiform.  - On cefepime 2g IV q12 and Vanco 1250mg q12hr. Vanco trough in AM. Eventual PICC ? f/u  to see that Abx will be covered.   -Podiatry: No surgical intervention this admission due to pt having wedding to attend. Local wound care. Visiting nurse for dressing change 4 times a week. Dispense DARCO left foot; partial weight-bearing to the heel. Will f/u as outpt w/ Dr. Humphreys in 1 week. Podiatry will f/u while in house  Confirm with Podiatry that they are ok not doing anything for now, because we do have enough time before his son's wedding (on 11/23).     - Blood Cx: NGTD  -F/u wound cx  - pain control with percocet    #Acute back pain due to thoracic spine T11- T12 fracture   - Neruo Sx: needs an MRI T LS spine non contrast if no contraindications.  F/u MRI results      #) Nausea   - zofran 4mg IV prn q6 hrs  -outpt w/u by GI  -CE x3 -ve  - US Abdomen Limited (11.04.18): Cholelithiasis without evidence of cholecystitis --> outpt management    #) CAD, NSTEMI s/p CABG  - c/w ASA, Plavix, metoprolol, statin      #) HTN  - c/w metoprolol     #) DM  - FS qAC and qHS  - On Lantus/Lispro 27/6/6/6  - c/w Lyrica for peripheral neuropathy    #) Diet   - DASH, Carb consistent    #) DVT ppx  - Lovenox 40mg daily    #) Activity  - out of bed to chair    #) Disposition  - from home    #) Full code status    #PT/REhab: Home w/ outpt or VN services
63y male with PMH of CAD, NSTEMI s/p CABG (5 vessel according to patient; March 2016); DM with peripheral neuropathy, Left DFU, HTN, HLD was sent for worsening chronic left foot ulcer.     #) L sub 1st metatarsal ulceration w/ overlying osteomyelitis  +erythema +edema - purulence  -X-ray: Erosive changes to medial cuneiform consistent w/ OM.   VA Duplex: Retrograde flow in R posterior tibial artery suggestive of proximal occlusion  - MRI Foot: Large plantar ulcer (5.9 cm) w/ direct extension into 1st tarsometatarsal joint. First tarsometatarsal joint septic arthritis w/ OM and pathologic fracture of the medial cuneiform.  - On cefepime 2g IV q12 . D/myrna Vanco Eventual PICC   -Podiatry: did Excisional debridement on 11/10. Found Necrotic soft tissue, extensive soft tissue scarring and osteomyelitic bone.  f/u cultures.   Ceretec scan to r/o charcot.  Dispense DARCO left foot; partial weight-bearing to the heel.     11/12: Podiatry redressed the wound.   STrict non weight bearing until he gets the CAM boots. F/u PT.        - Blood Cx: NGTD  -F/u wound cx  - pain control with percocet      #Acute back pain due to thoracic spine T11- T12 fracture   - Neruo Sx: needs an MRI T LS spine non contrast if no contraindications.    MRI confirms T11 fracture. f/u Neurosx  TLSO brace ??       #) Nausea   - zofran 4mg IV prn q6 hrs  -outpt w/u by GI  -CE x3 -ve  - US Abdomen Limited (11.04.18): Cholelithiasis without evidence of cholecystitis --> outpt management    #) CAD, h/o NSTEMI s/p CABG  - c/w ASA, Plavix, metoprolol, statin  Echo - WNL. EF 54%    #) HTN  - c/w metoprolol     #) DM  - FS qAC and qHS  - On Lantus/Lispro 27/6/6/6  - c/w Lyrica for peripheral neuropathy    #) Diet   - DASH, Carb consistent    #) DVT ppx  - Lovenox 40mg daily    #) Activity  - out of bed to chair    #) Disposition  - from home    #) Full code status    #PT/REhab: Home w/ outpt or VN services .
63y male with PMH of CAD, NSTEMI s/p CABG (5 vessel according to patient; March 2016); DM with peripheral neuropathy, Left DFU, HTN, HLD was sent for worsening chronic left foot ulcer.     #) L sub 1st metatarsal ulceration w/ overlying osteomyelitis  +erythema +edema - purulence  -X-ray: Erosive changes to medial cuneiform consistent w/ OM.   VA Duplex: Retrograde flow in R posterior tibial artery suggestive of proximal occlusion  - MRI Foot: Large plantar ulcer (5.9 cm) w/ direct extension into 1st tarsometatarsal joint. First tarsometatarsal joint septic arthritis w/ OM and pathologic fracture of the medial cuneiform.  - On cefepime 2g IV q12 and Vanco 1250mg q12hr. Monitor Vanco trough. Eventual PICC   -Podiatry: OR on satturday. Get cardiac clearance.   Dispense DARCO left foot; partial weight-bearing to the heel.      - Blood Cx: NGTD  -F/u wound cx  - pain control with percocet    #Pre-operative optimization:  Patient has poor functional status. Gets SOB even walking 4 steps up a flight of stairs.   Get Cardio for clearance d/t his h/o multiple vessel CABG.   No current cardiac symptoms  Get an EKG.    #Acute back pain due to thoracic spine T11- T12 fracture   - Neruo Sx: needs an MRI T LS spine non contrast if no contraindications.  F/u MRI results      #) Nausea   - zofran 4mg IV prn q6 hrs  -outpt w/u by GI  -CE x3 -ve  - US Abdomen Limited (11.04.18): Cholelithiasis without evidence of cholecystitis --> outpt management    #) CAD, h/o NSTEMI s/p CABG  - c/w ASA, Plavix, metoprolol, statin      #) HTN  - c/w metoprolol     #) DM  - FS qAC and qHS  - On Lantus/Lispro 27/6/6/6  - c/w Lyrica for peripheral neuropathy    #) Diet   - DASH, Carb consistent    #) DVT ppx  - Lovenox 40mg daily    #) Activity  - out of bed to chair    #) Disposition  - from home    #) Full code status    #PT/REhab: Home w/ outpt or VN services .
Patient was evaluated and examined by bedside, no c/o severe back pain, no nausea, no dysuria, no fever, tolerating diet well.    All labs, radiology studies, VS was reviewed  I agree with medical plan outlined by Medical resident as stated above.  continue IV abx, f/up Podiatry  -for nausea and cholelithiasis- outpatient GI evaluation  -acute back pain due to thoracic spine T11- T12 -consult Neurosurgery specialist, continue pain management  -for chronic medical conditions -resumed on home regimen tx.  -anemia- complete further work up, monitor H/H  -DM2- f/up hga1c - 7, , bsfs with ins. co prn.
63y male with PMH of CAD, NSTEMI s/p CABG (5 vessel according to patient; March 2016); DM with peripheral neuropathy, Left DFU, HTN, HLD was sent for worsening chronic left foot ulcer.     #) L sub 1st metatarsal ulceration w/ overlying osteomyelitis  +erythema +edema - purulence  -X-ray: Erosive changes to medial cuneiform consistent w/ OM.   VA Duplex: Retrograde flow in R posterior tibial artery suggestive of proximal occlusion  - MRI Foot: Large plantar ulcer (5.9 cm) w/ direct extension into 1st tarsometatarsal joint. First tarsometatarsal joint septic arthritis w/ OM and pathologic fracture of the medial cuneiform.  - On cefepime 2g IV q12 . D/myrna Vanco Eventual PICC   -Podiatry: did Excisional debridement on 11/10. Found Necrotic soft tissue, extensive soft tissue scarring and osteomyelitic bone.  f/u cultures.   Ceretec scan to r/o charcot ?  Dispensed CAM boot left foot; partial weight-bearing to the heel with a 4 point walker with PT     - Blood Cx: NGTD  -F/u wound cx  - pain control with percocet      #Acute back pain due to thoracic spine T11- T12 fracture   - Neruo Sx:   MRI confirms T11 fracture.  Neurosx - TLSO brace , PT      #) Nausea   - zofran 4mg IV prn q6 hrs  -outpt w/u by GI  -CE x3 -ve  - US Abdomen Limited (11.04.18): Cholelithiasis without evidence of cholecystitis --> outpt management    #) CAD, h/o NSTEMI s/p CABG  - c/w ASA, Plavix, metoprolol, statin  Echo - WNL. EF 54%    #) HTN  - c/w metoprolol     #) DM  - FS qAC and qHS  - On Lantus/Lispro 27/6/6/6  - c/w Lyrica for peripheral neuropathy    #) Diet   - DASH, Carb consistent    #) DVT ppx  - Lovenox 40mg daily    #) Activity  - out of bed to chair    #) Disposition  - from home    #) Full code status    #PT/REhab: Home w/ outpt or VN services .

## 2018-11-13 NOTE — PROGRESS NOTE ADULT - SUBJECTIVE AND OBJECTIVE BOX
ROSALIE ESCOBEDO  63y, Male      OVERNIGHT EVENTS:  afebrile  repeat or cx NGTD    ROS negative except as per above    VITALS:  T(F): 96.5, Max: 98.1 (11-12-18 @ 20:00)  HR: 71  BP: 135/79  RR: 17Vital Signs Last 24 Hrs  T(C): 35.8 (13 Nov 2018 05:23), Max: 36.7 (12 Nov 2018 12:34)  T(F): 96.5 (13 Nov 2018 05:23), Max: 98.1 (12 Nov 2018 20:00)  HR: 71 (13 Nov 2018 05:23) (71 - 83)  BP: 135/79 (13 Nov 2018 05:23) (127/67 - 135/79)  BP(mean): --  RR: 17 (13 Nov 2018 05:23) (17 - 18)  SpO2: 99% (13 Nov 2018 08:31) (99% - 99%)      PHYSICAL EXAM:  Gen: Awake and alert, non-toxic appearing, NAD  HEENT: NCAT. EOMI. MMM.   Neck: Supple, no cervical LAD  CV: RRR, no murmurs  Lungs: CTAB, no w/r/r  Abd: Soft. NTND  Extr: wwp L foot dressings  Skin: no rash  Neuro: No focal deficits  Lines: clean      TESTS & MEASUREMENTS:                        8.9    8.57  )-----------( 358      ( 12 Nov 2018 10:00 )             28.8     11-12    136  |  97<L>  |  10  ----------------------------<  132<H>  4.4   |  29  |  1.0    Ca    9.0      12 Nov 2018 10:00          Culture - Tissue with Gram Stain (collected 11-10-18 @ 12:38)  Source: .Tissue None  Gram Stain (11-10-18 @ 23:19):    No polymorphonuclear cells seen per low power field    No organisms seen per oil power field  Preliminary Report (11-11-18 @ 16:32):    No growth    Culture - Other (collected 11-06-18 @ 11:31)  Source: .Other Wound (L foot ulcer)  Final Report (11-09-18 @ 00:48):    Rare Streptococcus agalactiae (Group B) isolated    Group B streptococci are susceptible to ampicillin,    penicillin and cefazolin, but may be resistant to    erythromycin and clindamycin.    Recommendations for intrapartum prophylaxis for Group B    streptococci are penicillin or ampicillin.          RADIOLOGY & ADDITIONAL TESTS:    ANTIBIOTICS:    cefepime   IVPB   100 mL/Hr IV Intermittent (11-06-18 @ 05:41)   100 mL/Hr IV Intermittent (11-05-18 @ 22:01)   100 mL/Hr IV Intermittent (11-05-18 @ 14:57)   100 mL/Hr IV Intermittent (11-05-18 @ 05:24)   100 mL/Hr IV Intermittent (11-04-18 @ 21:16)    cefepime   IVPB   100 mL/Hr IV Intermittent (11-10-18 @ 05:37)   100 mL/Hr IV Intermittent (11-09-18 @ 17:31)   100 mL/Hr IV Intermittent (11-09-18 @ 05:54)   100 mL/Hr IV Intermittent (11-08-18 @ 17:07)   100 mL/Hr IV Intermittent (11-08-18 @ 05:43)   100 mL/Hr IV Intermittent (11-07-18 @ 17:09)   100 mL/Hr IV Intermittent (11-07-18 @ 05:10)   100 mL/Hr IV Intermittent (11-06-18 @ 19:06)    cefTRIAXone   IVPB   100 mL/Hr IV Intermittent (11-12-18 @ 14:32)    piperacillin/tazobactam IVPB.   200 mL/Hr IV Intermittent (11-04-18 @ 15:59)    vancomycin  IVPB   166.67 mL/Hr IV Intermittent (11-10-18 @ 06:07)   166.67 mL/Hr IV Intermittent (11-09-18 @ 17:31)   166.67 mL/Hr IV Intermittent (11-09-18 @ 06:29)   166.67 mL/Hr IV Intermittent (11-08-18 @ 18:24)   166.67 mL/Hr IV Intermittent (11-08-18 @ 08:09)   166.67 mL/Hr IV Intermittent (11-07-18 @ 17:47)   166.67 mL/Hr IV Intermittent (11-07-18 @ 05:10)   166.67 mL/Hr IV Intermittent (11-06-18 @ 19:16)    vancomycin  IVPB   250 mL/Hr IV Intermittent (11-04-18 @ 16:53)    vancomycin  IVPB   250 mL/Hr IV Intermittent (11-06-18 @ 06:41)   250 mL/Hr IV Intermittent (11-05-18 @ 18:26)   250 mL/Hr IV Intermittent (11-05-18 @ 07:40)   250 mL/Hr IV Intermittent (11-04-18 @ 18:42)    vancomycin  IVPB   166.67 mL/Hr IV Intermittent (11-12-18 @ 05:13)   166.67 mL/Hr IV Intermittent (11-11-18 @ 17:29)   166.67 mL/Hr IV Intermittent (11-11-18 @ 05:26)   166.67 mL/Hr IV Intermittent (11-10-18 @ 17:53)        cefTRIAXone   IVPB 2 Gram(s) IV Intermittent every 24 hours ROSALIE ESCOBEDO  63y, Male      OVERNIGHT EVENTS:  afebrile  repeat or cx NGTD  per pt, insurance will not cover PICC line    ROS negative except as per above    VITALS:  T(F): 96.5, Max: 98.1 (11-12-18 @ 20:00)  HR: 71  BP: 135/79  RR: 17Vital Signs Last 24 Hrs  T(C): 35.8 (13 Nov 2018 05:23), Max: 36.7 (12 Nov 2018 12:34)  T(F): 96.5 (13 Nov 2018 05:23), Max: 98.1 (12 Nov 2018 20:00)  HR: 71 (13 Nov 2018 05:23) (71 - 83)  BP: 135/79 (13 Nov 2018 05:23) (127/67 - 135/79)  BP(mean): --  RR: 17 (13 Nov 2018 05:23) (17 - 18)  SpO2: 99% (13 Nov 2018 08:31) (99% - 99%)      PHYSICAL EXAM:  Gen: Awake and alert, non-toxic appearing, NAD  HEENT: NCAT. EOMI. MMM.   Neck: Supple, no cervical LAD  CV: RRR, no murmurs  Lungs: CTAB, no w/r/r  Abd: Soft. NTND  Extr: wwp L foot dressings  Skin: no rash  Neuro: No focal deficits  Lines: clean      TESTS & MEASUREMENTS:                        8.9    8.57  )-----------( 358      ( 12 Nov 2018 10:00 )             28.8     11-12    136  |  97<L>  |  10  ----------------------------<  132<H>  4.4   |  29  |  1.0    Ca    9.0      12 Nov 2018 10:00          Culture - Tissue with Gram Stain (collected 11-10-18 @ 12:38)  Source: .Tissue None  Gram Stain (11-10-18 @ 23:19):    No polymorphonuclear cells seen per low power field    No organisms seen per oil power field  Preliminary Report (11-11-18 @ 16:32):    No growth    Culture - Other (collected 11-06-18 @ 11:31)  Source: .Other Wound (L foot ulcer)  Final Report (11-09-18 @ 00:48):    Rare Streptococcus agalactiae (Group B) isolated    Group B streptococci are susceptible to ampicillin,    penicillin and cefazolin, but may be resistant to    erythromycin and clindamycin.    Recommendations for intrapartum prophylaxis for Group B    streptococci are penicillin or ampicillin.          RADIOLOGY & ADDITIONAL TESTS:    ANTIBIOTICS:    cefepime   IVPB   100 mL/Hr IV Intermittent (11-06-18 @ 05:41)   100 mL/Hr IV Intermittent (11-05-18 @ 22:01)   100 mL/Hr IV Intermittent (11-05-18 @ 14:57)   100 mL/Hr IV Intermittent (11-05-18 @ 05:24)   100 mL/Hr IV Intermittent (11-04-18 @ 21:16)    cefepime   IVPB   100 mL/Hr IV Intermittent (11-10-18 @ 05:37)   100 mL/Hr IV Intermittent (11-09-18 @ 17:31)   100 mL/Hr IV Intermittent (11-09-18 @ 05:54)   100 mL/Hr IV Intermittent (11-08-18 @ 17:07)   100 mL/Hr IV Intermittent (11-08-18 @ 05:43)   100 mL/Hr IV Intermittent (11-07-18 @ 17:09)   100 mL/Hr IV Intermittent (11-07-18 @ 05:10)   100 mL/Hr IV Intermittent (11-06-18 @ 19:06)    cefTRIAXone   IVPB   100 mL/Hr IV Intermittent (11-12-18 @ 14:32)    piperacillin/tazobactam IVPB.   200 mL/Hr IV Intermittent (11-04-18 @ 15:59)    vancomycin  IVPB   166.67 mL/Hr IV Intermittent (11-10-18 @ 06:07)   166.67 mL/Hr IV Intermittent (11-09-18 @ 17:31)   166.67 mL/Hr IV Intermittent (11-09-18 @ 06:29)   166.67 mL/Hr IV Intermittent (11-08-18 @ 18:24)   166.67 mL/Hr IV Intermittent (11-08-18 @ 08:09)   166.67 mL/Hr IV Intermittent (11-07-18 @ 17:47)   166.67 mL/Hr IV Intermittent (11-07-18 @ 05:10)   166.67 mL/Hr IV Intermittent (11-06-18 @ 19:16)    vancomycin  IVPB   250 mL/Hr IV Intermittent (11-04-18 @ 16:53)    vancomycin  IVPB   250 mL/Hr IV Intermittent (11-06-18 @ 06:41)   250 mL/Hr IV Intermittent (11-05-18 @ 18:26)   250 mL/Hr IV Intermittent (11-05-18 @ 07:40)   250 mL/Hr IV Intermittent (11-04-18 @ 18:42)    vancomycin  IVPB   166.67 mL/Hr IV Intermittent (11-12-18 @ 05:13)   166.67 mL/Hr IV Intermittent (11-11-18 @ 17:29)   166.67 mL/Hr IV Intermittent (11-11-18 @ 05:26)   166.67 mL/Hr IV Intermittent (11-10-18 @ 17:53)        cefTRIAXone   IVPB 2 Gram(s) IV Intermittent every 24 hours

## 2018-11-13 NOTE — PROGRESS NOTE ADULT - SUBJECTIVE AND OBJECTIVE BOX
SUBJECTIVE:    Patient is a 63y old  Male who presents with a chief complaint of worsening chronic left foot ulcer (12 Nov 2018 07:52)    Currently admitted to medicine with the primary diagnosis of Osteomyelitis of left foot     Today is hospital day 9. Patient was seen and examined at bedside. This morning he is resting comfortably in bed and reports no new issues or overnight events.     PAST MEDICAL & SURGICAL HISTORY  PAST MEDICAL & SURGICAL HISTORY:  Dyslipidemia  NSTEMI (non-ST elevated myocardial infarction)  Diabetic foot ulcer  PVD (peripheral vascular disease)  Diabetes  Other hyperlipidemia  Hypertension, unspecified type  Amputated toe, unspecified laterality  S/P CABG (coronary artery bypass graft)    ALLERGIES:  Cipro (Unknown)  IV contrast (Short breath)  Keflex (Unknown)    MEDICATIONS:  STANDING MEDICATIONS  aspirin  chewable 81 milliGRAM(s) Oral daily  atorvastatin 40 milliGRAM(s) Oral at bedtime  cefTRIAXone   IVPB 2 Gram(s) IV Intermittent every 24 hours  clopidogrel Tablet 75 milliGRAM(s) Oral daily  docusate sodium 100 milliGRAM(s) Oral daily  enoxaparin Injectable 40 milliGRAM(s) SubCutaneous daily  insulin glargine Injectable (LANTUS) 27 Unit(s) SubCutaneous at bedtime  insulin lispro Injectable (HumaLOG) 6 Unit(s) SubCutaneous three times a day before meals  lidocaine   Patch 1 Patch Transdermal daily  metoprolol succinate ER 25 milliGRAM(s) Oral daily  pregabalin 100 milliGRAM(s) Oral two times a day  senna 1 Tablet(s) Oral at bedtime    PRN MEDICATIONS  ondansetron   Disintegrating Tablet 4 milliGRAM(s) Oral every 6 hours PRN  oxyCODONE    5 mG/acetaminophen 325 mG 1 Tablet(s) Oral every 6 hours PRN    VITALS:   Vital Signs (24 Hrs):  T(C): 36.7 (11-13-18 @ 12:28), Max: 36.7 (11-12-18 @ 20:00)  HR: 65 (11-13-18 @ 12:28) (65 - 71)  BP: 149/71 (11-13-18 @ 12:28) (129/70 - 149/71)  RR: 18 (11-13-18 @ 12:28) (17 - 18)  SpO2: 99% (11-13-18 @ 08:31) (99% - 99%)  Wt(kg): --  Daily     Daily     I&O's Summary    12 Nov 2018 07:01  -  13 Nov 2018 07:00  --------------------------------------------------------  IN: 0 mL / OUT: 650 mL / NET: -650 mL    13 Nov 2018 07:01  -  13 Nov 2018 14:47  --------------------------------------------------------  IN: 0 mL / OUT: 675 mL / NET: -675 mL        LABS:                             8.5    7.17  )-----------( 319      ( 13 Nov 2018 09:44 )             27.6       11-13    135  |  93<L>  |  10  ----------------------------<  182<H>  4.3   |  29  |  1.0    Ca    8.8      13 Nov 2018 09:44          Culture - Tissue with Gram Stain (collected 10 Nov 2018 12:38)  Source: .Tissue None  Gram Stain (10 Nov 2018 23:19):    No polymorphonuclear cells seen per low power field    No organisms seen per oil power field  Preliminary Report (11 Nov 2018 16:32):    No growth          RADIOLOGY:    < from: NM SPECT Inflammation Imaging, Disc (11.09.18 @ 21:47) >  IMPRESSION:   Intense focal white blood cell uptake in the left medial plantar region   of foot near region of first and second metatarsal base consistent with   osteomyelitis in light of the history and recent imaging findings.     Increased white blood cell uptake in thesoft tissues of the plantar left   foot corresponding with the clinical ulcer    < end of copied text >      PHYSICAL EXAM:  GENERAL: NAD, speaks in full sentences, no signs of respiratory distress, well-developed  HEAD: Atraumatic, Normocephalic  NECK: Supple, No JVD  CHEST/LUNG: Clear to auscultation bilaterally; No wheeze or crackles  HEART: S1, S2; RRR; No murmurs, rubs, or gallops  ABDOMEN: BS+; Soft, Non-tender, Non-distended  EXTREMITIES:  2+ Peripheral Pulses. 10 cm incision on left foot. No erythema or discharge noted.   PSYCH: AAOx3  NEUROLOGY: non-focal  SKIN: No rashes or lesions SUBJECTIVE:    Patient is a 63y old  Male who presents with a chief complaint of worsening chronic left foot ulcer (12 Nov 2018 07:52)    Currently admitted to medicine with the primary diagnosis of Osteomyelitis of left foot     Today is hospital day 9. Patient was seen and examined at bedside. This morning he is resting comfortably in bed and reports no new issues or overnight events. Walked 25 ft today with physical therapy.    PAST MEDICAL & SURGICAL HISTORY  PAST MEDICAL & SURGICAL HISTORY:  Dyslipidemia  NSTEMI (non-ST elevated myocardial infarction)  Diabetic foot ulcer  PVD (peripheral vascular disease)  Diabetes  Other hyperlipidemia  Hypertension, unspecified type  Amputated toe, unspecified laterality  S/P CABG (coronary artery bypass graft)    ALLERGIES:  Cipro (Unknown)  IV contrast (Short breath)  Keflex (Unknown)    MEDICATIONS:  STANDING MEDICATIONS  aspirin  chewable 81 milliGRAM(s) Oral daily  atorvastatin 40 milliGRAM(s) Oral at bedtime  cefTRIAXone   IVPB 2 Gram(s) IV Intermittent every 24 hours  clopidogrel Tablet 75 milliGRAM(s) Oral daily  docusate sodium 100 milliGRAM(s) Oral daily  enoxaparin Injectable 40 milliGRAM(s) SubCutaneous daily  insulin glargine Injectable (LANTUS) 27 Unit(s) SubCutaneous at bedtime  insulin lispro Injectable (HumaLOG) 6 Unit(s) SubCutaneous three times a day before meals  lidocaine   Patch 1 Patch Transdermal daily  metoprolol succinate ER 25 milliGRAM(s) Oral daily  pregabalin 100 milliGRAM(s) Oral two times a day  senna 1 Tablet(s) Oral at bedtime    PRN MEDICATIONS  ondansetron   Disintegrating Tablet 4 milliGRAM(s) Oral every 6 hours PRN  oxyCODONE    5 mG/acetaminophen 325 mG 1 Tablet(s) Oral every 6 hours PRN    VITALS:   Vital Signs (24 Hrs):  T(C): 36.7 (11-13-18 @ 12:28), Max: 36.7 (11-12-18 @ 20:00)  HR: 65 (11-13-18 @ 12:28) (65 - 71)  BP: 149/71 (11-13-18 @ 12:28) (129/70 - 149/71)  RR: 18 (11-13-18 @ 12:28) (17 - 18)  SpO2: 99% (11-13-18 @ 08:31) (99% - 99%)  Wt(kg): --  Daily     Daily     I&O's Summary    12 Nov 2018 07:01  -  13 Nov 2018 07:00  --------------------------------------------------------  IN: 0 mL / OUT: 650 mL / NET: -650 mL    13 Nov 2018 07:01  -  13 Nov 2018 14:47  --------------------------------------------------------  IN: 0 mL / OUT: 675 mL / NET: -675 mL        LABS:                             8.5    7.17  )-----------( 319      ( 13 Nov 2018 09:44 )             27.6       11-13    135  |  93<L>  |  10  ----------------------------<  182<H>  4.3   |  29  |  1.0    Ca    8.8      13 Nov 2018 09:44          Culture - Tissue with Gram Stain (collected 10 Nov 2018 12:38)  Source: .Tissue None  Gram Stain (10 Nov 2018 23:19):    No polymorphonuclear cells seen per low power field    No organisms seen per oil power field  Preliminary Report (11 Nov 2018 16:32):    No growth          RADIOLOGY:    < from: NM SPECT Inflammation Imaging, Disc (11.09.18 @ 21:47) >  IMPRESSION:   Intense focal white blood cell uptake in the left medial plantar region   of foot near region of first and second metatarsal base consistent with   osteomyelitis in light of the history and recent imaging findings.     Increased white blood cell uptake in thesoft tissues of the plantar left   foot corresponding with the clinical ulcer    < end of copied text >      PHYSICAL EXAM:  GENERAL: NAD, speaks in full sentences, no signs of respiratory distress, well-developed  HEAD: Atraumatic, Normocephalic  NECK: Supple, No JVD  CHEST/LUNG: Clear to auscultation bilaterally; No wheeze or crackles  HEART: S1, S2; RRR; No murmurs, rubs, or gallops  ABDOMEN: BS+; Soft, Non-tender, Non-distended  EXTREMITIES:  2+ Peripheral Pulses. 10 cm incision on left foot. No erythema or discharge noted.   PSYCH: AAOx3  NEUROLOGY: non-focal  SKIN: No rashes or lesions

## 2018-11-13 NOTE — PROGRESS NOTE ADULT - SUBJECTIVE AND OBJECTIVE BOX
< from: MR Thoracic Spine No Cont (11.07.18 @ 16:39) >  IMPRESSION:  As correlated with the CT chest of 9/26/2018:    1.  Residual bone marrow edema at the anterior aspect of the T11 inferior   endplate corresponding to fracture seen on CT. Minimal extension of   edema/fracture into the T11-12 disc space.    2.  No evidence of spinal cord compression or cord edema.    3.  Minimal degenerative changes.        NILSA NIELSON M.D., ATTENDING RADIOLOGIST  This document has been electronically signed. Nov 7 2018  5:25PM    < end of copied text >      Plan:  Discussed MRI findings with DR. Parker, no acute neurosurgical intervention  pain control  pt/rehab  possible TLSO brace if provides comfort  may follow up with Dr. Parker as outpatient [phone]  d/w attending

## 2018-11-13 NOTE — PROGRESS NOTE ADULT - ASSESSMENT
63M    DM  NSTEMI (non-ST elevated myocardial infarction)  PVD  HTN  Amputated toe  CAD S/P CABG (coronary artery bypass graft)    Admitted with worsening L DFU, xray with cuneiform OM  US with R post tib occlusion  Sepsis ruled out on admission   Systolic hypotension on admission  Hyponatremia  BMI 30  MRI L foot Large plantar medial midfoot ulcer with subjacent 5.9 cm phlegmon, increased in size since September 2016, demonstrating direct extension into the first tarsometatarsal joint. First tarsometatarsal joint septic arthritis with osteomyelitis and pathologic fractureof the medial cuneiform, and osteomyelitis of the plantar first metatarsal base stump. Worsening neuropathic osteoarthropathy of the second through fifth tarsometatarsal joints since 2016, with superimposed septic arthritis, and likely superimposed osteomyelitis of the plantar second metatarsal base. Septic arthritis of the navicular-cuneiform joints  ESR >140  11/6 culture GBS    11/10 OR Necrotic soft tissue, extensive soft tissue scarring and osteomyelitic bone, OR cx NGTD    - Ceftriaxone 2g q24h  - f/u OR path  - PICC and 6 weeks IV from OR  - Podiatry following  - ID follow-up [address] [phone]  - Outpatient weekly CBC, BMP    Spectra [id number] 63M    DM  NSTEMI (non-ST elevated myocardial infarction)  PVD  HTN  Amputated toe  CAD S/P CABG (coronary artery bypass graft)    Admitted with worsening L DFU, xray with cuneiform OM  US with R post tib occlusion  Sepsis ruled out on admission   Systolic hypotension on admission  Hyponatremia  BMI 30  MRI L foot Large plantar medial midfoot ulcer with subjacent 5.9 cm phlegmon, increased in size since September 2016, demonstrating direct extension into the first tarsometatarsal joint. First tarsometatarsal joint septic arthritis with osteomyelitis and pathologic fractureof the medial cuneiform, and osteomyelitis of the plantar first metatarsal base stump. Worsening neuropathic osteoarthropathy of the second through fifth tarsometatarsal joints since 2016, with superimposed septic arthritis, and likely superimposed osteomyelitis of the plantar second metatarsal base. Septic arthritis of the navicular-cuneiform joints  ESR >140  11/6 culture GBS    11/10 OR Necrotic soft tissue, extensive soft tissue scarring and osteomyelitic bone, OR cx NGTD    - Ceftriaxone 2g q24h  - f/u OR path  - As insurance will not cover PICC line, plan for PO therapy with augmentin 875mg PO BID  - Podiatry following  - ID follow-up [address] [phone]  - Outpatient weekly CBC, BMP    Spectra [phone]

## 2018-11-13 NOTE — PROGRESS NOTE ADULT - SUBJECTIVE AND OBJECTIVE BOX
PODIATRY PROGRESS NOTE   [id number] ROSALIE ESCOBEDO is a pleasant well-nourished, well developed 63y Male in no acute distress, alert awake, and oriented to person, place and time.   Patient is a 63y old  Male who presents with a chief complaint of worsening chronic left foot ulcer (13 Nov 2018 10:12)    HPI:  63y male with PMH of CAD, NSTEMI s/p CABG (5 vessel according to patient; March 2016); DM with peripheral neuropathy, Left DFU, HTN, HLD was sent for worsening chronic left foot ulcer. For the past 2 yrs pt has chronic diabetic left foot ulcer that has been taken care by podiatry at [facility] and has wound care nurse (3 days/week) for dressing. Today during dressing, nurse notice significant bleeding from the ulcer site and Q tip goes down to bone and referred the patient to the ER. He also complains of frequent falls approx. once every 2 months. On his last fall, he hit his back on the side walk and now suffering from back pain since. Denies fever, chills, trauma, chest pain, palpitations, dizziness, changes in urination, or diarrhea. Patient was recently discharged from the ED (10/29/18) with UTI and sent home on Vantin. At baseline, patient walk with cane; wife passed away Feb 2018 - coping ok but finds it difficult to manage ADL's.    In ED, Xray L Foot: Erosive change to the medial cuneiform, new since prior examination consistent with osteomyelitis.  He also started to have some nausea with no vomiting or abd pain. US Abdomen Limited (11.04.18): Cholelithiasis without evidence of cholecystitis.    Pt received:  2L LR stat  Vancomycin 2gm IV stat  Zosyn 4.5gm IV stat  Zofran 4mg IV push  Morphine 4 mg IV push (04 Nov 2018 17:17)    pt was seen and assessed at bedside today am round.    Vital Signs Last 24 Hrs  T(C): 36.7 (13 Nov 2018 12:28), Max: 36.7 (12 Nov 2018 20:00)  T(F): 98.1 (13 Nov 2018 12:28), Max: 98.1 (12 Nov 2018 20:00)  HR: 65 (13 Nov 2018 12:28) (65 - 71)  BP: 149/71 (13 Nov 2018 12:28) (129/70 - 149/71)  BP(mean): --  RR: 18 (13 Nov 2018 12:28) (17 - 18)  SpO2: 99% (13 Nov 2018 08:31) (99% - 99%)                        8.5    7.17  )-----------( 319      ( 13 Nov 2018 09:44 )             27.6                 11-13    135  |  93<L>  |  10  ----------------------------<  182<H>  4.3   |  29  |  1.0    Ca    8.8      13 Nov 2018 09:44    O  dorsal and plantar wounds are partially closed with sutures  margins of the wounds healthy no ischemic changes  base of wounds with increased granulation tissue   no purulent drainage with expression of sites   no edema no calor  no dolor no rubor no mal odor    A:   POD # 3  healing well s/p debridement of soft tissue and bone foot    P: Continue with local wound care and IV ABX per ID needs PICC for long term ABX  will need PT PWB with four point walker and CAM walker  will need VNA to flush and pack wound open q24 , pt has had VNA care in past   Wound flushed and packed open today  podiatry will follow while in house.  11-13-18 @ 13:48

## 2018-11-13 NOTE — PROGRESS NOTE ADULT - ASSESSMENT
63y male with PMH of CAD, NSTEMI s/p CABG (5 vessel according to patient; March 2016); DM with peripheral neuropathy, Left DFU, HTN, HLD was sent for worsening chronic left foot ulcer.     # L sub 1st metatarsal ulceration w/ overlying osteomyelitis: +erythema +edema - purulence  - X-ray: Erosive changes to medial cuneiform consistent w/ OM.   - VA Duplex: Retrograde flow in R posterior tibial artery suggestive of proximal occlusion  - MRI Foot: Large plantar ulcer (5.9 cm) w/ direct extension into 1st tarsometatarsal joint. First tarsometatarsal joint septic arthritis w/ OM and pathologic fracture of the medial cuneiform.  - ID following. Switch to Ceftriaxone 2 g q24 hours. On discharge, plan for PO therapy with augmentin 875mg PO BID.   - Podiatry: did Excisional debridement on 11/10. Found Necrotic soft tissue, extensive soft tissue scarring and osteomyelitic bone. need PT PWB with four point walker and CAM walker. will need VNA to flush and pack wound open q24, pt has had VNA care in past.  - F/u physical therapy    # Acute back pain due to thoracic spine T11- T12 fracture  - Neruo Sx: needs an MRI T LS spine non contrast if no contraindications. MRI confirms T11 fracture.   - f/u Neurosurgery recommendations. TLSO brace ??     # Nausea   - zofran 4mg IV prn q6 hrs  - outpt w/u by GI  - CE x3 -ve  - US Abdomen Limited (11.04.18): Cholelithiasis without evidence of cholecystitis --> outpt management    # CAD, h/o NSTEMI s/p CABG  - c/w ASA, Plavix, metoprolol, statin  Echo - WNL. EF 54%    # HTN  - c/w metoprolol     # DM  - FS qAC and qHS  - On Lantus/Lispro 27/6/6/6  - c/w Lyrica for peripheral neuropathy    Diet: DASH, Carb consistent  DVT ppx Lovenox 40mg daily  Activity out of bed to chair  Disposition: home with visiting nurse services.   Full code status 63y male with PMH of CAD, NSTEMI s/p CABG (5 vessel according to patient; March 2016); DM with peripheral neuropathy, Left DFU, HTN, HLD was sent for worsening chronic left foot ulcer.     # L sub 1st metatarsal ulceration w/ overlying osteomyelitis: +erythema +edema - purulence  - X-ray: Erosive changes to medial cuneiform consistent w/ OM.   - VA Duplex: Retrograde flow in R posterior tibial artery suggestive of proximal occlusion  - MRI Foot: Large plantar ulcer (5.9 cm) w/ direct extension into 1st tarsometatarsal joint. First tarsometatarsal joint septic arthritis w/ OM and pathologic fracture of the medial cuneiform.  - ID following. Switch to Ceftriaxone 2 g q24 hours. On discharge, plan for PO therapy with augmentin 875mg PO BID.   - Podiatry: did Excisional debridement on 11/10. Found Necrotic soft tissue, extensive soft tissue scarring and osteomyelitic bone. need PT PWB with four point walker and CAM walker. will need VNA to flush and pack wound open q24, pt has had VNA care in past.  - Walked 25 ft today with physical therapy    # Acute back pain due to thoracic spine T11- T12 fracture  - Neruo Sx: needs an MRI T LS spine non contrast if no contraindications. MRI confirms T11 fracture.   - f/u Neurosurgery recommendations. TLSO brace ??     # Nausea   - zofran 4mg IV prn q6 hrs  - outpt w/u by GI  - CE x3 -ve  - US Abdomen Limited (11.04.18): Cholelithiasis without evidence of cholecystitis --> outpt management    # CAD, h/o NSTEMI s/p CABG  - c/w ASA, Plavix, metoprolol, statin  Echo - WNL. EF 54%    # HTN  - c/w metoprolol     # DM  - FS qAC and qHS  - On Lantus/Lispro 27/6/6/6  - c/w Lyrica for peripheral neuropathy    Diet: DASH, Carb consistent  DVT ppx Lovenox 40mg daily  Activity out of bed to chair  Disposition: home with visiting nurse services.   Full code status 63y male with PMH of CAD, NSTEMI s/p CABG (5 vessel according to patient; March 2016); DM with peripheral neuropathy, Left DFU, HTN, HLD was sent for worsening chronic left foot ulcer.     # L sub 1st metatarsal ulceration w/ overlying osteomyelitis: +erythema +edema - purulence  - X-ray: Erosive changes to medial cuneiform consistent w/ OM.   - VA Duplex: Retrograde flow in R posterior tibial artery suggestive of proximal occlusion  - MRI Foot: Large plantar ulcer (5.9 cm) w/ direct extension into 1st tarsometatarsal joint. First tarsometatarsal joint septic arthritis w/ OM and pathologic fracture of the medial cuneiform.  - ID following. Switch to Ceftriaxone 2 g q24 hours. On discharge, plan for PO therapy with augmentin 875mg PO BID.   - Podiatry: did Excisional debridement on 11/10. Found Necrotic soft tissue, extensive soft tissue scarring and osteomyelitic bone. need PT PWB with four point walker and CAM walker. will need VNA to flush and pack wound open q24, pt has had VNA care in past.  - Walked 25 ft today with physical therapy    # Acute back pain due to thoracic spine T11- T12 fracture  - Neruo Sx: needs an MRI T LS spine non contrast if no contraindications. MRI confirms T11 fracture.   - f/u Neurosurgery recommendations. TLSO brace ??     # Nausea   - zofran 4mg IV prn q6 hrs  - outpt w/u by GI  - CE x3 -ve  - US Abdomen Limited (11.04.18): Cholelithiasis without evidence of cholecystitis --> outpt management    # CAD, h/o NSTEMI s/p CABG  - c/w ASA, Plavix, metoprolol, statin  Echo - WNL. EF 54%    # HTN  - c/w metoprolol     # DM  - FS qAC and qHS  - On Lantus/Lispro 27/6/6/6  - c/w Lyrica for peripheral neuropathy    Diet: DASH, Carb consistent  DVT ppx Lovenox 40mg daily  Activity out of bed to chair  Disposition: home with visiting nurse services.   Full code status  Spoke with daughter, Diann () and updated her today 11/13 on father's condition.

## 2018-11-14 VITALS
DIASTOLIC BLOOD PRESSURE: 81 MMHG | HEART RATE: 77 BPM | RESPIRATION RATE: 16 BRPM | SYSTOLIC BLOOD PRESSURE: 131 MMHG | TEMPERATURE: 96 F

## 2018-11-14 LAB
ANION GAP SERPL CALC-SCNC: 12 MMOL/L — SIGNIFICANT CHANGE UP (ref 7–14)
BUN SERPL-MCNC: 11 MG/DL — SIGNIFICANT CHANGE UP (ref 10–20)
CALCIUM SERPL-MCNC: 9.1 MG/DL — SIGNIFICANT CHANGE UP (ref 8.5–10.1)
CHLORIDE SERPL-SCNC: 97 MMOL/L — LOW (ref 98–110)
CO2 SERPL-SCNC: 28 MMOL/L — SIGNIFICANT CHANGE UP (ref 17–32)
CREAT SERPL-MCNC: 1 MG/DL — SIGNIFICANT CHANGE UP (ref 0.7–1.5)
GLUCOSE BLDC GLUCOMTR-MCNC: 158 MG/DL — HIGH (ref 70–99)
GLUCOSE BLDC GLUCOMTR-MCNC: 99 MG/DL — SIGNIFICANT CHANGE UP (ref 70–99)
GLUCOSE SERPL-MCNC: 100 MG/DL — HIGH (ref 70–99)
HCT VFR BLD CALC: 28.5 % — LOW (ref 42–52)
HGB BLD-MCNC: 9 G/DL — LOW (ref 14–18)
MCHC RBC-ENTMCNC: 23 PG — LOW (ref 27–31)
MCHC RBC-ENTMCNC: 31.6 G/DL — LOW (ref 32–37)
MCV RBC AUTO: 72.7 FL — LOW (ref 80–94)
NRBC # BLD: 0 /100 WBCS — SIGNIFICANT CHANGE UP (ref 0–0)
PLATELET # BLD AUTO: 336 K/UL — SIGNIFICANT CHANGE UP (ref 130–400)
POTASSIUM SERPL-MCNC: 4.6 MMOL/L — SIGNIFICANT CHANGE UP (ref 3.5–5)
POTASSIUM SERPL-SCNC: 4.6 MMOL/L — SIGNIFICANT CHANGE UP (ref 3.5–5)
RBC # BLD: 3.92 M/UL — LOW (ref 4.7–6.1)
RBC # FLD: 17.6 % — HIGH (ref 11.5–14.5)
SODIUM SERPL-SCNC: 137 MMOL/L — SIGNIFICANT CHANGE UP (ref 135–146)
WBC # BLD: 7.31 K/UL — SIGNIFICANT CHANGE UP (ref 4.8–10.8)
WBC # FLD AUTO: 7.31 K/UL — SIGNIFICANT CHANGE UP (ref 4.8–10.8)

## 2018-11-14 RX ORDER — TRAMADOL HYDROCHLORIDE 50 MG/1
1 TABLET ORAL
Qty: 21 | Refills: 0 | OUTPATIENT
Start: 2018-11-14 | End: 2018-11-20

## 2018-11-14 RX ORDER — CEFPODOXIME PROXETIL 100 MG
1 TABLET ORAL
Qty: 0 | Refills: 0 | COMMUNITY

## 2018-11-14 RX ORDER — SENNA PLUS 8.6 MG/1
1 TABLET ORAL
Qty: 30 | Refills: 0 | OUTPATIENT
Start: 2018-11-14

## 2018-11-14 RX ORDER — BACITRACIN ZINC 500 UNIT/G
1 OINTMENT IN PACKET (EA) TOPICAL
Qty: 30 | Refills: 0 | OUTPATIENT
Start: 2018-11-14 | End: 2018-12-13

## 2018-11-14 RX ORDER — DOCUSATE SODIUM 100 MG
1 CAPSULE ORAL
Qty: 30 | Refills: 0 | OUTPATIENT
Start: 2018-11-14 | End: 2018-12-13

## 2018-11-14 RX ORDER — BACITRACIN ZINC 500 UNIT/G
1 OINTMENT IN PACKET (EA) TOPICAL DAILY
Qty: 0 | Refills: 0 | Status: DISCONTINUED | OUTPATIENT
Start: 2018-11-14 | End: 2018-11-14

## 2018-11-14 RX ADMIN — Medication 81 MILLIGRAM(S): at 11:58

## 2018-11-14 RX ADMIN — Medication 100 MILLIGRAM(S): at 05:45

## 2018-11-14 RX ADMIN — OXYCODONE AND ACETAMINOPHEN 1 TABLET(S): 5; 325 TABLET ORAL at 10:23

## 2018-11-14 RX ADMIN — LIDOCAINE 1 PATCH: 4 CREAM TOPICAL at 11:59

## 2018-11-14 RX ADMIN — OXYCODONE AND ACETAMINOPHEN 1 TABLET(S): 5; 325 TABLET ORAL at 03:15

## 2018-11-14 RX ADMIN — OXYCODONE AND ACETAMINOPHEN 1 TABLET(S): 5; 325 TABLET ORAL at 03:45

## 2018-11-14 RX ADMIN — ENOXAPARIN SODIUM 40 MILLIGRAM(S): 100 INJECTION SUBCUTANEOUS at 11:58

## 2018-11-14 RX ADMIN — Medication 6 UNIT(S): at 08:25

## 2018-11-14 RX ADMIN — Medication 6 UNIT(S): at 12:15

## 2018-11-14 RX ADMIN — OXYCODONE AND ACETAMINOPHEN 1 TABLET(S): 5; 325 TABLET ORAL at 10:53

## 2018-11-14 RX ADMIN — Medication 1 APPLICATION(S): at 12:15

## 2018-11-14 RX ADMIN — CLOPIDOGREL BISULFATE 75 MILLIGRAM(S): 75 TABLET, FILM COATED ORAL at 11:58

## 2018-11-14 RX ADMIN — Medication 25 MILLIGRAM(S): at 05:45

## 2018-11-14 RX ADMIN — CEFTRIAXONE 100 GRAM(S): 500 INJECTION, POWDER, FOR SOLUTION INTRAMUSCULAR; INTRAVENOUS at 13:56

## 2018-11-14 NOTE — PROGRESS NOTE ADULT - SUBJECTIVE AND OBJECTIVE BOX
S :  Pt seen at bedside NAD      Patient admits to  (-) Fevers, (-) Chills, (-) Nausea, (-) Vomiting, (-) Shortness of Breath (-) calf pain (-) chest pain   Dressings C/D/I    PMH: Dyslipidemia  NSTEMI (non-ST elevated myocardial infarction)  Diabetic foot ulcer  PVD (peripheral vascular disease)  Diabetes  Other hyperlipidemia  Hypertension, unspecified type  Amputated toe, unspecified laterality    PSH:S/P CABG (coronary artery bypass graft)  Deep vein thrombosis associated with coronary artery bypass graft surgery      Allergies:Cipro (Unknown)  IV contrast (Short breath)  Keflex (Unknown)      Labs:                        8.5    7.17  )-----------( 319      ( 13 Nov 2018 09:44 )             27.6       WBC Trend  7.17 Date (11-13 @ 09:44)  8.57 Date (11-12 @ 10:00)  9.13 Date (11-11 @ 06:53)  7.12 Date (11-09 @ 10:01)  7.82 Date (11-08 @ 08:07)  8.38 Date (11-07 @ 08:11)  10.25 Date (11-06 @ 06:58)  11.12<H> Date (11-05 @ 10:01)  15.25<H> Date (11-04 @ 12:40)  7.50 Date (10-29 @ 07:33)      Chem  11-13    135  |  93<L>  |  10  ----------------------------<  182<H>  4.3   |  29  |  1.0    Ca    8.8      13 Nov 2018 09:44          Culture - Tissue with Gram Stain (collected 11-10-18 @ 12:38)  Source: .Tissue None  Gram Stain (11-10-18 @ 23:19):    No polymorphonuclear cells seen per low power field    No organisms seen per oil power field  Preliminary Report (11-11-18 @ 16:32):    No growth        T(F): 96.2 (11-14-18 @ 06:31), Max: 98.2 (11-13-18 @ 19:54)  HR: 62 (11-14-18 @ 06:31) (62 - 70)  BP: 139/77 (11-14-18 @ 06:31) (139/77 - 152/75)  RR: 16 (11-14-18 @ 06:31) (16 - 18)  SpO2: 99% (11-13-18 @ 08:31) (99% - 99%)  Wt(kg): --    wounds stable   no new signs of infection  A:   s/p debridement of soft tissue and bone      P: Continue with local wound care and IV ABX per ID   Wound flushed and packed open  rec PICC IV abx  rec VN wound care q24 flush and pack  rec PWB with CAm and four point walker

## 2018-11-14 NOTE — PROGRESS NOTE ADULT - PROVIDER SPECIALTY LIST ADULT
Hospitalist
Infectious Disease
Internal Medicine
Neurosurgery
Neurosurgery
Podiatry
Internal Medicine
Infectious Disease
Initiate Treatment: Minocycline 50mg tablet take one daily for a month then take one every other day for a month then stop
Otc Regimen: Wash face twice a day with cetaphil gentle cleansing bar \\nUse cetaphil lotion ten minutes before applying Retin a if too drying
Detail Level: Detailed
Continue Regimen: Aczone 7.5% apply to face in the morning after washing \\nRetin a apply to face at night after washing

## 2018-11-14 NOTE — PROGRESS NOTE ADULT - SUBJECTIVE AND OBJECTIVE BOX
ROSALIE ESCOBEDO  [facility] T2-3A 026 B ([facility] T2-3A)            Patient was evaluated and examined  by bedside, no active complains, no fever, tolerating diet well        REVIEW OF SYSTEMS:  please see pertinent positives mentioned above, all other 12 ROS negative      T(C): , Max: 36.8 (11-13-18 @ 19:54)  HR: 77 (11-14-18 @ 12:26)  BP: 131/81 (11-14-18 @ 12:26)  RR: 16 (11-14-18 @ 12:26)  SpO2: 96% (11-14-18 @ 08:05)  CAPILLARY BLOOD GLUCOSE      POCT Blood Glucose.: 158 mg/dL (14 Nov 2018 11:50)  POCT Blood Glucose.: 99 mg/dL (14 Nov 2018 07:55)  POCT Blood Glucose.: 141 mg/dL (13 Nov 2018 21:47)  POCT Blood Glucose.: 104 mg/dL (13 Nov 2018 17:10)      PHYSICAL EXAM:  General: NAD, AAOX3, patient is laying comfortably in bed  HEENT: AT, NC, Supple, NO JVD, NO CB  Lungs: CTA B/L, no wheezing, no rhonchi  CVS: normal S1, S2, RRR, NO M/G/R  Abdomen: soft, bowel sounds present, non-tender, non-distended  Extremities: chronic b/l lower ext wounds. no edema, no clubbing, no cyanosis, positive peripheral pulses b/l  Neuro: no acute focal neurological deficits  Skin: no rush, no ecchymosis      LAB  CBC  Date: 11-14-18 @ 08:03  Mean cell Wjhfndvdrr45.0  Mean cell Hemoglobin Conc31.6  Mean cell Volum 72.7  Platelet count-Automate 336  RBC Count 3.92  Red Cell Distrib Width17.6  WBC Count7.31  % Albumin, Urine--  Hematocrit 28.5  Hemoglobin 9.0  CBC  Date: 11-13-18 @ 09:44  Mean cell Fbghwpksrx09.5  Mean cell Hemoglobin Conc30.8  Mean cell Volum 73.0  Platelet count-Automate 319  RBC Count 3.78  Red Cell Distrib Width17.5  WBC Count7.17  % Albumin, Urine--  Hematocrit 27.6  Hemoglobin 8.5  CBC  Date: 11-12-18 @ 10:00  Mean cell Jjukxlqffi73.5  Mean cell Hemoglobin Conc30.9  Mean cell Volum 72.9  Platelet count-Automate 358  RBC Count 3.95  Red Cell Distrib Width17.3  WBC Count8.57  % Albumin, Urine--  Hematocrit 28.8  Hemoglobin 8.9  CBC  Date: 11-11-18 @ 06:53  Mean cell Bndzdhqdab00.1  Mean cell Hemoglobin Conc31.8  Mean cell Volum 72.6  Platelet count-Automate 342  RBC Count 3.68  Red Cell Distrib Width17.5  WBC Count9.13  % Albumin, Urine--  Hematocrit 26.7  Hemoglobin 8.5  CBC  Date: 11-09-18 @ 10:01  Mean cell Fwyrvejijh36.8  Mean cell Hemoglobin Conc31.5  Mean cell Volum 72.6  Platelet count-Automate 318  RBC Count 3.94  Red Cell Distrib Width17.7  WBC Count7.12  % Albumin, Urine--  Hematocrit 28.6  Hemoglobin 9.0  CBC  Date: 11-08-18 @ 08:07  Mean cell Cxsazwlyvw13.8  Mean cell Hemoglobin Conc31.5  Mean cell Volum 72.2  Platelet count-Automate 390  RBC Count 4.35  Red Cell Distrib Width17.7  WBC Count7.82  % Albumin, Urine--  Hematocrit 31.4  Hemoglobin 9.9    [facility]  11-14-18 @ 08:03  Blood Gas Arterial-Calcium,Ionized--  Blood Urea Nitrogen, Serum 11 mg/dL [10 - 20]  Carbon Dioxide, Serum28 mmol/L [17 - 32]  Chloride, Serum97 mmol/L<L> [98 - 110]  Creatinie, Serum1.0 mg/dL [0.7 - 1.5]  Glucose, Tzxpg797 mg/dL<H> [70 - 99]  Potassium, Serum4.6 mmol/L [3.5 - 5.0]  Sodium, Serum 137 mmol/L [135 - 146]  [facility]  11-13-18 @ 09:44  Blood Gas Arterial-Calcium,Ionized--  Blood Urea Nitrogen, Serum 10 mg/dL [10 - 20]  Carbon Dioxide, Serum29 mmol/L [17 - 32]  Chloride, Serum93 mmol/L<L> [98 - 110]  Creatinie, Serum1.0 mg/dL [0.7 - 1.5]  Glucose, Hybhd417 mg/dL<H> [70 - 99]  Potassium, Serum4.3 mmol/L [3.5 - 5.0]  Sodium, Serum 135 mmol/L [135 - 146]  [facility]  11-12-18 @ 10:00  Blood Gas Arterial-Calcium,Ionized--  Blood Urea Nitrogen, Serum 10 mg/dL [10 - 20]  Carbon Dioxide, Serum29 mmol/L [17 - 32]  Chloride, Serum97 mmol/L<L> [98 - 110]  Creatinie, Serum1.0 mg/dL [0.7 - 1.5]  Glucose, Xcvcn681 mg/dL<H> [70 - 99]  Potassium, Serum4.4 mmol/L [3.5 - 5.0]  Sodium, Serum 136 mmol/L [135 - 146]  [facility]  11-11-18 @ 06:53  Blood Gas Arterial-Calcium,Ionized--  Blood Urea Nitrogen, Serum 9 mg/dL<L> [10 - 20]  Carbon Dioxide, Serum28 mmol/L [17 - 32]  Chloride, Serum97 mmol/L<L> [98 - 110]  Creatinie, Serum0.9 mg/dL [0.7 - 1.5]  Glucose, Serum97 mg/dL [70 - 99]  Potassium, Serum4.6 mmol/L [3.5 - 5.0]  Sodium, Serum 135 mmol/L [135 - 146]              Microbiology:    Culture - Tissue with Gram Stain (collected 11-10-18 @ 12:38)  Source: .Tissue None  Gram Stain (11-10-18 @ 23:19):    No polymorphonuclear cells seen per low power field    No organisms seen per oil power field  Preliminary Report (11-11-18 @ 16:32):    No growth    Culture - Other (collected 11-06-18 @ 11:31)  Source: .Other Wound (L foot ulcer)  Final Report (11-09-18 @ 00:48):    Rare Streptococcus agalactiae (Group B) isolated    Group B streptococci are susceptible to ampicillin,    penicillin and cefazolin, but may be resistant to    erythromycin and clindamycin.    Recommendations for intrapartum prophylaxis for Group B    streptococci are penicillin or ampicillin.    Culture - Blood (collected 11-05-18 @ 10:01)  Source: .Blood None  Final Report (11-10-18 @ 18:00):    No growth at 5 days.    Culture - Blood (collected 11-04-18 @ 12:40)  Source: .Blood Blood-Peripheral  Final Report (11-09-18 @ 23:00):    No growth at 5 days.    Culture - Blood (collected 11-04-18 @ 12:32)  Source: .Blood Blood-Peripheral  Final Report (11-09-18 @ 21:00):    No growth at 5 days.    Culture - Blood (collected 10-29-18 @ 07:33)  Source: .Blood Blood-Peripheral  Final Report (11-03-18 @ 15:05):    No growth at 5 days.        Medications:  aspirin  chewable 81 milliGRAM(s) Oral daily  atorvastatin 40 milliGRAM(s) Oral at bedtime  BACItracin   Ointment 1 Application(s) Topical daily  cefTRIAXone   IVPB 2 Gram(s) IV Intermittent every 24 hours  chlorhexidine 4% Liquid 1 Application(s) Topical <User Schedule>  clopidogrel Tablet 75 milliGRAM(s) Oral daily  docusate sodium 100 milliGRAM(s) Oral daily  enoxaparin Injectable 40 milliGRAM(s) SubCutaneous daily  insulin glargine Injectable (LANTUS) 27 Unit(s) SubCutaneous at bedtime  insulin lispro Injectable (HumaLOG) 6 Unit(s) SubCutaneous three times a day before meals  lidocaine   Patch 1 Patch Transdermal daily  metoprolol succinate ER 25 milliGRAM(s) Oral daily  ondansetron   Disintegrating Tablet 4 milliGRAM(s) Oral every 6 hours PRN  oxyCODONE    5 mG/acetaminophen 325 mG 1 Tablet(s) Oral every 6 hours PRN  pregabalin 100 milliGRAM(s) Oral two times a day  senna 1 Tablet(s) Oral at bedtime        Assessment and Plan:  63y male with PMH of CAD, NSTEMI s/p CABG (5 vessel according to patient; March 2016); DM with peripheral neuropathy, Left DFU, HTN, HLD was sent for worsening chronic left foot ulcer.     #) L sub 1st metatarsal ulceration w/ overlying osteomyelitis    -X-ray: Erosive changes to medial cuneiform consistent w/ OM.   VA Duplex: Retrograde flow in R posterior tibial artery suggestive of proximal occlusion  - MRI Foot: Large plantar ulcer (5.9 cm) w/ direct extension into 1st tarsometatarsal joint. First tarsometatarsal joint septic arthritis w/ OM and pathologic fracture of the medial cuneiform.  - On cefepime 2g IV q12 . D/[place] Vanco, upon d/c home as per ID recommendations - patient will complete PO abx tx.  Dispensed CAM boot left foot; partial weight-bearing to the heel with a 4 point walker with PT     - Blood Cx: NGTD  - pain control with tramadol upon d/c home with outpatient Podiatry f/up to assess healing process      #Acute back pain due to thoracic spine T11- T12 fracture   - Neruo Sx:   MRI confirms T11 fracture.  Neurosx - TLSO brace , PT      #) Nausea   - zofran 4mg IV prn q6 hrs  -outpt w/u by GI  -CE x3 -ve  - US Abdomen Limited (11.04.18): Cholelithiasis without evidence of cholecystitis --> outpt management    #) CAD, h/o NSTEMI s/p CABG  - c/w ASA, Plavix, metoprolol, statin  Echo - WNL. EF 54%    #) HTN  - c/w metoprolol     #) DM  - FS qAC and qHS  - On Lantus/Lispro 27/6/6/6  - c/w Lyrica for peripheral neuropathy    #) Diet   - DASH, Carb consistent    #) DVT ppx  - Lovenox 40mg daily    #) Activity  - out of bed to chair    #) Disposition  - from home    #) Full code status    Discharge plan: Patient is medically stable for d/c home with home care today

## 2018-11-14 NOTE — PROGRESS NOTE ADULT - REASON FOR ADMISSION
worsening chronic left foot ulcer

## 2018-11-14 NOTE — CHART NOTE - NSCHARTNOTEFT_GEN_A_CORE
Registered Dietitian Follow-Up  (T2-26b)    ***Scroll to the bottom for RD recommendation***    Patient Profile Reviewed                           Yes [x]   No []  Nutrition History Previously Obtained        Yes [x]  No []  - pt reports of having some food preference, he doesn't like some of the food. He wants the Glucerna shake that was recommended by previous RD. Otherwise, her appetite and PO has been fluctuating. Pt has a big bag of chips at bedside.         PERTINENT MEDICAL INFORMATIONS:  (1) per podiatry, s/p debridement of soft tissue and bone. c/w local wound and IV abx per ID.  PICC neuro consulted. PT/rehab. Poss TLSO.        PERTINENT SUBJECTIVE INFORMATION:  (1) see above        DIET ORDER:  DASH/TLC, CC no snacks (50-75% trend)        ANTHROPOMETRICS:  - Ht.  190.5cm  - Wt.  (11/10): 108.9kg - no new weight  - BMI.  30  - IBW.  89kg       PERTINENT LAB DATA:  11/14: h/h 9/28.5, glucose 182  PERTINENT MEDS:  aspirin, clopidogrel, enoxaparin, atorvastatin, docusate, insulin, metoprolol, senna, oxy       PHYSICAL FINDINGS  - APPEARANCE:        alert and oriented. overweight/obese.  - GI FUNCTION:        none reported, LBM 11/13 per pt  - TUBES:                       - ORAL/MOUTH:      none reported.  - SKIN:                        DFU        NUTRITION REQUIREMENTS  WEIGHT USED:                          ideal  196lbs (89kg)  ESTIMATED ENERGY NEEDS:       CONTINUE [  x]      ADJUST [  ]    ESTIMATED ENERGY NEEDS:         1964-2160kcal (MSJ x 1.1-1.2 AF) for BMI=30  ESTIMATED PROTEIN NEEDS:        89-107g (1-1.2g/kg IBW) for BMI=30  ESTIMATED FLUID NEEDS:             1ml/kcal    CURRENT NUTRIENT NEEDS:     fluctuating po per pt. pt wants glucerna shake          [ x ] PREVIOUS NUTRITION DIAGNOSIS:   (1)  Inadequate oral intake - improving vs personal food preference            [ x ] ONGOING        [  ] RESOLVED      NUTRITION INTERVENTION:    [ x ] ORAL        [ ] EN/TF     GOAL/EXPECTED OUTCOME:     pt to consume and tolerate >75% of all meals and snacks and rec supplements upon f/u  INDICATOR/MONITORING:       RD to monitor diet order, energy intake, body composition, nutrition focused physical findings  PATIENT's INTERVENTION:        Meals and snacks. Medical food supplements    RECS: (1) Please order GLUCERNA SHAKE q12hr to current dash/tlc with cc no snack diet order.

## 2018-11-14 NOTE — PROGRESS NOTE ADULT - NSHPATTENDINGPLANDISCUSS_GEN_ALL_CORE
Floor resident
patient, medical resident, 
resident
residents
patient, medical resident, covering nurse

## 2018-11-14 NOTE — CHART NOTE - NSCHARTNOTEFT_GEN_A_CORE
<<<RESIDENT DISCHARGE NOTE>>>     ROSALIE ESCOBEDO  [id number]    VITAL SIGNS:  T(F): 96.2 (11-14-18 @ 12:26), Max: 98.2 (11-13-18 @ 19:54)  HR: 77 (11-14-18 @ 12:26)  BP: 131/81 (11-14-18 @ 12:26)  SpO2: 96% (11-14-18 @ 08:05)      PHYSICAL EXAMINATION:  GENERAL: NAD, speaks in full sentences, no signs of respiratory distress, well-developed  HEAD: Atraumatic, Normocephalic  NECK: Supple, No JVD  CHEST/LUNG: Clear to auscultation bilaterally; No wheeze or crackles  HEART: S1, S2; RRR; No murmurs, rubs, or gallops  ABDOMEN: BS+; Soft, Non-tender, Non-distended  EXTREMITIES:  2+ Peripheral Pulses. 10 cm incision on left foot. No erythema or discharge noted.   PSYCH: AAOx3  NEUROLOGY: non-focal  SKIN: No rashes or lesions    TEST RESULTS:                        9.0    7.31  )-----------( 336      ( 14 Nov 2018 08:03 )             28.5       11-14    137  |  97<L>  |  11  ----------------------------<  100<H>  4.6   |  28  |  1.0    Ca    9.1      14 Nov 2018 08:03        FINAL DISCHARGE INTERVIEW:  Resident(s) Present: (Name: Dr. Monsivais), RN Present: (Name:  Mark)    DISCHARGE MEDICATION RECONCILIATION  reviewed with Attending (Name: Dr. Collier)    DISPOSITION:   [  ] Home,    [ X ] Home with Visiting Nursing Services,   [    ]  SNF/ NH,    [   ] Acute Rehab (4A),   [   ] Other (Specify:_________)

## 2018-11-15 LAB
GLUCOSE BLDC GLUCOMTR-MCNC: 134 MG/DL — HIGH (ref 70–99)
SURGICAL PATHOLOGY STUDY: SIGNIFICANT CHANGE UP

## 2018-11-16 ENCOUNTER — INPATIENT (INPATIENT)
Facility: HOSPITAL | Age: 63
LOS: 3 days | Discharge: NURSING, REHAB OR LONG-TERM CARE | End: 2018-11-20
Attending: INTERNAL MEDICINE | Admitting: INTERNAL MEDICINE
Payer: SELF-PAY

## 2018-11-16 VITALS
OXYGEN SATURATION: 98 % | TEMPERATURE: 99 F | RESPIRATION RATE: 18 BRPM | DIASTOLIC BLOOD PRESSURE: 56 MMHG | SYSTOLIC BLOOD PRESSURE: 120 MMHG | HEART RATE: 88 BPM

## 2018-11-16 DIAGNOSIS — Z95.1 PRESENCE OF AORTOCORONARY BYPASS GRAFT: Chronic | ICD-10-CM

## 2018-11-16 LAB
ALBUMIN SERPL ELPH-MCNC: 4 G/DL — SIGNIFICANT CHANGE UP (ref 3.5–5.2)
ALP SERPL-CCNC: 245 U/L — HIGH (ref 30–115)
ALT FLD-CCNC: 38 U/L — SIGNIFICANT CHANGE UP (ref 0–41)
ANION GAP SERPL CALC-SCNC: 21 MMOL/L — HIGH (ref 7–14)
APTT BLD: 22.5 SEC — CRITICAL LOW (ref 27–39.2)
AST SERPL-CCNC: 96 U/L — HIGH (ref 0–41)
BASOPHILS # BLD AUTO: 0.09 K/UL — SIGNIFICANT CHANGE UP (ref 0–0.2)
BASOPHILS NFR BLD AUTO: 0.5 % — SIGNIFICANT CHANGE UP (ref 0–1)
BILIRUB SERPL-MCNC: 0.8 MG/DL — SIGNIFICANT CHANGE UP (ref 0.2–1.2)
BUN SERPL-MCNC: 28 MG/DL — HIGH (ref 10–20)
CALCIUM SERPL-MCNC: 9.8 MG/DL — SIGNIFICANT CHANGE UP (ref 8.5–10.1)
CHLORIDE SERPL-SCNC: 89 MMOL/L — LOW (ref 98–110)
CK SERPL-CCNC: >2000 U/L — HIGH (ref 0–225)
CO2 SERPL-SCNC: 22 MMOL/L — SIGNIFICANT CHANGE UP (ref 17–32)
CREAT SERPL-MCNC: 2.2 MG/DL — HIGH (ref 0.7–1.5)
EOSINOPHIL # BLD AUTO: 0.01 K/UL — SIGNIFICANT CHANGE UP (ref 0–0.7)
EOSINOPHIL NFR BLD AUTO: 0.1 % — SIGNIFICANT CHANGE UP (ref 0–8)
GAS PNL BLDV: SIGNIFICANT CHANGE UP
GLUCOSE BLDC GLUCOMTR-MCNC: 218 MG/DL — HIGH (ref 70–99)
GLUCOSE SERPL-MCNC: 196 MG/DL — HIGH (ref 70–99)
HCT VFR BLD CALC: 36.6 % — LOW (ref 42–52)
HGB BLD-MCNC: 11.3 G/DL — LOW (ref 14–18)
IMM GRANULOCYTES NFR BLD AUTO: 0.6 % — HIGH (ref 0.1–0.3)
INR BLD: 1.11 RATIO — SIGNIFICANT CHANGE UP (ref 0.65–1.3)
LYMPHOCYTES # BLD AUTO: 1.07 K/UL — LOW (ref 1.2–3.4)
LYMPHOCYTES # BLD AUTO: 6.1 % — LOW (ref 20.5–51.1)
MCHC RBC-ENTMCNC: 22.5 PG — LOW (ref 27–31)
MCHC RBC-ENTMCNC: 30.9 G/DL — LOW (ref 32–37)
MCV RBC AUTO: 72.9 FL — LOW (ref 80–94)
MONOCYTES # BLD AUTO: 0.6 K/UL — SIGNIFICANT CHANGE UP (ref 0.1–0.6)
MONOCYTES NFR BLD AUTO: 3.4 % — SIGNIFICANT CHANGE UP (ref 1.7–9.3)
NEUTROPHILS # BLD AUTO: 15.69 K/UL — HIGH (ref 1.4–6.5)
NEUTROPHILS NFR BLD AUTO: 89.3 % — HIGH (ref 42.2–75.2)
PLATELET # BLD AUTO: 384 K/UL — SIGNIFICANT CHANGE UP (ref 130–400)
POTASSIUM SERPL-MCNC: 5.9 MMOL/L — HIGH (ref 3.5–5)
POTASSIUM SERPL-SCNC: 5.9 MMOL/L — HIGH (ref 3.5–5)
PROT SERPL-MCNC: 9.4 G/DL — HIGH (ref 6–8)
PROTHROM AB SERPL-ACNC: 12.8 SEC — SIGNIFICANT CHANGE UP (ref 9.95–12.87)
RBC # BLD: 5.02 M/UL — SIGNIFICANT CHANGE UP (ref 4.7–6.1)
RBC # FLD: 17.9 % — HIGH (ref 11.5–14.5)
SODIUM SERPL-SCNC: 132 MMOL/L — LOW (ref 135–146)
TROPONIN T SERPL-MCNC: 0.01 NG/ML — SIGNIFICANT CHANGE UP
WBC # BLD: 17.57 K/UL — HIGH (ref 4.8–10.8)
WBC # FLD AUTO: 17.57 K/UL — HIGH (ref 4.8–10.8)

## 2018-11-16 PROCEDURE — 99232 SBSQ HOSP IP/OBS MODERATE 35: CPT

## 2018-11-16 RX ORDER — ATORVASTATIN CALCIUM 80 MG/1
40 TABLET, FILM COATED ORAL AT BEDTIME
Qty: 0 | Refills: 0 | Status: DISCONTINUED | OUTPATIENT
Start: 2018-11-16 | End: 2018-11-20

## 2018-11-16 RX ORDER — BACITRACIN ZINC 500 UNIT/G
1 OINTMENT IN PACKET (EA) TOPICAL DAILY
Qty: 0 | Refills: 0 | Status: DISCONTINUED | OUTPATIENT
Start: 2018-11-16 | End: 2018-11-20

## 2018-11-16 RX ORDER — INSULIN GLARGINE 100 [IU]/ML
27 INJECTION, SOLUTION SUBCUTANEOUS AT BEDTIME
Qty: 0 | Refills: 0 | Status: DISCONTINUED | OUTPATIENT
Start: 2018-11-16 | End: 2018-11-20

## 2018-11-16 RX ORDER — POLYETHYLENE GLYCOL 3350 17 G/17G
17 POWDER, FOR SOLUTION ORAL DAILY
Qty: 0 | Refills: 0 | Status: DISCONTINUED | OUTPATIENT
Start: 2018-11-16 | End: 2018-11-20

## 2018-11-16 RX ORDER — INSULIN LISPRO 100/ML
6 VIAL (ML) SUBCUTANEOUS
Qty: 0 | Refills: 0 | Status: DISCONTINUED | OUTPATIENT
Start: 2018-11-16 | End: 2018-11-20

## 2018-11-16 RX ORDER — METOPROLOL TARTRATE 50 MG
25 TABLET ORAL DAILY
Qty: 0 | Refills: 0 | Status: DISCONTINUED | OUTPATIENT
Start: 2018-11-16 | End: 2018-11-20

## 2018-11-16 RX ORDER — SODIUM CHLORIDE 9 MG/ML
2000 INJECTION, SOLUTION INTRAVENOUS ONCE
Qty: 0 | Refills: 0 | Status: COMPLETED | OUTPATIENT
Start: 2018-11-16 | End: 2018-11-16

## 2018-11-16 RX ORDER — CLOPIDOGREL BISULFATE 75 MG/1
75 TABLET, FILM COATED ORAL DAILY
Qty: 0 | Refills: 0 | Status: DISCONTINUED | OUTPATIENT
Start: 2018-11-16 | End: 2018-11-20

## 2018-11-16 RX ORDER — DEXTROSE 50 % IN WATER 50 %
15 SYRINGE (ML) INTRAVENOUS ONCE
Qty: 0 | Refills: 0 | Status: DISCONTINUED | OUTPATIENT
Start: 2018-11-16 | End: 2018-11-20

## 2018-11-16 RX ORDER — GLUCAGON INJECTION, SOLUTION 0.5 MG/.1ML
1 INJECTION, SOLUTION SUBCUTANEOUS ONCE
Qty: 0 | Refills: 0 | Status: DISCONTINUED | OUTPATIENT
Start: 2018-11-16 | End: 2018-11-20

## 2018-11-16 RX ORDER — DOCUSATE SODIUM 100 MG
100 CAPSULE ORAL DAILY
Qty: 0 | Refills: 0 | Status: DISCONTINUED | OUTPATIENT
Start: 2018-11-16 | End: 2018-11-17

## 2018-11-16 RX ORDER — DEXTROSE 50 % IN WATER 50 %
25 SYRINGE (ML) INTRAVENOUS ONCE
Qty: 0 | Refills: 0 | Status: DISCONTINUED | OUTPATIENT
Start: 2018-11-16 | End: 2018-11-20

## 2018-11-16 RX ORDER — SODIUM CHLORIDE 9 MG/ML
1000 INJECTION, SOLUTION INTRAVENOUS
Qty: 0 | Refills: 0 | Status: DISCONTINUED | OUTPATIENT
Start: 2018-11-16 | End: 2018-11-20

## 2018-11-16 RX ORDER — SODIUM CHLORIDE 9 MG/ML
1000 INJECTION INTRAMUSCULAR; INTRAVENOUS; SUBCUTANEOUS
Qty: 0 | Refills: 0 | Status: DISCONTINUED | OUTPATIENT
Start: 2018-11-16 | End: 2018-11-17

## 2018-11-16 RX ORDER — HEPARIN SODIUM 5000 [USP'U]/ML
5000 INJECTION INTRAVENOUS; SUBCUTANEOUS EVERY 8 HOURS
Qty: 0 | Refills: 0 | Status: DISCONTINUED | OUTPATIENT
Start: 2018-11-16 | End: 2018-11-20

## 2018-11-16 RX ORDER — SENNA PLUS 8.6 MG/1
1 TABLET ORAL AT BEDTIME
Qty: 0 | Refills: 0 | Status: DISCONTINUED | OUTPATIENT
Start: 2018-11-16 | End: 2018-11-20

## 2018-11-16 RX ORDER — DEXTROSE 50 % IN WATER 50 %
12.5 SYRINGE (ML) INTRAVENOUS ONCE
Qty: 0 | Refills: 0 | Status: DISCONTINUED | OUTPATIENT
Start: 2018-11-16 | End: 2018-11-20

## 2018-11-16 RX ORDER — ASPIRIN/CALCIUM CARB/MAGNESIUM 324 MG
81 TABLET ORAL DAILY
Qty: 0 | Refills: 0 | Status: DISCONTINUED | OUTPATIENT
Start: 2018-11-16 | End: 2018-11-20

## 2018-11-16 RX ORDER — TRAMADOL HYDROCHLORIDE 50 MG/1
50 TABLET ORAL EVERY 12 HOURS
Qty: 0 | Refills: 0 | Status: DISCONTINUED | OUTPATIENT
Start: 2018-11-16 | End: 2018-11-20

## 2018-11-16 RX ORDER — MORPHINE SULFATE 50 MG/1
4 CAPSULE, EXTENDED RELEASE ORAL ONCE
Qty: 0 | Refills: 0 | Status: DISCONTINUED | OUTPATIENT
Start: 2018-11-16 | End: 2018-11-16

## 2018-11-16 RX ORDER — CEFTRIAXONE 500 MG/1
INJECTION, POWDER, FOR SOLUTION INTRAMUSCULAR; INTRAVENOUS
Qty: 0 | Refills: 0 | Status: DISCONTINUED | OUTPATIENT
Start: 2018-11-17 | End: 2018-11-20

## 2018-11-16 RX ORDER — MORPHINE SULFATE 50 MG/1
2 CAPSULE, EXTENDED RELEASE ORAL EVERY 6 HOURS
Qty: 0 | Refills: 0 | Status: DISCONTINUED | OUTPATIENT
Start: 2018-11-16 | End: 2018-11-20

## 2018-11-16 RX ORDER — DEXAMETHASONE 0.5 MG/5ML
10 ELIXIR ORAL ONCE
Qty: 0 | Refills: 0 | Status: COMPLETED | OUTPATIENT
Start: 2018-11-16 | End: 2018-11-16

## 2018-11-16 RX ADMIN — MORPHINE SULFATE 2 MILLIGRAM(S): 50 CAPSULE, EXTENDED RELEASE ORAL at 23:34

## 2018-11-16 RX ADMIN — INSULIN GLARGINE 27 UNIT(S): 100 INJECTION, SOLUTION SUBCUTANEOUS at 23:33

## 2018-11-16 RX ADMIN — Medication 10 MILLIGRAM(S): at 16:26

## 2018-11-16 RX ADMIN — MORPHINE SULFATE 4 MILLIGRAM(S): 50 CAPSULE, EXTENDED RELEASE ORAL at 16:26

## 2018-11-16 RX ADMIN — SODIUM CHLORIDE 4000 MILLILITER(S): 9 INJECTION, SOLUTION INTRAVENOUS at 16:36

## 2018-11-16 NOTE — H&P ADULT - ASSESSMENT
63y male with PMH of CAD, NSTEMI s/p CABG (5 vessel according to patient; March 2016); DM with peripheral neuropathy, Left DFU, HTN, HLD and recently admitted for worsening chronic left foot ulcer s/p excisional debridement by podiatry and recent T11-T12 vertebral fracture discharged on TLSO brace / conservative management with home care presents for worsening b/l LE weakness.    #) Bilateral lower extremities numbness and weakness   -r/o neuropathy, deconditioning  -Neurosurgery and neurology note appreciated  -MRI done prelim report no cord compression  -CT head negative for acute stroke  -pain control  -PT/rehab  -TLSO brace    #)SAKINA, mild rhabdomyoliss  -IV hydration NS at 100ml/hr  -follow up CK in am and BMP    #) Osteomyelitis of the left foot s/p excisional debridement   - as per last ID note, the insurance would not cover IV atbx so the patient was discharged on po augmentin with plan to follow up in clinic  -resume [id number] q24h  -follow up ID for duration of atbx  -11/6 culture GBS  -11/10 OR Necrotic soft tissue, extensive soft tissue scarring and osteomyelitic bone, OR cx No gowth     #) CAD s/p CABG  - c/w ASA, plavix, metoprolol and statin    #) HTN  - c/w metoprolol     #) DM  - monitor finger stick  - recent hba1c =7  - last in hospital regimen 27/6-6-6    #) Diet   - DASH, Carb consistent    #) DVT ppx  -heparin 5000IU s/c q 8 hours    #) Activity  - out of bed to chair with assistance    #) Disposition  - from home, might need NH this time    #) Full code status 63y male with PMH of CAD, NSTEMI s/p CABG (5 vessel according to patient; March 2016); DM with peripheral neuropathy, Left DFU, HTN, HLD and recently admitted for worsening chronic left foot ulcer s/p excisional debridement by podiatry and recent T11-T12 vertebral fracture discharged on TLSO brace / conservative management with home care presents for worsening b/l LE weakness.    #) Bilateral lower extremities numbness and weakness   -r/o neuropathy, deconditioning  -Neurosurgery and neurology note appreciated  -MRI done prelim report no cord compression  -CT head negative for acute stroke, please follow up neurology recs for further investigation (CTA/P after cr is better)  -pain control  -PT/rehab  -TLSO brace    #)SAKINA, mild rhabdomyoliss  -IV hydration NS at 100ml/hr  -follow up CK in am and BMP    #) Osteomyelitis of the left foot s/p excisional debridement   - as per last ID note, the insurance would not cover IV atbx so the patient was discharged on po augmentin with plan to follow up in clinic  -resume [id number] q24h  -follow up ID for duration of atbx  -11/6 culture GBS  -11/10 OR Necrotic soft tissue, extensive soft tissue scarring and osteomyelitic bone, OR cx No gowth     #) CAD s/p CABG  - c/w ASA, plavix, metoprolol and statin    #) HTN  - c/w metoprolol     #) DM  - monitor finger stick  - recent hba1c =7  - last in hospital regimen 27/6-6-6    #) Diet   - DASH, Carb consistent    #) DVT ppx  -heparin 5000IU s/c q 8 hours    #) Activity  - out of bed to chair with assistance    #) Disposition  - from home, might need NH this time    #) Full code status

## 2018-11-16 NOTE — ED PROVIDER NOTE - NS ED ROS FT
Constitutional: NAD  Eyes: No visual changes, eye pain or discharge.  ENMT: No hearing changes, pain, discharge or infections. No neck pain or stiffness.  Cardiac: No chest pain, SOB or edema. No chest pain with exertion.  Respiratory: No cough or respiratory distress.   GI: No nausea, vomiting, diarrhea or abdominal pain.  : No dysuria, frequency or burning.  MS: No myalgia, muscle weakness, joint pain. + back pain.  Neuro: No headache. +LE weakness. No LOC.   Skin: No skin rash.  Heme: No bruising

## 2018-11-16 NOTE — ED PROVIDER NOTE - PROGRESS NOTE DETAILS
Code stroke called at 15:00. Spoke with neuro - they request CT head and CT perfusion. However patient anaphylactic to contrast - will just get CT head. Patient's rectal exam also revealed decreased tone. Possible cauda equina syndrome given history and exam - spoke with neurosurgery who will come see patient. Ordered decadron. Code stroke called at 15:00. Spoke with neuro - they request CT head and CT perfusion. However patient anaphylactic to contrast - will just get CT head. Patient's rectal exam also revealed decreased tone. Possible cauda equina syndrome given history and exam - spoke with neurosurgery who will come see patient. Ordered decadron and MRI per neurosurg. inpt team to f/u MRI Code stroke called at 15:00. Spoke with neuro - they request CT head and CT perfusion. However patient anaphylactic to contrast - will just get CT head. Patient's rectal exam also revealed decreased tone. Possible concurrent cauda equina syndrome given history and exam - spoke with neurosurgery who will come see patient. Ordered decadron and MRI per neurosurg.

## 2018-11-16 NOTE — H&P ADULT - HISTORY OF PRESENT ILLNESS
63y male with PMH of CAD, NSTEMI s/p CABG (5 vessel according to patient; March 2016); DM with peripheral neuropathy, Left DFU, HTN, HLD and recently admitted for worsening chronic left foot ulcer s/p excisional debridement by podiatry and recent T11-T12 vertebral fracture discharged on TLSO brace with home care presents for worsening b/l LE weakness with RUE/R face weakness and numbness starting at 10:00 EST per patient.  he was sitting down when his legs suddenly went numb, attempted to stand and LE gave out, pt fell to floor and could not get up until his family found him 4 hours later.  he was incontinent of stool and urine.   Pt admits to mid back pain since fracture, denies f/c, new trauma, CP, SOB, n/v/d    code stroke was called in ED, CT head negative for acute infarct; he was not candidate for thrombolysis (> 4.5 hours) 63y male with PMH of CAD, NSTEMI s/p CABG (5 vessel according to patient; March 2016); DM with peripheral neuropathy, Left DFU, HTN, HLD and recently admitted for worsening chronic left foot ulcer s/p excisional debridement by podiatry and recent T11-T12 vertebral fracture discharged on TLSO brace / conservative management with home care presents for worsening b/l LE weakness. this morning while sitting in the chair, he acutely felt numbness in his legs, from knees below, worse than baseline so he tried to get up buthis legs gave out and he fell to floor and could not get up until his daughter came at 1pm and called fire department (4 hours later).  he could not go to the bathroom so he was full of urine. he denies head trauma or LOC.  Pt admits to mid back pain since fracture, that is worse now    in the ED code stroke was called CT head negative for acute infarct; he was not candidate for thrombolysis (> 4.5 hours).

## 2018-11-16 NOTE — ED PROVIDER NOTE - ATTENDING CONTRIBUTION TO CARE
63 year old male, pmhx HTN, HLD, CVA, CAD, PVD, DM, DFU, recent thoracic spine fx presenting with lower extremity and right-sided weakness. Patient is poor historian, but states he was in a chair at home at which point he slid down the chair and lost control of his legs. This event occurred 6 hours prior to arrival. He had a subsequent loss of control of his bowels during this time. He states he was on the floor for four hours unable to get up until his daughter came to find him on the floor. Patient states since the fall, he has also had right-sided weakness in both his upper and lower extremities as well as intermittent numbness. Otherwise denies fevers, headache, vision changes, confusion, URI symptoms, neck pain, chest pain, back pain, dyspnea, cough, palpitations, nausea, vomiting, abdominal pain, diarrhea, constipation, blood in stool/dark stools, urinary symptoms penile discharge/testicular pain, leg swelling, rash, recent travel or sick contacts. Also denies IVDA    Vital Signs: I have reviewed the initial vital signs.  Constitutional: NAD, well-nourished, appears stated age, no acute distress.  HEENT: Airway patent, moist MM, no erythema/swelling/deformity of oral structures. EOMI, PERRLA.  CV: regular rate, regular rhythm, well-perfused extremities, 2+ b/l DP and radial pulses equal.  Lungs: BCTA, no increased WOB.  ABD: NTND, no guarding or rebound, no pulsatile mass, no hernias.   RECTAL: Decreased tone  MSK: Neck supple, nontender, nl ROM, no stepoff. Chest nontender. Back nontender in TLS spine or to b/l bony structures or flanks. Ext nontender, nl rom, no deformity.   INTEG: Skin warm, dry, no rash.  NEURO: A&Ox3, (+) right facial droop with 4/5 strength right upper and lower extremities, 5/5 strength on the left.   PSYCH: Calm, cooperative, normal affect and interaction.    Code stroke called on arrival given new onset right sided weakness. Concern for concurrent possible cauda equina as well. Neuro and neurosx to evaluate patient.

## 2018-11-16 NOTE — H&P ADULT - NSHPSOCIALHISTORY_GEN_ALL_CORE
Non smoker  no alcohol consumtion  No active IV drug use    lives home alone, , has a daughter that lives in [place] and a son in [place]   uses a walker and cane

## 2018-11-16 NOTE — ED PROVIDER NOTE - PHYSICAL EXAMINATION
General: AOx4, Non toxic appearing, NAD, speaking in full sentences.   Skin: Warm and dry, no acute rash.   Head:  Normocephalic, atraumatic.   EENT: PERRLA/EOMI, conjunctiva and sclera clear. MM moist, no nasal discharge.   Neck: Supple nt, no meningeal signs.   Heart RRR s1s2 nl, no rub/murmur.   Lungs- No retractions, BS equal, CTAB.   Abdomen: Soft ntnd no r/g.   Extremities- moves all, +equal distal pulses, brisk cap refill. No LE edema, calves nttp b/l.  Neuro: Decreased strength and sensation in LE b/l, decreased rectal tone w/ stool leakage. +R facial droop (chronic?). +4/5 strength in RUE.   Psych: Cooperative, appropriate

## 2018-11-16 NOTE — ED PROVIDER NOTE - CRITICAL CARE PROVIDED
consultation with other physicians/interpretation of diagnostic studies/consult w/ pt's family directly relating to pts condition/direct patient care (not related to procedure)/documentation/additional history taking

## 2018-11-16 NOTE — CHART NOTE - NSCHARTNOTEFT_GEN_A_CORE
Called for stroke code:  64 yo M w/ h/o DLD, CAD, DFU, HTN, DM, osteomyelitis L foot,  neuropathy,  recent T11-12 Fx w/o cord compression s/p TLSO brace/conservative management currently p/w worsening b/l LE weakness with RUE/R face weakness and numbness starting at 10:00 EST per patient.  HCT pending.  Pt is not candidate for thrombolysis with LKNT > 4.5 hours.  Recommend f/u HCT and if negative for ICH, proceed with CTA/CTP r/o LVO.  Recommend evaluation for myelopathy for b/l LE weakness.  Discussed with ED physician who agrees with current management and plan.

## 2018-11-16 NOTE — ED ADULT NURSE REASSESSMENT NOTE - NS ED NURSE REASSESS COMMENT FT1
Pt returned from CT. Pt awake and alert. Family at bedside. Awaiting CT results. Will continue to monitor and assess.

## 2018-11-16 NOTE — CONSULT NOTE ADULT - ASSESSMENT
Imp; This is a 64 y/o M with h/o LE weakness and new onset facial and Right hemiparesis - c/w Left CVA      Plan: Since HCT negative for hemorrhage please proceed with CTA/CTP to r/o Large Vessel Occlusion          Follow through with Neurosurgery recommendations          Cont frequent Neuro checks Imp; This is a 64 y/o M with h/o LE weakness and new onset facial and Right hemiparesis - c/w Left CVA      Plan: Since HCT negative for hemorrhage please proceed with CTA/CTP to r/o Large Vessel Occlusion - Understand Cr is elevated 2.5 from Rhabdo ok to optimize pt           MRI of brain and MRA H/N         TSH, electrolytes          Follow through with Neurosurgery recommendations          Cont frequent Neuro checks Imp; This is a 62 y/o M with h/o LE weakness and new onset facial and Right hemiparesis - c/w Left CVA      Plan: Since HCT negative for hemorrhage please proceed with CTA/CTP to r/o Large Vessel Occlusion - Understand Cr is elevated 2.5 from Rhabdo ok to optimize pt prior           MRI of brain and MRA H/N         TSH, electrolytes          Follow through with Neurosurgery recommendations          Cont frequent Neuro checks

## 2018-11-16 NOTE — ED PROVIDER NOTE - OBJECTIVE STATEMENT
64 y/o M p/w sudden on b/l LE weakness and incontinence since this morning. pt 64 y/o M HTN, HLD, CVA, CAD, PVD, DM, DFU p/w sudden on b/l LE weakness and incontinence since this morning. Pt had recent fall states he was sitting down when his legs suddenyl went numb 64 y/o M HTN, HLD, CVA, CAD, PVD, DM, DFU p/w sudden on b/l LE weakness and incontinence since this morning. Pt had recent fall, suffered T11 fx, had neg MRI on 11/7. States he was sitting down when his legs suddenly went numb, attempted to stand and LE gave out, pt fell to floor and could not get up until his family found him 4 hours later. Pt incontinent of stool and urine. Pt admits to mid back pain since fracture, denies f/c, new trauma, CP, SOB, n/v/d.

## 2018-11-16 NOTE — ED ADULT NURSE NOTE - NSIMPLEMENTINTERV_GEN_ALL_ED
Implemented All Fall with Harm Risk Interventions:  [place] to call system. Call bell, personal items and telephone within reach. Instruct patient to call for assistance. Room bathroom lighting operational. Non-slip footwear when patient is off stretcher. Physically safe environment: no spills, clutter or unnecessary equipment. Stretcher in lowest position, wheels locked, appropriate side rails in place. Provide visual cue, wrist band, yellow gown, etc. Monitor gait and stability. Monitor for mental status changes and reorient to person, place, and time. Review medications for side effects contributing to fall risk. Reinforce activity limits and safety measures with patient and family. Provide visual clues: red socks.

## 2018-11-16 NOTE — H&P ADULT - FAMILY HISTORY
Mother  Still living? No  Family history of lung cancer, Age at diagnosis: Age Unknown     Father  Still living? No  Family history of throat cancer, Age at diagnosis: Age Unknown

## 2018-11-16 NOTE — H&P ADULT - ATTENDING COMMENTS
The patient was seen and examined at bedside.  Agree with above a/p by resident except noted below:  Would increase IVF for worsening Rhabdomyolysis.

## 2018-11-16 NOTE — CONSULT NOTE ADULT - SUBJECTIVE AND OBJECTIVE BOX
HISTORY OF PRESENT ILLNESS:   · HPI Objective Statement: 64 y/o M HTN, HLD, CVA, CAD, PVD, DM, DFU p/w sudden on b/l LE weakness and incontinence since this morning. Pt had recent fall, suffered T11 fx, had neg MRI on 11/7. States he was sitting down when his legs suddenly went numb, attempted to stand and LE gave out, pt fell to floor and could not get up until his family found him 4 hours later. Pt incontinent of stool and urine. Pt admits to mid back pain since fracture, denies f/c, new trauma, CP, SOB, n/v/d.	     Patient denies saddle anesthesia     PAST MEDICAL & SURGICAL HISTORY:  Dyslipidemia  NSTEMI (non-ST elevated myocardial infarction)  Diabetic foot ulcer  PVD (peripheral vascular disease)  Diabetes    Other hyperlipidemia  Hypertension, unspecified type  Amputated toe, unspecified laterality  S/P CABG (coronary artery bypass graft)    FAMILY HISTORY:  No pertinent family history in first degree relatives      SOCIAL HISTORY:  Tobacco Use: denies  EtOH use:   Substance:    Allergies    Cipro (Unknown)  IV contrast (Short breath)  Keflex (Unknown)    OTHER:  dexamethasone  Injectable 10 milliGRAM(s) IV Push Once        Vital Signs Last 24 Hrs  T(C): 37.2 (16 Nov 2018 13:29), Max: 37.2 (16 Nov 2018 13:29)  T(F): 98.9 (16 Nov 2018 13:29), Max: 98.9 (16 Nov 2018 13:29)  HR: 88 (16 Nov 2018 13:29) (88 - 88)  BP: 120/56 (16 Nov 2018 13:29) (120/56 - 120/56)  BP(mean): --  RR: 18 (16 Nov 2018 13:29) (18 - 18)  SpO2: 98% (16 Nov 2018 13:29) (98% - 98%)    PHYSICAL EXAM:  A&OX3, PERRLA , EOM ( I  )   MS RUE 5/5  LUE 5-/5       RLE 4-/5 DF/PF Knee 4/5 hip 3/5       LLE 4-/5 DF/PF  4/5 knee hip 3/5   sensory decreased bilateral LE's ( baseline  neuropathy)      LABS:                        11.3   17.57 )-----------( 384      ( 16 Nov 2018 15:15 )             36.6     11-16    132<L>  |  89<L>  |  28<H>  ----------------------------<  196<H>  5.9<H>   |  22  |  2.2<H>    Ca    9.8      16 Nov 2018 15:15    TPro  9.4<H>  /  Alb  4.0  /  TBili  0.8  /  DBili  x   /  AST  96<H>  /  ALT  38  /  AlkPhos  245<H>  11-16    PT/INR - ( 16 Nov 2018 15:15 )   PT: 12.80 sec;   INR: 1.11 ratio             CULTURES:  Culture Results:   No growth at 5 days (11-10 @ 12:38)  Culture Results:   Rare Streptococcus agalactiae (Group B) isolated  Group B streptococci are susceptible to ampicillin,  penicillin and cefazolin, but may be resistant to  erythromycin and clindamycin.  Recommendations for intrapartum prophylaxis for Group B  streptococci are penicillin or ampicillin. (11-06 @ 11:31)      RADIOLOGY & ADDITIONAL STUDIES:  < from: CT Brain Stroke Protocol (11.16.18 @ 15:23) >    IMPRESSION:     1.  No evidence of acute intracranial hemorrhage or large territory   infarct.  Stable exam since 9/26/2018.    2.  Stable mild chronic microvascular changes and small chronic infarct   in the left frontal lobe.    Spoke with MARIEL?ROSALIE YUEN MD on 11/16/2018 3:26 PM with juan david    < end of copied text >    A/p        pt w/ hx of DM / Neuropathy presents with LE weakness       MRI of T spine        probable Neuropathy HISTORY OF PRESENT ILLNESS:   · HPI Objective Statement: 64 y/o M HTN, HLD, CVA, CAD, PVD, DM, DFU p/w sudden on b/l LE weakness and incontinence since this morning. Pt had recent fall, suffered T11 fx, had neg MRI on 11/7. States he was sitting down when his legs suddenly went numb, attempted to stand and LE gave out, pt fell to floor and could not get up until his family found him 4 hours later. Pt incontinent of stool and urine. Pt admits to mid back pain since fracture, denies f/c, new trauma, CP, SOB, n/v/d.	     Patient denies saddle anesthesia     PAST MEDICAL & SURGICAL HISTORY:  Dyslipidemia  NSTEMI (non-ST elevated myocardial infarction)  Diabetic foot ulcer  PVD (peripheral vascular disease)  Diabetes    Other hyperlipidemia  Hypertension, unspecified type  Amputated toe, unspecified laterality  S/P CABG (coronary artery bypass graft)    FAMILY HISTORY:  No pertinent family history in first degree relatives      SOCIAL HISTORY:  Tobacco Use: denies  EtOH use:   Substance:    Allergies    Cipro (Unknown)  IV contrast (Short breath)  Keflex (Unknown)    OTHER:  dexamethasone  Injectable 10 milliGRAM(s) IV Push Once        Vital Signs Last 24 Hrs  T(C): 37.2 (16 Nov 2018 13:29), Max: 37.2 (16 Nov 2018 13:29)  T(F): 98.9 (16 Nov 2018 13:29), Max: 98.9 (16 Nov 2018 13:29)  HR: 88 (16 Nov 2018 13:29) (88 - 88)  BP: 120/56 (16 Nov 2018 13:29) (120/56 - 120/56)  BP(mean): --  RR: 18 (16 Nov 2018 13:29) (18 - 18)  SpO2: 98% (16 Nov 2018 13:29) (98% - 98%)    PHYSICAL EXAM:  A&OX3, PERRLA , EOM ( I  )   MS RUE 5/5  LUE 5-/5       RLE 4-/5 DF/PF Knee 4/5 hip 3/5       LLE 4-/5 DF/PF  4/5 knee hip 3/5   sensory decreased bilateral LE's ( baseline  neuropathy)      LABS:                        11.3   17.57 )-----------( 384      ( 16 Nov 2018 15:15 )             36.6     11-16    132<L>  |  89<L>  |  28<H>  ----------------------------<  196<H>  5.9<H>   |  22  |  2.2<H>    Ca    9.8      16 Nov 2018 15:15    TPro  9.4<H>  /  Alb  4.0  /  TBili  0.8  /  DBili  x   /  AST  96<H>  /  ALT  38  /  AlkPhos  245<H>  11-16    PT/INR - ( 16 Nov 2018 15:15 )   PT: 12.80 sec;   INR: 1.11 ratio             CULTURES:  Culture Results:   No growth at 5 days (11-10 @ 12:38)  Culture Results:   Rare Streptococcus agalactiae (Group B) isolated  Group B streptococci are susceptible to ampicillin,  penicillin and cefazolin, but may be resistant to  erythromycin and clindamycin.  Recommendations for intrapartum prophylaxis for Group B  streptococci are penicillin or ampicillin. (11-06 @ 11:31)      RADIOLOGY & ADDITIONAL STUDIES:  < from: CT Brain Stroke Protocol (11.16.18 @ 15:23) >    IMPRESSION:     1.  No evidence of acute intracranial hemorrhage or large territory   infarct.  Stable exam since 9/26/2018.    2.  Stable mild chronic microvascular changes and small chronic infarct   in the left frontal lobe.    Spoke with ROSALIE BARRIENTOS MD on 11/16/2018 3:26 PM with juan david    < end of copied text >    A/p        pt w/ hx of DM / Neuropathy presents with LE weakness       MRI of T spine        probable Neuropathy       Addendum         Saw the patient this morning with Dr Parker will await official report of MRI's preliminary shows no cord compression, doubt that it is a spinal problem

## 2018-11-16 NOTE — CONSULT NOTE ADULT - SUBJECTIVE AND OBJECTIVE BOX
Neurology Consult    Patient is a 63y old  Male who presents with a chief complaint of Right hemiplegia/ Right facial - presents as stroke code    HPI:64 yo M w/ h/o DLD, CAD, DFU, HTN, DM, osteomyelitis L foot,  neuropathy,  recent T11-12 Fx w/o cord compression s/p TLSO brace/conservative management currently p/w worsening b/l LE weakness with RUE/R face weakness and numbness starting at 10:00 EST per patient.  HCT pending.  Pt is not candidate for thrombolysis with LKNT > 4.5 hours.  (Recommend f/u HCT and if negative for ICH, proceed with CTA/CTP r/o LVO.)  Recommend evaluation for myelopathy for b/l LE weakness.  Discussed with ED physician who agrees with current management and plan. Pt was seen by neurosurgery shortly after, their recommendations...·  Pt p/w sudden on b/l LE weakness and incontinence since this morning. Pt had recent fall, s/p T11 fx, had neg MRI for cord involvement on 11/7. States he was sitting down when his legs suddenly went numb, attempted to stand and LE gave out, pt fell to floor and could not get up until his family found him 4 hours later. Pt incontinent of stool and urine. Pt admits to mid back pain since fracture, denies f/c, new trauma, CP, SOB, n/v/d    Interval hx:      PAST MEDICAL & SURGICAL HISTORY:  Dyslipidemia  NSTEMI (non-ST elevated myocardial infarction)  Diabetic foot ulcer  PVD (peripheral vascular disease)  Diabetes  Other hyperlipidemia  Hypertension, unspecified type  Amputated toe, unspecified laterality  S/P CABG (coronary artery bypass graft)      FAMILY HISTORY:  No pertinent family history in first degree relatives      Social History: (-) x 3    Allergies    Cipro (Unknown)  IV contrast (Short breath)  Keflex (Unknown)    Intolerances        MEDICATIONS  (STANDING):    MEDICATIONS  (PRN):      Review of systems:    Constitutional: No fever, weight loss or fatigue    Eyes: No eye pain or discharge  ENMT:  No difficulty hearing; No sinus or throat pain  Neck: No pain or stiffness  Respiratory: No cough, wheezing, chills or hemoptysis  Cardiovascular: No chest pain, palpitations, shortness of breath, dyspnea on exertion  Gastrointestinal: No abdominal pain, nausea, vomiting or hematemesis; No diarrhea or constipation.   Genitourinary: No dysuria, frequency, hematuria or incontinence  Neurological: As per HPI  Skin: No rashes or lesions   Endocrine: No heat or cold intolerance; No hair loss  Musculoskeletal: No joint pain or swelling  Psychiatric: No depression, anxiety, mood swings  Heme/Lymph: No easy bruising or bleeding gums    Vital Signs Last 24 Hrs  T(C): 37.2 (16 Nov 2018 13:29), Max: 37.2 (16 Nov 2018 13:29)  T(F): 98.9 (16 Nov 2018 13:29), Max: 98.9 (16 Nov 2018 13:29)  HR: 75 (16 Nov 2018 17:12) (75 - 88)  BP: 106/60 (16 Nov 2018 17:12) (106/60 - 120/56)  BP(mean): --  RR: 18 (16 Nov 2018 13:29) (18 - 18)  SpO2: 98% (16 Nov 2018 13:29) (98% - 98%)    Neurologic Examination:  General:                       Labs:   CBC Full  -  ( 16 Nov 2018 15:15 )  WBC Count : 17.57 K/uL  Hemoglobin : 11.3 g/dL  Hematocrit : 36.6 %  Platelet Count - Automated : 384 K/uL  Mean Cell Volume : 72.9 fL  Mean Cell Hemoglobin : 22.5 pg  Mean Cell Hemoglobin Concentration : 30.9 g/dL  Auto Neutrophil # : 15.69 K/uL  Auto Lymphocyte # : 1.07 K/uL  Auto Monocyte # : 0.60 K/uL  Auto Eosinophil # : 0.01 K/uL  Auto Basophil # : 0.09 K/uL  Auto Neutrophil % : 89.3 %  Auto Lymphocyte % : 6.1 %  Auto Monocyte % : 3.4 %  Auto Eosinophil % : 0.1 %  Auto Basophil % : 0.5 %    11-16    132<L>  |  89<L>  |  28<H>  ----------------------------<  196<H>  5.9<H>   |  22  |  2.2<H>    Ca    9.8      16 Nov 2018 15:15    TPro  9.4<H>  /  Alb  4.0  /  TBili  0.8  /  DBili  x   /  AST  96<H>  /  ALT  38  /  AlkPhos  245<H>  11-16    LIVER FUNCTIONS - ( 16 Nov 2018 15:15 )  Alb: 4.0 g/dL / Pro: 9.4 g/dL / ALK PHOS: 245 U/L / ALT: 38 U/L / AST: 96 U/L / GGT: x           PT/INR - ( 16 Nov 2018 15:15 )   PT: 12.80 sec;   INR: 1.11 ratio         PTT - ( 16 Nov 2018 15:15 )  PTT:22.5 sec        Neuroimaging:  NCHCT:  No evidence of hemorrhage or large territory infarct just small chronic infarcts in the left frontal lobe Neurology Consult    Patient is a 63y old  Male who presents with a chief complaint of Right hemiplegia/ Right facial - presents as stroke code    HPI:62 yo M w/ h/o DLD, CAD, DFU, HTN, DM, osteomyelitis L foot,  neuropathy,  recent T11-12 Fx w/o cord compression s/p TLSO brace/conservative management currently p/w worsening b/l LE weakness with RUE/R face weakness and numbness starting at 10:00 EST per patient.  HCT pending.  Pt is not candidate for thrombolysis with LKNT > 4.5 hours.  (Recommend f/u HCT and if negative for ICH, proceed with CTA/CTP r/o LVO.)  Recommend evaluation for myelopathy for b/l LE weakness.  Discussed with ED physician who agrees with current management and plan. Pt was seen by neurosurgery shortly after, their recommendations...·  Pt p/w sudden on b/l LE weakness and incontinence since this morning. Pt had recent fall, s/p T11 fx, had neg MRI for cord involvement on 11/7. States he was sitting down when his legs suddenly went numb, attempted to stand and LE gave out, pt fell to floor and could not get up until his family found him 4 hours later. Pt incontinent of stool and urine. Pt admits to mid back pain since fracture, denies f/c, new trauma, CP, SOB, n/v/d    Interval hx: Pt states that since admission to the hospital his legs feel better. Pt underwent MRI of thoracic and lumbar spine. No evidence of cord compression on the prelim read.      PAST MEDICAL & SURGICAL HISTORY:  Dyslipidemia  NSTEMI (non-ST elevated myocardial infarction)  Diabetic foot ulcer  PVD (peripheral vascular disease)  Diabetes  Other hyperlipidemia  Hypertension, unspecified type  Amputated toe, unspecified laterality  S/P CABG (coronary artery bypass graft)      FAMILY HISTORY:  No pertinent family history in first degree relatives      Social History: (-) x 3    Allergies    Cipro (Unknown)  IV contrast (Short breath)  Keflex (Unknown)    Intolerances        MEDICATIONS  (STANDING):    MEDICATIONS  (PRN):      Review of systems:    Constitutional: No fever, weight loss or fatigue    Eyes: No eye pain or discharge  ENMT:  No difficulty hearing; No sinus or throat pain  Neck: No pain or stiffness  Respiratory: No cough, wheezing, chills or hemoptysis  Cardiovascular: No chest pain, palpitations, shortness of breath, dyspnea on exertion  Gastrointestinal: No abdominal pain, nausea, vomiting or hematemesis; No diarrhea or constipation.   Genitourinary: No dysuria, frequency, hematuria or incontinence  Neurological: As per HPI  Skin: No rashes or lesions   Endocrine: No heat or cold intolerance; No hair loss  Musculoskeletal: No joint pain or swelling  Psychiatric: No depression, anxiety, mood swings  Heme/Lymph: No easy bruising or bleeding gums    Vital Signs Last 24 Hrs  T(C): 37.2 (16 Nov 2018 13:29), Max: 37.2 (16 Nov 2018 13:29)  T(F): 98.9 (16 Nov 2018 13:29), Max: 98.9 (16 Nov 2018 13:29)  HR: 75 (16 Nov 2018 17:12) (75 - 88)  BP: 106/60 (16 Nov 2018 17:12) (106/60 - 120/56)  BP(mean): --  RR: 18 (16 Nov 2018 13:29) (18 - 18)  SpO2: 98% (16 Nov 2018 13:29) (98% - 98%)    Neurologic Examination:  General: Awake Alert oriented x3  PEERLA EOMI Tongue midline  PEERLA EOMI Speech intact  Motor: LLE 3/5 for 30s - 1min at 20-30 degrees             RLE 3/5 for 1min at 20-30 degrees    B/L toes amputated  LUE 4/5  RUE 4-/5  Sensation: decreased in LE 's b/l to crude and pin prick  DTR: Hard to illict                    Labs:   CBC Full  -  ( 16 Nov 2018 15:15 )  WBC Count : 17.57 K/uL  Hemoglobin : 11.3 g/dL  Hematocrit : 36.6 %  Platelet Count - Automated : 384 K/uL  Mean Cell Volume : 72.9 fL  Mean Cell Hemoglobin : 22.5 pg  Mean Cell Hemoglobin Concentration : 30.9 g/dL  Auto Neutrophil # : 15.69 K/uL  Auto Lymphocyte # : 1.07 K/uL  Auto Monocyte # : 0.60 K/uL  Auto Eosinophil # : 0.01 K/uL  Auto Basophil # : 0.09 K/uL  Auto Neutrophil % : 89.3 %  Auto Lymphocyte % : 6.1 %  Auto Monocyte % : 3.4 %  Auto Eosinophil % : 0.1 %  Auto Basophil % : 0.5 %    11-16    132<L>  |  89<L>  |  28<H>  ----------------------------<  196<H>  5.9<H>   |  22  |  2.2<H>    Ca    9.8      16 Nov 2018 15:15    TPro  9.4<H>  /  Alb  4.0  /  TBili  0.8  /  DBili  x   /  AST  96<H>  /  ALT  38  /  AlkPhos  245<H>  11-16    LIVER FUNCTIONS - ( 16 Nov 2018 15:15 )  Alb: 4.0 g/dL / Pro: 9.4 g/dL / ALK PHOS: 245 U/L / ALT: 38 U/L / AST: 96 U/L / GGT: x           PT/INR - ( 16 Nov 2018 15:15 )   PT: 12.80 sec;   INR: 1.11 ratio         PTT - ( 16 Nov 2018 15:15 )  PTT:22.5 sec        Neuroimaging:  NCHCT:  No evidence of hemorrhage or large territory infarct just small chronic infarcts in the left frontal lobe Neurology Consult    Patient is a 63y old  Male who presents with a chief complaint of Right hemiplegia/ Right facial - presents as stroke code    HPI:62 yo M w/ h/o DLD, CAD, DFU, HTN, DM, osteomyelitis L foot,  neuropathy,  recent T11-12 Fx w/o cord compression s/p TLSO brace/conservative management currently p/w worsening b/l LE weakness with RUE/R face weakness and numbness starting at 10:00 EST per patient.  HCT pending.  Pt is not candidate for thrombolysis with LKNT > 4.5 hours.  (Recommend f/u HCT and if negative for ICH, proceed with CTA/CTP r/o LVO.)  Recommend evaluation for myelopathy for b/l LE weakness.  Discussed with ED physician who agrees with current management and plan. Pt was seen by neurosurgery shortly after, their recommendations...·  Pt p/w sudden on b/l LE weakness and incontinence since this morning. Pt had recent fall, s/p T11 fx, had neg MRI for cord involvement on 11/7. States he was sitting down when his legs suddenly went numb, attempted to stand and LE gave out, pt fell to floor and could not get up until his family found him 4 hours later. Pt incontinent of stool and urine. Pt admits to mid back pain since fracture, denies f/c, new trauma, CP, SOB, n/v/d    Interval hx: Pt states that since admission to the hospital his legs feel better. Pt underwent MRI of thoracic and lumbar spine. No evidence of cord compression on the prelim read.      PAST MEDICAL & SURGICAL HISTORY:  Dyslipidemia  NSTEMI (non-ST elevated myocardial infarction)  Diabetic foot ulcer  PVD (peripheral vascular disease)  Diabetes  Other hyperlipidemia  Hypertension, unspecified type  Amputated toe, unspecified laterality  S/P CABG (coronary artery bypass graft)      FAMILY HISTORY:  No pertinent family history in first degree relatives      Social History: (-) x 3    Allergies    Cipro (Unknown)  IV contrast (Short breath)  Keflex (Unknown)    Intolerances        MEDICATIONS  (STANDING):    MEDICATIONS  (PRN):      Review of systems:    Constitutional: No fever, weight loss or fatigue    Eyes: No eye pain or discharge  ENMT:  No difficulty hearing; No sinus or throat pain  Neck: No pain or stiffness  Respiratory: No cough, wheezing, chills or hemoptysis  Cardiovascular: No chest pain, palpitations, shortness of breath, dyspnea on exertion  Gastrointestinal: No abdominal pain, nausea, vomiting or hematemesis; No diarrhea or constipation.   Genitourinary: No dysuria, frequency, hematuria or incontinence  Neurological: As per HPI  Skin: No rashes or lesions   Endocrine: No heat or cold intolerance; No hair loss  Musculoskeletal: No joint pain or swelling  Psychiatric: No depression, anxiety, mood swings  Heme/Lymph: No easy bruising or bleeding gums    Vital Signs Last 24 Hrs  T(C): 37.2 (16 Nov 2018 13:29), Max: 37.2 (16 Nov 2018 13:29)  T(F): 98.9 (16 Nov 2018 13:29), Max: 98.9 (16 Nov 2018 13:29)  HR: 75 (16 Nov 2018 17:12) (75 - 88)  BP: 106/60 (16 Nov 2018 17:12) (106/60 - 120/56)  BP(mean): --  RR: 18 (16 Nov 2018 13:29) (18 - 18)  SpO2: 98% (16 Nov 2018 13:29) (98% - 98%)    Neurologic Examination:  General: Awake Alert oriented x3  PEERLA EOMI Tongue midline  PEERLA EOMI Speech intact  Motor: LLE 3/5 for 30s - 1min at 20-30 degrees             RLE 3/5 for 30's- 1min at 20-30 degrees    B/L toes amputated  LUE 4/5  RUE 4-/5  Sensation: decreased in LE 's b/l to crude and pin prick  DTR: Hard to illict                    Labs:   CBC Full  -  ( 16 Nov 2018 15:15 )  WBC Count : 17.57 K/uL  Hemoglobin : 11.3 g/dL  Hematocrit : 36.6 %  Platelet Count - Automated : 384 K/uL  Mean Cell Volume : 72.9 fL  Mean Cell Hemoglobin : 22.5 pg  Mean Cell Hemoglobin Concentration : 30.9 g/dL  Auto Neutrophil # : 15.69 K/uL  Auto Lymphocyte # : 1.07 K/uL  Auto Monocyte # : 0.60 K/uL  Auto Eosinophil # : 0.01 K/uL  Auto Basophil # : 0.09 K/uL  Auto Neutrophil % : 89.3 %  Auto Lymphocyte % : 6.1 %  Auto Monocyte % : 3.4 %  Auto Eosinophil % : 0.1 %  Auto Basophil % : 0.5 %    11-16    132<L>  |  89<L>  |  28<H>  ----------------------------<  196<H>  5.9<H>   |  22  |  2.2<H>    Ca    9.8      16 Nov 2018 15:15    TPro  9.4<H>  /  Alb  4.0  /  TBili  0.8  /  DBili  x   /  AST  96<H>  /  ALT  38  /  AlkPhos  245<H>  11-16    LIVER FUNCTIONS - ( 16 Nov 2018 15:15 )  Alb: 4.0 g/dL / Pro: 9.4 g/dL / ALK PHOS: 245 U/L / ALT: 38 U/L / AST: 96 U/L / GGT: x           PT/INR - ( 16 Nov 2018 15:15 )   PT: 12.80 sec;   INR: 1.11 ratio         PTT - ( 16 Nov 2018 15:15 )  PTT:22.5 sec        Neuroimaging:  NCHCT:  No evidence of hemorrhage or large territory infarct just small chronic infarcts in the left frontal lobe

## 2018-11-16 NOTE — H&P ADULT - NSHPPHYSICALEXAM_GEN_ALL_CORE
ICU Vital Signs Last 24 Hrs  T(C): 37.2 (16 Nov 2018 13:29), Max: 37.2 (16 Nov 2018 13:29)  T(F): 98.9 (16 Nov 2018 13:29), Max: 98.9 (16 Nov 2018 13:29)  HR: 75 (16 Nov 2018 17:12) (75 - 88)  BP: 106/60 (16 Nov 2018 17:12) (106/60 - 120/56)  RR: 18 (16 Nov 2018 13:29) (18 - 18)  SpO2: 98% (16 Nov 2018 13:29) (98% - 98%) ICU Vital Signs Last 24 Hrs  T(C): 37.2 (16 Nov 2018 13:29), Max: 37.2 (16 Nov 2018 13:29)  T(F): 98.9 (16 Nov 2018 13:29), Max: 98.9 (16 Nov 2018 13:29)  HR: 75 (16 Nov 2018 17:12) (75 - 88)  BP: 106/60 (16 Nov 2018 17:12) (106/60 - 120/56)  RR: 18 (16 Nov 2018 13:29) (18 - 18)  SpO2: 98% (16 Nov 2018 13:29) (98% - 98%)    PHYSICAL EXAM:  GEN: No acute distress  LUNGS: Clear to auscultation bilaterally   HEART: S1/S2 present. RRR.   ABD: Soft, non-distended. Bowel sounds present. suprapubic fullness  EXT: left knee abrasion   NEURO: AAOX3  RUE 5/5  LUE 5-/5  RLE 4-/5 DF/PF Knee 4/5 hip 3/5   LLE 4-/5 DF/PF  4/5 knee hip 3/5 sensory decreased bilateral LE's ( baseline  neuropathy)

## 2018-11-16 NOTE — ED PROVIDER NOTE - MEDICAL DECISION MAKING DETAILS
Patient presented with sudden right-sided weakness as well as bilateral lower extremity weakness with incontinence. Questionable stroke vs cauda equina syndrome. Code stroke called and patient taken to CT. Unable to get perfusion scan since patient anaphylactic to dye. CT head otherwise negative. Patient found to have significant rhabdomyolysis with SAKINA as well. Seen by neurosurgery, started on decadron and MRIs ordered. Per neurosurgery - unlikely cauda equina syndrome based on their exam but will follow up MRIs to see if surgical intervention needed. Patient admitted for further management and close monitoring. Otherwise HD stable.

## 2018-11-16 NOTE — H&P ADULT - PMH
Amputated toe, unspecified laterality    CAD (coronary artery disease)    Diabetes    Diabetic foot ulcer    Dyslipidemia    Hypertension, unspecified type    Other hyperlipidemia    PVD (peripheral vascular disease)

## 2018-11-16 NOTE — H&P ADULT - NSHPLABSRESULTS_GEN_ALL_CORE
LABS:                        11.3   17.57 )-----------( 384      ( 16 Nov 2018 15:15 )             36.6     11-16    132<L>  |  89<L>  |  28<H>  ----------------------------<  196<H>  5.9<H>   |  22  |  2.2<H>    Ca    9.8      16 Nov 2018 15:15    TPro  9.4<H>  /  Alb  4.0  /  TBili  0.8  /  DBili  x   /  AST  96<H>  /  ALT  38  /  AlkPhos  245<H>  11-16    PT/INR - ( 16 Nov 2018 15:15 )   PT: 12.80 sec;   INR: 1.11 ratio         PTT - ( 16 Nov 2018 15:15 )  PTT:22.5 sec      Troponin T, Serum: 0.01 ng/mL (11-16-18 @ 15:15)  Creatine Kinase, Serum: >2000 U/L <H> (11-16-18 @ 15:15)      CARDIAC MARKERS ( 16 Nov 2018 15:15 )  x     / 0.01 ng/mL / >2000 U/L / x     / x          RADIOLOGY:  < from: CT Brain Stroke Protocol (11.16.18 @ 15:23) >  1.  No evidence of acute intracranial hemorrhage or large territory   infarct.  Stable exam since 9/26/2018.  2.  Stable mild chronic microvascular changes and small chronic infarct   in the left frontal lobe.  < end of copied text >    < from: MR Thoracic Spine No Cont (11.07.18 @ 16:39) >  IMPRESSION:  As correlated with the CT chest of 9/26/2018:  1.  Residual bone marrow edema at the anterior aspect of the T11 inferior   endplate corresponding to fracture seen on CT. Minimal extension of   edema/fracture into the T11-12 disc space.  2.  No evidence of spinal cord compression or cord edema.  3.  Minimal degenerative changes.  < end of copied text >

## 2018-11-16 NOTE — ED ADULT NURSE NOTE - OBJECTIVE STATEMENT
Pt s/p hospital admission for debridement for left foot with back fracture. Pt continue to have multiple falls and weakness at home, denies hitting his head or LOC. Pt living by himself at this time, daughter at bedside. Pt alert and orientedx3, VSS and afebrile. Safety maintained and hourly rounding performed. pt has abrasions to bilateral knees and diabetic foot ulcer covered with dressing. Will continue with plan of care.

## 2018-11-17 PROBLEM — I21.4 NON-ST ELEVATION (NSTEMI) MYOCARDIAL INFARCTION: Chronic | Status: INACTIVE | Noted: 2018-09-26 | Resolved: 2018-11-16

## 2018-11-17 LAB
ALBUMIN SERPL ELPH-MCNC: 3.1 G/DL — LOW (ref 3.5–5.2)
ALP SERPL-CCNC: 165 U/L — HIGH (ref 30–115)
ALT FLD-CCNC: 35 U/L — SIGNIFICANT CHANGE UP (ref 0–41)
ANION GAP SERPL CALC-SCNC: 16 MMOL/L — HIGH (ref 7–14)
APPEARANCE UR: ABNORMAL
AST SERPL-CCNC: 116 U/L — HIGH (ref 0–41)
BASOPHILS # BLD AUTO: 0.02 K/UL — SIGNIFICANT CHANGE UP (ref 0–0.2)
BASOPHILS NFR BLD AUTO: 0.1 % — SIGNIFICANT CHANGE UP (ref 0–1)
BILIRUB SERPL-MCNC: 0.5 MG/DL — SIGNIFICANT CHANGE UP (ref 0.2–1.2)
BILIRUB UR-MCNC: NEGATIVE — SIGNIFICANT CHANGE UP
BUN SERPL-MCNC: 33 MG/DL — HIGH (ref 10–20)
CALCIUM SERPL-MCNC: 8.7 MG/DL — SIGNIFICANT CHANGE UP (ref 8.5–10.1)
CHLORIDE SERPL-SCNC: 96 MMOL/L — LOW (ref 98–110)
CK SERPL-CCNC: >2000 U/L — HIGH (ref 0–225)
CO2 SERPL-SCNC: 24 MMOL/L — SIGNIFICANT CHANGE UP (ref 17–32)
COLOR SPEC: SIGNIFICANT CHANGE UP
CREAT SERPL-MCNC: 1.4 MG/DL — SIGNIFICANT CHANGE UP (ref 0.7–1.5)
DIFF PNL FLD: ABNORMAL
EOSINOPHIL # BLD AUTO: 0 K/UL — SIGNIFICANT CHANGE UP (ref 0–0.7)
EOSINOPHIL NFR BLD AUTO: 0 % — SIGNIFICANT CHANGE UP (ref 0–8)
GLUCOSE BLDC GLUCOMTR-MCNC: 107 MG/DL — HIGH (ref 70–99)
GLUCOSE BLDC GLUCOMTR-MCNC: 136 MG/DL — HIGH (ref 70–99)
GLUCOSE BLDC GLUCOMTR-MCNC: 169 MG/DL — HIGH (ref 70–99)
GLUCOSE BLDC GLUCOMTR-MCNC: 209 MG/DL — HIGH (ref 70–99)
GLUCOSE SERPL-MCNC: 197 MG/DL — HIGH (ref 70–99)
GLUCOSE UR QL: 250 MG/DL
HCT VFR BLD CALC: 28.4 % — LOW (ref 42–52)
HGB BLD-MCNC: 9 G/DL — LOW (ref 14–18)
IMM GRANULOCYTES NFR BLD AUTO: 0.7 % — HIGH (ref 0.1–0.3)
KETONES UR-MCNC: NEGATIVE — SIGNIFICANT CHANGE UP
LEUKOCYTE ESTERASE UR-ACNC: NEGATIVE — SIGNIFICANT CHANGE UP
LYMPHOCYTES # BLD AUTO: 0.86 K/UL — LOW (ref 1.2–3.4)
LYMPHOCYTES # BLD AUTO: 4.9 % — LOW (ref 20.5–51.1)
MAGNESIUM SERPL-MCNC: 2 MG/DL — SIGNIFICANT CHANGE UP (ref 1.8–2.4)
MCHC RBC-ENTMCNC: 22.8 PG — LOW (ref 27–31)
MCHC RBC-ENTMCNC: 31.7 G/DL — LOW (ref 32–37)
MCV RBC AUTO: 72.1 FL — LOW (ref 80–94)
MONOCYTES # BLD AUTO: 0.67 K/UL — HIGH (ref 0.1–0.6)
MONOCYTES NFR BLD AUTO: 3.9 % — SIGNIFICANT CHANGE UP (ref 1.7–9.3)
NEUTROPHILS # BLD AUTO: 15.73 K/UL — HIGH (ref 1.4–6.5)
NEUTROPHILS NFR BLD AUTO: 90.4 % — HIGH (ref 42.2–75.2)
NITRITE UR-MCNC: NEGATIVE — SIGNIFICANT CHANGE UP
PH UR: 5.5 — SIGNIFICANT CHANGE UP (ref 5–8)
PHOSPHATE SERPL-MCNC: 3 MG/DL — SIGNIFICANT CHANGE UP (ref 2.1–4.9)
PLATELET # BLD AUTO: 298 K/UL — SIGNIFICANT CHANGE UP (ref 130–400)
POTASSIUM SERPL-MCNC: 4.3 MMOL/L — SIGNIFICANT CHANGE UP (ref 3.5–5)
POTASSIUM SERPL-SCNC: 4.3 MMOL/L — SIGNIFICANT CHANGE UP (ref 3.5–5)
PROT SERPL-MCNC: 6.9 G/DL — SIGNIFICANT CHANGE UP (ref 6–8)
PROT UR-MCNC: 30 MG/DL
RBC # BLD: 3.94 M/UL — LOW (ref 4.7–6.1)
RBC # FLD: 17.5 % — HIGH (ref 11.5–14.5)
SODIUM SERPL-SCNC: 136 MMOL/L — SIGNIFICANT CHANGE UP (ref 135–146)
SP GR SPEC: 1.02 — SIGNIFICANT CHANGE UP (ref 1.01–1.03)
UROBILINOGEN FLD QL: 0.2 MG/DL — SIGNIFICANT CHANGE UP (ref 0.2–0.2)
WBC # BLD: 17.4 K/UL — HIGH (ref 4.8–10.8)
WBC # FLD AUTO: 17.4 K/UL — HIGH (ref 4.8–10.8)

## 2018-11-17 RX ORDER — SODIUM CHLORIDE 9 MG/ML
1000 INJECTION INTRAMUSCULAR; INTRAVENOUS; SUBCUTANEOUS
Qty: 0 | Refills: 0 | Status: DISCONTINUED | OUTPATIENT
Start: 2018-11-17 | End: 2018-11-19

## 2018-11-17 RX ORDER — CEFTRIAXONE 500 MG/1
2 INJECTION, POWDER, FOR SOLUTION INTRAMUSCULAR; INTRAVENOUS EVERY 24 HOURS
Qty: 0 | Refills: 0 | Status: DISCONTINUED | OUTPATIENT
Start: 2018-11-18 | End: 2018-11-20

## 2018-11-17 RX ORDER — CEFTRIAXONE 500 MG/1
2 INJECTION, POWDER, FOR SOLUTION INTRAMUSCULAR; INTRAVENOUS ONCE
Qty: 0 | Refills: 0 | Status: COMPLETED | OUTPATIENT
Start: 2018-11-17 | End: 2018-11-17

## 2018-11-17 RX ORDER — SENNA PLUS 8.6 MG/1
1 TABLET ORAL DAILY
Qty: 0 | Refills: 0 | Status: DISCONTINUED | OUTPATIENT
Start: 2018-11-17 | End: 2018-11-20

## 2018-11-17 RX ORDER — DOCUSATE SODIUM 100 MG
100 CAPSULE ORAL DAILY
Qty: 0 | Refills: 0 | Status: DISCONTINUED | OUTPATIENT
Start: 2018-11-17 | End: 2018-11-20

## 2018-11-17 RX ADMIN — Medication 100 MILLIGRAM(S): at 11:58

## 2018-11-17 RX ADMIN — Medication 6 UNIT(S): at 12:54

## 2018-11-17 RX ADMIN — SODIUM CHLORIDE 100 MILLILITER(S): 9 INJECTION INTRAMUSCULAR; INTRAVENOUS; SUBCUTANEOUS at 06:56

## 2018-11-17 RX ADMIN — Medication 81 MILLIGRAM(S): at 11:57

## 2018-11-17 RX ADMIN — HEPARIN SODIUM 5000 UNIT(S): 5000 INJECTION INTRAVENOUS; SUBCUTANEOUS at 05:42

## 2018-11-17 RX ADMIN — SENNA PLUS 1 TABLET(S): 8.6 TABLET ORAL at 21:30

## 2018-11-17 RX ADMIN — HEPARIN SODIUM 5000 UNIT(S): 5000 INJECTION INTRAVENOUS; SUBCUTANEOUS at 21:31

## 2018-11-17 RX ADMIN — CEFTRIAXONE 100 GRAM(S): 500 INJECTION, POWDER, FOR SOLUTION INTRAMUSCULAR; INTRAVENOUS at 05:42

## 2018-11-17 RX ADMIN — ATORVASTATIN CALCIUM 40 MILLIGRAM(S): 80 TABLET, FILM COATED ORAL at 21:31

## 2018-11-17 RX ADMIN — CLOPIDOGREL BISULFATE 75 MILLIGRAM(S): 75 TABLET, FILM COATED ORAL at 11:57

## 2018-11-17 RX ADMIN — SENNA PLUS 1 TABLET(S): 8.6 TABLET ORAL at 11:58

## 2018-11-17 RX ADMIN — Medication 6 UNIT(S): at 08:50

## 2018-11-17 RX ADMIN — MORPHINE SULFATE 2 MILLIGRAM(S): 50 CAPSULE, EXTENDED RELEASE ORAL at 20:36

## 2018-11-17 RX ADMIN — HEPARIN SODIUM 5000 UNIT(S): 5000 INJECTION INTRAVENOUS; SUBCUTANEOUS at 14:49

## 2018-11-17 RX ADMIN — Medication 100 MILLIGRAM(S): at 06:03

## 2018-11-17 RX ADMIN — SODIUM CHLORIDE 125 MILLILITER(S): 9 INJECTION INTRAMUSCULAR; INTRAVENOUS; SUBCUTANEOUS at 21:36

## 2018-11-17 RX ADMIN — SODIUM CHLORIDE 100 MILLILITER(S): 9 INJECTION INTRAMUSCULAR; INTRAVENOUS; SUBCUTANEOUS at 18:15

## 2018-11-17 RX ADMIN — POLYETHYLENE GLYCOL 3350 17 GRAM(S): 17 POWDER, FOR SOLUTION ORAL at 11:57

## 2018-11-17 RX ADMIN — INSULIN GLARGINE 27 UNIT(S): 100 INJECTION, SOLUTION SUBCUTANEOUS at 21:31

## 2018-11-17 RX ADMIN — Medication 6 UNIT(S): at 17:51

## 2018-11-17 RX ADMIN — Medication 25 MILLIGRAM(S): at 05:42

## 2018-11-17 RX ADMIN — MORPHINE SULFATE 2 MILLIGRAM(S): 50 CAPSULE, EXTENDED RELEASE ORAL at 17:53

## 2018-11-17 RX ADMIN — Medication 100 MILLIGRAM(S): at 17:53

## 2018-11-17 NOTE — CONSULT NOTE ADULT - ATTENDING COMMENTS
agree with tx and treatment plan    IV abx per ID  flushed wound and packed open    continue with PT PWB with four point walker and CAM

## 2018-11-17 NOTE — PROGRESS NOTE ADULT - SUBJECTIVE AND OBJECTIVE BOX
ROSALIE ESCOBEDO 63y Male  MRN#: [id number]   CODE STATUS:________      SUBJECTIVE  Patient is a 63y old Male who presents with a chief complaint of lower extremities weakness (2018 09:39)  Currently admitted to medicine with the primary diagnosis of Lower extremity weakness  Hospital course has been complicated by _______.   Today is hospital day 1d, and this morning he is _________ and reports ________ overnight events.     Present Today:           Woods Catheter ()No/ ()Yes? Indication:          Central Line ()No/ ()Yes? Indication:          IV Fluids ()No/ ()Yes? Type:  Rate:  Indication:      OBJECTIVE  PAST MEDICAL & SURGICAL HISTORY  CAD (coronary artery disease)  Dyslipidemia  Diabetic foot ulcer  PVD (peripheral vascular disease)  Diabetes  Other hyperlipidemia  Hypertension, unspecified type  Amputated toe, unspecified laterality  S/P CABG (coronary artery bypass graft)    ALLERGIES:  Cipro (Unknown)  IV contrast (Short breath)  Keflex (Unknown)    MEDICATIONS:  STANDING MEDICATIONS  aspirin  chewable 81 milliGRAM(s) Oral daily  atorvastatin 40 milliGRAM(s) Oral at bedtime  BACItracin   Ointment 1 Application(s) Topical daily  cefTRIAXone   IVPB      clopidogrel Tablet 75 milliGRAM(s) Oral daily  dextrose 5%. 1000 milliLiter(s) IV Continuous <Continuous>  dextrose 50% Injectable 12.5 Gram(s) IV Push once  dextrose 50% Injectable 25 Gram(s) IV Push once  dextrose 50% Injectable 25 Gram(s) IV Push once  docusate sodium 100 milliGRAM(s) Oral daily  heparin  Injectable 5000 Unit(s) SubCutaneous every 8 hours  insulin glargine Injectable (LANTUS) 27 Unit(s) SubCutaneous at bedtime  insulin lispro Injectable (HumaLOG) 6 Unit(s) SubCutaneous three times a day before meals  metoprolol succinate ER 25 milliGRAM(s) Oral daily  polyethylene glycol 3350 17 Gram(s) Oral daily  pregabalin 100 milliGRAM(s) Oral two times a day  senna 1 Tablet(s) Oral at bedtime  senna 1 Tablet(s) Oral daily  sodium chloride 0.9%. 1000 milliLiter(s) IV Continuous <Continuous>    PRN MEDICATIONS  dextrose 40% Gel 15 Gram(s) Oral once PRN  glucagon  Injectable 1 milliGRAM(s) IntraMuscular once PRN  morphine  - Injectable 2 milliGRAM(s) IV Push every 6 hours PRN  traMADol 50 milliGRAM(s) Oral every 12 hours PRN      VITAL SIGNS: Last 24 Hours  T(C): 36.6 (2018 14:30), Max: 36.6 (2018 14:30)  T(F): 97.9 (2018 14:30), Max: 97.9 (2018 14:30)  HR: 82 (2018 14:30) (82 - 95)  BP: 125/59 (2018 14:30) (91/53 - 125/59)  BP(mean): --  RR: 18 (2018 14:30) (18 - 18)  SpO2: 94% (2018 08:00) (94% - 99%)    LABS:                        9.0    17.40 )-----------( 298      ( 2018 08:09 )             28.4         136  |  96<L>  |  33<H>  ----------------------------<  197<H>  4.3   |  24  |  1.4    Ca    8.7      2018 08:09  Phos  3.0       Mg     2.0         TPro  6.9  /  Alb  3.1<L>  /  TBili  0.5  /  DBili  x   /  AST  116<H>  /  ALT  35  /  AlkPhos  165<H>      PT/INR - ( 2018 15:15 )   PT: 12.80 sec;   INR: 1.11 ratio         PTT - ( 2018 15:15 )  PTT:22.5 sec  Urinalysis Basic - ( 2018 09:00 )    Color: Dark Yellow / Appearance: Cloudy / S.020 / pH: x  Gluc: x / Ketone: Negative  / Bili: Negative / Urobili: 0.2 mg/dL   Blood: x / Protein: 30 mg/dL / Nitrite: Negative   Leuk Esterase: Negative / RBC: 11-25 /HPF / WBC x   Sq Epi: x / Non Sq Epi: x / Bacteria: x        Creatine Kinase, Serum: 5380 U/L (18 @ 08:09)      CARDIAC MARKERS ( 2018 08:09 )  x     / x     / 5380 U/L / x     / x      CARDIAC MARKERS ( 2018 15:15 )  x     / 0.01 ng/mL / >2000 U/L / x     / x          RADIOLOGY:  < from: MR Lumbar Spine No Cont (18 @ 21:08) >  No evidence of cord compression. Full report to follow.    < end of copied text >    < from: CT Brain Stroke Protocol (18 @ 15:23) >  	1.  No evidence of acute intracranial hemorrhage or large territory   	infarct.  Stable exam since 2018.  	2.  Stable mild chronic microvascular changes and small chronic infarct   	in the left frontal lobe.  	< end of copied text >    	< from: MR Thoracic Spine No Cont (18 @ 16:39) >  	IMPRESSION:  	As correlated with the CT chest of 2018:  	1.  Residual bone marrow edema at the anterior aspect of the T11 inferior   	endplate corresponding to fracture seen on CT. Minimal extension of   	edema/fracture into the T11-12 disc space.  	2.  No evidence of spinal cord compression or cord edema.  	3.  Minimal degenerative changes.  < end of copied text >    PHYSICAL EXAM:    GENERAL: NAD, well-developed, AAOx3  HEENT:  Atraumatic, Normocephalic. EOMI, PERRLA, conjunctiva and sclera clear, No JVD  PULMONARY: Clear to auscultation bilaterally; No wheeze  CARDIOVASCULAR: Regular rate and rhythm; No murmurs, rubs, or gallops  GASTROINTESTINAL: Soft, Nontender, Nondistended; Bowel sounds present  MUSCULOSKELETAL:  2+ Peripheral Pulses, No clubbing, cyanosis, or edema  NEUROLOGY: Weakness in bilateral lower extremities. Power 3/5 in bilateral LE, Sensation diminished on right LE and right side of face  SKIN: No rashes or lesions      ADMISSION SUMMARY  Patient is a 63y old Male who presents with a chief complaint of lower extremities weakness   Currently admitted to medicine with the primary diagnosis of Lower extremity weakness      ASSESSMENT & PLAN  63y male with PMH of CAD, NSTEMI s/p CABG (5 vessel according to patient; 2016); DM with peripheral neuropathy, Left DFU, HTN, HLD and recently admitted for worsening chronic left foot ulcer s/p excisional debridement by podiatry and recent T11-T12 vertebral fracture discharged on TLSO brace / conservative management with home care presents for worsening b/l LE weakness.    #) Bilateral lower extremities numbness and weakness   -r/o neuropathy, deconditioning  -Neurosurgery and neurology note appreciated  -MRI spine done no cord compression  -f/u MRI brain  -Patient still has weakness in bilateral lower extremities  -CT head negative for acute stroke.  -CT angiogram to be done once SAKINA improves  -pain control  -PT/rehab  -TLSO brace    #)SAKINA, mild rhabdomyolysis  -CK elevated  -IV hydration NS increased to 125cc/hr  -follow up CK in am and BMP    #) Osteomyelitis of the left foot s/p excisional debridement   - as per last ID note, the insurance would not cover IV atbx so the patient was discharged on po augmentin with plan to follow up in clinic  -resume [id number] q24h  -follow up ID for duration of atbx  - culture GBS  -11/10 OR Necrotic soft tissue, extensive soft tissue scarring and osteomyelitic bone, OR cx No growth     #) CAD s/p CABG  - c/w ASA, plavix, metoprolol and statin    #) HTN  - c/w metoprolol     #) DM  - monitor finger stick  - recent hba1c =7  - last in hospital regimen -6-6    #) Diet   - DASH, Carb consistent    #) DVT ppx  -heparin 5000IU s/c q 8 hours    #) Activity  - out of bed to chair with assistance    #) Disposition  - from home, might need NH this time    #) Full code status

## 2018-11-17 NOTE — CONSULT NOTE ADULT - SUBJECTIVE AND OBJECTIVE BOX
PODIATRY CONSULT   ROSALIE ESCOBEDO is a 63y old  Male who presents with a chief complaint of lower extremities weakness (16 Nov 2018 21:08)    HPI:  63y male with PMH of CAD, NSTEMI s/p CABG (5 vessel according to patient; March 2016); DM with peripheral neuropathy, Left DFU, HTN, HLD and recently admitted for worsening chronic left foot ulcer s/p excisional debridement by podiatry and recent T11-T12 vertebral fracture discharged on TLSO brace / conservative management with home care presents for worsening b/l LE weakness. this morning while sitting in the chair, he acutely felt numbness in his legs, from knees below, worse than baseline so he tried to get up buthis legs gave out and he fell to floor and could not get up until his daughter came at 1pm and called fire department (4 hours later).  he could not go to the bathroom so he was full of urine. he denies head trauma or LOC.  Pt admits to mid back pain since fracture, that is worse now    in the ED code stroke was called CT head negative for acute infarct; he was not candidate for thrombolysis (> 4.5 hours). (16 Nov 2018 21:08)    LOWER EXTREMITY WEAKNESS RHABDOMYOLYSIS  CAD (coronary artery disease)  Dyslipidemia  NSTEMI (non-ST elevated myocardial infarction)  Diabetic foot ulcer  PVD (peripheral vascular disease)  Diabetes  Other hyperlipidemia  Hypertension, unspecified type  Amputated toe, unspecified laterality      PMH: LOWER EXTREMITY WEAKNESS RHABDOMYOLYSIS  CAD (coronary artery disease)  Dyslipidemia  NSTEMI (non-ST elevated myocardial infarction)  Diabetic foot ulcer  PVD (peripheral vascular disease)  Diabetes  Other hyperlipidemia  Hypertension, unspecified type  Amputated toe, unspecified laterality    PSH: S/P CABG (coronary artery bypass graft)  Deep vein thrombosis associated with coronary artery bypass graft surgery    Medication cefTRIAXone   IVPB        Allergy: Cipro (Unknown)  IV contrast (Short breath)  Keflex (Unknown)        Labs:                        9.0    17.40 )-----------( 298      ( 17 Nov 2018 08:09 )             28.4     PT/INR - ( 16 Nov 2018 15:15 )   PT: 12.80 sec;   INR: 1.11 ratio         PTT - ( 16 Nov 2018 15:15 )  PTT:22.5 sec  11-17    136  |  96<L>  |  33<H>  ----------------------------<  197<H>  4.3   |  24  |  1.4    Ca    8.7      17 Nov 2018 08:09  Phos  3.0     11-17  Mg     2.0     11-17    TPro  6.9  /  Alb  3.1<L>  /  TBili  0.5  /  DBili  x   /  AST  116<H>  /  ALT  35  /  AlkPhos  165<H>  11-17    CARDIAC MARKERS ( 17 Nov 2018 08:09 )  x     / x     / 5380 U/L / x     / x      CARDIAC MARKERS ( 16 Nov 2018 15:15 )  x     / 0.01 ng/mL / >2000 U/L / x     / x              O:  Derm: Dorsal and plantar surgical wounds are partially closed with sutures intact.  Surgical margins of the wounds healthy, clean, and no ischemic changes  base of wounds with increased granulation tissue   no purulent drainage with expression of sites  no edema no calor  no dolor no rubor no mal odor  Vascular: DP/PT pulses 1/4 B/L , Skin temp warm to cool,  proximal to distal B/L, CFT < 3 sec B/L  Neuro: Gross sensation diminished to level of digits B/L      A:   S/p Day #7 debridement of soft tissue and bone foot, stable, no signs of infection  P:  Pt examined @ bedside   Wound flushed with saline, applied iodoform packing/betadien/DSD/ABD/kerlix/ACE   Continue ABx as per ID  podiatry will follow while in house.  Will follow up with attendings recommendations  no surgical planing per Podiatry

## 2018-11-18 LAB
ANION GAP SERPL CALC-SCNC: 15 MMOL/L — HIGH (ref 7–14)
BUN SERPL-MCNC: 22 MG/DL — HIGH (ref 10–20)
CALCIUM SERPL-MCNC: 8.2 MG/DL — LOW (ref 8.5–10.1)
CHLORIDE SERPL-SCNC: 101 MMOL/L — SIGNIFICANT CHANGE UP (ref 98–110)
CK SERPL-CCNC: 1545 U/L — HIGH (ref 0–225)
CO2 SERPL-SCNC: 23 MMOL/L — SIGNIFICANT CHANGE UP (ref 17–32)
CREAT SERPL-MCNC: 0.8 MG/DL — SIGNIFICANT CHANGE UP (ref 0.7–1.5)
GLUCOSE BLDC GLUCOMTR-MCNC: 103 MG/DL — HIGH (ref 70–99)
GLUCOSE BLDC GLUCOMTR-MCNC: 85 MG/DL — SIGNIFICANT CHANGE UP (ref 70–99)
GLUCOSE BLDC GLUCOMTR-MCNC: 87 MG/DL — SIGNIFICANT CHANGE UP (ref 70–99)
GLUCOSE BLDC GLUCOMTR-MCNC: 95 MG/DL — SIGNIFICANT CHANGE UP (ref 70–99)
GLUCOSE SERPL-MCNC: 80 MG/DL — SIGNIFICANT CHANGE UP (ref 70–99)
HCT VFR BLD CALC: 25.4 % — LOW (ref 42–52)
HGB BLD-MCNC: 8.1 G/DL — LOW (ref 14–18)
MCHC RBC-ENTMCNC: 22.8 PG — LOW (ref 27–31)
MCHC RBC-ENTMCNC: 31.9 G/DL — LOW (ref 32–37)
MCV RBC AUTO: 71.3 FL — LOW (ref 80–94)
NRBC # BLD: 0 /100 WBCS — SIGNIFICANT CHANGE UP (ref 0–0)
PLATELET # BLD AUTO: 260 K/UL — SIGNIFICANT CHANGE UP (ref 130–400)
POTASSIUM SERPL-MCNC: 4 MMOL/L — SIGNIFICANT CHANGE UP (ref 3.5–5)
POTASSIUM SERPL-SCNC: 4 MMOL/L — SIGNIFICANT CHANGE UP (ref 3.5–5)
RBC # BLD: 3.56 M/UL — LOW (ref 4.7–6.1)
RBC # FLD: 17.5 % — HIGH (ref 11.5–14.5)
SODIUM SERPL-SCNC: 139 MMOL/L — SIGNIFICANT CHANGE UP (ref 135–146)
WBC # BLD: 13.84 K/UL — HIGH (ref 4.8–10.8)
WBC # FLD AUTO: 13.84 K/UL — HIGH (ref 4.8–10.8)

## 2018-11-18 RX ORDER — SIMETHICONE 80 MG/1
80 TABLET, CHEWABLE ORAL
Qty: 0 | Refills: 0 | Status: DISCONTINUED | OUTPATIENT
Start: 2018-11-18 | End: 2018-11-20

## 2018-11-18 RX ORDER — CHLORHEXIDINE GLUCONATE 213 G/1000ML
1 SOLUTION TOPICAL
Qty: 0 | Refills: 0 | Status: DISCONTINUED | OUTPATIENT
Start: 2018-11-18 | End: 2018-11-20

## 2018-11-18 RX ADMIN — SIMETHICONE 80 MILLIGRAM(S): 80 TABLET, CHEWABLE ORAL at 17:19

## 2018-11-18 RX ADMIN — SODIUM CHLORIDE 125 MILLILITER(S): 9 INJECTION INTRAMUSCULAR; INTRAVENOUS; SUBCUTANEOUS at 02:41

## 2018-11-18 RX ADMIN — MORPHINE SULFATE 2 MILLIGRAM(S): 50 CAPSULE, EXTENDED RELEASE ORAL at 02:10

## 2018-11-18 RX ADMIN — CEFTRIAXONE 100 GRAM(S): 500 INJECTION, POWDER, FOR SOLUTION INTRAMUSCULAR; INTRAVENOUS at 05:48

## 2018-11-18 RX ADMIN — MORPHINE SULFATE 2 MILLIGRAM(S): 50 CAPSULE, EXTENDED RELEASE ORAL at 02:25

## 2018-11-18 RX ADMIN — Medication 100 MILLIGRAM(S): at 17:18

## 2018-11-18 RX ADMIN — CLOPIDOGREL BISULFATE 75 MILLIGRAM(S): 75 TABLET, FILM COATED ORAL at 11:05

## 2018-11-18 RX ADMIN — HEPARIN SODIUM 5000 UNIT(S): 5000 INJECTION INTRAVENOUS; SUBCUTANEOUS at 14:26

## 2018-11-18 RX ADMIN — MORPHINE SULFATE 2 MILLIGRAM(S): 50 CAPSULE, EXTENDED RELEASE ORAL at 11:01

## 2018-11-18 RX ADMIN — HEPARIN SODIUM 5000 UNIT(S): 5000 INJECTION INTRAVENOUS; SUBCUTANEOUS at 21:33

## 2018-11-18 RX ADMIN — SODIUM CHLORIDE 125 MILLILITER(S): 9 INJECTION INTRAMUSCULAR; INTRAVENOUS; SUBCUTANEOUS at 11:06

## 2018-11-18 RX ADMIN — HEPARIN SODIUM 5000 UNIT(S): 5000 INJECTION INTRAVENOUS; SUBCUTANEOUS at 05:49

## 2018-11-18 RX ADMIN — Medication 25 MILLIGRAM(S): at 05:49

## 2018-11-18 RX ADMIN — Medication 100 MILLIGRAM(S): at 05:51

## 2018-11-18 RX ADMIN — MORPHINE SULFATE 2 MILLIGRAM(S): 50 CAPSULE, EXTENDED RELEASE ORAL at 09:41

## 2018-11-18 RX ADMIN — ATORVASTATIN CALCIUM 40 MILLIGRAM(S): 80 TABLET, FILM COATED ORAL at 21:34

## 2018-11-18 RX ADMIN — Medication 81 MILLIGRAM(S): at 11:05

## 2018-11-18 NOTE — PROGRESS NOTE ADULT - ASSESSMENT
63y male with PMH of CAD, NSTEMI s/p CABG (5 vessel according to patient; March 2016); DM with peripheral neuropathy, Left DFU, HTN, HLD and recently admitted for worsening chronic left foot ulcer s/p excisional debridement by podiatry and recent T11-T12 vertebral fracture discharged on TLSO brace / conservative management with home care presents for worsening b/l LE weakness.    #) Bilateral lower extremities numbness and weakness   -r/o neur-myopathy, with elevated  CK.   -Neurosurgery and neurology note appreciated  -MRI spine done no cord compression  -f/u MRI brain  -Patient still has weakness in bilateral lower extremities, improving though.  -CT head negative for acute stroke.  -CT angiogram to be done once SAKINA improves  -pain control  -PT/rehab  -TLSO brace    #)SAKINA, mild rhabdomyolysis  -CK elevated, trending down.  - 5380 >>1545  -would decrease IV hydration NS  to 100cc/hr  -follow up CK in am and BMP    #) Osteomyelitis of the left foot s/p excisional debridement   - as per last ID note, the insurance would not cover IV atbx so the patient was discharged on po augmentin with plan to follow up in clinic  -resume [id number] q24h  -follow up ID for duration of atbx  -11/6 culture GBS  -11/10 OR Necrotic soft tissue, extensive soft tissue scarring and osteomyelitic bone, OR cx No growth     #) CAD s/p CABG  - c/w ASA, plavix, metoprolol and statin    #) HTN  - c/w metoprolol     #) DM  - monitor finger stick  - recent hba1c =7  - last in hospital regimen 27/6-6-6    #) Diet   - DASH, Carb consistent    #) DVT ppx  -heparin 5000IU s/c q 8 hours    #) Activity  - out of bed to chair with assistance    #) Disposition  - from home, might need NH this time    #) Full code status

## 2018-11-18 NOTE — CONSULT NOTE ADULT - SUBJECTIVE AND OBJECTIVE BOX
ROSALIE ESCOBEDO  63y, Male  Allergy: Cipro (Unknown)  IV contrast (Short breath)  Keflex (Unknown)      HPI:  63y male with PMH of CAD, NSTEMI s/p CABG (5 vessel according to patient; 2016); DM with peripheral neuropathy, Left DFU, HTN, HLD and recently admitted for worsening chronic left foot ulcer s/p excisional debridement by podiatry and recent T11-T12 vertebral fracture discharged on TLSO brace / conservative management with home care presents for worsening b/l LE weakness. this morning while sitting in the chair, he acutely felt numbness in his legs, from knees below, worse than baseline so he tried to get up buthis legs gave out and he fell to floor and could not get up until his daughter came at 1pm and called fire department (4 hours later).  he could not go to the bathroom so he was full of urine. he denies head trauma or LOC.  Pt admits to mid back pain since fracture, that is worse now    in the ED code stroke was called CT head negative for acute infarct; he was not candidate for thrombolysis (> 4.5 hours). (2018 21:08)    FAMILY HISTORY:  Family history of throat cancer (Father)  Family history of lung cancer (Mother)    PAST MEDICAL & SURGICAL HISTORY:  CAD (coronary artery disease)  Dyslipidemia  Diabetic foot ulcer  PVD (peripheral vascular disease)  Diabetes  Other hyperlipidemia  Hypertension, unspecified type  Amputated toe, unspecified laterality  S/P CABG (coronary artery bypass graft)        VITALS:  T(F): 97.3, Max: 98.2 (18 @ 20:45)  HR: 82  BP: 133/69  RR: 18Vital Signs Last 24 Hrs  T(C): 36.3 (2018 05:53), Max: 36.8 (2018 20:45)  T(F): 97.3 (2018 05:53), Max: 98.2 (2018 20:45)  HR: 82 (2018 05:53) (82 - 85)  BP: 133/69 (2018 05:53) (125/59 - 134/66)  BP(mean): --  RR: 18 (2018 05:53) (18 - 18)  SpO2: 94% (2018 08:00) (94% - 94%)    TESTS & MEASUREMENTS:                        9.0    17.40 )-----------( 298      ( 2018 08:09 )             28.4         136  |  96<L>  |  33<H>  ----------------------------<  197<H>  4.3   |  24  |  1.4    Ca    8.7      2018 08:09  Phos  3.0       Mg     2.0         TPro  6.9  /  Alb  3.1<L>  /  TBili  0.5  /  DBili  x   /  AST  116<H>  /  ALT  35  /  AlkPhos  165<H>      LIVER FUNCTIONS - ( 2018 08:09 )  Alb: 3.1 g/dL / Pro: 6.9 g/dL / ALK PHOS: 165 U/L / ALT: 35 U/L / AST: 116 U/L / GGT: x             Urinalysis Basic - ( 2018 09:00 )    Color: Dark Yellow / Appearance: Cloudy / S.020 / pH: x  Gluc: x / Ketone: Negative  / Bili: Negative / Urobili: 0.2 mg/dL   Blood: x / Protein: 30 mg/dL / Nitrite: Negative   Leuk Esterase: Negative / RBC: 11-25 /HPF / WBC x   Sq Epi: x / Non Sq Epi: x / Bacteria: x          RADIOLOGY & ADDITIONAL TESTS:    ANTIBIOTICS:  cefTRIAXone   IVPB      cefTRIAXone   IVPB 2 Gram(s) IV Intermittent every 24 hours

## 2018-11-18 NOTE — PROGRESS NOTE ADULT - SUBJECTIVE AND OBJECTIVE BOX
ROSALIE ESCOBEDO  63y  Male      Patient is a 63y old  Male who presents with a chief complaint of lower extremities weakness (2018 05:57)      INTERVAL HPI/OVERNIGHT EVENTS:      ******************************* REVIEW OF SYSTEMS:**********************************************    Feels better with leg weakness.  All other review of systems negative    *********************** VITALS ******************************************    T(F): 97.3 (18 @ 05:53)  HR: 82 (18 @ 05:53) (82 - 85)  BP: 133/69 (18 @ 05:53) (125/59 - 134/66)  RR: 18 (18 @ 05:53) (18 - 18)  SpO2: 94% (18 @ 07:18) (94% - 94%)    18 @ 07:01  -  18 @ 07:00  --------------------------------------------------------  IN: 1000 mL / OUT: 2800 mL / NET: -1800 mL            18 @ 07:01  -  18 @ 07:00  --------------------------------------------------------  IN: 1000 mL / OUT: 2800 mL / NET: -1800 mL        ******************************** PHYSICAL EXAM:**************************************************  GENERAL: NAD    PSYCH: no agitation, baseline mentation  HEENT:     NERVOUS SYSTEM:  Alert & Oriented X3, MS  4/5 B/L  LE b/l    PULMONARY: CASPER, CTA    CARDIOVASCULAR: S1S2 RRR    GI: Soft, NT, ND; BS present.    EXTREMITIES:  left foot wound     LYMPH: No lymphadenopathy noted    SKIN: as above    ******************************************************************************************    Consultant(s) Notes Reviewed:  [x ] YES  [ ] NO    Discussed with Consultants/Other Providers [ x] YES     **************************** LABS *******************************************************                          8.1    13.84 )-----------( 260      ( 2018 07:16 )             25.4         139  |  101  |  22<H>  ----------------------------<  80  4.0   |  23  |  0.8    Ca    8.2<L>      2018 07:16  Phos  3.0       Mg     2.0         TPro  6.9  /  Alb  3.1<L>  /  TBili  0.5  /  DBili  x   /  AST  116<H>  /  ALT  35  /  AlkPhos  165<H>        Urinalysis Basic - ( 2018 09:00 )    Color: Dark Yellow / Appearance: Cloudy / S.020 / pH: x  Gluc: x / Ketone: Negative  / Bili: Negative / Urobili: 0.2 mg/dL   Blood: x / Protein: 30 mg/dL / Nitrite: Negative   Leuk Esterase: Negative / RBC: 11-25 /HPF / WBC x   Sq Epi: x / Non Sq Epi: x / Bacteria: x      PT/INR - ( 2018 15:15 )   PT: 12.80 sec;   INR: 1.11 ratio         PTT - ( 2018 15:15 )  PTT:22.5 sec  Lactate Trend    CARDIAC MARKERS ( 2018 07:16 )  x     / x     / 1545 U/L / x     / x      CARDIAC MARKERS ( 2018 08:09 )  x     / x     / 5380 U/L / x     / x      CARDIAC MARKERS ( 2018 15:15 )  x     / 0.01 ng/mL / >2000 U/L / x     / x          CAPILLARY BLOOD GLUCOSE      POCT Blood Glucose.: 103 mg/dL (2018 08:10)          **************************Active Medications *******************************************  Cipro (Unknown)  IV contrast (Short breath)  Keflex (Unknown)      aspirin  chewable 81 milliGRAM(s) Oral daily  atorvastatin 40 milliGRAM(s) Oral at bedtime  BACItracin   Ointment 1 Application(s) Topical daily  cefTRIAXone   IVPB      cefTRIAXone   IVPB 2 Gram(s) IV Intermittent every 24 hours  chlorhexidine 4% Liquid 1 Application(s) Topical <User Schedule>  clopidogrel Tablet 75 milliGRAM(s) Oral daily  dextrose 40% Gel 15 Gram(s) Oral once PRN  dextrose 5%. 1000 milliLiter(s) IV Continuous <Continuous>  dextrose 50% Injectable 12.5 Gram(s) IV Push once  dextrose 50% Injectable 25 Gram(s) IV Push once  dextrose 50% Injectable 25 Gram(s) IV Push once  docusate sodium 100 milliGRAM(s) Oral daily  glucagon  Injectable 1 milliGRAM(s) IntraMuscular once PRN  heparin  Injectable 5000 Unit(s) SubCutaneous every 8 hours  insulin glargine Injectable (LANTUS) 27 Unit(s) SubCutaneous at bedtime  insulin lispro Injectable (HumaLOG) 6 Unit(s) SubCutaneous three times a day before meals  metoprolol succinate ER 25 milliGRAM(s) Oral daily  morphine  - Injectable 2 milliGRAM(s) IV Push every 6 hours PRN  polyethylene glycol 3350 17 Gram(s) Oral daily  pregabalin 100 milliGRAM(s) Oral two times a day  senna 1 Tablet(s) Oral at bedtime  senna 1 Tablet(s) Oral daily  sodium chloride 0.9%. 1000 milliLiter(s) IV Continuous <Continuous>  traMADol 50 milliGRAM(s) Oral every 12 hours PRN      ***************************************************  RADIOLOGY & ADDITIONAL TESTS:    Imaging Personally Reviewed:  [ ] YES  [ ] NO    HEALTH ISSUES - PROBLEM Dx:

## 2018-11-18 NOTE — CONSULT NOTE ADULT - ASSESSMENT
IMPRESSION:  Foot wounds look remarkably clean  Chronic OM secondary to gr B strep    RECOMMENDATIONS:  Rocephin 2 gm iv q24h  Will discuss with podiatry

## 2018-11-18 NOTE — PROGRESS NOTE ADULT - SUBJECTIVE AND OBJECTIVE BOX
PODIATRY PROGRESS NOTE   [id number] ROSALIE ESCOBEDO is a pleasant well-nourished, well developed 63y Male in no acute distress, alert awake, and oriented to person, place and time.   Patient is a 63y old  Male who presents with a chief complaint of lower extremities weakness (18 Nov 2018 10:08)    HPI:  63y male with PMH of CAD, NSTEMI s/p CABG (5 vessel according to patient; March 2016); DM with peripheral neuropathy, Left DFU, HTN, HLD and recently admitted for worsening chronic left foot ulcer s/p excisional debridement by podiatry and recent T11-T12 vertebral fracture discharged on TLSO brace / conservative management with home care presents for worsening b/l LE weakness. this morning while sitting in the chair, he acutely felt numbness in his legs, from knees below, worse than baseline so he tried to get up buthis legs gave out and he fell to floor and could not get up until his daughter came at 1pm and called fire department (4 hours later).  he could not go to the bathroom so he was full of urine. he denies head trauma or LOC.  Pt admits to mid back pain since fracture, that is worse now    in the ED code stroke was called CT head negative for acute infarct; he was not candidate for thrombolysis (> 4.5 hours). (16 Nov 2018 21:08)    pt was seen and assessed am rounds at bedside.  pt denies any n/v/f/c/sob      Vital Signs Last 24 Hrs  T(C): 36.3 (18 Nov 2018 05:53), Max: 36.8 (17 Nov 2018 20:45)  T(F): 97.3 (18 Nov 2018 05:53), Max: 98.2 (17 Nov 2018 20:45)  HR: 82 (18 Nov 2018 05:53) (82 - 85)  BP: 133/69 (18 Nov 2018 05:53) (125/59 - 134/66)  BP(mean): --  RR: 18 (18 Nov 2018 05:53) (18 - 18)  SpO2: 94% (18 Nov 2018 07:18) (94% - 94%)                        8.1    13.84 )-----------( 260      ( 18 Nov 2018 07:16 )             25.4                 11-18    139  |  101  |  22<H>  ----------------------------<  80  4.0   |  23  |  0.8    Ca    8.2<L>      18 Nov 2018 07:16  Phos  3.0     11-17  Mg     2.0     11-17    TPro  6.9  /  Alb  3.1<L>  /  TBili  0.5  /  DBili  x   /  AST  116<H>  /  ALT  35  /  AlkPhos  165<H>  11-17  O:  Derm: Dorsal and plantar surgical wounds are partially closed with sutures intact.  Surgical margins of the wounds healthy, clean, and no ischemic changes  base of wounds with increased granulation tissue   no purulent drainage with expression of sites  no edema no calor  no dolor no rubor no mal odor  Vascular: DP/PT pulses 1/4 B/L , Skin temp warm to cool,  proximal to distal B/L, CFT < 3 sec B/L  Neuro: Gross sensation diminished to level of digits B/L      A:   S/p Day #8 debridement of soft tissue and bone foot, stable, no signs of infection  P:  Pt examined @ bedside   Wound flushed with saline, applied iodoform packing/betadien/DSD/ABD/kerlix/ACE   Continue ABx as per ID  podiatry will follow while in house.  Will follow up with attendings recommendations  no surgical planing per Podiatry      11-18-18 @ 12:51 PODIATRY PROGRESS NOTE   [id number] ROSALIE ESCOBEDO is a pleasant well-nourished, well developed 63y Male in no acute distress, alert awake, and oriented to person, place and time.   Patient is a 63y old  Male who presents with a chief complaint of lower extremities weakness (18 Nov 2018 10:08)    HPI:  63y male with PMH of CAD, NSTEMI s/p CABG (5 vessel according to patient; March 2016); DM with peripheral neuropathy, Left DFU, HTN, HLD and recently admitted for worsening chronic left foot ulcer s/p excisional debridement by podiatry and recent T11-T12 vertebral fracture discharged on TLSO brace / conservative management with home care presents for worsening b/l LE weakness. this morning while sitting in the chair, he acutely felt numbness in his legs, from knees below, worse than baseline so he tried to get up buthis legs gave out and he fell to floor and could not get up until his daughter came at 1pm and called fire department (4 hours later).  he could not go to the bathroom so he was full of urine. he denies head trauma or LOC.  Pt admits to mid back pain since fracture, that is worse now    in the ED code stroke was called CT head negative for acute infarct; he was not candidate for thrombolysis (> 4.5 hours). (16 Nov 2018 21:08)    pt was seen and assessed am rounds at bedside.  pt denies any n/v/f/c/sob      Vital Signs Last 24 Hrs  T(C): 36.3 (18 Nov 2018 05:53), Max: 36.8 (17 Nov 2018 20:45)  T(F): 97.3 (18 Nov 2018 05:53), Max: 98.2 (17 Nov 2018 20:45)  HR: 82 (18 Nov 2018 05:53) (82 - 85)  BP: 133/69 (18 Nov 2018 05:53) (125/59 - 134/66)  BP(mean): --  RR: 18 (18 Nov 2018 05:53) (18 - 18)  SpO2: 94% (18 Nov 2018 07:18) (94% - 94%)                        8.1    13.84 )-----------( 260      ( 18 Nov 2018 07:16 )             25.4                 11-18    139  |  101  |  22<H>  ----------------------------<  80  4.0   |  23  |  0.8    Ca    8.2<L>      18 Nov 2018 07:16  Phos  3.0     11-17  Mg     2.0     11-17    TPro  6.9  /  Alb  3.1<L>  /  TBili  0.5  /  DBili  x   /  AST  116<H>  /  ALT  35  /  AlkPhos  165<H>  11-17  O:  Derm: Dorsal and plantar surgical wounds are partially closed with sutures intact.  Surgical margins of the wounds healthy, clean, and no ischemic changes  base of wounds with increased granulation tissue   no purulent drainage with expression of sites  no edema no calor  no dolor no rubor no mal odor  Vascular: DP/PT pulses 1/4 B/L , Skin temp warm to cool,  proximal to distal B/L, CFT < 3 sec B/L  Neuro: Gross sensation diminished to level of digits B/L      A:   S/p Day #8 debridement of soft tissue and bone left foot, stable, no signs of infection  P:  Pt examined @ bedside   Wound flushed with saline, applied iodoform packing/betadien/DSD/ABD/kerlix/ACE   Continue ABx as per ID  podiatry will follow while in house.  Will follow up with attendings recommendations  no surgical planing per Podiatry      11-18-18 @ 12:51

## 2018-11-19 LAB
ANION GAP SERPL CALC-SCNC: 14 MMOL/L — SIGNIFICANT CHANGE UP (ref 7–14)
BUN SERPL-MCNC: 15 MG/DL — SIGNIFICANT CHANGE UP (ref 10–20)
CALCIUM SERPL-MCNC: 8.2 MG/DL — LOW (ref 8.5–10.1)
CHLORIDE SERPL-SCNC: 102 MMOL/L — SIGNIFICANT CHANGE UP (ref 98–110)
CK SERPL-CCNC: 821 U/L — HIGH (ref 0–225)
CO2 SERPL-SCNC: 23 MMOL/L — SIGNIFICANT CHANGE UP (ref 17–32)
CREAT SERPL-MCNC: 0.8 MG/DL — SIGNIFICANT CHANGE UP (ref 0.7–1.5)
GLUCOSE BLDC GLUCOMTR-MCNC: 118 MG/DL — HIGH (ref 70–99)
GLUCOSE BLDC GLUCOMTR-MCNC: 126 MG/DL — HIGH (ref 70–99)
GLUCOSE BLDC GLUCOMTR-MCNC: 84 MG/DL — SIGNIFICANT CHANGE UP (ref 70–99)
GLUCOSE BLDC GLUCOMTR-MCNC: 99 MG/DL — SIGNIFICANT CHANGE UP (ref 70–99)
GLUCOSE SERPL-MCNC: 80 MG/DL — SIGNIFICANT CHANGE UP (ref 70–99)
HCT VFR BLD CALC: 24.7 % — LOW (ref 42–52)
HGB BLD-MCNC: 7.8 G/DL — LOW (ref 14–18)
MCHC RBC-ENTMCNC: 23.1 PG — LOW (ref 27–31)
MCHC RBC-ENTMCNC: 31.6 G/DL — LOW (ref 32–37)
MCV RBC AUTO: 73.1 FL — LOW (ref 80–94)
NRBC # BLD: 0 /100 WBCS — SIGNIFICANT CHANGE UP (ref 0–0)
PLATELET # BLD AUTO: 222 K/UL — SIGNIFICANT CHANGE UP (ref 130–400)
POTASSIUM SERPL-MCNC: 3.9 MMOL/L — SIGNIFICANT CHANGE UP (ref 3.5–5)
POTASSIUM SERPL-SCNC: 3.9 MMOL/L — SIGNIFICANT CHANGE UP (ref 3.5–5)
RBC # BLD: 3.38 M/UL — LOW (ref 4.7–6.1)
RBC # FLD: 17.3 % — HIGH (ref 11.5–14.5)
SODIUM SERPL-SCNC: 139 MMOL/L — SIGNIFICANT CHANGE UP (ref 135–146)
WBC # BLD: 11.81 K/UL — HIGH (ref 4.8–10.8)
WBC # FLD AUTO: 11.81 K/UL — HIGH (ref 4.8–10.8)

## 2018-11-19 RX ORDER — PREGABALIN 225 MG/1
1000 CAPSULE ORAL DAILY
Qty: 0 | Refills: 0 | Status: DISCONTINUED | OUTPATIENT
Start: 2018-11-19 | End: 2018-11-20

## 2018-11-19 RX ORDER — FOLIC ACID 0.8 MG
1 TABLET ORAL DAILY
Qty: 0 | Refills: 0 | Status: DISCONTINUED | OUTPATIENT
Start: 2018-11-19 | End: 2018-11-20

## 2018-11-19 RX ORDER — THIAMINE MONONITRATE (VIT B1) 100 MG
100 TABLET ORAL DAILY
Qty: 0 | Refills: 0 | Status: DISCONTINUED | OUTPATIENT
Start: 2018-11-19 | End: 2018-11-20

## 2018-11-19 RX ORDER — SODIUM CHLORIDE 9 MG/ML
1000 INJECTION INTRAMUSCULAR; INTRAVENOUS; SUBCUTANEOUS
Qty: 0 | Refills: 0 | Status: DISCONTINUED | OUTPATIENT
Start: 2018-11-19 | End: 2018-11-19

## 2018-11-19 RX ADMIN — Medication 100 MILLIGRAM(S): at 12:35

## 2018-11-19 RX ADMIN — Medication 100 MILLIGRAM(S): at 05:16

## 2018-11-19 RX ADMIN — MORPHINE SULFATE 2 MILLIGRAM(S): 50 CAPSULE, EXTENDED RELEASE ORAL at 01:04

## 2018-11-19 RX ADMIN — CLOPIDOGREL BISULFATE 75 MILLIGRAM(S): 75 TABLET, FILM COATED ORAL at 12:35

## 2018-11-19 RX ADMIN — Medication 1 APPLICATION(S): at 12:34

## 2018-11-19 RX ADMIN — Medication 6 UNIT(S): at 12:33

## 2018-11-19 RX ADMIN — POLYETHYLENE GLYCOL 3350 17 GRAM(S): 17 POWDER, FOR SOLUTION ORAL at 12:35

## 2018-11-19 RX ADMIN — Medication 40 MILLIGRAM(S): at 14:32

## 2018-11-19 RX ADMIN — MORPHINE SULFATE 2 MILLIGRAM(S): 50 CAPSULE, EXTENDED RELEASE ORAL at 01:20

## 2018-11-19 RX ADMIN — SODIUM CHLORIDE 100 MILLILITER(S): 9 INJECTION INTRAMUSCULAR; INTRAVENOUS; SUBCUTANEOUS at 09:36

## 2018-11-19 RX ADMIN — Medication 81 MILLIGRAM(S): at 12:35

## 2018-11-19 RX ADMIN — MORPHINE SULFATE 2 MILLIGRAM(S): 50 CAPSULE, EXTENDED RELEASE ORAL at 12:45

## 2018-11-19 RX ADMIN — ATORVASTATIN CALCIUM 40 MILLIGRAM(S): 80 TABLET, FILM COATED ORAL at 21:41

## 2018-11-19 RX ADMIN — SENNA PLUS 1 TABLET(S): 8.6 TABLET ORAL at 12:35

## 2018-11-19 RX ADMIN — SIMETHICONE 80 MILLIGRAM(S): 80 TABLET, CHEWABLE ORAL at 05:12

## 2018-11-19 RX ADMIN — HEPARIN SODIUM 5000 UNIT(S): 5000 INJECTION INTRAVENOUS; SUBCUTANEOUS at 05:12

## 2018-11-19 RX ADMIN — Medication 25 MILLIGRAM(S): at 05:12

## 2018-11-19 RX ADMIN — SODIUM CHLORIDE 125 MILLILITER(S): 9 INJECTION INTRAMUSCULAR; INTRAVENOUS; SUBCUTANEOUS at 04:10

## 2018-11-19 RX ADMIN — SIMETHICONE 80 MILLIGRAM(S): 80 TABLET, CHEWABLE ORAL at 17:35

## 2018-11-19 RX ADMIN — HEPARIN SODIUM 5000 UNIT(S): 5000 INJECTION INTRAVENOUS; SUBCUTANEOUS at 21:41

## 2018-11-19 RX ADMIN — Medication 100 MILLIGRAM(S): at 17:32

## 2018-11-19 RX ADMIN — CHLORHEXIDINE GLUCONATE 1 APPLICATION(S): 213 SOLUTION TOPICAL at 05:12

## 2018-11-19 RX ADMIN — HEPARIN SODIUM 5000 UNIT(S): 5000 INJECTION INTRAVENOUS; SUBCUTANEOUS at 14:33

## 2018-11-19 RX ADMIN — PREGABALIN 1000 MICROGRAM(S): 225 CAPSULE ORAL at 12:35

## 2018-11-19 RX ADMIN — CEFTRIAXONE 100 GRAM(S): 500 INJECTION, POWDER, FOR SOLUTION INTRAMUSCULAR; INTRAVENOUS at 05:12

## 2018-11-19 RX ADMIN — Medication 6 UNIT(S): at 17:33

## 2018-11-19 RX ADMIN — Medication 1 MILLIGRAM(S): at 12:36

## 2018-11-19 NOTE — CONSULT NOTE ADULT - CONSULT REASON
Bilateral LE weakness
Low back pain, difficulty walking
PARAM FUENTESU
fall le weakness LBP
s/p debridement
Stroke Code

## 2018-11-19 NOTE — PHYSICAL THERAPY INITIAL EVALUATION ADULT - ADDITIONAL COMMENTS
patient reports 3 steps to enter building, patient reports he has TLSO brace at home but has not been using it

## 2018-11-19 NOTE — PROGRESS NOTE ADULT - SUBJECTIVE AND OBJECTIVE BOX
Images of MRI were reviewed with Dr. Parker.   < from: MR Thoracic Spine No Cont (11.16.18 @ 21:07) >  IMPRESSION:    1.  Minimal residual bone marrow edema of the anterior inferior endplate   of T11 with minimal extension of edema along the anterior T11-12   intervertebral disc, previously reported fracture on CT scan dated   9/26/2018.     2.  Slightly exaggerated kyphosis of the thoracic spine.    3.  No cord compression, expansion or signal abnormality.     4.  No acute compression fracture deformities.    < end of copied text >      < from: MR Thoracic Spine No Cont (11.16.18 @ 21:07) >  IMPRESSION:     1.  Diffuse disc bulge at L4-5 with superimposed left paracentral neural   foraminal disc protrusion resulting in mass effect on left ventral thecal   sac and position to irritate the left L5 nerve root with mild to moderate   left greater right neuroforaminal narrowing.    2.  Diffuse disc bulge at L5-S1 with superimposed posterior central disc   protrusion resulting in effacement of the ventral epidural fat and   moderate to severe bilateral neuroforaminal narrowing.    3.  Multilevel diffuse disc bulges T12-L1 through L 34 resulting in   flattening of ventral thecal sac and mild spinal canal stenosis and no   significant neuroforaminal narrowing.    4.  Disc desiccation throughout the lumbar spine worse at L5-S1.    ROJAS URBAN M.D., RESIDENT RADIOLOGIST  This document has been electronically signed.  JOHN VELASQUEZ, ATTENDING RADIOLOGIST  This document has been electronically signed. Nov 19 2018 11:04AM    < end of copied text >        Plan per Dr. Parker:  No acute neurosurgical intervention  pain management- pain control, possible injection  medical care  physical therapy/rehab eval  cont podiatry follow up  can follow up with Dr. Parker as outpatient [phone]  d/w attending

## 2018-11-19 NOTE — CONSULT NOTE ADULT - ASSESSMENT
IMPRESSION: Rehab of gait ab falls Rhabdo, Diabetic neuropathy LBP T11 comp Fx / LLE radic    PRECAUTIONS: [  x  ] Cardiac  [    ] Respiratory  [    ] Seizures [    ] Contact Isolation  [    ] Droplet Isolation  [    ] Other    Weight Bearing Status: WBAT BLES WITH DARCOS?    RECOMMENDATION: PAIN MANAGEMENT- OOB WITH TLSO    Out of Bed to Chair     DVT/Decubiti Prophylaxis    REHAB PLAN:     [    x ] Bedside P/T 3-5 times a week   [     ] Bedside O/T  2-3 times a week   [     ] No Rehab Therapy Indicated   [     ]  Speech Therapy   Conditioning/ROM                                 ADL  Bed Mobility                                            Conditioning/ROM  Transfers                                                  Bed Mobility  Sitting /Standing Balance                      Transfers                                        Gait Training                                            Sitting/Standing Balance  Stair Training [  x ]Applicable                 Home equipment Eval                                                                     Splinting  [   ] Only      GOALS:   ADL   [   x ]   Independent         Transfers  [x    ] Independent            Ambulation  [  x   ] Independent     [ x    ] With device                            [    ]  CG                                               [    ]  CG                                                    [     ] CG                            [    ] Min A                                          [    ] Min A                                                [     ] Min  A          DISCHARGE PLAN:   [   x  ]  Good candidate for Intensive Rehabilitation/Hospital based                                             Will tolerate 3hrs Intensive Rehab Daily                                       [      ]  Short Term Rehab in Skilled Nursing Facility                                       [      ]  Home with Outpatient or  services                                         [      ]  Possible Candidate for Intensive Hospital based Rehab

## 2018-11-19 NOTE — CONSULT NOTE ADULT - REASON FOR ADMISSION
LE weakness
lower extremities weakness

## 2018-11-19 NOTE — PROGRESS NOTE ADULT - SUBJECTIVE AND OBJECTIVE BOX
ROSALIE ESCOBEDO 63y Male  MRN#: [id number]     SUBJECTIVE  Patient is a 63y old Male who presents with a chief complaint of lower extremity weakness  Currently admitted to medicine with the primary diagnosis of peripheral neuropathy  Today is hospital day 3d, and this morning he reports no overnight events.     OBJECTIVE  PAST MEDICAL & SURGICAL HISTORY  CAD (coronary artery disease)  Dyslipidemia  Diabetic foot ulcer  PVD (peripheral vascular disease)  Diabetes  Other hyperlipidemia  Hypertension, unspecified type  Amputated toe, unspecified laterality  S/P CABG (coronary artery bypass graft)    ALLERGIES:  Cipro (Unknown)  IV contrast (Short breath)  Keflex (Unknown)    MEDICATIONS:  STANDING MEDICATIONS  aspirin  chewable 81 milliGRAM(s) Oral daily  atorvastatin 40 milliGRAM(s) Oral at bedtime  BACItracin   Ointment 1 Application(s) Topical daily  cefTRIAXone   IVPB      cefTRIAXone   IVPB 2 Gram(s) IV Intermittent every 24 hours  chlorhexidine 4% Liquid 1 Application(s) Topical <User Schedule>  clopidogrel Tablet 75 milliGRAM(s) Oral daily  cyanocobalamin 1000 MICROGram(s) Oral daily  dextrose 5%. 1000 milliLiter(s) IV Continuous <Continuous>  dextrose 50% Injectable 12.5 Gram(s) IV Push once  dextrose 50% Injectable 25 Gram(s) IV Push once  dextrose 50% Injectable 25 Gram(s) IV Push once  docusate sodium 100 milliGRAM(s) Oral daily  folic acid 1 milliGRAM(s) Oral daily  heparin  Injectable 5000 Unit(s) SubCutaneous every 8 hours  insulin glargine Injectable (LANTUS) 27 Unit(s) SubCutaneous at bedtime  insulin lispro Injectable (HumaLOG) 6 Unit(s) SubCutaneous three times a day before meals  metoprolol succinate ER 25 milliGRAM(s) Oral daily  polyethylene glycol 3350 17 Gram(s) Oral daily  predniSONE   Tablet 40 milliGRAM(s) Oral daily  pregabalin 100 milliGRAM(s) Oral two times a day  senna 1 Tablet(s) Oral at bedtime  senna 1 Tablet(s) Oral daily  simethicone 80 milliGRAM(s) Chew two times a day  sodium chloride 0.9%. 1000 milliLiter(s) IV Continuous <Continuous>  thiamine 100 milliGRAM(s) Oral daily    PRN MEDICATIONS  dextrose 40% Gel 15 Gram(s) Oral once PRN  glucagon  Injectable 1 milliGRAM(s) IntraMuscular once PRN  morphine  - Injectable 2 milliGRAM(s) IV Push every 6 hours PRN  traMADol 50 milliGRAM(s) Oral every 12 hours PRN      VITAL SIGNS: Last 24 Hours  T(C): 36.6 (19 Nov 2018 14:38), Max: 37.1 (18 Nov 2018 20:57)  T(F): 97.8 (19 Nov 2018 14:38), Max: 98.7 (18 Nov 2018 20:57)  HR: 65 (19 Nov 2018 14:38) (65 - 70)  BP: 133/69 (19 Nov 2018 14:38) (133/69 - 145/68)  BP(mean): --  RR: 18 (19 Nov 2018 14:38) (17 - 18)  SpO2: --    LABS:                        7.8    11.81 )-----------( 222      ( 19 Nov 2018 06:09 )             24.7     11-19    139  |  102  |  15  ----------------------------<  80  3.9   |  23  |  0.8    Ca    8.2<L>      19 Nov 2018 06:09            Creatine Kinase, Serum: 821 U/L <H> (11-19-18 @ 10:20)      CARDIAC MARKERS ( 19 Nov 2018 10:20 )  x     / x     / 821 U/L / x     / x      CARDIAC MARKERS ( 18 Nov 2018 07:16 )  x     / x     / 1545 U/L / x     / x          RADIOLOGY:    < from: MR Lumbar Spine No Cont (11.16.18 @ 21:08) >  1.  Diffuse disc bulge at L4-5 with superimposed left paracentral neural   foraminal disc protrusion resulting in mass effect on left ventral thecal   sac and position to irritate the left L5 nerve root with mild to moderate   left greater right neuroforaminal narrowing.    2.  Diffuse disc bulge at L5-S1 with superimposed posterior central disc   protrusion resulting in effacement of the ventral epidural fat and   moderate to severe bilateral neuroforaminal narrowing.    3.  Multilevel diffuse disc bulges T12-L1 through L 34 resulting in   flattening of ventral thecal sac and mild spinal canal stenosis and no   significant neuroforaminal narrowing.    4.  Disc desiccation throughout the lumbar spine worse at L5-S1.      < end of copied text >    < from: CT Brain Stroke Protocol (11.16.18 @ 15:23) >  	1.  No evidence of acute intracranial hemorrhage or large territory   	infarct.  Stable exam since 9/26/2018.  	2.  Stable mild chronic microvascular changes and small chronic infarct   	in the left frontal lobe.  	< end of copied text >    	< from: MR Thoracic Spine No Cont (11.07.18 @ 16:39) >  	IMPRESSION:  	As correlated with the CT chest of 9/26/2018:  	1.  Residual bone marrow edema at the anterior aspect of the T11 inferior   	endplate corresponding to fracture seen on CT. Minimal extension of   	edema/fracture into the T11-12 disc space.  	2.  No evidence of spinal cord compression or cord edema.  	3.  Minimal degenerative changes.  < end of copied text >    PHYSICAL EXAM:    GENERAL: NAD, well-developed, AAOx3  HEENT:  Atraumatic, Normocephalic. EOMI, PERRLA, conjunctiva and sclera clear, No JVD  PULMONARY: Clear to auscultation bilaterally; No wheeze  CARDIOVASCULAR: Regular rate and rhythm; No murmurs, rubs, or gallops  GASTROINTESTINAL: Soft, Nontender, Nondistended; Bowel sounds present  MUSCULOSKELETAL:  2+ Peripheral Pulses, No clubbing, cyanosis, or edema  NEUROLOGY: non-focal  SKIN: No rashes or lesions      ADMISSION SUMMARY  Patient is a 63y old Male who presents with a chief complaint of lower extremities weakness   Currently admitted to medicine with the primary diagnosis of Peripheral neuropathy      ASSESSMENT & PLAN    63y male with PMH of CAD, NSTEMI s/p CABG (5 vessel according to patient; March 2016); DM with peripheral neuropathy, Left DFU, HTN, HLD and recently admitted for worsening chronic left foot ulcer s/p excisional debridement by podiatry and recent T11-T12 vertebral fracture discharged on TLSO brace / conservative management with home care presents for worsening b/l LE weakness.    #) Bilateral lower extremities numbness and weakness   -Neurosurgery and neurology note appreciated  -MRI spine done no cord compression  -f/u MRI brain  -Patient still has weakness in bilateral lower extremities  -CT head negative for acute stroke.  -CT angiogram to be done once SAKINA improves  -pain control  -PT/rehab  -TLSO brace    #)SAKINA, mild rhabdomyolysis  -CK elevated  -IV hydration NS increased to 125cc/hr  -follow up CK in am and BMP    #) Osteomyelitis of the left foot s/p excisional debridement   - as per last ID note, the insurance would not cover IV atbx so the patient was discharged on po augmentin with plan to follow up in clinic  -resume [id number] q24h  -follow up ID for duration of atbx  -11/6 culture GBS  -11/10 OR Necrotic soft tissue, extensive soft tissue scarring and osteomyelitic bone, OR cx No growth     #) CAD s/p CABG  - c/w ASA, plavix, metoprolol and statin    #) HTN  - c/w metoprolol     #) DM  - monitor finger stick  - recent hba1c =7  - last in hospital regimen 27/6-6-6    #) Diet   - DASH, Carb consistent    #) DVT ppx  -heparin 5000IU s/c q 8 hours    #) Activity  - out of bed to chair with assistance    #) Disposition  - from home, might need NH this time    #) Full code status ROSALIE ESCOBEDO 63y Male  MRN#: [id number]     SUBJECTIVE  Patient is a 63y old Male who presents with a chief complaint of lower extremity weakness  Currently admitted to medicine with the primary diagnosis of peripheral neuropathy  Today is hospital day 3d, and this morning he reports no overnight events.     OBJECTIVE  PAST MEDICAL & SURGICAL HISTORY  CAD (coronary artery disease)  Dyslipidemia  Diabetic foot ulcer  PVD (peripheral vascular disease)  Diabetes  Other hyperlipidemia  Hypertension, unspecified type  Amputated toe, unspecified laterality  S/P CABG (coronary artery bypass graft)    ALLERGIES:  Cipro (Unknown)  IV contrast (Short breath)  Keflex (Unknown)    MEDICATIONS:  STANDING MEDICATIONS  aspirin  chewable 81 milliGRAM(s) Oral daily  atorvastatin 40 milliGRAM(s) Oral at bedtime  BACItracin   Ointment 1 Application(s) Topical daily  cefTRIAXone   IVPB      cefTRIAXone   IVPB 2 Gram(s) IV Intermittent every 24 hours  chlorhexidine 4% Liquid 1 Application(s) Topical <User Schedule>  clopidogrel Tablet 75 milliGRAM(s) Oral daily  cyanocobalamin 1000 MICROGram(s) Oral daily  dextrose 5%. 1000 milliLiter(s) IV Continuous <Continuous>  dextrose 50% Injectable 12.5 Gram(s) IV Push once  dextrose 50% Injectable 25 Gram(s) IV Push once  dextrose 50% Injectable 25 Gram(s) IV Push once  docusate sodium 100 milliGRAM(s) Oral daily  folic acid 1 milliGRAM(s) Oral daily  heparin  Injectable 5000 Unit(s) SubCutaneous every 8 hours  insulin glargine Injectable (LANTUS) 27 Unit(s) SubCutaneous at bedtime  insulin lispro Injectable (HumaLOG) 6 Unit(s) SubCutaneous three times a day before meals  metoprolol succinate ER 25 milliGRAM(s) Oral daily  polyethylene glycol 3350 17 Gram(s) Oral daily  predniSONE   Tablet 40 milliGRAM(s) Oral daily  pregabalin 100 milliGRAM(s) Oral two times a day  senna 1 Tablet(s) Oral at bedtime  senna 1 Tablet(s) Oral daily  simethicone 80 milliGRAM(s) Chew two times a day  sodium chloride 0.9%. 1000 milliLiter(s) IV Continuous <Continuous>  thiamine 100 milliGRAM(s) Oral daily    PRN MEDICATIONS  dextrose 40% Gel 15 Gram(s) Oral once PRN  glucagon  Injectable 1 milliGRAM(s) IntraMuscular once PRN  morphine  - Injectable 2 milliGRAM(s) IV Push every 6 hours PRN  traMADol 50 milliGRAM(s) Oral every 12 hours PRN      VITAL SIGNS: Last 24 Hours  T(C): 36.6 (19 Nov 2018 14:38), Max: 37.1 (18 Nov 2018 20:57)  T(F): 97.8 (19 Nov 2018 14:38), Max: 98.7 (18 Nov 2018 20:57)  HR: 65 (19 Nov 2018 14:38) (65 - 70)  BP: 133/69 (19 Nov 2018 14:38) (133/69 - 145/68)  BP(mean): --  RR: 18 (19 Nov 2018 14:38) (17 - 18)  SpO2: --    LABS:                        7.8    11.81 )-----------( 222      ( 19 Nov 2018 06:09 )             24.7     11-19    139  |  102  |  15  ----------------------------<  80  3.9   |  23  |  0.8    Ca    8.2<L>      19 Nov 2018 06:09      Creatine Kinase, Serum: 821 U/L <H> (11-19-18 @ 10:20)      CARDIAC MARKERS ( 19 Nov 2018 10:20 )  x     / x     / 821 U/L / x     / x      CARDIAC MARKERS ( 18 Nov 2018 07:16 )  x     / x     / 1545 U/L / x     / x          RADIOLOGY:    < from: MR Lumbar Spine No Cont (11.16.18 @ 21:08) >  1.  Diffuse disc bulge at L4-5 with superimposed left paracentral neural   foraminal disc protrusion resulting in mass effect on left ventral thecal   sac and position to irritate the left L5 nerve root with mild to moderate   left greater right neuroforaminal narrowing.    2.  Diffuse disc bulge at L5-S1 with superimposed posterior central disc   protrusion resulting in effacement of the ventral epidural fat and   moderate to severe bilateral neuroforaminal narrowing.    3.  Multilevel diffuse disc bulges T12-L1 through L 34 resulting in   flattening of ventral thecal sac and mild spinal canal stenosis and no   significant neuroforaminal narrowing.    4.  Disc desiccation throughout the lumbar spine worse at L5-S1.      < end of copied text >      < from: MR Head No Cont (11.18.18 @ 10:34) >  1.  Scattered T2 and FLAIR hyperintensities periventricular and   subcortical white matter which are nonspecific and without mass effect   most likely consistent with chronic small vessel ischemic changes.    2.  Old small left frontal cortical infarct and multiple old lacunar   infarcts as described above.    3.  No acute infarcts or intracranial hemorrhage.    < end of copied text >      < from: CT Brain Stroke Protocol (11.16.18 @ 15:23) >  	1.  No evidence of acute intracranial hemorrhage or large territory   	infarct.  Stable exam since 9/26/2018.  	2.  Stable mild chronic microvascular changes and small chronic infarct   	in the left frontal lobe.  	< end of copied text >    	< from: MR Thoracic Spine No Cont (11.07.18 @ 16:39) >  	IMPRESSION:  	As correlated with the CT chest of 9/26/2018:  	1.  Residual bone marrow edema at the anterior aspect of the T11 inferior   	endplate corresponding to fracture seen on CT. Minimal extension of   	edema/fracture into the T11-12 disc space.  	2.  No evidence of spinal cord compression or cord edema.  	3.  Minimal degenerative changes.  < end of copied text >    PHYSICAL EXAM:    GENERAL: NAD, well-developed, AAOx3  HEENT:  Atraumatic, Normocephalic. EOMI, PERRLA, conjunctiva and sclera clear, No JVD  PULMONARY: Clear to auscultation bilaterally; No wheeze  CARDIOVASCULAR: Regular rate and rhythm; No murmurs, rubs, or gallops  GASTROINTESTINAL: Soft, Nontender, Nondistended; Bowel sounds present  MUSCULOSKELETAL:  2+ Peripheral Pulses, No clubbing, cyanosis, or edema  NEUROLOGY: non-focal  SKIN: No rashes or lesions      ADMISSION SUMMARY  Patient is a 63y old Male who presents with a chief complaint of bilateral lower extremity weakness   Currently admitted to medicine with the primary diagnosis of Peripheral neuropathy      ASSESSMENT & PLAN    63y male with PMH of CAD, NSTEMI s/p CABG (5 vessel according to patient; March 2016); DM with peripheral neuropathy, Left DFU, HTN, HLD and recently admitted for worsening chronic left foot ulcer s/p excisional debridement by podiatry and recent T11-T12 vertebral fracture discharged on TLSO brace / conservative management with home care presents for worsening b/l LE weakness.    #) Bilateral lower extremities numbness and weakness   -Neurosurgery and neurology note appreciated  -MRI spine done no cord compression but disc herniation mainly seen at L4-5, L5-S1  -MRI brain showed no acute ischemic changes .-CT head negative for acute stroke.  -Patient still has weakness in bilateral lower extremities, diminished sensation on right side of face and body  -pain control  -PT/rehab  -TLSO brace    #)SAKINA, mild rhabdomyolysis  -CK elevated. Trending down   -follow up CK in am and BMP  -d/c IV fluids    #) Osteomyelitis of the left foot s/p excisional debridement   - as per last ID note, the insurance would not cover IV abx so the patient was discharged on po augmentin with plan to follow up in clinic  -resume [id number] q24h  -follow up ID for duration of abx  -11/6 culture GBS  -11/10 OR Necrotic soft tissue, extensive soft tissue scarring and osteomyelitic bone, OR cx No growth     #) CAD s/p CABG  - c/w ASA, plavix, metoprolol and statin    #) HTN  - c/w metoprolol     #) DM  - monitor finger stick  - recent hba1c =7  - last in hospital regimen 27/6-6-6    #) Diet   - DASH, Carb consistent    #) DVT ppx  -heparin 5000 IU s/c q 8 hours    #) Activity  - out of bed to chair with assistance    #) Disposition  - from home, might need NH this time    #) Full code status

## 2018-11-19 NOTE — PROGRESS NOTE ADULT - ASSESSMENT
63y male with PMH of CAD, NSTEMI s/p CABG (5 vessel according to patient; March 2016); DM with peripheral neuropathy, Left DFU, HTN, HLD and recently admitted for worsening chronic left foot ulcer s/p excisional debridement by podiatry and recent T11-T12 vertebral fracture discharged on TLSO brace / conservative management with home care presents for worsening b/l LE weakness.    #) Bilateral lower extremities numbness and weakness   -r/o neur-myopathy, with elevated  CK.   -Neurosurgery and neurology note appreciated  -MRI spine done no cord compression but Diffuse disc bulging throughout T12-S1.  -Patient still has weakness in bilateral lower extremities, improving though.  -CT head negative for acute stroke.  -CT angiogram to be done once SAKINA improves  -pain control  -PT/rehab  -TLSO brace  - No NeuroSx intervention.  - PT/REHAB    #)SAKINA, mild rhabdomyolysis  -CK elevated, trending down.  - 5380 >>1545 >>831  -would d/c IVF.  - Encourage oral hydration.    #) Osteomyelitis of the left foot s/p excisional debridement   - as per last ID note, the insurance would not cover IV atbx so the patient was discharged on po augmentin with plan to follow up in clinic  -resume [id number] q24h  -follow up ID for duration of atbx     #) CAD s/p CABG  - c/w ASA, plavix, metoprolol and statin    #) HTN  - c/w metoprolol     #) DM  - monitor finger stick  - recent hba1c =7  - last in hospital regimen 27/6-6-6    #) Diet   - DASH, Carb consistent    #) DVT ppx  -heparin 5000IU s/c q 8 hours    #) Activity  - out of bed to chair with assistance    #) Disposition  - from home, might need NH this time    #) Full code status

## 2018-11-19 NOTE — CONSULT NOTE ADULT - CONSULT REQUESTED DATE/TIME
16-Nov-2018 16:16
17-Nov-2018 09:39
18-Nov-2018 05:58
19-Nov-2018 11:01
19-Nov-2018 12:15
16-Nov-2018 17:52

## 2018-11-19 NOTE — PROGRESS NOTE ADULT - SUBJECTIVE AND OBJECTIVE BOX
ROSALIE ESCOBEDO  63y  Male      Patient is a 63y old  Male who presents with a chief complaint of lower extremities weakness (19 Nov 2018 12:39)      INTERVAL HPI/OVERNIGHT EVENTS:      ******************************* REVIEW OF SYSTEMS:**********************************************      All other review of systems negative    *********************** VITALS ******************************************    T(F): 97.8 (11-19-18 @ 14:38)  HR: 65 (11-19-18 @ 14:38) (65 - 70)  BP: 133/69 (11-19-18 @ 14:38) (133/69 - 145/68)  RR: 18 (11-19-18 @ 14:38) (17 - 18)  SpO2: --    11-18-18 @ 07:01  -  11-19-18 @ 07:00  --------------------------------------------------------  IN: 1050 mL / OUT: 2700 mL / NET: -1650 mL            11-18-18 @ 07:01  -  11-19-18 @ 07:00  --------------------------------------------------------  IN: 1050 mL / OUT: 2700 mL / NET: -1650 mL        ******************************** PHYSICAL EXAM:**************************************************  GENERAL: NAD    PSYCH: no agitation, baseline mentation  HEENT:     NERVOUS SYSTEM:  Alert & Oriented X3, MS  4/5 B/L  LE ;    PULMONARY: CASPER, CTA    CARDIOVASCULAR: S1S2 RRR    GI: Soft, NT, ND; BS present.    EXTREMITIES:  2+ Peripheral Pulses, No clubbing, cyanosis, or edema    LYMPH: No lymphadenopathy noted    SKIN: No rashes or lesions    ******************************************************************************************    Consultant(s) Notes Reviewed:  [x ] YES  [ ] NO    Discussed with Consultants/Other Providers [ x] YES     **************************** LABS *******************************************************                          7.8    11.81 )-----------( 222      ( 19 Nov 2018 06:09 )             24.7     11-19    139  |  102  |  15  ----------------------------<  80  3.9   |  23  |  0.8    Ca    8.2<L>      19 Nov 2018 06:09            Lactate Trend    CARDIAC MARKERS ( 19 Nov 2018 10:20 )  x     / x     / 821 U/L / x     / x      CARDIAC MARKERS ( 18 Nov 2018 07:16 )  x     / x     / 1545 U/L / x     / x          CAPILLARY BLOOD GLUCOSE      POCT Blood Glucose.: 118 mg/dL (19 Nov 2018 11:35)          **************************Active Medications *******************************************  Cipro (Unknown)  IV contrast (Short breath)  Keflex (Unknown)      aspirin  chewable 81 milliGRAM(s) Oral daily  atorvastatin 40 milliGRAM(s) Oral at bedtime  BACItracin   Ointment 1 Application(s) Topical daily  cefTRIAXone   IVPB      cefTRIAXone   IVPB 2 Gram(s) IV Intermittent every 24 hours  chlorhexidine 4% Liquid 1 Application(s) Topical <User Schedule>  clopidogrel Tablet 75 milliGRAM(s) Oral daily  cyanocobalamin 1000 MICROGram(s) Oral daily  dextrose 40% Gel 15 Gram(s) Oral once PRN  dextrose 5%. 1000 milliLiter(s) IV Continuous <Continuous>  dextrose 50% Injectable 12.5 Gram(s) IV Push once  dextrose 50% Injectable 25 Gram(s) IV Push once  dextrose 50% Injectable 25 Gram(s) IV Push once  docusate sodium 100 milliGRAM(s) Oral daily  folic acid 1 milliGRAM(s) Oral daily  glucagon  Injectable 1 milliGRAM(s) IntraMuscular once PRN  heparin  Injectable 5000 Unit(s) SubCutaneous every 8 hours  insulin glargine Injectable (LANTUS) 27 Unit(s) SubCutaneous at bedtime  insulin lispro Injectable (HumaLOG) 6 Unit(s) SubCutaneous three times a day before meals  metoprolol succinate ER 25 milliGRAM(s) Oral daily  morphine  - Injectable 2 milliGRAM(s) IV Push every 6 hours PRN  polyethylene glycol 3350 17 Gram(s) Oral daily  predniSONE   Tablet 40 milliGRAM(s) Oral daily  pregabalin 100 milliGRAM(s) Oral two times a day  senna 1 Tablet(s) Oral at bedtime  senna 1 Tablet(s) Oral daily  simethicone 80 milliGRAM(s) Chew two times a day  sodium chloride 0.9%. 1000 milliLiter(s) IV Continuous <Continuous>  thiamine 100 milliGRAM(s) Oral daily  traMADol 50 milliGRAM(s) Oral every 12 hours PRN      ***************************************************  RADIOLOGY & ADDITIONAL TESTS:    Imaging Personally Reviewed:  [ ] YES  [ ] NO    HEALTH ISSUES - PROBLEM Dx:

## 2018-11-19 NOTE — PHYSICAL THERAPY INITIAL EVALUATION ADULT - GENERAL OBSERVATIONS, REHAB EVAL
[id number] patient encountered semi reynolds in bed + IV lock, NAD , + bee  + R foot ace wrap, Receptive to PT

## 2018-11-19 NOTE — CONSULT NOTE ADULT - ASSESSMENT
63 year old man with multiple emdical issues-  does complain of rt face and arm numbness- bud MRI brain negative for stroke    MRI L spine reveiwed- no final report- but there is a L4-5 and L5-S1 left disc herniation with thecal sac displacement-  will request neurosurgical evaluatipn P  Prednisone 40 mg daily  PPI  continue painMX

## 2018-11-19 NOTE — CONSULT NOTE ADULT - SUBJECTIVE AND OBJECTIVE BOX
Neurology Consult    Patient is a 63y old  Male who presents with a chief complaint of lower extremities weakness (19 Nov 2018 14:38)    ^3 year old man with acute on chronic back pain with LE weaknes Lt >rt- which was acute ont he day of admission      HPI:  63y male with PMH of CAD, NSTEMI s/p CABG (5 vessel according to patient; March 2016); DM with peripheral neuropathy, Left DFU, HTN, HLD and recently admitted for worsening chronic left foot ulcer s/p excisional debridement by podiatry and recent T11-T12 vertebral fracture discharged on TLSO brace / conservative management with home care presents for worsening b/l LE weakness. this morning while sitting in the chair, he acutely felt numbness in his legs, from knees below, worse than baseline so he tried to get up buthis legs gave out and he fell to floor and could not get up until his daughter came at 1pm and called fire department (4 hours later).  he could not go to the bathroom so he was full of urine. he denies head trauma or LOC.  Pt admits to mid back pain since fracture, that is worse now      PAST MEDICAL & SURGICAL HISTORY:  CAD (coronary artery disease)  Dyslipidemia  Diabetic foot ulcer  PVD (peripheral vascular disease)  Diabetes  Other hyperlipidemia  Hypertension, unspecified type  Amputated toe, unspecified laterality  S/P CABG (coronary artery bypass graft)      FAMILY HISTORY:  Family history of throat cancer (Father)  Family history of lung cancer (Mother)      Social History: (-) x 3    Allergies    Cipro (Unknown)  IV contrast (Short breath)  Keflex (Unknown)    Intolerances        MEDICATIONS  (STANDING):  aspirin  chewable 81 milliGRAM(s) Oral daily  atorvastatin 40 milliGRAM(s) Oral at bedtime  BACItracin   Ointment 1 Application(s) Topical daily  cefTRIAXone   IVPB      cefTRIAXone   IVPB 2 Gram(s) IV Intermittent every 24 hours  chlorhexidine 4% Liquid 1 Application(s) Topical <User Schedule>  clopidogrel Tablet 75 milliGRAM(s) Oral daily  cyanocobalamin 1000 MICROGram(s) Oral daily  dextrose 5%. 1000 milliLiter(s) (50 mL/Hr) IV Continuous <Continuous>  dextrose 50% Injectable 12.5 Gram(s) IV Push once  dextrose 50% Injectable 25 Gram(s) IV Push once  dextrose 50% Injectable 25 Gram(s) IV Push once  docusate sodium 100 milliGRAM(s) Oral daily  folic acid 1 milliGRAM(s) Oral daily  heparin  Injectable 5000 Unit(s) SubCutaneous every 8 hours  insulin glargine Injectable (LANTUS) 27 Unit(s) SubCutaneous at bedtime  insulin lispro Injectable (HumaLOG) 6 Unit(s) SubCutaneous three times a day before meals  metoprolol succinate ER 25 milliGRAM(s) Oral daily  polyethylene glycol 3350 17 Gram(s) Oral daily  predniSONE   Tablet 40 milliGRAM(s) Oral daily  pregabalin 100 milliGRAM(s) Oral two times a day  senna 1 Tablet(s) Oral at bedtime  senna 1 Tablet(s) Oral daily  simethicone 80 milliGRAM(s) Chew two times a day  sodium chloride 0.9%. 1000 milliLiter(s) (100 mL/Hr) IV Continuous <Continuous>  thiamine 100 milliGRAM(s) Oral daily    MEDICATIONS  (PRN):  dextrose 40% Gel 15 Gram(s) Oral once PRN Blood Glucose LESS THAN 70 milliGRAM(s)/deciliter  glucagon  Injectable 1 milliGRAM(s) IntraMuscular once PRN Glucose LESS THAN 70 milligrams/deciliter  morphine  - Injectable 2 milliGRAM(s) IV Push every 6 hours PRN Severe Pain (7 - 10)  traMADol 50 milliGRAM(s) Oral every 12 hours PRN Moderate Pain (4 - 6)      Review of systems:    Constitutional: No fever, weight loss or fatigue    Eyes: No eye pain or discharge  ENMT:  No difficulty hearing; No sinus or throat pain  Neck: No pain or stiffness  Respiratory: No cough, wheezing, chills or hemoptysis  Cardiovascular: No chest pain, palpitations, shortness of breath, dyspnea on exertion  Gastrointestinal: No abdominal pain, nausea, vomiting or hematemesis; No diarrhea or constipation.   Genitourinary: No dysuria, frequency, hematuria or incontinence  Neurological: As per HPI  Skin: No rashes or lesions   Endocrine: No heat or cold intolerance; No hair loss  Musculoskeletal: No joint pain or swelling  Psychiatric: No depression, anxiety, mood swings  Heme/Lymph: No easy bruising or bleeding gums    Vital Signs Last 24 Hrs  T(C): 36.6 (19 Nov 2018 14:38), Max: 37.1 (18 Nov 2018 20:57)  T(F): 97.8 (19 Nov 2018 14:38), Max: 98.7 (18 Nov 2018 20:57)  HR: 65 (19 Nov 2018 14:38) (65 - 70)  BP: 133/69 (19 Nov 2018 14:38) (133/69 - 145/68)  BP(mean): --  RR: 18 (19 Nov 2018 14:38) (17 - 18)  SpO2: --    Neurologic Examination:  patient lying oin mena- uncomfortable because of pain-  PERRL/EOMI  face is symmetirc    reports diminished sensation on rt face and rt arm  no wekaness in UE- no drift- no tremor  LE  Rt LE 5/5 strength proximal and distal,  LT LE_ proximal 3+-4/%, distal 4+/5  reflexes generalized trace to 1+  patient eventuall did get up from his bed and was sitting inthe chair- difficulty walking++    Labs:   CBC Full  -  ( 19 Nov 2018 06:09 )  WBC Count : 11.81 K/uL  Hemoglobin : 7.8 g/dL  Hematocrit : 24.7 %  Platelet Count - Automated : 222 K/uL  Mean Cell Volume : 73.1 fL  Mean Cell Hemoglobin : 23.1 pg  Mean Cell Hemoglobin Concentration : 31.6 g/dL  Auto Neutrophil # : x  Auto Lymphocyte # : x  Auto Monocyte # : x  Auto Eosinophil # : x  Auto Basophil # : x  Auto Neutrophil % : x  Auto Lymphocyte % : x  Auto Monocyte % : x  Auto Eosinophil % : x  Auto Basophil % : x    11-19    139  |  102  |  15  ----------------------------<  80  3.9   |  23  |  0.8    Ca    8.2<L>      19 Nov 2018 06:09

## 2018-11-19 NOTE — CONSULT NOTE ADULT - SUBJECTIVE AND OBJECTIVE BOX
Patient is a 63y old  Male who presents with a chief complaint of lower extremities weakness (18 Nov 2018 12:51)    HPI:  63y male with PMH of CAD, NSTEMI s/p CABG (5 vessel according to patient; March 2016); DM with peripheral neuropathy, Left DFU, HTN, HLD and recently admitted for worsening chronic left foot ulcer s/p excisional debridement by podiatry and recent T11-T12 vertebral fracture discharged on TLSO brace / conservative management with home care presents for worsening b/l LE weakness. this morning while sitting in the chair, he acutely felt numbness in his legs, from knees below, worse than baseline so he tried to get up buthis legs gave out and he fell to floor and could not get up until his daughter came at 1pm and called fire department (4 hours later).  he could not go to the bathroom so he was full of urine. he denies head trauma or LOC.  Pt admits to mid back pain since fracture, that is worse now    in the ED code stroke was called CT head negative for acute infarct; he was not candidate for thrombolysis (> 4.5 hours). (16 Nov 2018 21:08)      PAST MEDICAL & SURGICAL HISTORY:  CAD (coronary artery disease)  Dyslipidemia  Diabetic foot ulcer  PVD (peripheral vascular disease)  Diabetes  Other hyperlipidemia  Hypertension, unspecified type  Amputated toe, unspecified laterality  S/P CABG (coronary artery bypass graft)      Hospital Course: /o neur-myopathy, with elevated  CK.   -Neurosurgery and neurology note appreciated  -MRI spine done no cord compression  -f/u MRI brain  -Patient still has weakness in bilateral lower extremities, improving though.  -CT head negative for acute stroke.  -CT angiogram to be done once SAKINA improves  -pain control  SAKINA, mild rhabdomyolysis  -CK elevated, trending down.  - 5380 >>1545  -would decrease IV hydration NS  to 100cc/hr  -follow up CK in am and BMP   Osteomyelitis of the left foot s/p excisional debridement   - as per last ID note, the insurance would not cover IV atbx so the patient was discharged on po augmentin with plan to follow up in clinic  -resume [id number] q24h  -follow up ID for duration of atbx  TODAY'S SUBJECTIVE & REVIEW OF SYMPTOMS:     Constitutional Weakness   Cardio WNL   Resp WNL   GI WNL  Heme WNL  Endo WNL  Skin WNL  MSK LBP  Neuro paresthesias feet  Cognitive WNL  Psych WNL      MEDICATIONS  (STANDING):  aspirin  chewable 81 milliGRAM(s) Oral daily  atorvastatin 40 milliGRAM(s) Oral at bedtime  BACItracin   Ointment 1 Application(s) Topical daily  cefTRIAXone   IVPB      cefTRIAXone   IVPB 2 Gram(s) IV Intermittent every 24 hours  chlorhexidine 4% Liquid 1 Application(s) Topical <User Schedule>  clopidogrel Tablet 75 milliGRAM(s) Oral daily  cyanocobalamin 1000 MICROGram(s) Oral daily  dextrose 5%. 1000 milliLiter(s) (50 mL/Hr) IV Continuous <Continuous>  dextrose 50% Injectable 12.5 Gram(s) IV Push once  dextrose 50% Injectable 25 Gram(s) IV Push once  dextrose 50% Injectable 25 Gram(s) IV Push once  docusate sodium 100 milliGRAM(s) Oral daily  folic acid 1 milliGRAM(s) Oral daily  heparin  Injectable 5000 Unit(s) SubCutaneous every 8 hours  insulin glargine Injectable (LANTUS) 27 Unit(s) SubCutaneous at bedtime  insulin lispro Injectable (HumaLOG) 6 Unit(s) SubCutaneous three times a day before meals  metoprolol succinate ER 25 milliGRAM(s) Oral daily  polyethylene glycol 3350 17 Gram(s) Oral daily  predniSONE   Tablet 40 milliGRAM(s) Oral daily  pregabalin 100 milliGRAM(s) Oral two times a day  senna 1 Tablet(s) Oral at bedtime  senna 1 Tablet(s) Oral daily  simethicone 80 milliGRAM(s) Chew two times a day  sodium chloride 0.9%. 1000 milliLiter(s) (100 mL/Hr) IV Continuous <Continuous>  thiamine 100 milliGRAM(s) Oral daily    MEDICATIONS  (PRN):  dextrose 40% Gel 15 Gram(s) Oral once PRN Blood Glucose LESS THAN 70 milliGRAM(s)/deciliter  glucagon  Injectable 1 milliGRAM(s) IntraMuscular once PRN Glucose LESS THAN 70 milligrams/deciliter  morphine  - Injectable 2 milliGRAM(s) IV Push every 6 hours PRN Severe Pain (7 - 10)  traMADol 50 milliGRAM(s) Oral every 12 hours PRN Moderate Pain (4 - 6)      FAMILY HISTORY:  Family history of throat cancer (Father)  Family history of lung cancer (Mother)      Allergies    Cipro (Unknown)  IV contrast (Short breath)  Keflex (Unknown)    Intolerances        SOCIAL HISTORY:    [    ] Etoh  [    ] Smoking  [    ] Substance abuse     Home Environment:  [  x  ] Home Alone  [    ] Lives with Family  [    ] Home Health Aid    Dwelling:  [ x   ] Apartment  [    ] Private House  [    ] Adult Home  [    ] Skilled Nursing Facility      [    ] Short Term  [    ] Long Term  [ x   ] Stairs          3outside                 [    ] Elevator     FUNCTIONAL STATUS PTA: (Check all that apply)  Ambulation: [  x   ]Independent    [    ] Dependent     [    ] Non-Ambulatory  Assistive Device: [    ] SA Cane  [    ]  Q Cane  [x    ] Walker  [    ]  Wheelchair  ADL : [    ] Independent  [    ]  Dependent       Vital Signs Last 24 Hrs  T(C): 36.9 (19 Nov 2018 06:05), Max: 37.1 (18 Nov 2018 20:57)  T(F): 98.4 (19 Nov 2018 06:05), Max: 98.7 (18 Nov 2018 20:57)  HR: 70 (19 Nov 2018 06:05) (70 - 71)  BP: 134/71 (19 Nov 2018 06:05) (134/71 - 159/73)  BP(mean): --  RR: 17 (19 Nov 2018 06:05) (17 - 18)  SpO2: --      PHYSICAL EXAM: Alert & Oriented X3  GENERAL: NAD, well-groomed, well-developed  HEAD:  Atraumatic, Normocephalic  EYES: EOMI, PERRLA, conjunctiva and sclera clear  NECK: Supple, No JVD, Normal thyroid  CHEST/LUNG: Clear bilaterally; No rales, rhonchi, wheezing, or rubs  HEART: Regular rate and rhythm; No murmurs, rubs, or gallops  ABDOMEN: Soft, Nontender, Nondistended; Bowel sounds present  EXTREMITIES:  L foot bandaged with ace - R foot with healed 4 toe amputation    NERVOUS SYSTEM:  Cranial Nerves 2-12 intact [x    ] Abnormal  [    ]  ROM: WFL all extremities [    x]  Abnormal [     ]  Motor Strength: WFL all extremities  [ x   ]  Abnormal [    ]3-3+/5 Prox LES, 4/5 distally  Sensation: intact to light touch [    ] Abnormal [  x  ]diminished distally LES      FUNCTIONAL STATUS:  Bed Mobility: [   ]  Independent [    ]  Supervision [  x  ]  Needs Assistance [  ]  N/A  Transfers: [    ]  Independent [    ]  Supervision [    ]  Needs Assistance [x    ]  N/A    Ambulation:  [    ]  Independent [    ]  Supervision [    ]  Needs Assistance [ x   ]  N/A   ADL:  [    ]   Independent [  x  ] Requires Assistance [    ] N/A   +LBP    LABS:                        7.8    11.81 )-----------( 222      ( 19 Nov 2018 06:09 )             24.7     11-19    139  |  102  |  15  ----------------------------<  80  3.9   |  23  |  0.8    Ca    8.2<L>      19 Nov 2018 06:09            RADIOLOGY & ADDITIONAL STUDIES:

## 2018-11-20 ENCOUNTER — INPATIENT (INPATIENT)
Facility: HOSPITAL | Age: 63
LOS: 3 days | Discharge: ANOTHER HOSPITAL | End: 2018-11-24
Attending: PHYSICAL MEDICINE & REHABILITATION | Admitting: PHYSICAL MEDICINE & REHABILITATION

## 2018-11-20 VITALS
HEART RATE: 74 BPM | TEMPERATURE: 99 F | SYSTOLIC BLOOD PRESSURE: 163 MMHG | DIASTOLIC BLOOD PRESSURE: 77 MMHG | RESPIRATION RATE: 18 BRPM

## 2018-11-20 DIAGNOSIS — I95.89 OTHER HYPOTENSION: ICD-10-CM

## 2018-11-20 DIAGNOSIS — M86.8X7 OTHER OSTEOMYELITIS, ANKLE AND FOOT: ICD-10-CM

## 2018-11-20 DIAGNOSIS — E78.5 HYPERLIPIDEMIA, UNSPECIFIED: ICD-10-CM

## 2018-11-20 DIAGNOSIS — Z89.422 ACQUIRED ABSENCE OF OTHER LEFT TOE(S): ICD-10-CM

## 2018-11-20 DIAGNOSIS — I25.2 OLD MYOCARDIAL INFARCTION: ICD-10-CM

## 2018-11-20 DIAGNOSIS — I70.201 UNSPECIFIED ATHEROSCLEROSIS OF NATIVE ARTERIES OF EXTREMITIES, RIGHT LEG: ICD-10-CM

## 2018-11-20 DIAGNOSIS — K80.20 CALCULUS OF GALLBLADDER WITHOUT CHOLECYSTITIS WITHOUT OBSTRUCTION: ICD-10-CM

## 2018-11-20 DIAGNOSIS — R29.6 REPEATED FALLS: ICD-10-CM

## 2018-11-20 DIAGNOSIS — E11.43 TYPE 2 DIABETES MELLITUS WITH DIABETIC AUTONOMIC (POLY)NEUROPATHY: ICD-10-CM
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
## 2018-11-20 DIAGNOSIS — I25.10 ATHEROSCLEROTIC HEART DISEASE OF NATIVE CORONARY ARTERY WITHOUT ANGINA PECTORIS: ICD-10-CM

## 2018-11-20 DIAGNOSIS — E11.69 TYPE 2 DIABETES MELLITUS WITH OTHER SPECIFIED COMPLICATION: ICD-10-CM
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
## 2018-11-20 DIAGNOSIS — I10 ESSENTIAL (PRIMARY) HYPERTENSION: ICD-10-CM

## 2018-11-20 DIAGNOSIS — Z95.1 PRESENCE OF AORTOCORONARY BYPASS GRAFT: Chronic | ICD-10-CM

## 2018-11-20 DIAGNOSIS — E11.621 TYPE 2 DIABETES MELLITUS WITH FOOT ULCER: ICD-10-CM

## 2018-11-20 DIAGNOSIS — S22.089A UNSPECIFIED FRACTURE OF T11-T12 VERTEBRA, INITIAL ENCOUNTER FOR CLOSED FRACTURE: ICD-10-CM

## 2018-11-20 DIAGNOSIS — Z79.84 LONG TERM (CURRENT) USE OF ORAL HYPOGLYCEMIC DRUGS: ICD-10-CM

## 2018-11-20 DIAGNOSIS — M00.9 PYOGENIC ARTHRITIS, UNSPECIFIED: ICD-10-CM

## 2018-11-20 DIAGNOSIS — E11.610 TYPE 2 DIABETES MELLITUS WITH DIABETIC NEUROPATHIC ARTHROPATHY: ICD-10-CM

## 2018-11-20 DIAGNOSIS — Z95.1 PRESENCE OF AORTOCORONARY BYPASS GRAFT: ICD-10-CM

## 2018-11-20 DIAGNOSIS — E87.1 HYPO-OSMOLALITY AND HYPONATREMIA: ICD-10-CM

## 2018-11-20 DIAGNOSIS — M84.672A: ICD-10-CM

## 2018-11-20 DIAGNOSIS — D64.9 ANEMIA, UNSPECIFIED: ICD-10-CM

## 2018-11-20 LAB
ANION GAP SERPL CALC-SCNC: 14 MMOL/L — SIGNIFICANT CHANGE UP (ref 7–14)
BUN SERPL-MCNC: 14 MG/DL — SIGNIFICANT CHANGE UP (ref 10–20)
CALCIUM SERPL-MCNC: 8.8 MG/DL — SIGNIFICANT CHANGE UP (ref 8.5–10.1)
CHLORIDE SERPL-SCNC: 99 MMOL/L — SIGNIFICANT CHANGE UP (ref 98–110)
CK SERPL-CCNC: 432 U/L — HIGH (ref 0–225)
CO2 SERPL-SCNC: 24 MMOL/L — SIGNIFICANT CHANGE UP (ref 17–32)
CREAT SERPL-MCNC: 0.8 MG/DL — SIGNIFICANT CHANGE UP (ref 0.7–1.5)
GLUCOSE BLDC GLUCOMTR-MCNC: 113 MG/DL — HIGH (ref 70–99)
GLUCOSE BLDC GLUCOMTR-MCNC: 120 MG/DL — HIGH (ref 70–99)
GLUCOSE BLDC GLUCOMTR-MCNC: 163 MG/DL — HIGH (ref 70–99)
GLUCOSE BLDC GLUCOMTR-MCNC: 180 MG/DL — HIGH (ref 70–99)
GLUCOSE BLDC GLUCOMTR-MCNC: 213 MG/DL — HIGH (ref 70–99)
GLUCOSE SERPL-MCNC: 99 MG/DL — SIGNIFICANT CHANGE UP (ref 70–99)
HCT VFR BLD CALC: 27.9 % — LOW (ref 42–52)
HGB BLD-MCNC: 8.8 G/DL — LOW (ref 14–18)
MAGNESIUM SERPL-MCNC: 1.9 MG/DL — SIGNIFICANT CHANGE UP (ref 1.8–2.4)
MCHC RBC-ENTMCNC: 22.9 PG — LOW (ref 27–31)
MCHC RBC-ENTMCNC: 31.5 G/DL — LOW (ref 32–37)
MCV RBC AUTO: 72.5 FL — LOW (ref 80–94)
NRBC # BLD: 0 /100 WBCS — SIGNIFICANT CHANGE UP (ref 0–0)
PLATELET # BLD AUTO: 266 K/UL — SIGNIFICANT CHANGE UP (ref 130–400)
POTASSIUM SERPL-MCNC: 4.3 MMOL/L — SIGNIFICANT CHANGE UP (ref 3.5–5)
POTASSIUM SERPL-SCNC: 4.3 MMOL/L — SIGNIFICANT CHANGE UP (ref 3.5–5)
RBC # BLD: 3.85 M/UL — LOW (ref 4.7–6.1)
RBC # FLD: 17.2 % — HIGH (ref 11.5–14.5)
SODIUM SERPL-SCNC: 137 MMOL/L — SIGNIFICANT CHANGE UP (ref 135–146)
WBC # BLD: 11.22 K/UL — HIGH (ref 4.8–10.8)
WBC # FLD AUTO: 11.22 K/UL — HIGH (ref 4.8–10.8)

## 2018-11-20 RX ORDER — CEFTRIAXONE 500 MG/1
2 INJECTION, POWDER, FOR SOLUTION INTRAMUSCULAR; INTRAVENOUS
Qty: 0 | Refills: 0 | COMMUNITY
Start: 2018-11-20

## 2018-11-20 RX ORDER — ASPIRIN/CALCIUM CARB/MAGNESIUM 324 MG
81 TABLET ORAL DAILY
Qty: 0 | Refills: 0 | Status: DISCONTINUED | OUTPATIENT
Start: 2018-11-20 | End: 2018-11-24

## 2018-11-20 RX ORDER — BACITRACIN ZINC 500 UNIT/G
1 OINTMENT IN PACKET (EA) TOPICAL DAILY
Qty: 0 | Refills: 0 | Status: DISCONTINUED | OUTPATIENT
Start: 2018-11-20 | End: 2018-11-24

## 2018-11-20 RX ORDER — INSULIN GLARGINE 100 [IU]/ML
27 INJECTION, SOLUTION SUBCUTANEOUS
Qty: 0 | Refills: 0 | COMMUNITY
Start: 2018-11-20

## 2018-11-20 RX ORDER — FOLIC ACID 0.8 MG
1 TABLET ORAL
Qty: 0 | Refills: 0 | COMMUNITY
Start: 2018-11-20

## 2018-11-20 RX ORDER — HEPARIN SODIUM 5000 [USP'U]/ML
5000 INJECTION INTRAVENOUS; SUBCUTANEOUS EVERY 8 HOURS
Qty: 0 | Refills: 0 | Status: DISCONTINUED | OUTPATIENT
Start: 2018-11-20 | End: 2018-11-24

## 2018-11-20 RX ORDER — FOLIC ACID 0.8 MG
1 TABLET ORAL DAILY
Qty: 0 | Refills: 0 | Status: DISCONTINUED | OUTPATIENT
Start: 2018-11-20 | End: 2018-11-24

## 2018-11-20 RX ORDER — ATORVASTATIN CALCIUM 80 MG/1
40 TABLET, FILM COATED ORAL AT BEDTIME
Qty: 0 | Refills: 0 | Status: DISCONTINUED | OUTPATIENT
Start: 2018-11-20 | End: 2018-11-24

## 2018-11-20 RX ORDER — THIAMINE MONONITRATE (VIT B1) 100 MG
100 TABLET ORAL DAILY
Qty: 0 | Refills: 0 | Status: DISCONTINUED | OUTPATIENT
Start: 2018-11-20 | End: 2018-11-24

## 2018-11-20 RX ORDER — PREGABALIN 225 MG/1
1000 CAPSULE ORAL DAILY
Qty: 0 | Refills: 0 | Status: DISCONTINUED | OUTPATIENT
Start: 2018-11-20 | End: 2018-11-24

## 2018-11-20 RX ORDER — CEFTRIAXONE 500 MG/1
2 INJECTION, POWDER, FOR SOLUTION INTRAMUSCULAR; INTRAVENOUS EVERY 24 HOURS
Qty: 0 | Refills: 0 | Status: DISCONTINUED | OUTPATIENT
Start: 2018-11-20 | End: 2018-11-24

## 2018-11-20 RX ORDER — POLYETHYLENE GLYCOL 3350 17 G/17G
17 POWDER, FOR SOLUTION ORAL
Qty: 0 | Refills: 0 | COMMUNITY
Start: 2018-11-20

## 2018-11-20 RX ORDER — CHLORHEXIDINE GLUCONATE 213 G/1000ML
1 SOLUTION TOPICAL
Qty: 0 | Refills: 0 | Status: DISCONTINUED | OUTPATIENT
Start: 2018-11-20 | End: 2018-11-24

## 2018-11-20 RX ORDER — CLOPIDOGREL BISULFATE 75 MG/1
75 TABLET, FILM COATED ORAL DAILY
Qty: 0 | Refills: 0 | Status: DISCONTINUED | OUTPATIENT
Start: 2018-11-20 | End: 2018-11-24

## 2018-11-20 RX ORDER — INSULIN GLARGINE 100 [IU]/ML
27 INJECTION, SOLUTION SUBCUTANEOUS AT BEDTIME
Qty: 0 | Refills: 0 | Status: DISCONTINUED | OUTPATIENT
Start: 2018-11-20 | End: 2018-11-24

## 2018-11-20 RX ORDER — THIAMINE MONONITRATE (VIT B1) 100 MG
1 TABLET ORAL
Qty: 0 | Refills: 0 | COMMUNITY
Start: 2018-11-20

## 2018-11-20 RX ORDER — TRAMADOL HYDROCHLORIDE 50 MG/1
50 TABLET ORAL EVERY 8 HOURS
Qty: 0 | Refills: 0 | Status: DISCONTINUED | OUTPATIENT
Start: 2018-11-20 | End: 2018-11-24

## 2018-11-20 RX ORDER — SIMETHICONE 80 MG/1
80 TABLET, CHEWABLE ORAL EVERY 12 HOURS
Qty: 0 | Refills: 0 | Status: DISCONTINUED | OUTPATIENT
Start: 2018-11-20 | End: 2018-11-24

## 2018-11-20 RX ORDER — METOPROLOL TARTRATE 50 MG
25 TABLET ORAL DAILY
Qty: 0 | Refills: 0 | Status: DISCONTINUED | OUTPATIENT
Start: 2018-11-20 | End: 2018-11-23

## 2018-11-20 RX ORDER — INSULIN LISPRO 100/ML
6 VIAL (ML) SUBCUTANEOUS
Qty: 0 | Refills: 0 | Status: DISCONTINUED | OUTPATIENT
Start: 2018-11-20 | End: 2018-11-24

## 2018-11-20 RX ORDER — SIMETHICONE 80 MG/1
1 TABLET, CHEWABLE ORAL
Qty: 0 | Refills: 0 | COMMUNITY
Start: 2018-11-20

## 2018-11-20 RX ORDER — PREGABALIN 225 MG/1
1 CAPSULE ORAL
Qty: 0 | Refills: 0 | COMMUNITY
Start: 2018-11-20

## 2018-11-20 RX ORDER — INSULIN LISPRO 100/ML
6 VIAL (ML) SUBCUTANEOUS
Qty: 1 | Refills: 0 | OUTPATIENT
Start: 2018-11-20 | End: 2018-12-19

## 2018-11-20 RX ORDER — MAGNESIUM HYDROXIDE 400 MG/1
30 TABLET, CHEWABLE ORAL DAILY
Qty: 0 | Refills: 0 | Status: DISCONTINUED | OUTPATIENT
Start: 2018-11-20 | End: 2018-11-24

## 2018-11-20 RX ORDER — MORPHINE SULFATE 50 MG/1
2 CAPSULE, EXTENDED RELEASE ORAL
Qty: 0 | Refills: 0 | COMMUNITY
Start: 2018-11-20

## 2018-11-20 RX ADMIN — Medication 6 UNIT(S): at 17:37

## 2018-11-20 RX ADMIN — Medication 1 APPLICATION(S): at 12:07

## 2018-11-20 RX ADMIN — Medication 100 MILLIGRAM(S): at 12:07

## 2018-11-20 RX ADMIN — SIMETHICONE 80 MILLIGRAM(S): 80 TABLET, CHEWABLE ORAL at 06:18

## 2018-11-20 RX ADMIN — Medication 100 MILLIGRAM(S): at 06:22

## 2018-11-20 RX ADMIN — Medication 40 MILLIGRAM(S): at 06:18

## 2018-11-20 RX ADMIN — Medication 1 MILLIGRAM(S): at 12:07

## 2018-11-20 RX ADMIN — PREGABALIN 1000 MICROGRAM(S): 225 CAPSULE ORAL at 12:07

## 2018-11-20 RX ADMIN — HEPARIN SODIUM 5000 UNIT(S): 5000 INJECTION INTRAVENOUS; SUBCUTANEOUS at 21:41

## 2018-11-20 RX ADMIN — TRAMADOL HYDROCHLORIDE 50 MILLIGRAM(S): 50 TABLET ORAL at 21:43

## 2018-11-20 RX ADMIN — SIMETHICONE 80 MILLIGRAM(S): 80 TABLET, CHEWABLE ORAL at 17:38

## 2018-11-20 RX ADMIN — Medication 25 MILLIGRAM(S): at 06:17

## 2018-11-20 RX ADMIN — Medication 100 MILLIGRAM(S): at 17:37

## 2018-11-20 RX ADMIN — TRAMADOL HYDROCHLORIDE 50 MILLIGRAM(S): 50 TABLET ORAL at 21:41

## 2018-11-20 RX ADMIN — HEPARIN SODIUM 5000 UNIT(S): 5000 INJECTION INTRAVENOUS; SUBCUTANEOUS at 13:15

## 2018-11-20 RX ADMIN — Medication 6 UNIT(S): at 08:11

## 2018-11-20 RX ADMIN — Medication 6 UNIT(S): at 12:14

## 2018-11-20 RX ADMIN — INSULIN GLARGINE 27 UNIT(S): 100 INJECTION, SOLUTION SUBCUTANEOUS at 21:54

## 2018-11-20 RX ADMIN — ATORVASTATIN CALCIUM 40 MILLIGRAM(S): 80 TABLET, FILM COATED ORAL at 21:41

## 2018-11-20 RX ADMIN — HEPARIN SODIUM 5000 UNIT(S): 5000 INJECTION INTRAVENOUS; SUBCUTANEOUS at 06:17

## 2018-11-20 RX ADMIN — Medication 81 MILLIGRAM(S): at 12:11

## 2018-11-20 RX ADMIN — CLOPIDOGREL BISULFATE 75 MILLIGRAM(S): 75 TABLET, FILM COATED ORAL at 12:07

## 2018-11-20 RX ADMIN — MORPHINE SULFATE 2 MILLIGRAM(S): 50 CAPSULE, EXTENDED RELEASE ORAL at 00:29

## 2018-11-20 RX ADMIN — CEFTRIAXONE 100 GRAM(S): 500 INJECTION, POWDER, FOR SOLUTION INTRAMUSCULAR; INTRAVENOUS at 06:17

## 2018-11-20 NOTE — DISCHARGE NOTE ADULT - CARE PLAN
Principal Discharge DX:	Disc displacement, lumbar  Goal:	Treat appropriately and avoid complications  Assessment and plan of treatment:	You were admitted with back pain, lower extremity weakness and numbness on admission. Imaging showed no stroke on brain MRI but MRI of lumbar spine showed herniation of disc. Neurosurgery recommended pain management and physical therapy. No surgical intervention needed at this point. Continue steroids for another week, pain management and physical therapy. Follow up with neurologist  in a week. You also developed urinary retention. Woods is placed on 11/19/2018. Try trial of void while in 4A.  Secondary Diagnosis:	Amputated toe, unspecified laterality  Goal:	Treat appropriately and avoid complications  Assessment and plan of treatment:	You had excisional debridement of left foot. Podiatry has been doing dressings regularly. Follow up with podiatry for further treatment.  Secondary Diagnosis:	Rhabdomyolysis  Goal:	Treat appropriately and avoid complications  Assessment and plan of treatment:	You were admitted with fall and lying on ground for 4 hrs. Lab showed elevated CK which improved with fluids. Renal function also improved with fluids.  Secondary Diagnosis:	Hypertension, unspecified type  Goal:	Treat appropriately and avoid complications  Assessment and plan of treatment:	Continue taking medications as prescribed and followup with PMD in 2 weeks.  Secondary Diagnosis:	CAD (coronary artery disease)  Goal:	Treat appropriately and avoid complications  Assessment and plan of treatment:	Follow up with cardiologist in 2 weeks after discharge  Secondary Diagnosis:	Diabetes  Goal:	Treat appropriately and avoid complications  Assessment and plan of treatment:	Continue taking medications as prescribed and followup with PMD in 2 weeks.

## 2018-11-20 NOTE — PROGRESS NOTE ADULT - SUBJECTIVE AND OBJECTIVE BOX
PODIATRY PROGRESS NOTE   [id number] ROSALIE ESCOBEDO is a pleasant well-nourished, well developed 63y Male in no acute distress, alert awake, and oriented to person, place and time.   Patient is a 63y old  Male who presents with a chief complaint of lower extremities weakness (20 Nov 2018 10:37)    HPI:  63y male with PMH of CAD, NSTEMI s/p CABG (5 vessel according to patient; March 2016); DM with peripheral neuropathy, Left DFU, HTN, HLD and recently admitted for worsening chronic left foot ulcer s/p excisional debridement by podiatry and recent T11-T12 vertebral fracture discharged on TLSO brace / conservative management with home care presents for worsening b/l LE weakness. this morning while sitting in the chair, he acutely felt numbness in his legs, from knees below, worse than baseline so he tried to get up buthis legs gave out and he fell to floor and could not get up until his daughter came at 1pm and called fire department (4 hours later).  he could not go to the bathroom so he was full of urine. he denies head trauma or LOC.  Pt admits to mid back pain since fracture, that is worse now    in the ED code stroke was called CT head negative for acute infarct; he was not candidate for thrombolysis (> 4.5 hours). (16 Nov 2018 21:08)    pt was seen and assessed am rounds with [facility].  pt states that she does not feel good this morning because of weakness of the B/L LE.  denies any n/v/f/c/sob at this time.      Vital Signs Last 24 Hrs  T(C): 36.2 (20 Nov 2018 05:33), Max: 36.6 (19 Nov 2018 14:38)  T(F): 97.1 (20 Nov 2018 05:33), Max: 97.8 (19 Nov 2018 14:38)  HR: 73 (20 Nov 2018 05:33) (65 - 84)  BP: 150/68 (20 Nov 2018 05:33) (133/69 - 150/68)  BP(mean): --  RR: 18 (20 Nov 2018 05:33) (18 - 18)  SpO2: --                        8.8    11.22 )-----------( 266      ( 20 Nov 2018 06:36 )             27.9                 11-20    137  |  99  |  14  ----------------------------<  99  4.3   |  24  |  0.8    Ca    8.8      20 Nov 2018 06:36  Mg     1.9     11-20    O:  Derm: Dorsal and plantar surgical wounds are partially closed with sutures intact.  Surgical margins of the wounds healthy, clean, and no ischemic changes  base of wounds with increased granulation tissue   no purulent drainage with expression of sites  no edema no calor  no dolor no rubor no mal odor  Vascular: DP/PT pulses 1/4 B/L , Skin temp warm to cool,  proximal to distal B/L, CFT < 3 sec B/L  Neuro: Gross sensation diminished to level of digits B/L      A:   S/p Day #9 debridement of soft tissue and bone left foot, stable, no signs of infection  P:  Pt examined @ bedside   Wound flushed with saline, applied iodoform packing/betadien/DSD/ABD/kerlix/ACE   Continue ABx as per ID  podiatry will follow while in house.  no surgical planing per Podiatry  11-20-18 @ 14:22

## 2018-11-20 NOTE — PROGRESS NOTE ADULT - REASON FOR ADMISSION
lower extremities weakness

## 2018-11-20 NOTE — DISCHARGE NOTE ADULT - ADDITIONAL INSTRUCTIONS
1. Continue physical therapy  2. Follow up with nephrologist  for urinary retention  3. Follow up with podiatry for left foot osteomyelitis  4. Patient had bee placed on 11/19/2018 for retention. Trial of void in 2 days  5. Continue using TLSO brace

## 2018-11-20 NOTE — DISCHARGE NOTE ADULT - SECONDARY DIAGNOSIS.
Amputated toe, unspecified laterality Rhabdomyolysis Hypertension, unspecified type CAD (coronary artery disease) Diabetes

## 2018-11-20 NOTE — DISCHARGE NOTE ADULT - PATIENT PORTAL LINK FT
You can access the [facility], offered by [facility], by registering with the following website: [email or web address]

## 2018-11-20 NOTE — DISCHARGE NOTE ADULT - PLAN OF CARE
Treat appropriately and avoid complications You were admitted with back pain, lower extremity weakness and numbness on admission. Imaging showed no stroke on brain MRI but MRI of lumbar spine showed herniation of disc. Neurosurgery recommended pain management and physical therapy. No surgical intervention needed at this point. Continue steroids for another week, pain management and physical therapy. Follow up with neurologist  in a week. You also developed urinary retention. Woods is placed on 11/19/2018. Try trial of void while in 4A. You had excisional debridement of left foot. Podiatry has been doing dressings regularly. Follow up with podiatry for further treatment. You were admitted with fall and lying on ground for 4 hrs. Lab showed elevated CK which improved with fluids. Renal function also improved with fluids. Continue taking medications as prescribed and followup with PMD in 2 weeks. Follow up with cardiologist in 2 weeks after discharge

## 2018-11-20 NOTE — PROGRESS NOTE ADULT - ASSESSMENT
IMPRESSION:  Foot wounds look remarkably clean  Chronic OM secondary to gr B strep    RECOMMENDATIONS:  Rocephin 2 gm iv q24h

## 2018-11-20 NOTE — PROGRESS NOTE ADULT - ASSESSMENT
63y male with PMH of CAD, NSTEMI s/p CABG (5 vessel according to patient; March 2016); DM with peripheral neuropathy, Left DFU, HTN, HLD and recently admitted for worsening chronic left foot ulcer s/p excisional debridement by podiatry and recent T11-T12 vertebral fracture discharged on TLSO brace / conservative management with home care presents for worsening b/l LE weakness.    #) Bilateral lower extremities numbness and weakness   -r/o neur-myopathy, with elevated  CK.   -Neurosurgery and neurology note appreciated  -MRI spine done no cord compression but Diffuse disc bulging throughout T12-S1.  -Patient still has weakness in bilateral lower extremities, improving though.  -CT head negative for acute stroke.  -TLSO brace  - No NeuroSx intervention.  - PT/REHAB    #)SAKINA, mild rhabdomyolysis  -CK elevated, trending down.  - 5380 >>1545 >>831  -would d/c IVF.  - Encourage oral hydration.    #) Osteomyelitis of the left foot s/p excisional debridement   - as per last ID note, the insurance would not cover IV atbx so the patient was discharged on po augmentin with plan to follow up in clinic  -resume [id number] q24h  -follow up ID for duration of atbx     #) CAD s/p CABG  - c/w ASA, plavix, metoprolol and statin    #) HTN  - c/w metoprolol     #) DM  - monitor finger stick  - recent hba1c =7  - last in hospital regimen 27/6-6-6    #) Diet   - DASH, Carb consistent    #) DVT ppx  -heparin 5000IU s/c q 8 hours    #) Activity  - out of bed to chair with assistance    #) Disposition  - from home, Awaiting D/C to Acute rehab 4A.    #) Full code status 63y male with PMH of CAD, NSTEMI s/p CABG (5 vessel according to patient; March 2016); DM with peripheral neuropathy, Left DFU, HTN, HLD and recently admitted for worsening chronic left foot ulcer s/p excisional debridement by podiatry and recent T11-T12 vertebral fracture discharged on TLSO brace / conservative management with home care presents for worsening b/l LE weakness.    #) Bilateral lower extremities numbness and weakness   -r/o neur-myopathy, with elevated  CK.   -Neurosurgery and neurology note appreciated  -MRI spine done no cord compression but Diffuse disc bulging throughout T12-S1.  -Patient still has weakness in bilateral lower extremities, improving though.  -CT head negative for acute stroke.  -TLSO brace  - No NeuroSx intervention.  - PT/REHAB    # Suspected Thiamine and Vit B12 deficiency.   Being supplemented.    #)SAKINA, mild rhabdomyolysis  -CK elevated, trending down.  - 5380 >>1545 >>831  -would d/c IVF.  - Encourage oral hydration.    #) Osteomyelitis of the left foot s/p excisional debridement   - as per last ID note, the insurance would not cover IV atbx so the patient was discharged on po augmentin with plan to follow up in clinic  -resume [id number] q24h  -follow up ID for duration of atbx     #) CAD s/p CABG  - c/w ASA, plavix, metoprolol and statin    #) HTN  - c/w metoprolol     #) DM  - monitor finger stick  - recent hba1c =7  - last in hospital regimen 27/6-6-6    #) Diet   - DASH, Carb consistent    #) DVT ppx  -heparin 5000IU s/c q 8 hours    #) Activity  - out of bed to chair with assistance    #) Disposition  - from home, Awaiting D/C to Acute rehab 4A.    #) Full code status

## 2018-11-20 NOTE — DISCHARGE NOTE ADULT - HOSPITAL COURSE
63y male with PMH of CAD, NSTEMI s/p CABG (5 vessel according to patient; March 2016); DM with peripheral neuropathy, Left DFU, HTN, HLD and recently admitted for worsening chronic left foot ulcer s/p excisional debridement by podiatry and recent T11-T12 vertebral fracture discharged on TLSO brace / conservative management with home care presents for worsening b/l LE weakness. PTP morning while sitting in the chair, he acutely felt numbness in his legs, from knees below, worse than baseline so he tried to get up but his legs gave out and he fell to floor and could not get up until his daughter came at 1pm and called fire department (4 hours later).  he could not go to the bathroom so he was full of urine. Pt admits to mid back pain since fracture, that is worse in the ED code. Stroke was called CT head negative for acute infarct; he was not candidate for thrombolysis (> 4.5 hours). MRI brain didn't show any new ischemic changes. MRI lumbar and sacral spine showed diffuse disc bulges in lumbar and sacral spine. Patient was followed by Neurosurgery, neurology, ID, podiatry. Neurosurgery didn't want any interventions to be done at this point. They recommended extensive PT, pain management. ID and podiatry followed the patient for osteomyelitis of left foot. Patient is being discharged to  for physical therapy. Patient had urinary retention for which bee was placed. Trial of void can be performed at .

## 2018-11-20 NOTE — DISCHARGE NOTE ADULT - MEDICATION SUMMARY - MEDICATIONS TO STOP TAKING
I will STOP taking the medications listed below when I get home from the hospital:    Augmentin 875 mg-125 mg oral tablet  -- 1 tab(s) by mouth 2 times a day   -- Finish all this medication unless otherwise directed by prescriber.  Take with food or milk.

## 2018-11-20 NOTE — DISCHARGE NOTE ADULT - PATIENT PORTAL LINK FT
You can access the [facility] Patient Portal, offered by [facility], by registering with the following website: [email or web address]

## 2018-11-20 NOTE — PROGRESS NOTE ADULT - SUBJECTIVE AND OBJECTIVE BOX
ROSALIE ESCOBEDO  63y  Male      Patient is a 63y old  Male who presents with a chief complaint of lower extremities weakness (20 Nov 2018 14:21)      INTERVAL HPI/OVERNIGHT EVENTS:      ******************************* REVIEW OF SYSTEMS:**********************************************      All other review of systems negative    *********************** VITALS ******************************************    T(F): 98.6 (11-20-18 @ 14:37)  HR: 74 (11-20-18 @ 14:37) (73 - 84)  BP: 163/77 (11-20-18 @ 14:37) (138/81 - 163/77)  RR: 18 (11-20-18 @ 14:37) (18 - 18)  SpO2: --    11-19-18 @ 07:01  -  11-20-18 @ 07:00  --------------------------------------------------------  IN: 0 mL / OUT: 1545 mL / NET: -1545 mL    11-20-18 @ 07:01  -  11-20-18 @ 14:57  --------------------------------------------------------  IN: 0 mL / OUT: 2750 mL / NET: -2750 mL            11-19-18 @ 07:01  -  11-20-18 @ 07:00  --------------------------------------------------------  IN: 0 mL / OUT: 1545 mL / NET: -1545 mL    11-20-18 @ 07:01  -  11-20-18 @ 14:57  --------------------------------------------------------  IN: 0 mL / OUT: 2750 mL / NET: -2750 mL        ******************************** PHYSICAL EXAM:**************************************************  GENERAL: NAD    PSYCH: no agitation, baseline mentation  HEENT:     NERVOUS SYSTEM:  Alert & Oriented X3, MS 3-4/5 LE    PULMONARY: CASPER, CTA    CARDIOVASCULAR: S1S2 RRR    GI: Soft, NT, ND; BS present.    EXTREMITIES:  2+ Peripheral Pulses, No clubbing, cyanosis, or edema    LYMPH: No lymphadenopathy noted    SKIN: No rashes or lesions    ******************************************************************************************    Consultant(s) Notes Reviewed:  [x ] YES  [ ] NO    Discussed with Consultants/Other Providers [ x] YES     **************************** LABS *******************************************************                          8.8    11.22 )-----------( 266      ( 20 Nov 2018 06:36 )             27.9     11-20    137  |  99  |  14  ----------------------------<  99  4.3   |  24  |  0.8    Ca    8.8      20 Nov 2018 06:36  Mg     1.9     11-20            Lactate Trend    CARDIAC MARKERS ( 20 Nov 2018 06:36 )  x     / x     / 432 U/L / x     / x      CARDIAC MARKERS ( 19 Nov 2018 10:20 )  x     / x     / 821 U/L / x     / x          CAPILLARY BLOOD GLUCOSE      POCT Blood Glucose.: 163 mg/dL (20 Nov 2018 11:33)          **************************Active Medications *******************************************  Cipro (Unknown)  IV contrast (Short breath)  Keflex (Unknown)      aspirin  chewable 81 milliGRAM(s) Oral daily  atorvastatin 40 milliGRAM(s) Oral at bedtime  BACItracin   Ointment 1 Application(s) Topical daily  cefTRIAXone   IVPB      cefTRIAXone   IVPB 2 Gram(s) IV Intermittent every 24 hours  chlorhexidine 4% Liquid 1 Application(s) Topical <User Schedule>  clopidogrel Tablet 75 milliGRAM(s) Oral daily  cyanocobalamin 1000 MICROGram(s) Oral daily  dextrose 40% Gel 15 Gram(s) Oral once PRN  dextrose 5%. 1000 milliLiter(s) IV Continuous <Continuous>  dextrose 50% Injectable 12.5 Gram(s) IV Push once  dextrose 50% Injectable 25 Gram(s) IV Push once  dextrose 50% Injectable 25 Gram(s) IV Push once  docusate sodium 100 milliGRAM(s) Oral daily  folic acid 1 milliGRAM(s) Oral daily  glucagon  Injectable 1 milliGRAM(s) IntraMuscular once PRN  heparin  Injectable 5000 Unit(s) SubCutaneous every 8 hours  insulin glargine Injectable (LANTUS) 27 Unit(s) SubCutaneous at bedtime  insulin lispro Injectable (HumaLOG) 6 Unit(s) SubCutaneous three times a day before meals  metoprolol succinate ER 25 milliGRAM(s) Oral daily  morphine  - Injectable 2 milliGRAM(s) IV Push every 6 hours PRN  polyethylene glycol 3350 17 Gram(s) Oral daily  predniSONE   Tablet 40 milliGRAM(s) Oral daily  pregabalin 100 milliGRAM(s) Oral two times a day  senna 1 Tablet(s) Oral at bedtime  senna 1 Tablet(s) Oral daily  simethicone 80 milliGRAM(s) Chew two times a day  thiamine 100 milliGRAM(s) Oral daily  traMADol 50 milliGRAM(s) Oral every 12 hours PRN      ***************************************************  RADIOLOGY & ADDITIONAL TESTS:    Imaging Personally Reviewed:  [ ] YES  [ ] NO    HEALTH ISSUES - PROBLEM Dx:

## 2018-11-20 NOTE — PROGRESS NOTE ADULT - SUBJECTIVE AND OBJECTIVE BOX
ESCOBEDO ROSALIE  63y, Male      OVERNIGHT EVENTS:    none    VITALS:  T(F): 97.1, Max: 97.8 (11-19-18 @ 14:38)  HR: 73  BP: 150/68  RR: 18Vital Signs Last 24 Hrs  T(C): 36.2 (20 Nov 2018 05:33), Max: 36.6 (19 Nov 2018 14:38)  T(F): 97.1 (20 Nov 2018 05:33), Max: 97.8 (19 Nov 2018 14:38)  HR: 73 (20 Nov 2018 05:33) (65 - 84)  BP: 150/68 (20 Nov 2018 05:33) (133/69 - 150/68)  BP(mean): --  RR: 18 (20 Nov 2018 05:33) (18 - 18)  SpO2: --    TESTS & MEASUREMENTS:                        8.8    11.22 )-----------( 266      ( 20 Nov 2018 06:36 )             27.9     11-20    137  |  99  |  14  ----------------------------<  99  4.3   |  24  |  0.8    Ca    8.8      20 Nov 2018 06:36  Mg     1.9     11-20                RADIOLOGY & ADDITIONAL TESTS:    ANTIBIOTICS:  cefTRIAXone   IVPB      cefTRIAXone   IVPB 2 Gram(s) IV Intermittent every 24 hours

## 2018-11-20 NOTE — DISCHARGE NOTE ADULT - CARE PROVIDER_API CALL
Tara Escalona), Neurology  [address] [address]  Phone: [phone]  Fax: [phone]    Edilma Arellano (MD), Internal Medicine  [address] [place] [address]  Phone: [phone]  Fax: [phone]    John Parker), Neurological Surgery  [address] [facility]  [address]  [place] [address]  Phone: [phone]  Fax: [phone]    Lance Humphreys (MALATHI), Foot and Ankle Surgery; Podiatric Medicine and Surgery  [address] [place] [address]  Phone: [phone]  Fax: [phone]

## 2018-11-20 NOTE — DISCHARGE NOTE ADULT - MEDICATION SUMMARY - MEDICATIONS TO TAKE
I will START or STAY ON the medications listed below when I get home from the hospital:    predniSONE 20 mg oral tablet  -- 2 tab(s) by mouth once a day  -- Indication: For Disc herniation    morphine  -- 2 milligram(s) intravenous every 8 hours, As Needed  -- Indication: For Back pain    aspirin 81 mg oral tablet  -- 1 tab(s) by mouth once a day  -- Indication: For CAD (coronary artery disease)    traMADol 50 mg oral tablet  -- 1 tab(s) by mouth every 8 hours, As Needed MDD:3  for moderate to severe pain  -- Caution federal law prohibits the transfer of this drug to any person other  than the person for whom it was prescribed.  May cause drowsiness.  Alcohol may intensify this effect.  Use care when operating dangerous machinery.  Obtain medical advice before taking any non-prescription drugs as some may affect the action of this medication.    -- Indication: For Back pain    Lyrica 100 mg oral capsule  -- 1 cap(s) by mouth 2 times a day  -- Indication: For Peripheral Neuropathy    insulin lispro  -- 6 unit(s) subcutaneously 3 times a day   -- Indication: For Diabetes    insulin glargine  -- 27 unit(s) subcutaneous once a day (at bedtime)  -- Indication: For Diabetes    atorvastatin 40 mg oral tablet  -- 1 tab(s) by mouth once a day  -- Indication: For CAD (coronary artery disease)    Plavix 75 mg oral tablet  -- 1 tab(s) by mouth once a day  -- Indication: For CAD (coronary artery disease)    metoprolol succinate 25 mg oral tablet, extended release  -- 1 tab(s) by mouth once a day  -- Indication: For hypertension    cefTRIAXone  -- 2 gram(s) intravenous once a day  -- Indication: For Osteomyelitis    bacitracin 500 units/g topical ointment  -- 1 application on skin once a day  -- Indication: For Dermatitis    docusate sodium 100 mg oral capsule  -- 1 cap(s) by mouth once a day PRN for constipation    -- Indication: For Constipation    senna oral tablet  -- 1 tab(s) by mouth once a day (at bedtime) PRN for constipation  -- Indication: For Constipation    polyethylene glycol 3350 oral powder for reconstitution  -- 17 gram(s) by mouth once a day  -- Indication: For Constipation    simethicone 80 mg oral tablet, chewable  -- 1 tab(s) by mouth 2 times a day  -- Indication: For Dyspepsia    cyanocobalamin 1000 mcg oral tablet  -- 1 tab(s) by mouth once a day  -- Indication: For Vitamins    folic acid 1 mg oral tablet  -- 1 tab(s) by mouth once a day  -- Indication: For Vitamins    thiamine 100 mg oral tablet  -- 1 tab(s) by mouth once a day  -- Indication: For Vitamins

## 2018-11-21 PROBLEM — I25.10 ATHEROSCLEROTIC HEART DISEASE OF NATIVE CORONARY ARTERY WITHOUT ANGINA PECTORIS: Chronic | Status: ACTIVE | Noted: 2018-11-16

## 2018-11-21 LAB
ALBUMIN SERPL ELPH-MCNC: 3 G/DL — LOW (ref 3.5–5.2)
ALP SERPL-CCNC: 232 U/L — HIGH (ref 30–115)
ALT FLD-CCNC: 70 U/L — HIGH (ref 0–41)
ANION GAP SERPL CALC-SCNC: 14 MMOL/L — SIGNIFICANT CHANGE UP (ref 7–14)
APPEARANCE UR: ABNORMAL
AST SERPL-CCNC: 53 U/L — HIGH (ref 0–41)
BACTERIA # UR AUTO: ABNORMAL /HPF
BILIRUB SERPL-MCNC: 0.5 MG/DL — SIGNIFICANT CHANGE UP (ref 0.2–1.2)
BILIRUB UR-MCNC: NEGATIVE — SIGNIFICANT CHANGE UP
BUN SERPL-MCNC: 15 MG/DL — SIGNIFICANT CHANGE UP (ref 10–20)
CALCIUM SERPL-MCNC: 8.6 MG/DL — SIGNIFICANT CHANGE UP (ref 8.4–10.5)
CALCIUM SERPL-MCNC: 8.9 MG/DL — SIGNIFICANT CHANGE UP (ref 8.5–10.1)
CHLORIDE SERPL-SCNC: 99 MMOL/L — SIGNIFICANT CHANGE UP (ref 98–110)
CK SERPL-CCNC: 131 U/L — SIGNIFICANT CHANGE UP (ref 0–225)
CO2 SERPL-SCNC: 27 MMOL/L — SIGNIFICANT CHANGE UP (ref 17–32)
COLOR SPEC: YELLOW — SIGNIFICANT CHANGE UP
CREAT SERPL-MCNC: 0.8 MG/DL — SIGNIFICANT CHANGE UP (ref 0.7–1.5)
DIFF PNL FLD: NEGATIVE — SIGNIFICANT CHANGE UP
EPI CELLS # UR: ABNORMAL /HPF
GLUCOSE BLDC GLUCOMTR-MCNC: 116 MG/DL — HIGH (ref 70–99)
GLUCOSE BLDC GLUCOMTR-MCNC: 133 MG/DL — HIGH (ref 70–99)
GLUCOSE BLDC GLUCOMTR-MCNC: 137 MG/DL — HIGH (ref 70–99)
GLUCOSE BLDC GLUCOMTR-MCNC: 157 MG/DL — HIGH (ref 70–99)
GLUCOSE SERPL-MCNC: 117 MG/DL — HIGH (ref 70–99)
GLUCOSE UR QL: 500 MG/DL
HCT VFR BLD CALC: 28.3 % — LOW (ref 42–52)
HGB BLD-MCNC: 8.8 G/DL — LOW (ref 14–18)
KETONES UR-MCNC: NEGATIVE — SIGNIFICANT CHANGE UP
LEUKOCYTE ESTERASE UR-ACNC: NEGATIVE — SIGNIFICANT CHANGE UP
MAGNESIUM SERPL-MCNC: 1.9 MG/DL — SIGNIFICANT CHANGE UP (ref 1.8–2.4)
MCHC RBC-ENTMCNC: 22.3 PG — LOW (ref 27–31)
MCHC RBC-ENTMCNC: 31.1 G/DL — LOW (ref 32–37)
MCV RBC AUTO: 71.8 FL — LOW (ref 80–94)
NITRITE UR-MCNC: NEGATIVE — SIGNIFICANT CHANGE UP
NRBC # BLD: 0 /100 WBCS — SIGNIFICANT CHANGE UP (ref 0–0)
PH UR: 6 — SIGNIFICANT CHANGE UP (ref 5–8)
PHOSPHATE SERPL-MCNC: 2.6 MG/DL — SIGNIFICANT CHANGE UP (ref 2.1–4.9)
PLATELET # BLD AUTO: 234 K/UL — SIGNIFICANT CHANGE UP (ref 130–400)
POTASSIUM SERPL-MCNC: 3.6 MMOL/L — SIGNIFICANT CHANGE UP (ref 3.5–5)
POTASSIUM SERPL-SCNC: 3.6 MMOL/L — SIGNIFICANT CHANGE UP (ref 3.5–5)
PROT SERPL-MCNC: 6.8 G/DL — SIGNIFICANT CHANGE UP (ref 6–8)
PROT UR-MCNC: NEGATIVE MG/DL — SIGNIFICANT CHANGE UP
PTH-INTACT FLD-MCNC: 34 PG/ML — SIGNIFICANT CHANGE UP (ref 15–65)
RBC # BLD: 3.94 M/UL — LOW (ref 4.7–6.1)
RBC # FLD: 16.9 % — HIGH (ref 11.5–14.5)
SODIUM SERPL-SCNC: 140 MMOL/L — SIGNIFICANT CHANGE UP (ref 135–146)
SP GR SPEC: 1.02 — SIGNIFICANT CHANGE UP (ref 1.01–1.03)
T PALLIDUM AB TITR SER: NEGATIVE — SIGNIFICANT CHANGE UP
UROBILINOGEN FLD QL: 0.2 MG/DL — SIGNIFICANT CHANGE UP (ref 0.2–0.2)
WBC # BLD: 10.58 K/UL — SIGNIFICANT CHANGE UP (ref 4.8–10.8)
WBC # FLD AUTO: 10.58 K/UL — SIGNIFICANT CHANGE UP (ref 4.8–10.8)

## 2018-11-21 RX ORDER — TAMSULOSIN HYDROCHLORIDE 0.4 MG/1
0.4 CAPSULE ORAL AT BEDTIME
Qty: 0 | Refills: 0 | Status: DISCONTINUED | OUTPATIENT
Start: 2018-11-21 | End: 2018-11-24

## 2018-11-21 RX ORDER — OXYCODONE HYDROCHLORIDE 5 MG/1
5 TABLET ORAL EVERY 4 HOURS
Qty: 0 | Refills: 0 | Status: DISCONTINUED | OUTPATIENT
Start: 2018-11-21 | End: 2018-11-24

## 2018-11-21 RX ADMIN — Medication 1 MILLIGRAM(S): at 11:33

## 2018-11-21 RX ADMIN — Medication 6 UNIT(S): at 16:40

## 2018-11-21 RX ADMIN — Medication 1 APPLICATION(S): at 11:32

## 2018-11-21 RX ADMIN — HEPARIN SODIUM 5000 UNIT(S): 5000 INJECTION INTRAVENOUS; SUBCUTANEOUS at 21:34

## 2018-11-21 RX ADMIN — Medication 25 MILLIGRAM(S): at 05:54

## 2018-11-21 RX ADMIN — INSULIN GLARGINE 27 UNIT(S): 100 INJECTION, SOLUTION SUBCUTANEOUS at 21:38

## 2018-11-21 RX ADMIN — HEPARIN SODIUM 5000 UNIT(S): 5000 INJECTION INTRAVENOUS; SUBCUTANEOUS at 11:34

## 2018-11-21 RX ADMIN — Medication 81 MILLIGRAM(S): at 11:33

## 2018-11-21 RX ADMIN — CEFTRIAXONE 100 GRAM(S): 500 INJECTION, POWDER, FOR SOLUTION INTRAMUSCULAR; INTRAVENOUS at 05:51

## 2018-11-21 RX ADMIN — ATORVASTATIN CALCIUM 40 MILLIGRAM(S): 80 TABLET, FILM COATED ORAL at 21:31

## 2018-11-21 RX ADMIN — CLOPIDOGREL BISULFATE 75 MILLIGRAM(S): 75 TABLET, FILM COATED ORAL at 11:33

## 2018-11-21 RX ADMIN — PREGABALIN 1000 MICROGRAM(S): 225 CAPSULE ORAL at 11:33

## 2018-11-21 RX ADMIN — HEPARIN SODIUM 5000 UNIT(S): 5000 INJECTION INTRAVENOUS; SUBCUTANEOUS at 05:54

## 2018-11-21 RX ADMIN — Medication 100 MILLIGRAM(S): at 17:15

## 2018-11-21 RX ADMIN — Medication 6 UNIT(S): at 08:35

## 2018-11-21 RX ADMIN — Medication 100 MILLIGRAM(S): at 06:20

## 2018-11-21 RX ADMIN — CHLORHEXIDINE GLUCONATE 1 APPLICATION(S): 213 SOLUTION TOPICAL at 06:22

## 2018-11-21 RX ADMIN — SIMETHICONE 80 MILLIGRAM(S): 80 TABLET, CHEWABLE ORAL at 05:54

## 2018-11-21 RX ADMIN — TRAMADOL HYDROCHLORIDE 50 MILLIGRAM(S): 50 TABLET ORAL at 06:21

## 2018-11-21 RX ADMIN — Medication 20 MILLIGRAM(S): at 05:54

## 2018-11-21 RX ADMIN — TAMSULOSIN HYDROCHLORIDE 0.4 MILLIGRAM(S): 0.4 CAPSULE ORAL at 21:32

## 2018-11-21 RX ADMIN — OXYCODONE HYDROCHLORIDE 5 MILLIGRAM(S): 5 TABLET ORAL at 10:06

## 2018-11-21 RX ADMIN — Medication 100 MILLIGRAM(S): at 11:33

## 2018-11-21 RX ADMIN — SIMETHICONE 80 MILLIGRAM(S): 80 TABLET, CHEWABLE ORAL at 17:15

## 2018-11-21 RX ADMIN — Medication 6 UNIT(S): at 11:37

## 2018-11-22 LAB
24R-OH-CALCIDIOL SERPL-MCNC: 20 NG/ML — LOW (ref 30–80)
GLUCOSE BLDC GLUCOMTR-MCNC: 104 MG/DL — HIGH (ref 70–99)
GLUCOSE BLDC GLUCOMTR-MCNC: 123 MG/DL — HIGH (ref 70–99)
GLUCOSE BLDC GLUCOMTR-MCNC: 128 MG/DL — HIGH (ref 70–99)
GLUCOSE BLDC GLUCOMTR-MCNC: 159 MG/DL — HIGH (ref 70–99)
PROT SERPL-MCNC: 6.8 G/DL — SIGNIFICANT CHANGE UP (ref 6–8.3)
PROT SERPL-MCNC: 6.8 G/DL — SIGNIFICANT CHANGE UP (ref 6–8.3)

## 2018-11-22 RX ADMIN — Medication 100 MILLIGRAM(S): at 11:38

## 2018-11-22 RX ADMIN — SIMETHICONE 80 MILLIGRAM(S): 80 TABLET, CHEWABLE ORAL at 16:25

## 2018-11-22 RX ADMIN — CLOPIDOGREL BISULFATE 75 MILLIGRAM(S): 75 TABLET, FILM COATED ORAL at 11:38

## 2018-11-22 RX ADMIN — Medication 100 MILLIGRAM(S): at 05:43

## 2018-11-22 RX ADMIN — OXYCODONE HYDROCHLORIDE 5 MILLIGRAM(S): 5 TABLET ORAL at 21:05

## 2018-11-22 RX ADMIN — Medication 1 APPLICATION(S): at 11:39

## 2018-11-22 RX ADMIN — Medication 1 MILLIGRAM(S): at 11:39

## 2018-11-22 RX ADMIN — Medication 25 MILLIGRAM(S): at 05:42

## 2018-11-22 RX ADMIN — OXYCODONE HYDROCHLORIDE 5 MILLIGRAM(S): 5 TABLET ORAL at 21:40

## 2018-11-22 RX ADMIN — Medication 6 UNIT(S): at 11:37

## 2018-11-22 RX ADMIN — OXYCODONE HYDROCHLORIDE 5 MILLIGRAM(S): 5 TABLET ORAL at 07:10

## 2018-11-22 RX ADMIN — OXYCODONE HYDROCHLORIDE 5 MILLIGRAM(S): 5 TABLET ORAL at 15:24

## 2018-11-22 RX ADMIN — PREGABALIN 1000 MICROGRAM(S): 225 CAPSULE ORAL at 11:39

## 2018-11-22 RX ADMIN — Medication 6 UNIT(S): at 08:10

## 2018-11-22 RX ADMIN — CEFTRIAXONE 100 GRAM(S): 500 INJECTION, POWDER, FOR SOLUTION INTRAMUSCULAR; INTRAVENOUS at 05:42

## 2018-11-22 RX ADMIN — Medication 81 MILLIGRAM(S): at 11:38

## 2018-11-22 RX ADMIN — CHLORHEXIDINE GLUCONATE 1 APPLICATION(S): 213 SOLUTION TOPICAL at 08:10

## 2018-11-22 RX ADMIN — HEPARIN SODIUM 5000 UNIT(S): 5000 INJECTION INTRAVENOUS; SUBCUTANEOUS at 05:42

## 2018-11-22 RX ADMIN — TAMSULOSIN HYDROCHLORIDE 0.4 MILLIGRAM(S): 0.4 CAPSULE ORAL at 21:06

## 2018-11-22 RX ADMIN — HEPARIN SODIUM 5000 UNIT(S): 5000 INJECTION INTRAVENOUS; SUBCUTANEOUS at 13:55

## 2018-11-22 RX ADMIN — INSULIN GLARGINE 27 UNIT(S): 100 INJECTION, SOLUTION SUBCUTANEOUS at 21:06

## 2018-11-22 RX ADMIN — TRAMADOL HYDROCHLORIDE 50 MILLIGRAM(S): 50 TABLET ORAL at 10:34

## 2018-11-22 RX ADMIN — OXYCODONE HYDROCHLORIDE 5 MILLIGRAM(S): 5 TABLET ORAL at 13:55

## 2018-11-22 RX ADMIN — SIMETHICONE 80 MILLIGRAM(S): 80 TABLET, CHEWABLE ORAL at 05:43

## 2018-11-22 RX ADMIN — OXYCODONE HYDROCHLORIDE 5 MILLIGRAM(S): 5 TABLET ORAL at 08:10

## 2018-11-22 RX ADMIN — TRAMADOL HYDROCHLORIDE 50 MILLIGRAM(S): 50 TABLET ORAL at 09:08

## 2018-11-22 RX ADMIN — ATORVASTATIN CALCIUM 40 MILLIGRAM(S): 80 TABLET, FILM COATED ORAL at 21:06

## 2018-11-22 RX ADMIN — Medication 30 MILLILITER(S): at 13:57

## 2018-11-22 RX ADMIN — Medication 100 MILLIGRAM(S): at 17:17

## 2018-11-22 RX ADMIN — Medication 6 UNIT(S): at 16:30

## 2018-11-22 RX ADMIN — HEPARIN SODIUM 5000 UNIT(S): 5000 INJECTION INTRAVENOUS; SUBCUTANEOUS at 21:06

## 2018-11-23 LAB
% ALBUMIN: 37.2 % — SIGNIFICANT CHANGE UP
% ALPHA 1: 7.1 % — SIGNIFICANT CHANGE UP
% ALPHA 2: 16.3 % — SIGNIFICANT CHANGE UP
% BETA: 16.4 % — SIGNIFICANT CHANGE UP
% GAMMA: 23 % — SIGNIFICANT CHANGE UP
ALBUMIN SERPL ELPH-MCNC: 2.5 G/DL — LOW (ref 3.6–5.5)
ALBUMIN/GLOB SERPL ELPH: 0.6 RATIO — SIGNIFICANT CHANGE UP
ALPHA1 GLOB SERPL ELPH-MCNC: 0.5 G/DL — HIGH (ref 0.1–0.4)
ALPHA2 GLOB SERPL ELPH-MCNC: 1.1 G/DL — HIGH (ref 0.5–1)
B-GLOBULIN SERPL ELPH-MCNC: 1.1 G/DL — HIGH (ref 0.5–1)
GAMMA GLOBULIN: 1.6 G/DL — SIGNIFICANT CHANGE UP (ref 0.6–1.6)
GLUCOSE BLDC GLUCOMTR-MCNC: 107 MG/DL — HIGH (ref 70–99)
GLUCOSE BLDC GLUCOMTR-MCNC: 130 MG/DL — HIGH (ref 70–99)
GLUCOSE BLDC GLUCOMTR-MCNC: 133 MG/DL — HIGH (ref 70–99)
GLUCOSE BLDC GLUCOMTR-MCNC: 95 MG/DL — SIGNIFICANT CHANGE UP (ref 70–99)
INTERPRETATION SERPL IFE-IMP: SIGNIFICANT CHANGE UP
PROT PATTERN SERPL ELPH-IMP: SIGNIFICANT CHANGE UP

## 2018-11-23 RX ORDER — METOPROLOL TARTRATE 50 MG
12.5 TABLET ORAL
Qty: 0 | Refills: 0 | Status: DISCONTINUED | OUTPATIENT
Start: 2018-11-23 | End: 2018-11-24

## 2018-11-23 RX ORDER — ACETAMINOPHEN 500 MG
650 TABLET ORAL EVERY 4 HOURS
Qty: 0 | Refills: 0 | Status: DISCONTINUED | OUTPATIENT
Start: 2018-11-23 | End: 2018-11-24

## 2018-11-23 RX ADMIN — Medication 12.5 MILLIGRAM(S): at 18:11

## 2018-11-23 RX ADMIN — TAMSULOSIN HYDROCHLORIDE 0.4 MILLIGRAM(S): 0.4 CAPSULE ORAL at 21:22

## 2018-11-23 RX ADMIN — HEPARIN SODIUM 5000 UNIT(S): 5000 INJECTION INTRAVENOUS; SUBCUTANEOUS at 05:28

## 2018-11-23 RX ADMIN — SIMETHICONE 80 MILLIGRAM(S): 80 TABLET, CHEWABLE ORAL at 18:09

## 2018-11-23 RX ADMIN — CEFTRIAXONE 100 GRAM(S): 500 INJECTION, POWDER, FOR SOLUTION INTRAMUSCULAR; INTRAVENOUS at 05:27

## 2018-11-23 RX ADMIN — INSULIN GLARGINE 27 UNIT(S): 100 INJECTION, SOLUTION SUBCUTANEOUS at 22:11

## 2018-11-23 RX ADMIN — Medication 100 MILLIGRAM(S): at 12:29

## 2018-11-23 RX ADMIN — ATORVASTATIN CALCIUM 40 MILLIGRAM(S): 80 TABLET, FILM COATED ORAL at 21:23

## 2018-11-23 RX ADMIN — Medication 100 MILLIGRAM(S): at 18:09

## 2018-11-23 RX ADMIN — CLOPIDOGREL BISULFATE 75 MILLIGRAM(S): 75 TABLET, FILM COATED ORAL at 12:29

## 2018-11-23 RX ADMIN — OXYCODONE HYDROCHLORIDE 5 MILLIGRAM(S): 5 TABLET ORAL at 21:22

## 2018-11-23 RX ADMIN — HEPARIN SODIUM 5000 UNIT(S): 5000 INJECTION INTRAVENOUS; SUBCUTANEOUS at 12:30

## 2018-11-23 RX ADMIN — Medication 6 UNIT(S): at 12:29

## 2018-11-23 RX ADMIN — SIMETHICONE 80 MILLIGRAM(S): 80 TABLET, CHEWABLE ORAL at 05:27

## 2018-11-23 RX ADMIN — OXYCODONE HYDROCHLORIDE 5 MILLIGRAM(S): 5 TABLET ORAL at 22:09

## 2018-11-23 RX ADMIN — Medication 100 MILLIGRAM(S): at 05:28

## 2018-11-23 RX ADMIN — TRAMADOL HYDROCHLORIDE 50 MILLIGRAM(S): 50 TABLET ORAL at 14:24

## 2018-11-23 RX ADMIN — Medication 1 APPLICATION(S): at 12:29

## 2018-11-23 RX ADMIN — Medication 81 MILLIGRAM(S): at 12:29

## 2018-11-23 RX ADMIN — PREGABALIN 1000 MICROGRAM(S): 225 CAPSULE ORAL at 12:29

## 2018-11-23 RX ADMIN — Medication 6 UNIT(S): at 07:42

## 2018-11-23 RX ADMIN — OXYCODONE HYDROCHLORIDE 5 MILLIGRAM(S): 5 TABLET ORAL at 09:42

## 2018-11-23 RX ADMIN — Medication 1 MILLIGRAM(S): at 12:29

## 2018-11-23 RX ADMIN — HEPARIN SODIUM 5000 UNIT(S): 5000 INJECTION INTRAVENOUS; SUBCUTANEOUS at 21:23

## 2018-11-24 ENCOUNTER — INPATIENT (INPATIENT)
Facility: HOSPITAL | Age: 63
LOS: 6 days | Discharge: NURSING, REHAB OR LONG-TERM CARE | End: 2018-12-01
Attending: HOSPITALIST | Admitting: HOSPITALIST
Payer: SELF-PAY

## 2018-11-24 VITALS
RESPIRATION RATE: 18 BRPM | TEMPERATURE: 98 F | DIASTOLIC BLOOD PRESSURE: 53 MMHG | SYSTOLIC BLOOD PRESSURE: 89 MMHG | HEART RATE: 70 BPM | HEIGHT: 75 IN | WEIGHT: 243.17 LBS

## 2018-11-24 DIAGNOSIS — M86.9 OSTEOMYELITIS, UNSPECIFIED: ICD-10-CM

## 2018-11-24 DIAGNOSIS — Z79.4 LONG TERM (CURRENT) USE OF INSULIN: ICD-10-CM

## 2018-11-24 DIAGNOSIS — Z91.041 RADIOGRAPHIC DYE ALLERGY STATUS: ICD-10-CM

## 2018-11-24 DIAGNOSIS — S80.212A ABRASION, LEFT KNEE, INITIAL ENCOUNTER: ICD-10-CM

## 2018-11-24 DIAGNOSIS — K64.4 RESIDUAL HEMORRHOIDAL SKIN TAGS: ICD-10-CM

## 2018-11-24 DIAGNOSIS — I10 ESSENTIAL (PRIMARY) HYPERTENSION: ICD-10-CM

## 2018-11-24 DIAGNOSIS — K62.5 HEMORRHAGE OF ANUS AND RECTUM: ICD-10-CM
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
## 2018-11-24 DIAGNOSIS — E11.51 TYPE 2 DIABETES MELLITUS WITH DIABETIC PERIPHERAL ANGIOPATHY WITHOUT GANGRENE: ICD-10-CM

## 2018-11-24 DIAGNOSIS — Z66 DO NOT RESUSCITATE: ICD-10-CM

## 2018-11-24 DIAGNOSIS — Z95.1 PRESENCE OF AORTOCORONARY BYPASS GRAFT: Chronic | ICD-10-CM

## 2018-11-24 DIAGNOSIS — E53.8 DEFICIENCY OF OTHER SPECIFIED B GROUP VITAMINS: ICD-10-CM

## 2018-11-24 DIAGNOSIS — Z88.1 ALLERGY STATUS TO OTHER ANTIBIOTIC AGENTS STATUS: ICD-10-CM

## 2018-11-24 DIAGNOSIS — I25.2 OLD MYOCARDIAL INFARCTION: ICD-10-CM

## 2018-11-24 DIAGNOSIS — D64.9 ANEMIA, UNSPECIFIED: ICD-10-CM

## 2018-11-24 DIAGNOSIS — E78.5 HYPERLIPIDEMIA, UNSPECIFIED: ICD-10-CM

## 2018-11-24 DIAGNOSIS — I25.10 ATHEROSCLEROTIC HEART DISEASE OF NATIVE CORONARY ARTERY WITHOUT ANGINA PECTORIS: ICD-10-CM

## 2018-11-24 DIAGNOSIS — Z95.1 PRESENCE OF AORTOCORONARY BYPASS GRAFT: ICD-10-CM
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
## 2018-11-24 DIAGNOSIS — R33.9 RETENTION OF URINE, UNSPECIFIED: ICD-10-CM

## 2018-11-24 DIAGNOSIS — L89.312 PRESSURE ULCER OF RIGHT BUTTOCK, STAGE 2: ICD-10-CM

## 2018-11-24 DIAGNOSIS — E11.42 TYPE 2 DIABETES MELLITUS WITH DIABETIC POLYNEUROPATHY: ICD-10-CM

## 2018-11-24 DIAGNOSIS — K62.89 OTHER SPECIFIED DISEASES OF ANUS AND RECTUM: ICD-10-CM

## 2018-11-24 DIAGNOSIS — E51.9 THIAMINE DEFICIENCY, UNSPECIFIED: ICD-10-CM

## 2018-11-24 LAB
GLUCOSE BLDC GLUCOMTR-MCNC: 114 MG/DL — HIGH (ref 70–99)
GLUCOSE BLDC GLUCOMTR-MCNC: 142 MG/DL — HIGH (ref 70–99)
GLUCOSE BLDC GLUCOMTR-MCNC: 94 MG/DL — SIGNIFICANT CHANGE UP (ref 70–99)
GLUCOSE BLDC GLUCOMTR-MCNC: 96 MG/DL — SIGNIFICANT CHANGE UP (ref 70–99)

## 2018-11-24 RX ORDER — CLOPIDOGREL BISULFATE 75 MG/1
1 TABLET, FILM COATED ORAL
Qty: 0 | Refills: 0 | COMMUNITY

## 2018-11-24 RX ORDER — INSULIN GLARGINE 100 [IU]/ML
27 INJECTION, SOLUTION SUBCUTANEOUS AT BEDTIME
Qty: 0 | Refills: 0 | Status: DISCONTINUED | OUTPATIENT
Start: 2018-11-24 | End: 2018-12-01

## 2018-11-24 RX ORDER — THIAMINE MONONITRATE (VIT B1) 100 MG
100 TABLET ORAL DAILY
Qty: 0 | Refills: 0 | Status: DISCONTINUED | OUTPATIENT
Start: 2018-11-24 | End: 2018-12-01

## 2018-11-24 RX ORDER — INSULIN LISPRO 100/ML
6 VIAL (ML) SUBCUTANEOUS
Qty: 0 | Refills: 0 | Status: DISCONTINUED | OUTPATIENT
Start: 2018-11-24 | End: 2018-11-27

## 2018-11-24 RX ORDER — PANTOPRAZOLE SODIUM 20 MG/1
40 TABLET, DELAYED RELEASE ORAL
Qty: 0 | Refills: 0 | Status: DISCONTINUED | OUTPATIENT
Start: 2018-11-24 | End: 2018-11-26

## 2018-11-24 RX ORDER — TRAMADOL HYDROCHLORIDE 50 MG/1
50 TABLET ORAL EVERY 8 HOURS
Qty: 0 | Refills: 0 | Status: DISCONTINUED | OUTPATIENT
Start: 2018-11-24 | End: 2018-11-30

## 2018-11-24 RX ORDER — SIMETHICONE 80 MG/1
80 TABLET, CHEWABLE ORAL
Qty: 0 | Refills: 0 | Status: DISCONTINUED | OUTPATIENT
Start: 2018-11-24 | End: 2018-12-01

## 2018-11-24 RX ORDER — ASPIRIN/CALCIUM CARB/MAGNESIUM 324 MG
1 TABLET ORAL
Qty: 0 | Refills: 0 | COMMUNITY

## 2018-11-24 RX ORDER — FOLIC ACID 0.8 MG
1 TABLET ORAL DAILY
Qty: 0 | Refills: 0 | Status: DISCONTINUED | OUTPATIENT
Start: 2018-11-24 | End: 2018-12-01

## 2018-11-24 RX ORDER — CEFTRIAXONE 500 MG/1
2 INJECTION, POWDER, FOR SOLUTION INTRAMUSCULAR; INTRAVENOUS EVERY 24 HOURS
Qty: 0 | Refills: 0 | Status: DISCONTINUED | OUTPATIENT
Start: 2018-11-24 | End: 2018-12-01

## 2018-11-24 RX ORDER — ATORVASTATIN CALCIUM 80 MG/1
40 TABLET, FILM COATED ORAL AT BEDTIME
Qty: 0 | Refills: 0 | Status: DISCONTINUED | OUTPATIENT
Start: 2018-11-24 | End: 2018-12-01

## 2018-11-24 RX ORDER — SODIUM CHLORIDE 9 MG/ML
500 INJECTION INTRAMUSCULAR; INTRAVENOUS; SUBCUTANEOUS ONCE
Qty: 0 | Refills: 0 | Status: COMPLETED | OUTPATIENT
Start: 2018-11-24 | End: 2018-11-24

## 2018-11-24 RX ORDER — PREGABALIN 225 MG/1
1000 CAPSULE ORAL DAILY
Qty: 0 | Refills: 0 | Status: DISCONTINUED | OUTPATIENT
Start: 2018-11-24 | End: 2018-12-01

## 2018-11-24 RX ADMIN — HEPARIN SODIUM 5000 UNIT(S): 5000 INJECTION INTRAVENOUS; SUBCUTANEOUS at 13:05

## 2018-11-24 RX ADMIN — OXYCODONE HYDROCHLORIDE 5 MILLIGRAM(S): 5 TABLET ORAL at 14:07

## 2018-11-24 RX ADMIN — Medication 6 UNIT(S): at 12:00

## 2018-11-24 RX ADMIN — Medication 81 MILLIGRAM(S): at 11:58

## 2018-11-24 RX ADMIN — OXYCODONE HYDROCHLORIDE 5 MILLIGRAM(S): 5 TABLET ORAL at 10:07

## 2018-11-24 RX ADMIN — Medication 12.5 MILLIGRAM(S): at 18:07

## 2018-11-24 RX ADMIN — Medication 6 UNIT(S): at 17:49

## 2018-11-24 RX ADMIN — Medication 1 MILLIGRAM(S): at 11:58

## 2018-11-24 RX ADMIN — Medication 12.5 MILLIGRAM(S): at 05:16

## 2018-11-24 RX ADMIN — Medication 1 APPLICATION(S): at 11:58

## 2018-11-24 RX ADMIN — OXYCODONE HYDROCHLORIDE 5 MILLIGRAM(S): 5 TABLET ORAL at 01:36

## 2018-11-24 RX ADMIN — SIMETHICONE 80 MILLIGRAM(S): 80 TABLET, CHEWABLE ORAL at 05:16

## 2018-11-24 RX ADMIN — SIMETHICONE 80 MILLIGRAM(S): 80 TABLET, CHEWABLE ORAL at 17:54

## 2018-11-24 RX ADMIN — OXYCODONE HYDROCHLORIDE 5 MILLIGRAM(S): 5 TABLET ORAL at 18:11

## 2018-11-24 RX ADMIN — PANTOPRAZOLE SODIUM 40 MILLIGRAM(S): 20 TABLET, DELAYED RELEASE ORAL at 23:35

## 2018-11-24 RX ADMIN — CHLORHEXIDINE GLUCONATE 1 APPLICATION(S): 213 SOLUTION TOPICAL at 05:32

## 2018-11-24 RX ADMIN — Medication 100 MILLIGRAM(S): at 05:15

## 2018-11-24 RX ADMIN — HEPARIN SODIUM 5000 UNIT(S): 5000 INJECTION INTRAVENOUS; SUBCUTANEOUS at 05:16

## 2018-11-24 RX ADMIN — Medication 100 MILLIGRAM(S): at 11:58

## 2018-11-24 RX ADMIN — CLOPIDOGREL BISULFATE 75 MILLIGRAM(S): 75 TABLET, FILM COATED ORAL at 11:58

## 2018-11-24 RX ADMIN — Medication 100 MILLIGRAM(S): at 17:54

## 2018-11-24 RX ADMIN — OXYCODONE HYDROCHLORIDE 5 MILLIGRAM(S): 5 TABLET ORAL at 10:57

## 2018-11-24 RX ADMIN — CEFTRIAXONE 100 GRAM(S): 500 INJECTION, POWDER, FOR SOLUTION INTRAMUSCULAR; INTRAVENOUS at 05:15

## 2018-11-24 RX ADMIN — Medication 30 MILLILITER(S): at 18:07

## 2018-11-24 RX ADMIN — PREGABALIN 1000 MICROGRAM(S): 225 CAPSULE ORAL at 11:58

## 2018-11-24 NOTE — H&P ADULT - HISTORY OF PRESENT ILLNESS
63y male with PMH of CAD, NSTEMI s/p CABG (5 vessel according to patient; March 2016); DM with peripheral neuropathy, Left DFU, HTN, HLD and recently admitted for lower extremity weakness and a complete Stroke workup was done with CT head negative for acute infarct; and  MRI brain didn't show any new ischemic changes. MRI lumbar and sacral spine showed diffuse disc bulges in lumbar and sacral spine. Patient was followed by Neurosurgery, neurology, ID, podiatry.During his stay ID and podiatry followed the patient for osteomyelitis of left foot and he was discharged to  for intensive rehab and on IV antibiotics on 11/20/2018 . Today on day of presentation , patient reports that he was having crampy abdominal pain, sudden in onset, 6/10 intensity located in the lower abdomen , non radiating and had a sudden urge to defecate for which he called the PCA to help him out to go to a bathroom. He went to the bathroom and reports bright red colored watery stool .patient was taken back to the bed .Denies sob, fever, nausea, vomiting, hemoptysis, lightheadedness , dysuria .The nurse called the attending dr corley who assessed the patient and did a DASHAWN which was+ve hemorrhoids but no melena reported.She spoke with the GI fellow on call and no acute intervention was suggested and patient was admitted to medicine service for further evaluation.Since he came to the medicine floor he has had 2 similar episodes   Patient never had a colonoscopy ,is on plavix and asa , no h/o colon c/a in family . Does not have a GI physician 63y male with PMH of CAD, NSTEMI s/p CABG (5 vessel according to patient; March 2016); DM with peripheral neuropathy, Left DFU, HTN, HLD and recently admitted for lower extremity weakness and a complete Stroke workup was done with CT head negative for acute infarct; and  MRI brain didn't show any new ischemic changes. MRI lumbar and sacral spine showed diffuse disc bulges in lumbar and sacral spine. Patient was followed by Neurosurgery, neurology, ID, podiatry.During his stay ID and podiatry followed the patient for osteomyelitis of left foot and he was discharged to  for intensive rehab and on IV antibiotics on 11/20/2018 . Today on day of presentation , patient reports that he was having crampy abdominal pain, sudden in onset, 6/10 intensity located in the lower abdomen , non radiating and had a sudden urge to defecate for which he called the PCA to help him out to go to a bathroom. He went to the bathroom and reports bright red colored watery stool .patient was taken back to the bed .Denies sob, fever, nausea, vomiting, hemoptysis, lightheadedness , dysuria .The nurse called the attending Dr. Hall who assessed the patient and did a DASHAWN which showed active bleeding from rectum with prince bright red blood, no melena reported .She spoke with the GI fellow on call and no acute intervention was suggested and patient was admitted to medicine service for further evaluation.Since he came to the medicine floor he has had 2 similar episodes   Patient never had a colonoscopy ,is on plavix and asa , no h/o colon c/a in family . Does not have a GI physician

## 2018-11-24 NOTE — H&P ADULT - ASSESSMENT
#) Bilateral lower extremities numbness and weakness   -r/o neuropathy, deconditioning  -Neurosurgery and neurology note appreciated  -MRI done prelim report no cord compression  -CT head negative for acute stroke, please follow up neurology recs for further investigation (CTA/P after cr is better)  -pain control  -PT/rehab  -TLSO brace    #)SAKINA, mild rhabdomyoliss  -IV hydration NS at 100ml/hr  -follow up CK in am and BMP    #) Osteomyelitis of the left foot s/p excisional debridement   - as per last ID note, the insurance would not cover IV atbx so the patient was discharged on po augmentin with plan to follow up in clinic  -resume [id number] q24h  -follow up ID for duration of atbx  -11/6 culture GBS  -11/10 OR Necrotic soft tissue, extensive soft tissue scarring and osteomyelitic bone, OR cx No gowth     #) CAD s/p CABG  - c/w ASA, plavix, metoprolol and statin    #) HTN  - c/w metoprolol     #) DM  - monitor finger stick  - recent hba1c =7  - last in hospital regimen 27/6-6-6    #) Diet   - DASH, Carb consistent    #) DVT ppx  -heparin 5000IU s/c q 8 hours    #) Activity  - out of bed to chair with assistance    #) Disposition  - from home, might need NH this time    #) Full code status 63y male with PMH of CAD, NSTEMI s/p CABG (5 vessel according to patient; March 2016); DM with peripheral neuropathy, Left DFUcomplicated by osteomyelitis on IV abx , HTN, HLD and recently admitted for lower extremity weakness and then discharged to  for rehab, readmitted for Episode of BRBPR     #BRBPR likely 2/2 hemorrhoidal bleed vs r/o LGIB as a cause- doubt UGIB    patient has 3 good # 18 G IVs   protonix 40 mg iv q12   npo  GI consult - dr mazariegos spoke with fellow dr lechuga - GI will f/u patient  mantain active type and screen  f/u stat cbc  IVF NS @ 75   hold ASA, PLAVIX    #) chronic Bilateral lower extremities  weakness   --PT/rehab  -c/w TLSO brace    #) Osteomyelitis of the left foot s/p excisional debridement   -resume [id number] q24h  -follow up ID for duration of atbx  #) CAD s/p CABG  - hold ASA, plavix,   holding metoprolol as BP borderline   c/w statin    #) HTN  - holding metoprolol     #) DM  - monitor finger stick  - recent hba1c =7  - last in hospital regimen 27/6-6-6    #) Diet - NPO    #) DVT ppx - SCDs    #) Activity  - out of bed to chair with assistance    #) Disposition  -will need rehab , probably 4 A     # )DNR/DNI 63y male with PMH of CAD, NSTEMI s/p CABG (5 vessel according to patient; March 2016); DM with peripheral neuropathy, Left DFUcomplicated by osteomyelitis on IV abx , HTN, HLD and recently admitted for lower extremity weakness and then discharged to  for rehab, readmitted for Episode of BRBPR     #BRBPR likely 2/2 hemorrhoidal bleed vs r/o LGIB as a cause- doubt UGIB    patient has 3 good # 18 G IVs   protonix 40 mg iv q12   npo  GI consult - dr mazariegos spoke with fellow dr lechuga - GI will f/u patient  mantain active type and screen  f/u stat cbc  IVF NS @ 75   hold ASA, PLAVIX    #) chronic Bilateral lower extremities  weakness   --PT/rehab  -c/w TLSO brace  -patient discharged on 20 mg prednisone for disc herniation- confirm in am with neurosurgery    #) Osteomyelitis of the left foot s/p excisional debridement   -resume [id number] q24h  -follow up ID for duration of atbx  #) CAD s/p CABG  - hold ASA, plavix,   holding metoprolol as BP borderline   c/w statin    #) HTN  - holding metoprolol     #) DM  - monitor finger stick  - recent hba1c =7  - last in hospital regimen 27/6-6-6    #) Diet - NPO    #) DVT ppx - SCDs    #) Activity  - out of bed to chair with assistance    #) Disposition  -will need rehab , probably 4 A     # )DNR/DNI

## 2018-11-24 NOTE — H&P ADULT - NSHPREVIEWOFSYSTEMS_GEN_ALL_CORE
REVIEW OF SYSTEMS:    CONSTITUTIONAL: No weakness, fevers or chills  EYES/ENT: No visual changes;  No vertigo or throat pain   NECK: No pain or stiffness  RESPIRATORY: No cough, wheezing, hemoptysis; No shortness of breath  CARDIOVASCULAR: No chest pain or palpitations  GASTROINTESTINAL: crampy abdominal  pain. + diarrhea .BRBPR +  GENITOURINARY: No dysuria, frequency or hematuria  NEUROLOGICAL: No numbness or weakness  SKIN: No itching, rashes

## 2018-11-24 NOTE — H&P ADULT - NSHPPHYSICALEXAM_GEN_ALL_CORE
General: NAD, AAO x3  HEENT: No icterus,. Moist mucous membranes  Neck: No JVD noted. Supple, no meningismus  Cardio: S1, S2 noted, RRR. No murmurs  Resp: Clear to auscultation b/l. No adventitious sounds  Abdo: Soft, NT, bowel sounds present.  Extremities: No edema noted. Pulses present b/l  Neuro: AAO x3, grossly normal motor strength.  Lymphnodes: no lymphadenopathy identified.  Skin: Dry, no rashes General: NAD, AAO x3  HEENT: No icterus,. Moist mucous membranes  Neck: No JVD noted. Supple, no meningismus  Cardio: S1, S2 noted, RRR. No murmurs  Resp: Clear to auscultation b/l. No adventitious sounds  Abdo: Soft, NT, bowel sounds present.  Extremities: Pulses present b/l  Neuro: AAO x3, grossly normal motor strength.

## 2018-11-25 LAB
ALBUMIN SERPL ELPH-MCNC: 2.6 G/DL — LOW (ref 3.5–5.2)
ALBUMIN SERPL ELPH-MCNC: 2.7 G/DL — LOW (ref 3.5–5.2)
ALP SERPL-CCNC: 123 U/L — HIGH (ref 30–115)
ALP SERPL-CCNC: 134 U/L — HIGH (ref 30–115)
ALT FLD-CCNC: 28 U/L — SIGNIFICANT CHANGE UP (ref 0–41)
ALT FLD-CCNC: 30 U/L — SIGNIFICANT CHANGE UP (ref 0–41)
ANION GAP SERPL CALC-SCNC: 13 MMOL/L — SIGNIFICANT CHANGE UP (ref 7–14)
ANION GAP SERPL CALC-SCNC: 14 MMOL/L — SIGNIFICANT CHANGE UP (ref 7–14)
AST SERPL-CCNC: 17 U/L — SIGNIFICANT CHANGE UP (ref 0–41)
AST SERPL-CCNC: 18 U/L — SIGNIFICANT CHANGE UP (ref 0–41)
BASOPHILS # BLD AUTO: 0.05 K/UL — SIGNIFICANT CHANGE UP (ref 0–0.2)
BASOPHILS NFR BLD AUTO: 0.6 % — SIGNIFICANT CHANGE UP (ref 0–1)
BILIRUB SERPL-MCNC: 0.3 MG/DL — SIGNIFICANT CHANGE UP (ref 0.2–1.2)
BILIRUB SERPL-MCNC: 0.5 MG/DL — SIGNIFICANT CHANGE UP (ref 0.2–1.2)
BUN SERPL-MCNC: 15 MG/DL — SIGNIFICANT CHANGE UP (ref 10–20)
BUN SERPL-MCNC: 16 MG/DL — SIGNIFICANT CHANGE UP (ref 10–20)
CALCIUM SERPL-MCNC: 8.2 MG/DL — LOW (ref 8.5–10.1)
CALCIUM SERPL-MCNC: 8.7 MG/DL — SIGNIFICANT CHANGE UP (ref 8.5–10.1)
CHLORIDE SERPL-SCNC: 101 MMOL/L — SIGNIFICANT CHANGE UP (ref 98–110)
CHLORIDE SERPL-SCNC: 99 MMOL/L — SIGNIFICANT CHANGE UP (ref 98–110)
CO2 SERPL-SCNC: 24 MMOL/L — SIGNIFICANT CHANGE UP (ref 17–32)
CO2 SERPL-SCNC: 24 MMOL/L — SIGNIFICANT CHANGE UP (ref 17–32)
CREAT SERPL-MCNC: 0.8 MG/DL — SIGNIFICANT CHANGE UP (ref 0.7–1.5)
CREAT SERPL-MCNC: 0.9 MG/DL — SIGNIFICANT CHANGE UP (ref 0.7–1.5)
EOSINOPHIL # BLD AUTO: 0.41 K/UL — SIGNIFICANT CHANGE UP (ref 0–0.7)
EOSINOPHIL NFR BLD AUTO: 4.9 % — SIGNIFICANT CHANGE UP (ref 0–8)
GLUCOSE BLDC GLUCOMTR-MCNC: 131 MG/DL — HIGH (ref 70–99)
GLUCOSE BLDC GLUCOMTR-MCNC: 141 MG/DL — HIGH (ref 70–99)
GLUCOSE BLDC GLUCOMTR-MCNC: 160 MG/DL — HIGH (ref 70–99)
GLUCOSE BLDC GLUCOMTR-MCNC: 160 MG/DL — HIGH (ref 70–99)
GLUCOSE SERPL-MCNC: 144 MG/DL — HIGH (ref 70–99)
GLUCOSE SERPL-MCNC: 86 MG/DL — SIGNIFICANT CHANGE UP (ref 70–99)
HCT VFR BLD CALC: 24.9 % — LOW (ref 42–52)
HCT VFR BLD CALC: 25 % — LOW (ref 42–52)
HCT VFR BLD CALC: 27.5 % — LOW (ref 42–52)
HGB BLD-MCNC: 7.7 G/DL — LOW (ref 14–18)
HGB BLD-MCNC: 7.7 G/DL — LOW (ref 14–18)
HGB BLD-MCNC: 8.6 G/DL — LOW (ref 14–18)
IMM GRANULOCYTES NFR BLD AUTO: 0.7 % — HIGH (ref 0.1–0.3)
LACTATE SERPL-SCNC: 1 MMOL/L — SIGNIFICANT CHANGE UP (ref 0.5–2.2)
LYMPHOCYTES # BLD AUTO: 3.38 K/UL — SIGNIFICANT CHANGE UP (ref 1.2–3.4)
LYMPHOCYTES # BLD AUTO: 40.3 % — SIGNIFICANT CHANGE UP (ref 20.5–51.1)
MCHC RBC-ENTMCNC: 22.6 PG — LOW (ref 27–31)
MCHC RBC-ENTMCNC: 22.7 PG — LOW (ref 27–31)
MCHC RBC-ENTMCNC: 23 PG — LOW (ref 27–31)
MCHC RBC-ENTMCNC: 30.8 G/DL — LOW (ref 32–37)
MCHC RBC-ENTMCNC: 30.9 G/DL — LOW (ref 32–37)
MCHC RBC-ENTMCNC: 31.3 G/DL — LOW (ref 32–37)
MCV RBC AUTO: 73.3 FL — LOW (ref 80–94)
MCV RBC AUTO: 73.5 FL — LOW (ref 80–94)
MCV RBC AUTO: 73.5 FL — LOW (ref 80–94)
MONOCYTES # BLD AUTO: 0.79 K/UL — HIGH (ref 0.1–0.6)
MONOCYTES NFR BLD AUTO: 9.4 % — HIGH (ref 1.7–9.3)
NEUTROPHILS # BLD AUTO: 3.7 K/UL — SIGNIFICANT CHANGE UP (ref 1.4–6.5)
NEUTROPHILS NFR BLD AUTO: 44.1 % — SIGNIFICANT CHANGE UP (ref 42.2–75.2)
NRBC # BLD: 0 /100 WBCS — SIGNIFICANT CHANGE UP (ref 0–0)
NRBC # BLD: 0 /100 WBCS — SIGNIFICANT CHANGE UP (ref 0–0)
PLATELET # BLD AUTO: 243 K/UL — SIGNIFICANT CHANGE UP (ref 130–400)
PLATELET # BLD AUTO: 244 K/UL — SIGNIFICANT CHANGE UP (ref 130–400)
PLATELET # BLD AUTO: 264 K/UL — SIGNIFICANT CHANGE UP (ref 130–400)
POTASSIUM SERPL-MCNC: 3.9 MMOL/L — SIGNIFICANT CHANGE UP (ref 3.5–5)
POTASSIUM SERPL-MCNC: 4.6 MMOL/L — SIGNIFICANT CHANGE UP (ref 3.5–5)
POTASSIUM SERPL-SCNC: 3.9 MMOL/L — SIGNIFICANT CHANGE UP (ref 3.5–5)
POTASSIUM SERPL-SCNC: 4.6 MMOL/L — SIGNIFICANT CHANGE UP (ref 3.5–5)
PROT SERPL-MCNC: 5.4 G/DL — LOW (ref 6–8)
PROT SERPL-MCNC: 6.2 G/DL — SIGNIFICANT CHANGE UP (ref 6–8)
RBC # BLD: 3.39 M/UL — LOW (ref 4.7–6.1)
RBC # BLD: 3.41 M/UL — LOW (ref 4.7–6.1)
RBC # BLD: 3.74 M/UL — LOW (ref 4.7–6.1)
RBC # FLD: 17 % — HIGH (ref 11.5–14.5)
RBC # FLD: 17.1 % — HIGH (ref 11.5–14.5)
RBC # FLD: 17.2 % — HIGH (ref 11.5–14.5)
SODIUM SERPL-SCNC: 137 MMOL/L — SIGNIFICANT CHANGE UP (ref 135–146)
SODIUM SERPL-SCNC: 138 MMOL/L — SIGNIFICANT CHANGE UP (ref 135–146)
TYPE + AB SCN PNL BLD: SIGNIFICANT CHANGE UP
WBC # BLD: 7.03 K/UL — SIGNIFICANT CHANGE UP (ref 4.8–10.8)
WBC # BLD: 8.39 K/UL — SIGNIFICANT CHANGE UP (ref 4.8–10.8)
WBC # BLD: 8.99 K/UL — SIGNIFICANT CHANGE UP (ref 4.8–10.8)
WBC # FLD AUTO: 7.03 K/UL — SIGNIFICANT CHANGE UP (ref 4.8–10.8)
WBC # FLD AUTO: 8.39 K/UL — SIGNIFICANT CHANGE UP (ref 4.8–10.8)
WBC # FLD AUTO: 8.99 K/UL — SIGNIFICANT CHANGE UP (ref 4.8–10.8)

## 2018-11-25 RX ADMIN — SIMETHICONE 80 MILLIGRAM(S): 80 TABLET, CHEWABLE ORAL at 17:31

## 2018-11-25 RX ADMIN — SODIUM CHLORIDE 3000 MILLILITER(S): 9 INJECTION INTRAMUSCULAR; INTRAVENOUS; SUBCUTANEOUS at 00:15

## 2018-11-25 RX ADMIN — ATORVASTATIN CALCIUM 40 MILLIGRAM(S): 80 TABLET, FILM COATED ORAL at 21:44

## 2018-11-25 RX ADMIN — Medication 100 MILLIGRAM(S): at 12:32

## 2018-11-25 RX ADMIN — Medication 100 MILLIGRAM(S): at 05:18

## 2018-11-25 RX ADMIN — PREGABALIN 1000 MICROGRAM(S): 225 CAPSULE ORAL at 11:44

## 2018-11-25 RX ADMIN — SIMETHICONE 80 MILLIGRAM(S): 80 TABLET, CHEWABLE ORAL at 05:16

## 2018-11-25 RX ADMIN — CEFTRIAXONE 100 GRAM(S): 500 INJECTION, POWDER, FOR SOLUTION INTRAMUSCULAR; INTRAVENOUS at 11:44

## 2018-11-25 RX ADMIN — INSULIN GLARGINE 27 UNIT(S): 100 INJECTION, SOLUTION SUBCUTANEOUS at 21:44

## 2018-11-25 RX ADMIN — PANTOPRAZOLE SODIUM 40 MILLIGRAM(S): 20 TABLET, DELAYED RELEASE ORAL at 17:35

## 2018-11-25 RX ADMIN — Medication 6 UNIT(S): at 17:29

## 2018-11-25 RX ADMIN — Medication 100 MILLIGRAM(S): at 18:15

## 2018-11-25 RX ADMIN — TRAMADOL HYDROCHLORIDE 50 MILLIGRAM(S): 50 TABLET ORAL at 20:30

## 2018-11-25 RX ADMIN — Medication 20 MILLIGRAM(S): at 05:17

## 2018-11-25 RX ADMIN — PANTOPRAZOLE SODIUM 40 MILLIGRAM(S): 20 TABLET, DELAYED RELEASE ORAL at 05:17

## 2018-11-25 RX ADMIN — Medication 1 MILLIGRAM(S): at 11:45

## 2018-11-25 RX ADMIN — TRAMADOL HYDROCHLORIDE 50 MILLIGRAM(S): 50 TABLET ORAL at 19:38

## 2018-11-25 NOTE — PROGRESS NOTE ADULT - SUBJECTIVE AND OBJECTIVE BOX
History of Present Illness:  Reason for Admission: Bright red bleeding per rectum x 1 episode  History of Present Illness:   63y male with PMH of CAD, NSTEMI s/p CABG (5 vessel according to patient; March 2016); DM with peripheral neuropathy, Left DFU, HTN, HLD and recently admitted for lower extremity weakness and a complete Stroke workup was done with CT head negative for acute infarct; and  MRI brain didn't show any new ischemic changes. MRI lumbar and sacral spine showed diffuse disc bulges in lumbar and sacral spine. Patient was followed by Neurosurgery, neurology, ID, podiatry. During his stay ID and podiatry followed the patient for osteomyelitis of left foot and he was discharged to  for intensive rehab and on IV antibiotics on 11/20/2018 . Today on day of presentation , patient reports that he was having crampy abdominal pain, sudden in onset, 6/10 intensity located in the lower abdomen , non radiating and had a sudden urge to defecate for which he called the PCA to help him out to go to a bathroom. He went to the bathroom and reports bright red colored watery stool .patient was taken back to the bed .Denies sob, fever, nausea, vomiting, hemoptysis, lightheadedness , dysuria.   Patient was admitted to medicine service for further evaluation. Since he came to the medicine floor he has had 2 similar episodes   Patient never had a colonoscopy ,is on plavix and asa , no h/o colon c/a in family . Does not have a GI physician.    His blood count has been stable and he is fully asymptomatic and feels well.    PE:  VSS  alert, NAD. LUNGS- clear, COR- RRR, ABD- soft, flat NT. Slert and oriented. MP- WNL all extremeties    Patient with unsteady gait/ impaired balance, no clear etiology, likely multifactoral: diabetic neuropathy, osteomyelitis of foot, debility. Now with new Dx GI bleed. Work up pending.    Will order bedside PT and anticipate return to rehab floor when medically stable.

## 2018-11-25 NOTE — PHARMACOTHERAPY INTERVENTION NOTE - COMMENTS
rocephin order.   flex allergy. s/w dr gayle [phone]. she said pt has been on it at home and is just continuation of therapy

## 2018-11-25 NOTE — PROVIDER CONTACT NOTE (OTHER) - SITUATION
Pt received 1 NS bolus 500ml. Post VS-/56 O2 99 T-97.1 HR 72. MD made aware and will con't to monitor.

## 2018-11-25 NOTE — CONSULT NOTE ADULT - SUBJECTIVE AND OBJECTIVE BOX
Gastroenterology Consultation    64yo Male with   Patient is a 63y old  Male who presents with a chief complaint of Bright red bleeding per rectum x 1 episode (25 Nov 2018 14:07)    HPI:  63y male with PMH of CAD, NSTEMI s/p CABG (5 vessel according to patient; March 2016); DM with peripheral neuropathy, Left DFU, HTN, HLD and recently admitted for lower extremity weakness and a complete Stroke workup was done with CT head negative for acute infarct; and  MRI brain didn't show any new ischemic changes. MRI lumbar and sacral spine showed diffuse disc bulges in lumbar and sacral spine. Patient was followed by Neurosurgery, neurology, ID, podiatry.During his stay ID and podiatry followed the patient for osteomyelitis of left foot and he was discharged to  for intensive rehab and on IV antibiotics on 11/20/2018 . Today on day of presentation , patient reports that he was having crampy abdominal pain, sudden in onset, 6/10 intensity located in the lower abdomen , non radiating and had a sudden urge to defecate for which he called the PCA to help him out to go to a bathroom. He went to the bathroom and reports bright red colored watery stool .patient was taken back to the bed .Denies sob, fever, nausea, vomiting, hemoptysis, lightheadedness , dysuria .The nurse called the attending dr corley who assessed the patient and did a DASHAWN which was+ve hemorrhoids but no melena reported.She spoke with the GI fellow on call and no acute intervention was suggested and patient was admitted to medicine service for further evaluation.Since he came to the medicine floor he has had 2 similar episodes   Patient never had a colonoscopy ,is on plavix and asa , no h/o colon c/a in family . Does not have a GI physician (24 Nov 2018 23:20)      GI Hx:  62 yo M CAD  SP CABG and stent 3 years ago on DAPT, DM (poorly controlled) SP L foot amputation admitted to the rehab floor. Patient was admitted to the inpatient unit after developing x1 hematochezia yestreday. No BMs since then.  Patient endorses noticing small amount of blood on the TP or in the bowl in the past.  No previous colonoscopies/EGD.  Denies pain.  Endorses brown stool.  Denies emesis/hematemesis.  Denies Hx of liver disease.    Previous colonoscopy(ies): None  Previous EGD(s): None  Family Hx: (-) CRC  Social Hx: (-) x3 (+) tattoos    REVIEW OF SYSTEMS  General:  No weight loss, fevers, or chills.  Eyes:  No reported pain or visual changes  ENT:  No sore throat or runny nose.  NECK: No stiffness or lymphadenopathy  CV:  No chest pain or palpitations.  Resp:  No shortness of breath, cough, wheezing or hemoptysis  GI:  No abdominal pain, nausea, vomiting, dysphagia, diarrhea or constipation. No rectal bleeding, melena, or hematemesis.  :  No dysuria, urinary incontinence or hematuria.  Muscle:  No aches or weakness  Neuro:  No tingling, numbness or vertigo  Psych:  No mood problems or irritability.  Endocrine:  No polyuria, polydipsia or cold/heat intolerance  Heme:  No ecchymosis or easy bruisability  All other review of systems is negative unless indicated above.    PHYSICAL EXAM:  GENERAL: AAOx3, no acute distress.  HEAD:  Atraumatic, Normocephalic  EYES: EOMI, PERRLA, conjunctiva and sclera clear  NECK: Supple, no JVD or thyromegaly  NODES: No palpable cervical or supraclavicular lymphadenopathy   CHEST/LUNG: Clear to auscultation bilaterally; No wheeze, rhonchi, or rales  HEART: Regular rate and rhythm; normal S1, S2, No murmurs.  ABDOMEN: Soft, nontender, nondistended; Bowel sounds present, no abdominal bruit, masses, or hepatosplenomegaly  EXTREMITIES:  2+ Peripheral Pulses. No clubbing, cyanosis, or edema  PSYCH:  Cooperative and appropriate, normal affect.  NEUROLOGY: No asterixis or tremor. Motor and sensory functions grossly intact  SKIN: Intact, no jaundice  EXTREMITIES: warm, no periph edema.  Rectal exam: light red + mucous.    PAST MEDICAL & SURGICAL HISTORY:  CAD (coronary artery disease)  Dyslipidemia  Diabetic foot ulcer  PVD (peripheral vascular disease)  Diabetes  Other hyperlipidemia  Hypertension, unspecified type  Amputated toe, unspecified laterality  S/P CABG (coronary artery bypass graft)      atorvastatin 40 mg oral tablet: 1 tab(s) orally once a day  cefTRIAXone: 2 gram(s) intravenous once a day  cyanocobalamin 1000 mcg oral tablet: 1 tab(s) orally once a day  folic acid 1 mg oral tablet: 1 tab(s) orally once a day  insulin glargine: 27 unit(s) subcutaneous once a day (at bedtime)  Lyrica 100 mg oral capsule: 1 cap(s) orally 2 times a day  metoprolol succinate 25 mg oral tablet, extended release: 1 tab(s) orally once a day  morphine: 2 milligram(s) intravenous every 8 hours, As Needed  simethicone 80 mg oral tablet, chewable: 1 tab(s) orally 2 times a day  thiamine 100 mg oral tablet: 1 tab(s) orally once a day      Allergies: Cipro (Unknown)  IV contrast (Short breath)  Keflex (Unknown)    Medications:   atorvastatin 40 milliGRAM(s) Oral at bedtime  cefTRIAXone   IVPB 2 Gram(s) IV Intermittent every 24 hours  cyanocobalamin 1000 MICROGram(s) Oral daily  folic acid 1 milliGRAM(s) Oral daily  insulin glargine Injectable (LANTUS) 27 Unit(s) SubCutaneous at bedtime  insulin lispro Injectable (HumaLOG) 6 Unit(s) SubCutaneous three times a day before meals  pantoprazole  Injectable 40 milliGRAM(s) IV Push two times a day  predniSONE   Tablet 20 milliGRAM(s) Oral daily  pregabalin 100 milliGRAM(s) Oral two times a day  simethicone 80 milliGRAM(s) Chew two times a day  thiamine 100 milliGRAM(s) Oral daily  traMADol 50 milliGRAM(s) Oral every 8 hours PRN        Physical Examination:  T(C): 35.8 (11-25-18 @ 09:49), Max: 36.5 (11-24-18 @ 22:50)  HR: 85 (11-25-18 @ 09:49) (57 - 85)  BP: 108/59 (11-25-18 @ 09:49) (89/53 - 157/67)  RR: 18 (11-25-18 @ 09:49) (18 - 18)  SpO2: 99% (11-25-18 @ 01:45) (99% - 99%)      Data:                        8.6    7.03  )-----------( 244      ( 25 Nov 2018 12:12 )             27.5     MCV 73.5 (11-25-18)  73.5 (11-25-18)  73.3 (11-24-18)    RDW 17.2 (11-25-18)  17.1 (11-25-18)  17.0 (11-24-18)    HGB trend:  8.6  11-25-18 @ 12:12  7.7  11-25-18 @ 04:31  7.7  11-24-18 @ 23:30      11-25    138  |  101  |  15  ----------------------------<  144<H>  4.6   |  24  |  0.8    Ca    8.7      25 Nov 2018 12:12    TPro  6.2  /  Alb  2.7<L>  /  TBili  0.5  /  DBili  x   /  AST  18  /  ALT  30  /  AlkPhos  134<H>  11-25    Liver panel trend:  TBili 0.5   /   AST 18   /   ALT 30   /   AlkP 134   /   Tptn 6.2   /   Alb 2.7    /   DBili --      11-25  TBili 0.3   /   AST 17   /   ALT 28   /   AlkP 123   /   Tptn 5.4   /   Alb 2.6    /   DBili --      11-25  TBili 0.5   /   AST 53   /   ALT 70   /   AlkP 232   /   Tptn 6.8   /   Alb 3.0    /   DBili --      11-21  TBili 0.5   /      /   ALT 35   /   AlkP 165   /   Tptn 6.9   /   Alb 3.1    /   DBili --      11-17  TBili 0.8   /   AST 96   /   ALT 38   /   AlkP 245   /   Tptn 9.4   /   Alb 4.0    /   DBili --      11-16    INR: 1.11: NO ANTICOAGULANT,NORMAL            0.65 -  1.30  ORAL ANTICOAGULANT,STD DOSE        2.00 - 3.00  MECHANICAL HEART VALVES            2.50 - 3.50  NOTE: INR RESULTS ARE INTENDED FOR USE ONLY TO  MONITOR  ORAL ANTICOAGULANT THERAPY IN STABILIZED  PATIENTS. ratio (11.16.18 @ 15:15)

## 2018-11-25 NOTE — CONSULT NOTE ADULT - ASSESSMENT
62 Yo M with Likely LGIB- Stable Hgb and VS.  Possible etiologies: Hemorrhoidal vs diverticular vs adenomas, adenoCa    1)LGIB  2)CAD Off DAPT x1 day  3)DM  4)Average risk CRC    Rec:  1)Trend and transfuse  2)Colonoscopy on thursday earlier if active bleeding.  3)Provide Diet  4)Hold plavix

## 2018-11-26 LAB
ANION GAP SERPL CALC-SCNC: 13 MMOL/L — SIGNIFICANT CHANGE UP (ref 7–14)
BASOPHILS # BLD AUTO: 0.03 K/UL — SIGNIFICANT CHANGE UP (ref 0–0.2)
BASOPHILS NFR BLD AUTO: 0.4 % — SIGNIFICANT CHANGE UP (ref 0–1)
BUN SERPL-MCNC: 17 MG/DL — SIGNIFICANT CHANGE UP (ref 10–20)
CALCIUM SERPL-MCNC: 8.8 MG/DL — SIGNIFICANT CHANGE UP (ref 8.5–10.1)
CHLORIDE SERPL-SCNC: 99 MMOL/L — SIGNIFICANT CHANGE UP (ref 98–110)
CO2 SERPL-SCNC: 24 MMOL/L — SIGNIFICANT CHANGE UP (ref 17–32)
CREAT SERPL-MCNC: 1 MG/DL — SIGNIFICANT CHANGE UP (ref 0.7–1.5)
EOSINOPHIL # BLD AUTO: 0.01 K/UL — SIGNIFICANT CHANGE UP (ref 0–0.7)
EOSINOPHIL NFR BLD AUTO: 0.1 % — SIGNIFICANT CHANGE UP (ref 0–8)
GLUCOSE BLDC GLUCOMTR-MCNC: 185 MG/DL — HIGH (ref 70–99)
GLUCOSE BLDC GLUCOMTR-MCNC: 233 MG/DL — HIGH (ref 70–99)
GLUCOSE BLDC GLUCOMTR-MCNC: 277 MG/DL — HIGH (ref 70–99)
GLUCOSE BLDC GLUCOMTR-MCNC: 309 MG/DL — HIGH (ref 70–99)
GLUCOSE SERPL-MCNC: 255 MG/DL — HIGH (ref 70–99)
HCT VFR BLD CALC: 24.1 % — LOW (ref 42–52)
HCT VFR BLD CALC: 26.3 % — LOW (ref 42–52)
HGB BLD-MCNC: 7.5 G/DL — LOW (ref 14–18)
HGB BLD-MCNC: 8 G/DL — LOW (ref 14–18)
IMM GRANULOCYTES NFR BLD AUTO: 5.7 % — HIGH (ref 0.1–0.3)
LYMPHOCYTES # BLD AUTO: 0.92 K/UL — LOW (ref 1.2–3.4)
LYMPHOCYTES # BLD AUTO: 12 % — LOW (ref 20.5–51.1)
MAGNESIUM SERPL-MCNC: 1.9 MG/DL — SIGNIFICANT CHANGE UP (ref 1.8–2.4)
MCHC RBC-ENTMCNC: 22.6 PG — LOW (ref 27–31)
MCHC RBC-ENTMCNC: 22.9 PG — LOW (ref 27–31)
MCHC RBC-ENTMCNC: 30.4 G/DL — LOW (ref 32–37)
MCHC RBC-ENTMCNC: 31.1 G/DL — LOW (ref 32–37)
MCV RBC AUTO: 73.5 FL — LOW (ref 80–94)
MCV RBC AUTO: 74.3 FL — LOW (ref 80–94)
MONOCYTES # BLD AUTO: 0.28 K/UL — SIGNIFICANT CHANGE UP (ref 0.1–0.6)
MONOCYTES NFR BLD AUTO: 3.6 % — SIGNIFICANT CHANGE UP (ref 1.7–9.3)
NEUTROPHILS # BLD AUTO: 6.01 K/UL — SIGNIFICANT CHANGE UP (ref 1.4–6.5)
NEUTROPHILS NFR BLD AUTO: 78.2 % — HIGH (ref 42.2–75.2)
NRBC # BLD: 0 /100 WBCS — SIGNIFICANT CHANGE UP (ref 0–0)
NRBC # BLD: 0 /100 WBCS — SIGNIFICANT CHANGE UP (ref 0–0)
PLATELET # BLD AUTO: 249 K/UL — SIGNIFICANT CHANGE UP (ref 130–400)
PLATELET # BLD AUTO: 252 K/UL — SIGNIFICANT CHANGE UP (ref 130–400)
POTASSIUM SERPL-MCNC: 4.6 MMOL/L — SIGNIFICANT CHANGE UP (ref 3.5–5)
POTASSIUM SERPL-SCNC: 4.6 MMOL/L — SIGNIFICANT CHANGE UP (ref 3.5–5)
RBC # BLD: 3.28 M/UL — LOW (ref 4.7–6.1)
RBC # BLD: 3.54 M/UL — LOW (ref 4.7–6.1)
RBC # FLD: 17.2 % — HIGH (ref 11.5–14.5)
RBC # FLD: 17.3 % — HIGH (ref 11.5–14.5)
SODIUM SERPL-SCNC: 136 MMOL/L — SIGNIFICANT CHANGE UP (ref 135–146)
WBC # BLD: 10.82 K/UL — HIGH (ref 4.8–10.8)
WBC # BLD: 7.69 K/UL — SIGNIFICANT CHANGE UP (ref 4.8–10.8)
WBC # FLD AUTO: 10.82 K/UL — HIGH (ref 4.8–10.8)
WBC # FLD AUTO: 7.69 K/UL — SIGNIFICANT CHANGE UP (ref 4.8–10.8)

## 2018-11-26 PROCEDURE — 99221 1ST HOSP IP/OBS SF/LOW 40: CPT

## 2018-11-26 RX ORDER — PANTOPRAZOLE SODIUM 20 MG/1
40 TABLET, DELAYED RELEASE ORAL EVERY 12 HOURS
Qty: 0 | Refills: 0 | Status: DISCONTINUED | OUTPATIENT
Start: 2018-11-26 | End: 2018-12-01

## 2018-11-26 RX ORDER — ASPIRIN/CALCIUM CARB/MAGNESIUM 324 MG
81 TABLET ORAL DAILY
Qty: 0 | Refills: 0 | Status: DISCONTINUED | OUTPATIENT
Start: 2018-11-26 | End: 2018-12-01

## 2018-11-26 RX ORDER — TAMSULOSIN HYDROCHLORIDE 0.4 MG/1
0.4 CAPSULE ORAL AT BEDTIME
Qty: 0 | Refills: 0 | Status: DISCONTINUED | OUTPATIENT
Start: 2018-11-26 | End: 2018-12-01

## 2018-11-26 RX ORDER — SENNA PLUS 8.6 MG/1
2 TABLET ORAL AT BEDTIME
Qty: 0 | Refills: 0 | Status: DISCONTINUED | OUTPATIENT
Start: 2018-11-26 | End: 2018-12-01

## 2018-11-26 RX ORDER — DOCUSATE SODIUM 100 MG
100 CAPSULE ORAL THREE TIMES A DAY
Qty: 0 | Refills: 0 | Status: DISCONTINUED | OUTPATIENT
Start: 2018-11-26 | End: 2018-12-01

## 2018-11-26 RX ORDER — CHLORHEXIDINE GLUCONATE 213 G/1000ML
1 SOLUTION TOPICAL
Qty: 0 | Refills: 0 | Status: DISCONTINUED | OUTPATIENT
Start: 2018-11-26 | End: 2018-12-01

## 2018-11-26 RX ORDER — ENOXAPARIN SODIUM 100 MG/ML
40 INJECTION SUBCUTANEOUS DAILY
Qty: 0 | Refills: 0 | Status: DISCONTINUED | OUTPATIENT
Start: 2018-11-26 | End: 2018-12-01

## 2018-11-26 RX ADMIN — PANTOPRAZOLE SODIUM 40 MILLIGRAM(S): 20 TABLET, DELAYED RELEASE ORAL at 05:13

## 2018-11-26 RX ADMIN — PREGABALIN 1000 MICROGRAM(S): 225 CAPSULE ORAL at 11:58

## 2018-11-26 RX ADMIN — Medication 20 MILLIGRAM(S): at 05:11

## 2018-11-26 RX ADMIN — Medication 100 MILLIGRAM(S): at 21:13

## 2018-11-26 RX ADMIN — Medication 6 UNIT(S): at 12:29

## 2018-11-26 RX ADMIN — Medication 6 UNIT(S): at 17:48

## 2018-11-26 RX ADMIN — ATORVASTATIN CALCIUM 40 MILLIGRAM(S): 80 TABLET, FILM COATED ORAL at 21:13

## 2018-11-26 RX ADMIN — TAMSULOSIN HYDROCHLORIDE 0.4 MILLIGRAM(S): 0.4 CAPSULE ORAL at 21:14

## 2018-11-26 RX ADMIN — SIMETHICONE 80 MILLIGRAM(S): 80 TABLET, CHEWABLE ORAL at 17:49

## 2018-11-26 RX ADMIN — SENNA PLUS 2 TABLET(S): 8.6 TABLET ORAL at 21:13

## 2018-11-26 RX ADMIN — Medication 6 UNIT(S): at 08:04

## 2018-11-26 RX ADMIN — Medication 1 TABLET(S): at 13:12

## 2018-11-26 RX ADMIN — Medication 100 MILLIGRAM(S): at 11:59

## 2018-11-26 RX ADMIN — SIMETHICONE 80 MILLIGRAM(S): 80 TABLET, CHEWABLE ORAL at 05:13

## 2018-11-26 RX ADMIN — Medication 100 MILLIGRAM(S): at 13:12

## 2018-11-26 RX ADMIN — TRAMADOL HYDROCHLORIDE 50 MILLIGRAM(S): 50 TABLET ORAL at 00:00

## 2018-11-26 RX ADMIN — INSULIN GLARGINE 27 UNIT(S): 100 INJECTION, SOLUTION SUBCUTANEOUS at 21:14

## 2018-11-26 RX ADMIN — CEFTRIAXONE 100 GRAM(S): 500 INJECTION, POWDER, FOR SOLUTION INTRAMUSCULAR; INTRAVENOUS at 11:57

## 2018-11-26 RX ADMIN — PANTOPRAZOLE SODIUM 40 MILLIGRAM(S): 20 TABLET, DELAYED RELEASE ORAL at 17:49

## 2018-11-26 RX ADMIN — TRAMADOL HYDROCHLORIDE 50 MILLIGRAM(S): 50 TABLET ORAL at 15:52

## 2018-11-26 RX ADMIN — Medication 81 MILLIGRAM(S): at 11:58

## 2018-11-26 RX ADMIN — Medication 100 MILLIGRAM(S): at 05:13

## 2018-11-26 RX ADMIN — Medication 1 MILLIGRAM(S): at 11:58

## 2018-11-26 RX ADMIN — TRAMADOL HYDROCHLORIDE 50 MILLIGRAM(S): 50 TABLET ORAL at 05:13

## 2018-11-26 RX ADMIN — Medication 100 MILLIGRAM(S): at 17:51

## 2018-11-26 RX ADMIN — ENOXAPARIN SODIUM 40 MILLIGRAM(S): 100 INJECTION SUBCUTANEOUS at 11:57

## 2018-11-26 NOTE — DIETITIAN INITIAL EVALUATION ADULT. - ETIOLOGY
pt is eating 100% with good appetite despite Pressure injury. Will add Glucerna + MVI on the side for him

## 2018-11-26 NOTE — CONSULT NOTE ADULT - SUBJECTIVE AND OBJECTIVE BOX
64 yo male s/p surgical debridement of a left diabetic foot ulcer 2 weeks ago, pt was discharged and came back with LE weakness, CVA was ruled out. During the hospital course pt is unable to void w/o a Bee catheter. In Rehab pt was straight catheterized every shift. Currently pt is on a medical floor to r/o GI bleed. Pt has a hx of IPP 20 years ago, pt states scrotal portion was extruding through skin 10 years ago and was removed. Pt denies any issues voiding at baseline, sena snot take any prostate medications at home.    PAST MEDICAL & SURGICAL HISTORY:  CAD (coronary artery disease)  Dyslipidemia  Diabetic foot ulcer  PVD (peripheral vascular disease)  Diabetes  Other hyperlipidemia  Hypertension, unspecified type  Amputated toe, unspecified laterality  S/P CABG (coronary artery bypass graft)    MEDICATIONS  (STANDING):  aspirin  chewable 81 milliGRAM(s) Oral daily  atorvastatin 40 milliGRAM(s) Oral at bedtime  cefTRIAXone   IVPB 2 Gram(s) IV Intermittent every 24 hours  cyanocobalamin 1000 MICROGram(s) Oral daily  enoxaparin Injectable 40 milliGRAM(s) SubCutaneous daily  folic acid 1 milliGRAM(s) Oral daily  insulin glargine Injectable (LANTUS) 27 Unit(s) SubCutaneous at bedtime  insulin lispro Injectable (HumaLOG) 6 Unit(s) SubCutaneous three times a day before meals  multivitamin 1 Tablet(s) Oral daily  pantoprazole    Tablet 40 milliGRAM(s) Oral every 12 hours  predniSONE   Tablet 20 milliGRAM(s) Oral daily  pregabalin 100 milliGRAM(s) Oral two times a day  simethicone 80 milliGRAM(s) Chew two times a day  thiamine 100 milliGRAM(s) Oral daily    Allergies    Cipro (Unknown)  IV contrast (Short breath)  Keflex (Unknown)  shellfish (Anaphylaxis)    Intolerances    SHx - NC    FHx - NC    Vital Signs Last 24 Hrs  T(C): 36.3 (26 Nov 2018 07:40), Max: 36.8 (25 Nov 2018 23:00)  T(F): 97.4 (26 Nov 2018 07:40), Max: 98.3 (25 Nov 2018 23:00)  HR: 91 (26 Nov 2018 07:40) (91 - 101)  BP: 126/64 (26 Nov 2018 07:40) (120/61 - 139/60  RR: 18 (26 Nov 2018 07:40) (18 - 19)    pt seen and examined at bedside  a+ox3, nad, non toxic  abd - soft, nd, nttp, no spt  gu - genitalia wnl, old midline scrotal incision noted, testicles nttp, + bee, urine clear                          8.6    7.03  )-----------( 244      ( 25 Nov 2018 12:12 )             27.5   11-25    138  |  101  |  15  ----------------------------<  144<H>  4.6   |  24  |  0.8    Ca    8.7      25 Nov 2018 12:12    TPro  6.2  /  Alb  2.7<L>  /  TBili  0.5  /  DBili  x   /  AST  18  /  ALT  30  /  AlkPhos  134<H>  11-25    < from: CT Abdomen and Pelvis No Cont (09.26.18 @ 01:18) >  KIDNEYS: No hydronephrosis bilaterally. No renal or ureteral calculus.    ABDOMINOPELVIC NODES: No lymphadenopathy.    PELVIC ORGANS: Penile implants at the base.    < end of copied text > 62 yo male s/p surgical debridement of a left diabetic foot ulcer 2 weeks ago, pt was discharged and came back with LE weakness, CVA was ruled out. During the hospital course pt is unable to void w/o a Bee catheter. In Rehab pt was straight catheterized every shift. Currently pt is on a medical floor to r/o GI bleed. Pt has a hx of IPP 20 years ago, pt states scrotal portion was extruding through skin 10 years ago and was removed. Pt denies any issues voiding at baseline, sena snot take any prostate medications at home.    PAST MEDICAL & SURGICAL HISTORY:  CAD (coronary artery disease)  Dyslipidemia  Diabetic foot ulcer  PVD (peripheral vascular disease)  Diabetes  Other hyperlipidemia  Hypertension, unspecified type  Amputated toe, unspecified laterality  S/P CABG (coronary artery bypass graft)    MEDICATIONS  (STANDING):  aspirin  chewable 81 milliGRAM(s) Oral daily  atorvastatin 40 milliGRAM(s) Oral at bedtime  cefTRIAXone   IVPB 2 Gram(s) IV Intermittent every 24 hours  cyanocobalamin 1000 MICROGram(s) Oral daily  enoxaparin Injectable 40 milliGRAM(s) SubCutaneous daily  folic acid 1 milliGRAM(s) Oral daily  insulin glargine Injectable (LANTUS) 27 Unit(s) SubCutaneous at bedtime  insulin lispro Injectable (HumaLOG) 6 Unit(s) SubCutaneous three times a day before meals  multivitamin 1 Tablet(s) Oral daily  pantoprazole    Tablet 40 milliGRAM(s) Oral every 12 hours  predniSONE   Tablet 20 milliGRAM(s) Oral daily  pregabalin 100 milliGRAM(s) Oral two times a day  simethicone 80 milliGRAM(s) Chew two times a day  thiamine 100 milliGRAM(s) Oral daily    Allergies    Cipro (Unknown)  IV contrast (Short breath)  Keflex (Unknown)  shellfish (Anaphylaxis)    Intolerances    SHx - NC    FHx - NC    Vital Signs Last 24 Hrs  T(C): 36.3 (26 Nov 2018 07:40), Max: 36.8 (25 Nov 2018 23:00)  T(F): 97.4 (26 Nov 2018 07:40), Max: 98.3 (25 Nov 2018 23:00)  HR: 91 (26 Nov 2018 07:40) (91 - 101)  BP: 126/64 (26 Nov 2018 07:40) (120/61 - 139/60  RR: 18 (26 Nov 2018 07:40) (18 - 19)    pt seen and examined at bedside  a+ox3, nad, non toxic  abd - soft, nd, nttp, no spt  gu - genitalia wnl, old midline scrotal incision noted, testicles nttp, + bee, urine clear                          8.6    7.03  )-----------( 244      ( 25 Nov 2018 12:12 )             27.5   11-25    138  |  101  |  15  ----------------------------<  144<H>  4.6   |  24  |  0.8    Ca    8.7      25 Nov 2018 12:12    TPro  6.2  /  Alb  2.7<L>  /  TBili  0.5  /  DBili  x   /  AST  18  /  ALT  30  /  AlkPhos  134<H>  11-25    < from: CT Abdomen and Pelvis No Cont (09.26.18 @ 01:18) >  KIDNEYS: No hydronephrosis bilaterally. No renal or ureteral calculus.    ABDOMINOPELVIC NODES: No lymphadenopathy.    PELVIC ORGANS: Penile implants at the base.    < from: US Kidney and Bladder (11.05.18 @ 09:21) >  Findings:  The right kidney is normal in size and echotexture measuring 11.8 cm in   length.  There is no evidence of hydronephrosis, atrophy, mass or   calculus.  Normal vascular flow is demonstrated.    The left kidney is normal in size and echotexture measuring 11.1 cm in   length.  There is no evidence of hydronephrosis, atrophy, mass or   calculus.  Normal vascular flow is demonstrated.      Urinary bladder is collapsed limiting evaluation.    Impression:  Normal renal ultrasound.    Decompressed urinary bladder limiting evaluation.    < end of copied text >

## 2018-11-26 NOTE — CONSULT NOTE ADULT - ASSESSMENT
62 yo male admitted for LE weakness, recent diabetic foot ulcer debridement. Pt unable to void on his own while hospitalized    plan  -start Flomax  -avoid narcotic pain medication  -alleviate constipation  -ambulate  -d/c indwelling bee and please perform straight catheterization q 6 hours, document pvr  -pt not a good candidate to self-catheterize himself given poor dexterity secondary to severe upper extremity neuropathy  - pt's inability to void may be in the setting of neurogenic bladder, given uncontrolled diabetes and severe neuropathy  -pt may benefit from outpatient URODYNAMIC testing  -recall prn

## 2018-11-26 NOTE — PROGRESS NOTE ADULT - ASSESSMENT
63y male with PMH of CAD, NSTEMI s/p CABG (5 vessel according to patient; March 2016) on DAPT ASA and Plavix last dose on 11/24/2018; DM with peripheral neuropathy, Left DFU, HTN, HLD and recently admitted for lower extremity weakness and a complete Stroke workup was done which was negative for any acute strokes and then discharged to  for rehab,  on 20 mg prednisone for lumbar disc herniation. During that admission pt was also diagnosed with Osteomyelitis of the left foot s/p excisional debridement currently on IV ABX .   Pt now  readmitted for multiple episodes of BRBPR with clots, no melena.   Pt last BM was yesterday in the morning , which was bloody. Since then no BM and NO BRBPR    BRBPR - Likely LGIB- ? Resolved   Vital signs stable  Hb stable at 8.6 , did not recieve PRBC transfusion   Possible etiologies: Hemorrhoidal vs diverticular vs adenomas, adenoCa vs others     Rec:  If no further brbpr episodes, can advance diet as tolerated   2 peripheral 18 G IV Access  Active type and screen   Monitor CBC q12 for now  and transfuse to keep Hb > 8   continue to hold plavix ,Pt needs to be off of plavix for a total of 5 day duration   Will schedule patient for Colonoscopy on thursday/ friday  earlier if active bleeding.  Please keep pt on clear liquid diet on wednesday 11/28/2018  If patient develops active GI bleed with significant drop in hb or unstable vitals, please notify GI team will consider emergent endoscopy vs CT angio abd with IR intervention.

## 2018-11-26 NOTE — DIETITIAN INITIAL EVALUATION ADULT. - OTHER INFO
p/w bright red bleed via rectum x1 episode. a/w abd pain. physiatry consulted. BRBPR poss hemorrhoidal bleed vs LGIB (unlikely). GI consulted. PT/rehab needed for chronic b/l LE weakness. OA of L foot s/p excisional debs. DNR/DNI. DM monitoring.

## 2018-11-26 NOTE — DIETITIAN INITIAL EVALUATION ADULT. - PT NOT SOURCE
other (specify)/Consult for Pressure ulcer- pt is alert and oriented. no edema. R buttlock stage 2 pressure injury noted. LBM 11/25 per pt. No oral issue

## 2018-11-26 NOTE — DIETITIAN INITIAL EVALUATION ADULT. - MD RECOMMEND
Please add (1) Carbohydrate Consistent w/ snacks diet modifier to current DASH/TLC diet. (2) Add MVI w/o minerals q24hr. (3) Add Glucerna Shake q24hr.

## 2018-11-26 NOTE — PROGRESS NOTE ADULT - SUBJECTIVE AND OBJECTIVE BOX
PODIATRY PROGRESS NOTE   [id number] ROSALIE ESCOBEDO is a pleasant well-nourished, well developed 63y Male in no acute distress, alert awake, and oriented to person, place and time.   Patient is a 63y old  Male who presents with a chief complaint of Bright red bleeding per rectum x 1 episode (26 Nov 2018 12:38)    HPI:  63y male with PMH of CAD, NSTEMI s/p CABG (5 vessel according to patient; March 2016); DM with peripheral neuropathy, Left DFU, HTN, HLD and recently admitted for lower extremity weakness and a complete Stroke workup was done with CT head negative for acute infarct; and  MRI brain didn't show any new ischemic changes. MRI lumbar and sacral spine showed diffuse disc bulges in lumbar and sacral spine. Patient was followed by Neurosurgery, neurology, ID, podiatry.During his stay ID and podiatry followed the patient for osteomyelitis of left foot and he was discharged to  for intensive rehab and on IV antibiotics on 11/20/2018 . Today on day of presentation , patient reports that he was having crampy abdominal pain, sudden in onset, 6/10 intensity located in the lower abdomen , non radiating and had a sudden urge to defecate for which he called the PCA to help him out to go to a bathroom. He went to the bathroom and reports bright red colored watery stool .patient was taken back to the bed .Denies sob, fever, nausea, vomiting, hemoptysis, lightheadedness , dysuria .The nurse called the attending Dr. Hall who assessed the patient and did a DASHAWN which showed active bleeding from rectum with prince bright red blood, no melena reported .She spoke with the GI fellow on call and no acute intervention was suggested and patient was admitted to medicine service for further evaluation.Since he came to the medicine floor he has had 2 similar episodes   Patient never had a colonoscopy ,is on plavix and asa , no h/o colon c/a in family . Does not have a GI physician (24 Nov 2018 23:20)    pt was seen and assessed at bedside pm rounds.  pt states that he denies any n/v/f/c/sob      Vital Signs Last 24 Hrs  T(C): 36.8 (26 Nov 2018 15:55), Max: 36.8 (25 Nov 2018 23:00)  T(F): 98.2 (26 Nov 2018 15:55), Max: 98.3 (25 Nov 2018 23:00)  HR: 89 (26 Nov 2018 15:55) (89 - 94)  BP: 147/76 (26 Nov 2018 15:55) (120/61 - 147/76)  RR: 18 (26 Nov 2018 15:55) (18 - 19)                          8.0    7.69  )-----------( 252      ( 26 Nov 2018 13:09 )             26.3                 11-26    136  |  99  |  17  ----------------------------<  255<H>  4.6   |  24  |  1.0    Ca    8.8      26 Nov 2018 13:09  Mg     1.9     11-26    TPro  6.2  /  Alb  2.7<L>  /  TBili  0.5  /  DBili  x   /  AST  18  /  ALT  30  /  AlkPhos  134<H>  11-25  O:  Derm: Dorsal and plantar surgical wounds are partially closed with sutures intact.  Surgical margins of the wounds healthy, clean, and no ischemic changes  base of wounds with increased granulation tissue   no purulent drainage with expression of sites  no edema no calor  no dolor no rubor no mal odor  Vascular: DP/PT pulses 1/4 B/L , Skin temp warm to cool,  proximal to distal B/L, CFT < 3 sec B/L  Neuro: Gross sensation diminished to level of digits B/L      A:   S/p debridement of soft tissue and bone left foot, stable, no signs of infection 11/10  P:  Pt examined @ bedside   Wound flushed with saline, applied iodoform packing/betadien/DSD/ABD/kerlix/ACE   Continue ABx as per ID  podiatry will follow while in house.  no surgical planing per [id number] @ 16:17

## 2018-11-26 NOTE — PROGRESS NOTE ADULT - SUBJECTIVE AND OBJECTIVE BOX
SUBJECTIVE:    Patient is a 63y old Male who presents with a chief complaint of Bright red bleeding per rectum x 1 episode (26 Nov 2018 08:45)    Currently admitted to medicine with the primary diagnosis of    Today is hospital day 2d. This morning he is resting comfortably in bed and reports no new issues or overnight events.     PAST MEDICAL & SURGICAL HISTORY  CAD (coronary artery disease)  Dyslipidemia  Diabetic foot ulcer  PVD (peripheral vascular disease)  Diabetes  Other hyperlipidemia  Hypertension, unspecified type  Amputated toe, unspecified laterality  S/P CABG (coronary artery bypass graft)    SOCIAL HISTORY:  Patient denies alcohol, tobacco, and recreational drug use.     From ( ) home ( ) nursing home ( ) other   Ambulation status: ( ) ambulates independently ( ) ambulates with assistance ( ) ambulates with device ( ) dependent   Device: ( ) rolling walker ( ) cane     ALLERGIES:  Cipro (Unknown)  IV contrast (Short breath)  Keflex (Unknown)  shellfish (Anaphylaxis)    MEDICATIONS:  STANDING MEDICATIONS  aspirin  chewable 81 milliGRAM(s) Oral daily  atorvastatin 40 milliGRAM(s) Oral at bedtime  cefTRIAXone   IVPB 2 Gram(s) IV Intermittent every 24 hours  cyanocobalamin 1000 MICROGram(s) Oral daily  enoxaparin Injectable 40 milliGRAM(s) SubCutaneous daily  folic acid 1 milliGRAM(s) Oral daily  insulin glargine Injectable (LANTUS) 27 Unit(s) SubCutaneous at bedtime  insulin lispro Injectable (HumaLOG) 6 Unit(s) SubCutaneous three times a day before meals  multivitamin 1 Tablet(s) Oral daily  pantoprazole    Tablet 40 milliGRAM(s) Oral every 12 hours  predniSONE   Tablet 20 milliGRAM(s) Oral daily  pregabalin 100 milliGRAM(s) Oral two times a day  simethicone 80 milliGRAM(s) Chew two times a day  thiamine 100 milliGRAM(s) Oral daily    PRN MEDICATIONS  traMADol 50 milliGRAM(s) Oral every 8 hours PRN    VITALS:   T(F): 97.4  HR: 91  BP: 126/64  RR: 18  SpO2: --    LABS:                        8.6    7.03  )-----------( 244      ( 25 Nov 2018 12:12 )             27.5     11-25    138  |  101  |  15  ----------------------------<  144<H>  4.6   |  24  |  0.8    Ca    8.7      25 Nov 2018 12:12    TPro  6.2  /  Alb  2.7<L>  /  TBili  0.5  /  DBili  x   /  AST  18  /  ALT  30  /  AlkPhos  134<H>  11-25                  RADIOLOGY:    PHYSICAL EXAM:  GEN: No acute distress, lying comfortably in bed   LUNGS: Clear to auscultation bilaterally, no labored breathing   HEART: S1/S2 present. RRR.   ABD: Soft, non-tender, non-distended. Bowel sounds present.   EXT: Noncyanotic, nonedematous, 2+ peripheral pulses, skin intact   NEURO: AAOX3, CN II-XII grossly intact     Lines/Tubes </u  Woods catheter: Indwelling Urethral Catheter:     Connect To:  Leg Bag    Indication:  Improved Comfort Care (11-25-18 @ 10:43) (not performed) [active]  Indwelling Urethral Catheter:     Connect To:  Straight Drainage/Ward    Indication:  Urinary Retention / Obstruction (11-26-18 @ 09:16) (not performed) [active] SUBJECTIVE:    Patient is a 63y old Male who presents with a chief complaint of Bright red bleeding per rectum x 1 episode (26 Nov 2018 08:45)    Currently admitted to medicine with the primary diagnosis of  BRBPR     Today is hospital day 2d. No acute overnight events. Per patient and nursing staff, there has been no more bright red blood per rectum. Patient denies any headache, chest pain, cough, dyspnea, abdominal pain, N/V/D/C at this time.     PAST MEDICAL & SURGICAL HISTORY  CAD (coronary artery disease)  Dyslipidemia  Diabetic foot ulcer  PVD (peripheral vascular disease)  Diabetes  Other hyperlipidemia  Hypertension, unspecified type  Amputated toe, unspecified laterality  S/P CABG (coronary artery bypass graft)    SOCIAL HISTORY:  Patient denies alcohol, tobacco, and recreational drug use.     From () home ( ) nursing home ( X) other: 4A    Ambulation status: ( ) ambulates independently ( ) ambulates with assistance (X ) ambulates with device ( ) dependent   Device: (X ) rolling walker ( ) cane     ALLERGIES:  Cipro (Unknown)  IV contrast (Short breath)  Keflex (Unknown)  shellfish (Anaphylaxis)    MEDICATIONS:  STANDING MEDICATIONS  aspirin  chewable 81 milliGRAM(s) Oral daily  atorvastatin 40 milliGRAM(s) Oral at bedtime  cefTRIAXone   IVPB 2 Gram(s) IV Intermittent every 24 hours  cyanocobalamin 1000 MICROGram(s) Oral daily  enoxaparin Injectable 40 milliGRAM(s) SubCutaneous daily  folic acid 1 milliGRAM(s) Oral daily  insulin glargine Injectable (LANTUS) 27 Unit(s) SubCutaneous at bedtime  insulin lispro Injectable (HumaLOG) 6 Unit(s) SubCutaneous three times a day before meals  multivitamin 1 Tablet(s) Oral daily  pantoprazole    Tablet 40 milliGRAM(s) Oral every 12 hours  predniSONE   Tablet 20 milliGRAM(s) Oral daily  pregabalin 100 milliGRAM(s) Oral two times a day  simethicone 80 milliGRAM(s) Chew two times a day  thiamine 100 milliGRAM(s) Oral daily    PRN MEDICATIONS  traMADol 50 milliGRAM(s) Oral every 8 hours PRN    VITALS:   T(F): 97.4  HR: 91  BP: 126/64  RR: 18  SpO2: --    LABS:                        8.6    7.03  )-----------( 244      ( 25 Nov 2018 12:12 )             27.5     11-25    138  |  101  |  15  ----------------------------<  144<H>  4.6   |  24  |  0.8    Ca    8.7      25 Nov 2018 12:12    TPro  6.2  /  Alb  2.7<L>  /  TBili  0.5  /  DBili  x   /  AST  18  /  ALT  30  /  AlkPhos  134<H>  11-25    RADIOLOGY:  No new imaging studies today     PHYSICAL EXAM:  GEN: No acute distress, lying comfortably in bed   LUNGS: Clear to auscultation bilaterally, no labored breathing   HEART: S1/S2 present. RRR.   ABD: Soft, non-tender, non-distended. Bowel sounds present.   EXT: Noncyanotic, nonedematous, 2+ peripheral pulses, skin intact   NEURO: AAOX3, CN II-XII grossly intact     Lines/Tubes   Peripheral IV access  Woods catheter

## 2018-11-26 NOTE — PROGRESS NOTE ADULT - SUBJECTIVE AND OBJECTIVE BOX
GI HPI Today:  Patient is a 63y old  Male who presents with a chief complaint of Bright red bleeding per rectum (25 Nov 2018 14:42)  GI following the patient for BRBPR  Interval Events  Pt last BM was yesterday in the morning , which was bloody. Since then no BM and NO BRBPR  Currently pt denies abd pain, nausea, vomiting, fever, chills, diarrhea, melena.   Pt last dose of plavix was on 11/24/ 2018  Pt wants to go back to PT/Rehab floor 4A .     PAST MEDICAL & SURGICAL HISTORY  CAD (coronary artery disease)  Dyslipidemia  Diabetic foot ulcer  PVD (peripheral vascular disease)  Diabetes  Other hyperlipidemia  Hypertension, unspecified type  Amputated toe, unspecified laterality  S/P CABG (coronary artery bypass graft)      ALLERGIES:  Cipro (Unknown)  IV contrast (Short breath)  Keflex (Unknown)      MEDICATIONS:  MEDICATIONS  (STANDING):  aspirin  chewable 81 milliGRAM(s) Oral daily  atorvastatin 40 milliGRAM(s) Oral at bedtime  cefTRIAXone   IVPB 2 Gram(s) IV Intermittent every 24 hours  cyanocobalamin 1000 MICROGram(s) Oral daily  folic acid 1 milliGRAM(s) Oral daily  insulin glargine Injectable (LANTUS) 27 Unit(s) SubCutaneous at bedtime  insulin lispro Injectable (HumaLOG) 6 Unit(s) SubCutaneous three times a day before meals  pantoprazole  Injectable 40 milliGRAM(s) IV Push two times a day  predniSONE   Tablet 20 milliGRAM(s) Oral daily  pregabalin 100 milliGRAM(s) Oral two times a day  simethicone 80 milliGRAM(s) Chew two times a day  thiamine 100 milliGRAM(s) Oral daily    MEDICATIONS  (PRN):  traMADol 50 milliGRAM(s) Oral every 8 hours PRN Moderate Pain (4 - 6)      REVIEW OF SYSTEMS  General:  No fevers  Eyes:  No reported pain   ENT:  No sore throat   NECK: No stiffness   CV:  No chest pain   Resp:  No shortness of breath  GI:  See HPI  :  No dysuria  Muscle:  No aches or weakness  Neuro:  No tingling  Endocrine:  No polyuria  Heme:  No ecchymosis   All other review of systems is negative unless indicated above.       VITALS:   T(F): 97.4 (11-26 @ 07:40), Max: 98.3 (11-25 @ 23:00)  HR: 91 (11-26 @ 07:40) (57 - 101)  BP: 126/64 (11-26 @ 07:40) (89/53 - 157/67)  BP(mean): --  RR: 18 (11-26 @ 07:40) (18 - 19)  SpO2: 99% (11-25 @ 01:45) (99% - 99%)        PHYSICAL EXAM:  Gen : Pt looks comfortable   EYES: No scleral icterus   LUNG: Clear to auscultation bilaterally  HEART: RRR; S1 and S2 heard   ABDOMEN: Soft, +BS, no abd distension , no Abdominal Tenderness, No guarding, No Solorzano Sign   Neuro : AA0 x3         Blood Work :                        8.6    7.03  )-----------( 244      ( 25 Nov 2018 12:12 )             27.5       11-25    138  |  101  |  15  ----------------------------<  144<H>  4.6   |  24  |  0.8    Ca    8.7      25 Nov 2018 12:12      CBC -  ( 25 Nov 2018 12:12 )  Hemoglobin : 8.6    CBC -  ( 25 Nov 2018 04:31 )  Hemoglobin : 7.7    CBC -  ( 24 Nov 2018 23:30 )  Hemoglobin : 7.7      LIVER FUNCTIONS - ( 25 Nov 2018 12:12 )  Alb: 2.7 [3.5 - 5.2] / Pro: 6.2 [6.0 - 8.0] / ALK PHOS: 134 [30 - 115] / ALT: 30 [0 - 41] / AST: 18 [0 - 41] / GGT: x     LIVER FUNCTIONS - ( 25 Nov 2018 04:31 )  Alb: 2.6 [3.5 - 5.2] / Pro: 5.4 [6.0 - 8.0] / ALK PHOS: 123 [30 - 115] / ALT: 28 [0 - 41] / AST: 17 [0 - 41] / GGT: x     LIVER FUNCTIONS - ( 21 Nov 2018 06:35 )  Alb: 3.0 [3.5 - 5.2] / Pro: 6.8 [6.0 - 8.0] / ALK PHOS: 232 [30 - 115] / ALT: 70 [0 - 41] / AST: 53 [0 - 41] / GGT: x

## 2018-11-27 DIAGNOSIS — M86.672 OTHER CHRONIC OSTEOMYELITIS, LEFT ANKLE AND FOOT: ICD-10-CM

## 2018-11-27 DIAGNOSIS — I10 ESSENTIAL (PRIMARY) HYPERTENSION: ICD-10-CM

## 2018-11-27 DIAGNOSIS — L97.529 NON-PRESSURE CHRONIC ULCER OF OTHER PART OF LEFT FOOT WITH UNSPECIFIED SEVERITY: ICD-10-CM

## 2018-11-27 DIAGNOSIS — E11.51 TYPE 2 DIABETES MELLITUS WITH DIABETIC PERIPHERAL ANGIOPATHY WITHOUT GANGRENE: ICD-10-CM

## 2018-11-27 DIAGNOSIS — E11.621 TYPE 2 DIABETES MELLITUS WITH FOOT ULCER: ICD-10-CM

## 2018-11-27 DIAGNOSIS — R29.898 OTHER SYMPTOMS AND SIGNS INVOLVING THE MUSCULOSKELETAL SYSTEM: ICD-10-CM

## 2018-11-27 DIAGNOSIS — M51.27 OTHER INTERVERTEBRAL DISC DISPLACEMENT, LUMBOSACRAL REGION: ICD-10-CM
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
## 2018-11-27 DIAGNOSIS — I25.10 ATHEROSCLEROTIC HEART DISEASE OF NATIVE CORONARY ARTERY WITHOUT ANGINA PECTORIS: ICD-10-CM

## 2018-11-27 DIAGNOSIS — M62.82 RHABDOMYOLYSIS: ICD-10-CM

## 2018-11-27 DIAGNOSIS — Z79.02 LONG TERM (CURRENT) USE OF ANTITHROMBOTICS/ANTIPLATELETS: ICD-10-CM

## 2018-11-27 DIAGNOSIS — S22.089D UNSPECIFIED FRACTURE OF T11-T12 VERTEBRA, SUBSEQUENT ENCOUNTER FOR FRACTURE WITH ROUTINE HEALING: ICD-10-CM

## 2018-11-27 DIAGNOSIS — E53.8 DEFICIENCY OF OTHER SPECIFIED B GROUP VITAMINS: ICD-10-CM

## 2018-11-27 DIAGNOSIS — B95.1 STREPTOCOCCUS, GROUP B, AS THE CAUSE OF DISEASES CLASSIFIED ELSEWHERE: ICD-10-CM

## 2018-11-27 DIAGNOSIS — Z88.1 ALLERGY STATUS TO OTHER ANTIBIOTIC AGENTS STATUS: ICD-10-CM

## 2018-11-27 DIAGNOSIS — Z95.1 PRESENCE OF AORTOCORONARY BYPASS GRAFT: ICD-10-CM

## 2018-11-27 DIAGNOSIS — Z79.899 OTHER LONG TERM (CURRENT) DRUG THERAPY: ICD-10-CM

## 2018-11-27 DIAGNOSIS — E11.42 TYPE 2 DIABETES MELLITUS WITH DIABETIC POLYNEUROPATHY: ICD-10-CM

## 2018-11-27 DIAGNOSIS — Z79.82 LONG TERM (CURRENT) USE OF ASPIRIN: ICD-10-CM

## 2018-11-27 DIAGNOSIS — N17.9 ACUTE KIDNEY FAILURE, UNSPECIFIED: ICD-10-CM

## 2018-11-27 DIAGNOSIS — E78.5 HYPERLIPIDEMIA, UNSPECIFIED: ICD-10-CM

## 2018-11-27 DIAGNOSIS — Z91.041 RADIOGRAPHIC DYE ALLERGY STATUS: ICD-10-CM

## 2018-11-27 DIAGNOSIS — I25.2 OLD MYOCARDIAL INFARCTION: ICD-10-CM

## 2018-11-27 DIAGNOSIS — E11.69 TYPE 2 DIABETES MELLITUS WITH OTHER SPECIFIED COMPLICATION: ICD-10-CM

## 2018-11-27 DIAGNOSIS — Z79.2 LONG TERM (CURRENT) USE OF ANTIBIOTICS: ICD-10-CM

## 2018-11-27 DIAGNOSIS — E51.9 THIAMINE DEFICIENCY, UNSPECIFIED: ICD-10-CM

## 2018-11-27 LAB
ANION GAP SERPL CALC-SCNC: 12 MMOL/L — SIGNIFICANT CHANGE UP (ref 7–14)
BASOPHILS # BLD AUTO: 0.05 K/UL — SIGNIFICANT CHANGE UP (ref 0–0.2)
BASOPHILS # BLD AUTO: 0.06 K/UL — SIGNIFICANT CHANGE UP (ref 0–0.2)
BASOPHILS NFR BLD AUTO: 0.5 % — SIGNIFICANT CHANGE UP (ref 0–1)
BASOPHILS NFR BLD AUTO: 0.5 % — SIGNIFICANT CHANGE UP (ref 0–1)
BLD GP AB SCN SERPL QL: SIGNIFICANT CHANGE UP
BUN SERPL-MCNC: 15 MG/DL — SIGNIFICANT CHANGE UP (ref 10–20)
CALCIUM SERPL-MCNC: 8.6 MG/DL — SIGNIFICANT CHANGE UP (ref 8.5–10.1)
CHLORIDE SERPL-SCNC: 100 MMOL/L — SIGNIFICANT CHANGE UP (ref 98–110)
CO2 SERPL-SCNC: 27 MMOL/L — SIGNIFICANT CHANGE UP (ref 17–32)
CREAT SERPL-MCNC: 0.8 MG/DL — SIGNIFICANT CHANGE UP (ref 0.7–1.5)
EOSINOPHIL # BLD AUTO: 0.24 K/UL — SIGNIFICANT CHANGE UP (ref 0–0.7)
EOSINOPHIL # BLD AUTO: 0.53 K/UL — SIGNIFICANT CHANGE UP (ref 0–0.7)
EOSINOPHIL NFR BLD AUTO: 2.1 % — SIGNIFICANT CHANGE UP (ref 0–8)
EOSINOPHIL NFR BLD AUTO: 4.9 % — SIGNIFICANT CHANGE UP (ref 0–8)
GLUCOSE BLDC GLUCOMTR-MCNC: 147 MG/DL — HIGH (ref 70–99)
GLUCOSE BLDC GLUCOMTR-MCNC: 157 MG/DL — HIGH (ref 70–99)
GLUCOSE BLDC GLUCOMTR-MCNC: 167 MG/DL — HIGH (ref 70–99)
GLUCOSE BLDC GLUCOMTR-MCNC: 170 MG/DL — HIGH (ref 70–99)
GLUCOSE SERPL-MCNC: 154 MG/DL — HIGH (ref 70–99)
HCT VFR BLD CALC: 24.4 % — LOW (ref 42–52)
HCT VFR BLD CALC: 24.6 % — LOW (ref 42–52)
HGB BLD-MCNC: 7.6 G/DL — LOW (ref 14–18)
HGB BLD-MCNC: 7.6 G/DL — LOW (ref 14–18)
IMM GRANULOCYTES NFR BLD AUTO: 1 % — HIGH (ref 0.1–0.3)
IMM GRANULOCYTES NFR BLD AUTO: 1.2 % — HIGH (ref 0.1–0.3)
LYMPHOCYTES # BLD AUTO: 2.84 K/UL — SIGNIFICANT CHANGE UP (ref 1.2–3.4)
LYMPHOCYTES # BLD AUTO: 2.91 K/UL — SIGNIFICANT CHANGE UP (ref 1.2–3.4)
LYMPHOCYTES # BLD AUTO: 25.5 % — SIGNIFICANT CHANGE UP (ref 20.5–51.1)
LYMPHOCYTES # BLD AUTO: 26.3 % — SIGNIFICANT CHANGE UP (ref 20.5–51.1)
MAGNESIUM SERPL-MCNC: 1.8 MG/DL — SIGNIFICANT CHANGE UP (ref 1.8–2.4)
MCHC RBC-ENTMCNC: 23 PG — LOW (ref 27–31)
MCHC RBC-ENTMCNC: 23.1 PG — LOW (ref 27–31)
MCHC RBC-ENTMCNC: 30.9 G/DL — LOW (ref 32–37)
MCHC RBC-ENTMCNC: 31.1 G/DL — LOW (ref 32–37)
MCV RBC AUTO: 74.2 FL — LOW (ref 80–94)
MCV RBC AUTO: 74.5 FL — LOW (ref 80–94)
MONOCYTES # BLD AUTO: 0.85 K/UL — HIGH (ref 0.1–0.6)
MONOCYTES # BLD AUTO: 0.89 K/UL — HIGH (ref 0.1–0.6)
MONOCYTES NFR BLD AUTO: 7.8 % — SIGNIFICANT CHANGE UP (ref 1.7–9.3)
MONOCYTES NFR BLD AUTO: 7.9 % — SIGNIFICANT CHANGE UP (ref 1.7–9.3)
NEUTROPHILS # BLD AUTO: 6.4 K/UL — SIGNIFICANT CHANGE UP (ref 1.4–6.5)
NEUTROPHILS # BLD AUTO: 7.18 K/UL — HIGH (ref 1.4–6.5)
NEUTROPHILS NFR BLD AUTO: 59.4 % — SIGNIFICANT CHANGE UP (ref 42.2–75.2)
NEUTROPHILS NFR BLD AUTO: 62.9 % — SIGNIFICANT CHANGE UP (ref 42.2–75.2)
NRBC # BLD: 0 /100 WBCS — SIGNIFICANT CHANGE UP (ref 0–0)
NRBC # BLD: 0 /100 WBCS — SIGNIFICANT CHANGE UP (ref 0–0)
PLATELET # BLD AUTO: 233 K/UL — SIGNIFICANT CHANGE UP (ref 130–400)
PLATELET # BLD AUTO: 238 K/UL — SIGNIFICANT CHANGE UP (ref 130–400)
POTASSIUM SERPL-MCNC: 3.9 MMOL/L — SIGNIFICANT CHANGE UP (ref 3.5–5)
POTASSIUM SERPL-SCNC: 3.9 MMOL/L — SIGNIFICANT CHANGE UP (ref 3.5–5)
RBC # BLD: 3.29 M/UL — LOW (ref 4.7–6.1)
RBC # BLD: 3.3 M/UL — LOW (ref 4.7–6.1)
RBC # FLD: 17.3 % — HIGH (ref 11.5–14.5)
RBC # FLD: 17.5 % — HIGH (ref 11.5–14.5)
SODIUM SERPL-SCNC: 139 MMOL/L — SIGNIFICANT CHANGE UP (ref 135–146)
TYPE + AB SCN PNL BLD: SIGNIFICANT CHANGE UP
WBC # BLD: 10.78 K/UL — SIGNIFICANT CHANGE UP (ref 4.8–10.8)
WBC # BLD: 11.42 K/UL — HIGH (ref 4.8–10.8)
WBC # FLD AUTO: 10.78 K/UL — SIGNIFICANT CHANGE UP (ref 4.8–10.8)
WBC # FLD AUTO: 11.42 K/UL — HIGH (ref 4.8–10.8)

## 2018-11-27 RX ORDER — INSULIN LISPRO 100/ML
9 VIAL (ML) SUBCUTANEOUS
Qty: 0 | Refills: 0 | Status: DISCONTINUED | OUTPATIENT
Start: 2018-11-27 | End: 2018-12-01

## 2018-11-27 RX ADMIN — TRAMADOL HYDROCHLORIDE 50 MILLIGRAM(S): 50 TABLET ORAL at 01:04

## 2018-11-27 RX ADMIN — ENOXAPARIN SODIUM 40 MILLIGRAM(S): 100 INJECTION SUBCUTANEOUS at 11:49

## 2018-11-27 RX ADMIN — SENNA PLUS 2 TABLET(S): 8.6 TABLET ORAL at 21:32

## 2018-11-27 RX ADMIN — Medication 81 MILLIGRAM(S): at 11:47

## 2018-11-27 RX ADMIN — INSULIN GLARGINE 27 UNIT(S): 100 INJECTION, SOLUTION SUBCUTANEOUS at 21:32

## 2018-11-27 RX ADMIN — PANTOPRAZOLE SODIUM 40 MILLIGRAM(S): 20 TABLET, DELAYED RELEASE ORAL at 17:10

## 2018-11-27 RX ADMIN — SIMETHICONE 80 MILLIGRAM(S): 80 TABLET, CHEWABLE ORAL at 17:10

## 2018-11-27 RX ADMIN — TRAMADOL HYDROCHLORIDE 50 MILLIGRAM(S): 50 TABLET ORAL at 12:40

## 2018-11-27 RX ADMIN — TRAMADOL HYDROCHLORIDE 50 MILLIGRAM(S): 50 TABLET ORAL at 12:09

## 2018-11-27 RX ADMIN — Medication 100 MILLIGRAM(S): at 17:10

## 2018-11-27 RX ADMIN — PANTOPRAZOLE SODIUM 40 MILLIGRAM(S): 20 TABLET, DELAYED RELEASE ORAL at 05:21

## 2018-11-27 RX ADMIN — Medication 100 MILLIGRAM(S): at 11:48

## 2018-11-27 RX ADMIN — Medication 100 MILLIGRAM(S): at 14:40

## 2018-11-27 RX ADMIN — Medication 100 MILLIGRAM(S): at 05:21

## 2018-11-27 RX ADMIN — Medication 1 MILLIGRAM(S): at 11:48

## 2018-11-27 RX ADMIN — Medication 9 UNIT(S): at 17:08

## 2018-11-27 RX ADMIN — PREGABALIN 1000 MICROGRAM(S): 225 CAPSULE ORAL at 11:48

## 2018-11-27 RX ADMIN — SIMETHICONE 80 MILLIGRAM(S): 80 TABLET, CHEWABLE ORAL at 05:21

## 2018-11-27 RX ADMIN — TRAMADOL HYDROCHLORIDE 50 MILLIGRAM(S): 50 TABLET ORAL at 00:35

## 2018-11-27 RX ADMIN — TAMSULOSIN HYDROCHLORIDE 0.4 MILLIGRAM(S): 0.4 CAPSULE ORAL at 21:31

## 2018-11-27 RX ADMIN — Medication 6 UNIT(S): at 08:20

## 2018-11-27 RX ADMIN — ATORVASTATIN CALCIUM 40 MILLIGRAM(S): 80 TABLET, FILM COATED ORAL at 21:32

## 2018-11-27 RX ADMIN — Medication 100 MILLIGRAM(S): at 21:32

## 2018-11-27 RX ADMIN — Medication 1 TABLET(S): at 11:49

## 2018-11-27 RX ADMIN — CEFTRIAXONE 100 GRAM(S): 500 INJECTION, POWDER, FOR SOLUTION INTRAMUSCULAR; INTRAVENOUS at 12:09

## 2018-11-27 NOTE — PROGRESS NOTE ADULT - ASSESSMENT
63y male with PMH of CAD, NSTEMI s/p CABG (5 vessel according to patient; March 2016); DM with peripheral neuropathy, Left DFU complicated by osteomyelitis on IV abx , HTN, HLD and recently admitted for lower extremity weakness and then discharged to  for rehab, readmitted for Episode of BRBPR     #BRBPR likely 2/2 LGIB   -Maintain 2 large bore IV access  -Regular diet for now, clear liquid diet on Wednesday and NPO after midnight with bowel prep for colonoscopy with GI on Thursday 11/29/18  -PO Protonix 40 BID, hold Plavix, monitor CBC q12h and transfuse PRN  -Notify GI for any hemodynamic instability, significant anemia     #Microcytic Anemia, BRBPR; DDx: hemorrhoids vs. adenocarcinoma vs. polyp   -Hemoglobin 7.6, f/u CBC 18:00    -Iron studies ordered  -B12 and folate replacement   -Monitor CBC, keep active T&S<, transfuse if < 7     #Chronic Bilateral LE Weakness   -Patient transferred from , to return to  once medically stable  -PT/rehab on board   -TLSO brace, pregabalin 100mg BID for neuropathy. Completed prednisone course.     #Recent osteomyelitis of the left foot s/p excisional debridement   -Ceftriaxone 2g IV q24h  -Followed by podiatry   -Per ID, patient will require extended IV abx, will follow     #CAD s/p CABG  -Aspirin 81mg daily, holding Plavix 2/2 LGIB requiring colonoscopy   -Atorvastatin 40mg qhs     #HTN   -Monitor BP   -Metoprolol on hold for borderline BP      #DM  -A1C 7   -Monitor fingersticks, carbohydrate consistent diet   -Lantus 27, Lispro increased to 9, sliding scale PRN     #Thiamine Deficiency   -Thiamine 100    #Vitamin B12 Deficiency  -Cyanocobalamin 1000 daily    #Folate Deficiency  -Folic acid 1mg PO daily     #Urinary Retention  -Urology: Flomax, ambulation, alleviate constipation, d/c bee, straight cath q6h and document post-void residual, recall if needed, consider outpatient urodynamic studies     DVT ppx: Lovenox 40   GI ppx: Protonix 40mg PO BID   Diet: DASH/TLC, carbohydrate consistent   Activity: ambulate as tolerated with assistance   Dispo: 4A after colonoscopy if stable   Code status: DNR/DNR

## 2018-11-27 NOTE — PROGRESS NOTE ADULT - SUBJECTIVE AND OBJECTIVE BOX
SUBJECTIVE:    Patient is a 63y old Male who presents with a chief complaint of Bright red bleeding per rectum (26 Nov 2018 16:17)    Currently admitted to medicine with the primary diagnosis of lower GI bleed    Today is hospital day 3d. One episode of blood-tinged stool was reported overnight with several clots of blood. This AM, nursing staff reported visualization of actively bleeding external hemorrhoid during patient's bowel movement. The patient did not realize he had a bowel movement and denies any rectal pain. We will further evaluate for perineal sensation and rectal tone. Per urology recommendations, Woods catheter to be removed. We will continue to monitor the patient's hemodynamic status and hemoglobin. Patient denies any headache, chest pain, lightheadedness, dyspnea, abdominal pain, N/V/D/C at this time.     PAST MEDICAL & SURGICAL HISTORY  CAD (coronary artery disease)  Dyslipidemia  Diabetic foot ulcer  PVD (peripheral vascular disease)  Diabetes  Other hyperlipidemia  Hypertension, unspecified type  Amputated toe, unspecified laterality  S/P CABG (coronary artery bypass graft)    SOCIAL HISTORY:  Patient denies alcohol, tobacco, and recreational drug use.     From ( ) home ( ) nursing home (X) other: 4A   Ambulation status: ( ) ambulates independently ( ) ambulates with assistance (X ) ambulates with device ( ) dependent   Device: ( X) rolling walker ( ) cane     ALLERGIES:  Cipro (Unknown)  IV contrast (Short breath)  Keflex (Unknown)  shellfish (Anaphylaxis)    MEDICATIONS:  STANDING MEDICATIONS  aspirin  chewable 81 milliGRAM(s) Oral daily  atorvastatin 40 milliGRAM(s) Oral at bedtime  cefTRIAXone   IVPB 2 Gram(s) IV Intermittent every 24 hours  chlorhexidine 2% Cloths 1 Application(s) Topical <User Schedule>  chlorhexidine 4% Liquid 1 Application(s) Topical <User Schedule>  cyanocobalamin 1000 MICROGram(s) Oral daily  docusate sodium 100 milliGRAM(s) Oral three times a day  enoxaparin Injectable 40 milliGRAM(s) SubCutaneous daily  folic acid 1 milliGRAM(s) Oral daily  insulin glargine Injectable (LANTUS) 27 Unit(s) SubCutaneous at bedtime  insulin lispro Injectable (HumaLOG) 6 Unit(s) SubCutaneous three times a day before meals  multivitamin 1 Tablet(s) Oral daily  pantoprazole    Tablet 40 milliGRAM(s) Oral every 12 hours  pregabalin 100 milliGRAM(s) Oral two times a day  senna 2 Tablet(s) Oral at bedtime  simethicone 80 milliGRAM(s) Chew two times a day  tamsulosin 0.4 milliGRAM(s) Oral at bedtime  thiamine 100 milliGRAM(s) Oral daily    PRN MEDICATIONS  traMADol 50 milliGRAM(s) Oral every 8 hours PRN    VITALS:   T(F): 96.5  HR: 88  BP: 132/77  RR: 18  SpO2: --    LABS:                        7.6    11.42 )-----------( 238      ( 27 Nov 2018 06:56 )             24.4     11-27    139  |  100  |  15  ----------------------------<  154<H>  3.9   |  27  |  0.8    Ca    8.6      27 Nov 2018 06:56  Mg     1.8     11-27    TPro  6.2  /  Alb  2.7<L>  /  TBili  0.5  /  DBili  x   /  AST  18  /  ALT  30  /  AlkPhos  134<H>  11-25    RADIOLOGY:  No new imaging studies today     PHYSICAL EXAM:  GEN: No acute distress, lying comfortably in bed   LUNGS: Clear to auscultation bilaterally, no labored breathing   HEART: S1/S2 present. RRR.   ABD: Soft, non-tender, non-distended. Bowel sounds present.   EXT: Noncyanotic, nonedematous, 2+ peripheral pulses, skin intact   NEURO: AAOX3, CN II-XII grossly intact     Lines/Tubes   Peripheral IV access

## 2018-11-28 LAB
ANION GAP SERPL CALC-SCNC: 12 MMOL/L — SIGNIFICANT CHANGE UP (ref 7–14)
BASOPHILS # BLD AUTO: 0.07 K/UL — SIGNIFICANT CHANGE UP (ref 0–0.2)
BASOPHILS NFR BLD AUTO: 0.7 % — SIGNIFICANT CHANGE UP (ref 0–1)
BUN SERPL-MCNC: 13 MG/DL — SIGNIFICANT CHANGE UP (ref 10–20)
CALCIUM SERPL-MCNC: 8.7 MG/DL — SIGNIFICANT CHANGE UP (ref 8.5–10.1)
CHLORIDE SERPL-SCNC: 100 MMOL/L — SIGNIFICANT CHANGE UP (ref 98–110)
CO2 SERPL-SCNC: 28 MMOL/L — SIGNIFICANT CHANGE UP (ref 17–32)
CREAT SERPL-MCNC: 0.9 MG/DL — SIGNIFICANT CHANGE UP (ref 0.7–1.5)
EOSINOPHIL # BLD AUTO: 0.61 K/UL — SIGNIFICANT CHANGE UP (ref 0–0.7)
EOSINOPHIL NFR BLD AUTO: 6 % — SIGNIFICANT CHANGE UP (ref 0–8)
GLUCOSE BLDC GLUCOMTR-MCNC: 110 MG/DL — HIGH (ref 70–99)
GLUCOSE BLDC GLUCOMTR-MCNC: 111 MG/DL — HIGH (ref 70–99)
GLUCOSE BLDC GLUCOMTR-MCNC: 117 MG/DL — HIGH (ref 70–99)
GLUCOSE BLDC GLUCOMTR-MCNC: 185 MG/DL — HIGH (ref 70–99)
GLUCOSE SERPL-MCNC: 111 MG/DL — HIGH (ref 70–99)
HCT VFR BLD CALC: 25.1 % — LOW (ref 42–52)
HGB BLD-MCNC: 7.7 G/DL — LOW (ref 14–18)
IMM GRANULOCYTES NFR BLD AUTO: 0.8 % — HIGH (ref 0.1–0.3)
LYMPHOCYTES # BLD AUTO: 2.95 K/UL — SIGNIFICANT CHANGE UP (ref 1.2–3.4)
LYMPHOCYTES # BLD AUTO: 29 % — SIGNIFICANT CHANGE UP (ref 20.5–51.1)
MAGNESIUM SERPL-MCNC: 1.8 MG/DL — SIGNIFICANT CHANGE UP (ref 1.8–2.4)
MCHC RBC-ENTMCNC: 22.7 PG — LOW (ref 27–31)
MCHC RBC-ENTMCNC: 30.7 G/DL — LOW (ref 32–37)
MCV RBC AUTO: 74 FL — LOW (ref 80–94)
MONOCYTES # BLD AUTO: 0.75 K/UL — HIGH (ref 0.1–0.6)
MONOCYTES NFR BLD AUTO: 7.4 % — SIGNIFICANT CHANGE UP (ref 1.7–9.3)
NEUTROPHILS # BLD AUTO: 5.72 K/UL — SIGNIFICANT CHANGE UP (ref 1.4–6.5)
NEUTROPHILS NFR BLD AUTO: 56.1 % — SIGNIFICANT CHANGE UP (ref 42.2–75.2)
NRBC # BLD: 0 /100 WBCS — SIGNIFICANT CHANGE UP (ref 0–0)
PLATELET # BLD AUTO: 230 K/UL — SIGNIFICANT CHANGE UP (ref 130–400)
POTASSIUM SERPL-MCNC: 4.3 MMOL/L — SIGNIFICANT CHANGE UP (ref 3.5–5)
POTASSIUM SERPL-SCNC: 4.3 MMOL/L — SIGNIFICANT CHANGE UP (ref 3.5–5)
RBC # BLD: 3.39 M/UL — LOW (ref 4.7–6.1)
RBC # FLD: 17.5 % — HIGH (ref 11.5–14.5)
SODIUM SERPL-SCNC: 140 MMOL/L — SIGNIFICANT CHANGE UP (ref 135–146)
WBC # BLD: 10.18 K/UL — SIGNIFICANT CHANGE UP (ref 4.8–10.8)
WBC # FLD AUTO: 10.18 K/UL — SIGNIFICANT CHANGE UP (ref 4.8–10.8)

## 2018-11-28 RX ORDER — SOD SULF/SODIUM/NAHCO3/KCL/PEG
4000 SOLUTION, RECONSTITUTED, ORAL ORAL
Qty: 0 | Refills: 0 | Status: COMPLETED | OUTPATIENT
Start: 2018-11-28 | End: 2018-11-28

## 2018-11-28 RX ORDER — SOD SULF/SODIUM/NAHCO3/KCL/PEG
4000 SOLUTION, RECONSTITUTED, ORAL ORAL
Qty: 0 | Refills: 0 | Status: DISCONTINUED | OUTPATIENT
Start: 2018-11-28 | End: 2018-11-28

## 2018-11-28 RX ADMIN — Medication 100 MILLIGRAM(S): at 15:53

## 2018-11-28 RX ADMIN — Medication 100 MILLIGRAM(S): at 21:29

## 2018-11-28 RX ADMIN — SENNA PLUS 2 TABLET(S): 8.6 TABLET ORAL at 21:29

## 2018-11-28 RX ADMIN — Medication 100 MILLIGRAM(S): at 05:20

## 2018-11-28 RX ADMIN — Medication 4000 MILLILITER(S): at 21:27

## 2018-11-28 RX ADMIN — TRAMADOL HYDROCHLORIDE 50 MILLIGRAM(S): 50 TABLET ORAL at 00:45

## 2018-11-28 RX ADMIN — Medication 81 MILLIGRAM(S): at 15:54

## 2018-11-28 RX ADMIN — Medication 20 MILLIGRAM(S): at 21:28

## 2018-11-28 RX ADMIN — SIMETHICONE 80 MILLIGRAM(S): 80 TABLET, CHEWABLE ORAL at 17:38

## 2018-11-28 RX ADMIN — TRAMADOL HYDROCHLORIDE 50 MILLIGRAM(S): 50 TABLET ORAL at 13:15

## 2018-11-28 RX ADMIN — Medication 9 UNIT(S): at 12:56

## 2018-11-28 RX ADMIN — PREGABALIN 1000 MICROGRAM(S): 225 CAPSULE ORAL at 15:54

## 2018-11-28 RX ADMIN — Medication 9 UNIT(S): at 08:30

## 2018-11-28 RX ADMIN — Medication 1 TABLET(S): at 15:54

## 2018-11-28 RX ADMIN — ATORVASTATIN CALCIUM 40 MILLIGRAM(S): 80 TABLET, FILM COATED ORAL at 21:29

## 2018-11-28 RX ADMIN — Medication 1 MILLIGRAM(S): at 15:53

## 2018-11-28 RX ADMIN — PANTOPRAZOLE SODIUM 40 MILLIGRAM(S): 20 TABLET, DELAYED RELEASE ORAL at 05:20

## 2018-11-28 RX ADMIN — TAMSULOSIN HYDROCHLORIDE 0.4 MILLIGRAM(S): 0.4 CAPSULE ORAL at 21:29

## 2018-11-28 RX ADMIN — CEFTRIAXONE 100 GRAM(S): 500 INJECTION, POWDER, FOR SOLUTION INTRAMUSCULAR; INTRAVENOUS at 11:55

## 2018-11-28 RX ADMIN — SIMETHICONE 80 MILLIGRAM(S): 80 TABLET, CHEWABLE ORAL at 05:20

## 2018-11-28 RX ADMIN — ENOXAPARIN SODIUM 40 MILLIGRAM(S): 100 INJECTION SUBCUTANEOUS at 14:56

## 2018-11-28 RX ADMIN — PANTOPRAZOLE SODIUM 40 MILLIGRAM(S): 20 TABLET, DELAYED RELEASE ORAL at 17:38

## 2018-11-28 RX ADMIN — TRAMADOL HYDROCHLORIDE 50 MILLIGRAM(S): 50 TABLET ORAL at 01:15

## 2018-11-28 RX ADMIN — Medication 100 MILLIGRAM(S): at 17:39

## 2018-11-28 NOTE — PROGRESS NOTE ADULT - SUBJECTIVE AND OBJECTIVE BOX
PROGRESS NOTE   Patient is a 63y old  Male who presents with a chief complaint of Bright red bleeding per rectum x 1 episode (27 Nov 2018 11:18)      HPI:  63y male with PMH of CAD, NSTEMI s/p CABG (5 vessel according to patient; March 2016); DM with peripheral neuropathy, Left DFU, HTN, HLD and recently admitted for lower extremity weakness and a complete Stroke workup was done with CT head negative for acute infarct; and  MRI brain didn't show any new ischemic changes. MRI lumbar and sacral spine showed diffuse disc bulges in lumbar and sacral spine. Patient was followed by Neurosurgery, neurology, ID, podiatry.During his stay ID and podiatry followed the patient for osteomyelitis of left foot and he was discharged to  for intensive rehab and on IV antibiotics on 11/20/2018 . Today on day of presentation , patient reports that he was having crampy abdominal pain, sudden in onset, 6/10 intensity located in the lower abdomen , non radiating and had a sudden urge to defecate for which he called the PCA to help him out to go to a bathroom. He went to the bathroom and reports bright red colored watery stool .patient was taken back to the bed .Denies sob, fever, nausea, vomiting, hemoptysis, lightheadedness , dysuria .The nurse called the attending Dr. Hall who assessed the patient and did a DASHAWN which showed active bleeding from rectum with prince bright red blood, no melena reported .She spoke with the GI fellow on call and no acute intervention was suggested and patient was admitted to medicine service for further evaluation.Since he came to the medicine floor he has had 2 similar episodes   Patient never had a colonoscopy ,is on plavix and asa , no h/o colon c/a in family . Does not have a GI physician (24 Nov 2018 23:20)      GASTROINESTINAL BLEEDING  ^GASTROINESTINAL BLEEDING  Yes  Family history of throat cancer (Father)  Family history of lung cancer (Mother)  No pertinent family history in first degree relatives  Handoff  MEWS Score  CAD (coronary artery disease)  Dyslipidemia  NSTEMI (non-ST elevated myocardial infarction)  Diabetic foot ulcer  PVD (peripheral vascular disease)  Diabetes  Other hyperlipidemia  Hypertension, unspecified type  Amputated toe, unspecified laterality  S/P CABG (coronary artery bypass graft)  Deep vein thrombosis associated with coronary artery bypass graft surgery      VITALS:  Vital Signs Last 24 Hrs  T(C): 36.3 (28 Nov 2018 07:26), Max: 36.3 (28 Nov 2018 07:26)  T(F): 97.3 (28 Nov 2018 07:26), Max: 97.3 (28 Nov 2018 07:26)  HR: 83 (28 Nov 2018 07:26) (83 - 91)  BP: 105/55 (28 Nov 2018 07:26) (105/55 - 139/73)  BP(mean): --  RR: 18 (28 Nov 2018 07:26) (18 - 18)  SpO2: --    LABS:                        7.7    10.18 )-----------( 230      ( 28 Nov 2018 06:15 )             25.1     11-28    140  |  100  |  13  ----------------------------<  111<H>  4.3   |  28  |  0.9    Ca    8.7      28 Nov 2018 06:15  Mg     1.8     11-28        Hemoglobin A1C     PHYSICAL EXAM  GEN: ROSALIE ESCOBEDO is a pleasant well-nourished, well developed 63y Male in no acute distress, alert awake, and oriented to person, place and time.     Medication(s):   aspirin  chewable 81 milliGRAM(s) Oral daily  atorvastatin 40 milliGRAM(s) Oral at bedtime  bisacodyl 20 milliGRAM(s) Oral <User Schedule>  cefTRIAXone   IVPB 2 Gram(s) IV Intermittent every 24 hours  chlorhexidine 2% Cloths 1 Application(s) Topical <User Schedule>  chlorhexidine 4% Liquid 1 Application(s) Topical <User Schedule>  cyanocobalamin 1000 MICROGram(s) Oral daily  docusate sodium 100 milliGRAM(s) Oral three times a day  enoxaparin Injectable 40 milliGRAM(s) SubCutaneous daily  folic acid 1 milliGRAM(s) Oral daily  insulin glargine Injectable (LANTUS) 27 Unit(s) SubCutaneous at bedtime  insulin lispro Injectable (HumaLOG) 9 Unit(s) SubCutaneous three times a day before meals  multivitamin 1 Tablet(s) Oral daily  pantoprazole    Tablet 40 milliGRAM(s) Oral every 12 hours  polyethylene glycol/electrolyte Solution. 4000 milliLiter(s) Oral <User Schedule>  pregabalin 100 milliGRAM(s) Oral two times a day  senna 2 Tablet(s) Oral at bedtime  simethicone 80 milliGRAM(s) Chew two times a day  tamsulosin 0.4 milliGRAM(s) Oral at bedtime  thiamine 100 milliGRAM(s) Oral daily  traMADol 50 milliGRAM(s) Oral every 8 hours PRN

## 2018-11-28 NOTE — PROGRESS NOTE ADULT - SUBJECTIVE AND OBJECTIVE BOX
SUBJECTIVE:    Patient is a 63y old Male who presents with a chief complaint of Bright red bleeding per rectum x 1 episode (28 Nov 2018 14:21)    Currently admitted to medicine with the primary diagnosis of      Today is hospital day 4d. No acute events overnight. Patient reports suprapubic discomfort and complete inability to urinate. Per urology, nursing staff should straight cath patient 5x/day and document urine output. Ideally, patient can be taught to straight cath himself for discharge. If he is unable to straight cath himself, a Woods catheter can be inserted on the day of discharge and he can follow up outpatient with urology for urodynamic studies. He is scheduled for colonoscopy tomorrow 11/29/18 with GI. Patient denies any headache, lightheadedness, chest pain, dyspnea, abdominal pain, N/V/D/C at this time.     We will transfuse 1 unit of PRBCs to optimize hemoglobin for the procedure.     PAST MEDICAL & SURGICAL HISTORY  CAD (coronary artery disease)  Dyslipidemia  Diabetic foot ulcer  PVD (peripheral vascular disease)  Diabetes  Other hyperlipidemia  Hypertension, unspecified type  Amputated toe, unspecified laterality  S/P CABG (coronary artery bypass graft)    SOCIAL HISTORY:  Patient denies alcohol, tobacco, and recreational drug use.     From ( ) home ( ) nursing home (X ) other: 4A  Ambulation status: ( ) ambulates independently ( ) ambulates with assistance (X ) ambulates with device ( ) dependent   Device: (X ) rolling walker ( ) cane     ALLERGIES:  Cipro (Unknown)  IV contrast (Short breath)  Keflex (Unknown)  shellfish (Anaphylaxis)    MEDICATIONS:  STANDING MEDICATIONS  aspirin  chewable 81 milliGRAM(s) Oral daily  atorvastatin 40 milliGRAM(s) Oral at bedtime  bisacodyl 20 milliGRAM(s) Oral <User Schedule>  cefTRIAXone   IVPB 2 Gram(s) IV Intermittent every 24 hours  chlorhexidine 2% Cloths 1 Application(s) Topical <User Schedule>  chlorhexidine 4% Liquid 1 Application(s) Topical <User Schedule>  cyanocobalamin 1000 MICROGram(s) Oral daily  docusate sodium 100 milliGRAM(s) Oral three times a day  enoxaparin Injectable 40 milliGRAM(s) SubCutaneous daily  folic acid 1 milliGRAM(s) Oral daily  insulin glargine Injectable (LANTUS) 27 Unit(s) SubCutaneous at bedtime  insulin lispro Injectable (HumaLOG) 9 Unit(s) SubCutaneous three times a day before meals  multivitamin 1 Tablet(s) Oral daily  pantoprazole    Tablet 40 milliGRAM(s) Oral every 12 hours  polyethylene glycol/electrolyte Solution. 4000 milliLiter(s) Oral <User Schedule>  pregabalin 100 milliGRAM(s) Oral two times a day  senna 2 Tablet(s) Oral at bedtime  simethicone 80 milliGRAM(s) Chew two times a day  tamsulosin 0.4 milliGRAM(s) Oral at bedtime  thiamine 100 milliGRAM(s) Oral daily    PRN MEDICATIONS  traMADol 50 milliGRAM(s) Oral every 8 hours PRN    VITALS:   T(F): 97.3  HR: 83  BP: 105/55  RR: 18  SpO2: --    LABS:                        7.7    10.18 )-----------( 230      ( 28 Nov 2018 06:15 )             25.1     11-28    140  |  100  |  13  ----------------------------<  111<H>  4.3   |  28  |  0.9    Ca    8.7      28 Nov 2018 06:15  Mg     1.8     11-28    RADIOLOGY:  No new imaging studies today     PHYSICAL EXAM:  GEN: No acute distress, lying comfortably in bed   LUNGS: Clear to auscultation bilaterally, no labored breathing   HEART: S1/S2 present. RRR.   ABD: Soft, non-tender, non-distended. Bowel sounds present.   EXT: L foot dressing clean dry and intake. No peripheral edema. 2+ peripheral pulsesNEURO: AAOX3, CN II-XII grossly intact     Lines/Tubes  Peripheral IV access

## 2018-11-28 NOTE — PROGRESS NOTE ADULT - ASSESSMENT
63y male with PMH of CAD, NSTEMI s/p CABG (5 vessel according to patient; March 2016); DM with peripheral neuropathy, Left DFU complicated by osteomyelitis on IV abx , HTN, HLD and recently admitted for lower extremity weakness and then discharged to  for rehab, readmitted for Episode of BRBPR     #BRBPR likely 2/2 LGIB   -Maintain 2 large bore IV access  -Clear liquid diet, NPO after midnight with bowel prep (Golytely and Dulcolax 20) for colonoscopy with GI on Thursday 11/29/18  -PO Protonix 40 BID, hold Plavix, monitor CBC q12h and transfuse PRN  -Notify GI for any hemodynamic instability, significant anemia     #Microcytic Anemia, BRBPR; DDx: hemorrhoids vs. adenocarcinoma vs. polyp   -Hemoglobin 7.7. Patient to be transfused 1 unit of PRBCs for goal hemoglobin of 8. Follow up 8pm CBC.   -Iron studies ordered  -B12 and folate replacement   -Monitor CBC, keep active T&S<, transfuse if < 8     #Chronic Bilateral LE Weakness   -Patient transferred from , to return to  once medically stable  -PT/rehab on board   -TLSO brace, pregabalin 100mg BID for neuropathy. Completed prednisone course.     #Recent osteomyelitis of the left foot s/p excisional debridement   -Ceftriaxone 2g IV q24h  -Followed by podiatry   -Per ID, patient will require extended IV abx, will follow     #CAD s/p CABG  -Aspirin 81mg daily, holding Plavix 2/2 LGIB requiring colonoscopy   -Atorvastatin 40mg qhs     #HTN   -Monitor BP   -Metoprolol on hold for borderline BP      #DM  -A1C 7   -Monitor fingersticks, carbohydrate consistent diet   -Lantus 27, Lispro 9, sliding scale PRN     #Thiamine Deficiency   -Thiamine 100    #Vitamin B12 Deficiency  -Cyanocobalamin 1000 daily    #Folate Deficiency  -Folic acid 1mg PO daily     #Urinary Retention  -Urology: Flomax, ambulation, alleviate constipation, straight cath 5x/day and document output. Teach patient to self-catheterize. If he is unable, he can have a Woods catheter inserted on the day of discharge to home.     DVT ppx: Lovenox 40   GI ppx: Protonix 40mg PO BID   Diet: Clear liquid diet -> NPO after midnight   Activity: ambulate as tolerated with assistance   Dispo: 4A after colonoscopy if stable   Code status: DNR/DNR

## 2018-11-28 NOTE — PROGRESS NOTE ADULT - ASSESSMENT
Assessment:  - medial and plantar sx incision left foot. No signs of infection @ this time     Plan:   - pt seen/evaluated @ bedside  - dressed w/ iodoform packing/DSD  - c/w LWC. No further sx intervention  - plan discussed w/ attending   - podiatry will f/u

## 2018-11-29 DIAGNOSIS — Z79.01 LONG TERM (CURRENT) USE OF ANTICOAGULANTS: ICD-10-CM

## 2018-11-29 DIAGNOSIS — R26.9 UNSPECIFIED ABNORMALITIES OF GAIT AND MOBILITY: ICD-10-CM

## 2018-11-29 DIAGNOSIS — K92.1 MELENA: ICD-10-CM

## 2018-11-29 DIAGNOSIS — I10 ESSENTIAL (PRIMARY) HYPERTENSION: ICD-10-CM

## 2018-11-29 DIAGNOSIS — G72.9 MYOPATHY, UNSPECIFIED: ICD-10-CM

## 2018-11-29 DIAGNOSIS — I25.2 OLD MYOCARDIAL INFARCTION: ICD-10-CM

## 2018-11-29 DIAGNOSIS — E11.621 TYPE 2 DIABETES MELLITUS WITH FOOT ULCER: ICD-10-CM

## 2018-11-29 DIAGNOSIS — E11.69 TYPE 2 DIABETES MELLITUS WITH OTHER SPECIFIED COMPLICATION: ICD-10-CM

## 2018-11-29 DIAGNOSIS — Z46.6 ENCOUNTER FOR FITTING AND ADJUSTMENT OF URINARY DEVICE: ICD-10-CM

## 2018-11-29 DIAGNOSIS — R33.9 RETENTION OF URINE, UNSPECIFIED: ICD-10-CM

## 2018-11-29 DIAGNOSIS — M62.82 RHABDOMYOLYSIS: ICD-10-CM

## 2018-11-29 DIAGNOSIS — M63.861: ICD-10-CM

## 2018-11-29 DIAGNOSIS — E78.5 HYPERLIPIDEMIA, UNSPECIFIED: ICD-10-CM

## 2018-11-29 DIAGNOSIS — D64.9 ANEMIA, UNSPECIFIED: ICD-10-CM

## 2018-11-29 DIAGNOSIS — M86.672 OTHER CHRONIC OSTEOMYELITIS, LEFT ANKLE AND FOOT: ICD-10-CM

## 2018-11-29 DIAGNOSIS — E11.40 TYPE 2 DIABETES MELLITUS WITH DIABETIC NEUROPATHY, UNSPECIFIED: ICD-10-CM

## 2018-11-29 DIAGNOSIS — Z96.0 PRESENCE OF UROGENITAL IMPLANTS: ICD-10-CM

## 2018-11-29 DIAGNOSIS — I95.9 HYPOTENSION, UNSPECIFIED: ICD-10-CM

## 2018-11-29 DIAGNOSIS — E11.51 TYPE 2 DIABETES MELLITUS WITH DIABETIC PERIPHERAL ANGIOPATHY WITHOUT GANGRENE: ICD-10-CM

## 2018-11-29 DIAGNOSIS — L97.529 NON-PRESSURE CHRONIC ULCER OF OTHER PART OF LEFT FOOT WITH UNSPECIFIED SEVERITY: ICD-10-CM

## 2018-11-29 DIAGNOSIS — Z89.421 ACQUIRED ABSENCE OF OTHER RIGHT TOE(S): ICD-10-CM

## 2018-11-29 DIAGNOSIS — I25.10 ATHEROSCLEROTIC HEART DISEASE OF NATIVE CORONARY ARTERY WITHOUT ANGINA PECTORIS: ICD-10-CM

## 2018-11-29 DIAGNOSIS — R53.1 WEAKNESS: ICD-10-CM

## 2018-11-29 DIAGNOSIS — Z95.1 PRESENCE OF AORTOCORONARY BYPASS GRAFT: ICD-10-CM

## 2018-11-29 LAB
ANION GAP SERPL CALC-SCNC: 15 MMOL/L — HIGH (ref 7–14)
APTT BLD: 26.5 SEC — LOW (ref 27–39.2)
BASOPHILS # BLD AUTO: 0.04 K/UL — SIGNIFICANT CHANGE UP (ref 0–0.2)
BASOPHILS NFR BLD AUTO: 0.5 % — SIGNIFICANT CHANGE UP (ref 0–1)
BLD GP AB SCN SERPL QL: SIGNIFICANT CHANGE UP
BUN SERPL-MCNC: 11 MG/DL — SIGNIFICANT CHANGE UP (ref 10–20)
CALCIUM SERPL-MCNC: 8.9 MG/DL — SIGNIFICANT CHANGE UP (ref 8.5–10.1)
CHLORIDE SERPL-SCNC: 102 MMOL/L — SIGNIFICANT CHANGE UP (ref 98–110)
CO2 SERPL-SCNC: 26 MMOL/L — SIGNIFICANT CHANGE UP (ref 17–32)
CREAT SERPL-MCNC: 0.9 MG/DL — SIGNIFICANT CHANGE UP (ref 0.7–1.5)
EOSINOPHIL # BLD AUTO: 0.63 K/UL — SIGNIFICANT CHANGE UP (ref 0–0.7)
EOSINOPHIL NFR BLD AUTO: 7.8 % — SIGNIFICANT CHANGE UP (ref 0–8)
GLUCOSE BLDC GLUCOMTR-MCNC: 134 MG/DL — HIGH (ref 70–99)
GLUCOSE BLDC GLUCOMTR-MCNC: 135 MG/DL — HIGH (ref 70–99)
GLUCOSE BLDC GLUCOMTR-MCNC: 141 MG/DL — HIGH (ref 70–99)
GLUCOSE BLDC GLUCOMTR-MCNC: 144 MG/DL — HIGH (ref 70–99)
GLUCOSE SERPL-MCNC: 134 MG/DL — HIGH (ref 70–99)
HCT VFR BLD CALC: 27 % — LOW (ref 42–52)
HCT VFR BLD CALC: 27.6 % — LOW (ref 42–52)
HGB BLD-MCNC: 8.2 G/DL — LOW (ref 14–18)
HGB BLD-MCNC: 8.6 G/DL — LOW (ref 14–18)
IMM GRANULOCYTES NFR BLD AUTO: 0.7 % — HIGH (ref 0.1–0.3)
INR BLD: 1.1 RATIO — SIGNIFICANT CHANGE UP (ref 0.65–1.3)
LYMPHOCYTES # BLD AUTO: 2.05 K/UL — SIGNIFICANT CHANGE UP (ref 1.2–3.4)
LYMPHOCYTES # BLD AUTO: 25.4 % — SIGNIFICANT CHANGE UP (ref 20.5–51.1)
MAGNESIUM SERPL-MCNC: 1.8 MG/DL — SIGNIFICANT CHANGE UP (ref 1.8–2.4)
MCHC RBC-ENTMCNC: 22.5 PG — LOW (ref 27–31)
MCHC RBC-ENTMCNC: 23.1 PG — LOW (ref 27–31)
MCHC RBC-ENTMCNC: 30.4 G/DL — LOW (ref 32–37)
MCHC RBC-ENTMCNC: 31.2 G/DL — LOW (ref 32–37)
MCV RBC AUTO: 74 FL — LOW (ref 80–94)
MCV RBC AUTO: 74.2 FL — LOW (ref 80–94)
MONOCYTES # BLD AUTO: 0.7 K/UL — HIGH (ref 0.1–0.6)
MONOCYTES NFR BLD AUTO: 8.7 % — SIGNIFICANT CHANGE UP (ref 1.7–9.3)
NEUTROPHILS # BLD AUTO: 4.59 K/UL — SIGNIFICANT CHANGE UP (ref 1.4–6.5)
NEUTROPHILS NFR BLD AUTO: 56.9 % — SIGNIFICANT CHANGE UP (ref 42.2–75.2)
NRBC # BLD: 0 /100 WBCS — SIGNIFICANT CHANGE UP (ref 0–0)
NRBC # BLD: 0 /100 WBCS — SIGNIFICANT CHANGE UP (ref 0–0)
PLATELET # BLD AUTO: 242 K/UL — SIGNIFICANT CHANGE UP (ref 130–400)
PLATELET # BLD AUTO: 272 K/UL — SIGNIFICANT CHANGE UP (ref 130–400)
POTASSIUM SERPL-MCNC: 4.4 MMOL/L — SIGNIFICANT CHANGE UP (ref 3.5–5)
POTASSIUM SERPL-SCNC: 4.4 MMOL/L — SIGNIFICANT CHANGE UP (ref 3.5–5)
PROTHROM AB SERPL-ACNC: 12.6 SEC — SIGNIFICANT CHANGE UP (ref 9.95–12.87)
RBC # BLD: 3.64 M/UL — LOW (ref 4.7–6.1)
RBC # BLD: 3.73 M/UL — LOW (ref 4.7–6.1)
RBC # FLD: 17.7 % — HIGH (ref 11.5–14.5)
RBC # FLD: 17.8 % — HIGH (ref 11.5–14.5)
SODIUM SERPL-SCNC: 143 MMOL/L — SIGNIFICANT CHANGE UP (ref 135–146)
TYPE + AB SCN PNL BLD: SIGNIFICANT CHANGE UP
WBC # BLD: 8.07 K/UL — SIGNIFICANT CHANGE UP (ref 4.8–10.8)
WBC # BLD: 9.92 K/UL — SIGNIFICANT CHANGE UP (ref 4.8–10.8)
WBC # FLD AUTO: 8.07 K/UL — SIGNIFICANT CHANGE UP (ref 4.8–10.8)
WBC # FLD AUTO: 9.92 K/UL — SIGNIFICANT CHANGE UP (ref 4.8–10.8)

## 2018-11-29 RX ORDER — SOD SULF/SODIUM/NAHCO3/KCL/PEG
4000 SOLUTION, RECONSTITUTED, ORAL ORAL ONCE
Qty: 0 | Refills: 0 | Status: COMPLETED | OUTPATIENT
Start: 2018-11-29 | End: 2018-11-29

## 2018-11-29 RX ADMIN — Medication 4000 MILLILITER(S): at 16:13

## 2018-11-29 RX ADMIN — PANTOPRAZOLE SODIUM 40 MILLIGRAM(S): 20 TABLET, DELAYED RELEASE ORAL at 17:12

## 2018-11-29 RX ADMIN — TRAMADOL HYDROCHLORIDE 50 MILLIGRAM(S): 50 TABLET ORAL at 05:22

## 2018-11-29 RX ADMIN — ATORVASTATIN CALCIUM 40 MILLIGRAM(S): 80 TABLET, FILM COATED ORAL at 21:05

## 2018-11-29 RX ADMIN — Medication 81 MILLIGRAM(S): at 13:54

## 2018-11-29 RX ADMIN — Medication 100 MILLIGRAM(S): at 16:13

## 2018-11-29 RX ADMIN — Medication 15 MILLIGRAM(S): at 17:12

## 2018-11-29 RX ADMIN — Medication 1 TABLET(S): at 13:54

## 2018-11-29 RX ADMIN — Medication 100 MILLIGRAM(S): at 17:13

## 2018-11-29 RX ADMIN — Medication 1 MILLIGRAM(S): at 13:56

## 2018-11-29 RX ADMIN — Medication 100 MILLIGRAM(S): at 13:55

## 2018-11-29 RX ADMIN — TAMSULOSIN HYDROCHLORIDE 0.4 MILLIGRAM(S): 0.4 CAPSULE ORAL at 21:05

## 2018-11-29 RX ADMIN — CEFTRIAXONE 100 GRAM(S): 500 INJECTION, POWDER, FOR SOLUTION INTRAMUSCULAR; INTRAVENOUS at 13:57

## 2018-11-29 RX ADMIN — Medication 15 MILLIGRAM(S): at 16:13

## 2018-11-29 RX ADMIN — PANTOPRAZOLE SODIUM 40 MILLIGRAM(S): 20 TABLET, DELAYED RELEASE ORAL at 05:22

## 2018-11-29 RX ADMIN — SENNA PLUS 2 TABLET(S): 8.6 TABLET ORAL at 21:05

## 2018-11-29 RX ADMIN — PREGABALIN 1000 MICROGRAM(S): 225 CAPSULE ORAL at 13:54

## 2018-11-29 RX ADMIN — Medication 100 MILLIGRAM(S): at 05:21

## 2018-11-29 RX ADMIN — Medication 100 MILLIGRAM(S): at 21:05

## 2018-11-29 RX ADMIN — TRAMADOL HYDROCHLORIDE 50 MILLIGRAM(S): 50 TABLET ORAL at 21:06

## 2018-11-29 RX ADMIN — ENOXAPARIN SODIUM 40 MILLIGRAM(S): 100 INJECTION SUBCUTANEOUS at 13:55

## 2018-11-29 RX ADMIN — TRAMADOL HYDROCHLORIDE 50 MILLIGRAM(S): 50 TABLET ORAL at 22:00

## 2018-11-29 RX ADMIN — TRAMADOL HYDROCHLORIDE 50 MILLIGRAM(S): 50 TABLET ORAL at 05:52

## 2018-11-29 RX ADMIN — Medication 100 MILLIGRAM(S): at 05:22

## 2018-11-29 RX ADMIN — SIMETHICONE 80 MILLIGRAM(S): 80 TABLET, CHEWABLE ORAL at 17:12

## 2018-11-29 RX ADMIN — SIMETHICONE 80 MILLIGRAM(S): 80 TABLET, CHEWABLE ORAL at 05:22

## 2018-11-29 NOTE — PRE-ANESTHESIA EVALUATION ADULT - NSANTHAIRWAYFT_ENT_ALL_CORE
Interventional Radiology Brief Procedure Note    Patient: Oren Hugo MRN: [id number]  SSN: [id number]    YOB: 1955  Age: 59 y.o. Sex: female      Date of Procedure: 5/26/2017    Pre-Procedure Diagnosis: left Le radiculopathy    Post-Procedure Diagnosis: SAME    Procedure(s): L L5-S1 WYATT    Brief Description of Procedure: 80 Depomedrol on left    Performed By: Deneen Williamson MD     Assistants: None    Anesthesia: Lidocaine    Estimated Blood Loss: None    Specimens: None    Implants: Depomedrol and bupivicaine . 5%    Findings: OK    Complications: None    Recommendations: follow up clinically      Follow Up:  Dr Yecenia Rodas.      Signed By: Deneen Williamson MD     May 26, 2017
MP2  TMD > 3cm   Full range of cervical motion

## 2018-11-29 NOTE — PROGRESS NOTE ADULT - SUBJECTIVE AND OBJECTIVE BOX
PROGRESS NOTE   Patient is a 63y old  Male who presents with a chief complaint of Bright red bleeding per rectum x 1 episode (29 Nov 2018 13:12)    Patient seen at bedside. Patient has no new complaints at this time.     HPI:  63y male with PMH of CAD, NSTEMI s/p CABG (5 vessel according to patient; March 2016); DM with peripheral neuropathy, Left DFU, HTN, HLD and recently admitted for lower extremity weakness and a complete Stroke workup was done with CT head negative for acute infarct; and  MRI brain didn't show any new ischemic changes. MRI lumbar and sacral spine showed diffuse disc bulges in lumbar and sacral spine. Patient was followed by Neurosurgery, neurology, ID, podiatry.During his stay ID and podiatry followed the patient for osteomyelitis of left foot and he was discharged to  for intensive rehab and on IV antibiotics on 11/20/2018 . Today on day of presentation , patient reports that he was having crampy abdominal pain, sudden in onset, 6/10 intensity located in the lower abdomen , non radiating and had a sudden urge to defecate for which he called the PCA to help him out to go to a bathroom. He went to the bathroom and reports bright red colored watery stool .patient was taken back to the bed .Denies sob, fever, nausea, vomiting, hemoptysis, lightheadedness , dysuria .The nurse called the attending Dr. Hall who assessed the patient and did a DASHAWN which showed active bleeding from rectum with prince bright red blood, no melena reported .She spoke with the GI fellow on call and no acute intervention was suggested and patient was admitted to medicine service for further evaluation.Since he came to the medicine floor he has had 2 similar episodes   Patient never had a colonoscopy ,is on plavix and asa , no h/o colon c/a in family . Does not have a GI physician (24 Nov 2018 23:20)      Vital Signs Last 24 Hrs  T(C): 36.8 (29 Nov 2018 11:56), Max: 36.8 (28 Nov 2018 16:00)  T(F): 98.3 (29 Nov 2018 11:40), Max: 98.3 (28 Nov 2018 16:00)  HR: 88 (29 Nov 2018 12:51) (80 - 127)  BP: 147/89 (29 Nov 2018 12:51) (97/51 - 179/82)  BP(mean): --  RR: 18 (29 Nov 2018 12:51) (18 - 18)  SpO2: 100% (29 Nov 2018 12:20) (100% - 100%)                          8.2    8.07  )-----------( 242      ( 29 Nov 2018 05:49 )             27.0               11-29    143  |  102  |  11  ----------------------------<  134<H>  4.4   |  26  |  0.9    Ca    8.9      29 Nov 2018 05:49  Mg     1.8     11-29        PHYSICAL EXAM  GEN: ROSALIE ESCOBEDO is a pleasant well-nourished, well developed 63y Male in no acute distress, alert awake, and oriented to person, place and time.   LE Focused:  patient with plantar and medial ulcerations with sutures intact and granular wound bases. No clinical signs of infection at this time.     A:  left foot s/p excision of bone    P:  Patient examined and evaluated.   Patient irrigated with NSS/Betadine mixture and packed with iodoform packing  Patient dressed with betadine/DSD/Kerlix  Will f/u with attending for any further recommendations.

## 2018-11-29 NOTE — PROVIDER CONTACT NOTE (OTHER) - SITUATION
Pt fell on unit ambulating back to bathroom w/ walker and 1 person assit. Fell on back and did not hit head. MD Gonzalez notified and came up to assess pt. Xrays of lumbar spine, R foot, and knee orded.

## 2018-11-29 NOTE — PROGRESS NOTE ADULT - SUBJECTIVE AND OBJECTIVE BOX
SUBJECTIVE:    Patient is a 63y old Male who presents with a chief complaint of Bright red bleeding per rectum (28 Nov 2018 14:37)    Currently admitted to medicine with the primary diagnosis of BRBPR     Today is hospital day 5d. Overnight, the patient fell backward after standing up from the toilet to walk back to the bed with assistance and his rolling walker. There was no head trauma or loss of consciousness. X-ray including right tibia-fibula, right knee, right ankle, and lumbosacral spine all negative for fracture or dislocation. Patient is pain free.     Patient was scheduled for colonoscopy today with GI. However, bowel prep was not sufficient. Patient will repeat bowel prep tonight (Dulcolax x2, GoLytely) in preparation for colonoscopy rescheduled for tomorrow 11/30/18. Per anesthesia, he will be optimized with 1 unit of PRBCs. Patient denies any headache, chest pain, palpitations, dyspnea, abdominal pain, N/V at this time.     PAST MEDICAL & SURGICAL HISTORY  CAD (coronary artery disease)  Dyslipidemia  Diabetic foot ulcer  PVD (peripheral vascular disease)  Diabetes  Other hyperlipidemia  Hypertension, unspecified type  Amputated toe, unspecified laterality  S/P CABG (coronary artery bypass graft)    SOCIAL HISTORY:  Patient denies alcohol, tobacco, and recreational drug use.     From ( ) home ( ) nursing home ( X) other: 4A   Ambulation status: ( ) ambulates independently ( ) ambulates with assistance (X ) ambulates with device ( ) dependent   Device: ( X) rolling walker ( ) cane     ALLERGIES:  Cipro (Unknown)  IV contrast (Short breath)  Keflex (Unknown)  shellfish (Anaphylaxis)    MEDICATIONS:  STANDING MEDICATIONS  aspirin  chewable 81 milliGRAM(s) Oral daily  atorvastatin 40 milliGRAM(s) Oral at bedtime  bisacodyl 15 milliGRAM(s) Oral once  bisacodyl 15 milliGRAM(s) Oral <User Schedule>  cefTRIAXone   IVPB 2 Gram(s) IV Intermittent every 24 hours  chlorhexidine 2% Cloths 1 Application(s) Topical <User Schedule>  chlorhexidine 4% Liquid 1 Application(s) Topical <User Schedule>  cyanocobalamin 1000 MICROGram(s) Oral daily  docusate sodium 100 milliGRAM(s) Oral three times a day  enoxaparin Injectable 40 milliGRAM(s) SubCutaneous daily  folic acid 1 milliGRAM(s) Oral daily  insulin glargine Injectable (LANTUS) 27 Unit(s) SubCutaneous at bedtime  insulin lispro Injectable (HumaLOG) 9 Unit(s) SubCutaneous three times a day before meals  multivitamin 1 Tablet(s) Oral daily  pantoprazole    Tablet 40 milliGRAM(s) Oral every 12 hours  polyethylene glycol/electrolyte Solution. 4000 milliLiter(s) Oral once  pregabalin 100 milliGRAM(s) Oral two times a day  senna 2 Tablet(s) Oral at bedtime  simethicone 80 milliGRAM(s) Chew two times a day  tamsulosin 0.4 milliGRAM(s) Oral at bedtime  thiamine 100 milliGRAM(s) Oral daily    PRN MEDICATIONS  traMADol 50 milliGRAM(s) Oral every 8 hours PRN    VITALS:   T(F): 98.3  HR: 88  BP: 147/89  RR: 18  SpO2: 100%    LABS:                        8.2    8.07  )-----------( 242      ( 29 Nov 2018 05:49 )             27.0     11-29    143  |  102  |  11  ----------------------------<  134<H>  4.4   |  26  |  0.9    Ca    8.9      29 Nov 2018 05:49  Mg     1.8     11-29      PT/INR - ( 29 Nov 2018 05:49 )   PT: 12.60 sec;   INR: 1.10 ratio      PTT - ( 29 Nov 2018 05:49 )  PTT:26.5 sec    RADIOLOGY:  No new imaging studies today     PHYSICAL EXAM:  GEN: No acute distress, lying comfortably in bed   LUNGS: Clear to auscultation bilaterally, no labored breathing   HEART: S1/S2 present. RRR.   ABD: Soft, non-tender, non-distended. Bowel sounds present.   EXT: Wound dressing on left lower extremity dry clean and intact, no peripheral edema   NEURO: AAOX3, CN II-XII grossly intact     Lines/Tubes   Peripheral IV access

## 2018-11-29 NOTE — PROGRESS NOTE ADULT - ATTENDING COMMENTS
blood tinged BM noted by RN, ?due to external hemorrhoid  cbc stable, trend q12  hemodynamically stable  plan for colon on thursday
colonoscopy tm  cbc stable  f/u GI recs, possible d/c tm
colonoscopy today  f/u GI recs
plan for colonoscopy thursday  cbc stable  f/u GI if able to d/c to rehab and f/u outpt for colonoscopy  TOV per uro

## 2018-11-29 NOTE — PROGRESS NOTE ADULT - ASSESSMENT
63y male with PMH of CAD, NSTEMI s/p CABG (5 vessel according to patient; March 2016); DM with peripheral neuropathy, Left DFU complicated by osteomyelitis on IV abx , HTN, HLD and recently admitted for lower extremity weakness and then discharged to  for rehab, readmitted for Episode of BRBPR     #BRBPR likely 2/2 LGIB   -Maintain 2 large bore IV access  -Clear liquid diet, NPO after midnight with bowel prep (Golytely and Dulcolax 15 x 2) for colonoscopy with GI on Friday 11/30/18   -PO Protonix 40 BID, hold Plavix, monitor CBC and transfuse PRN  -Notify GI for any hemodynamic instability, significant anemia     #Microcytic Anemia, BRBPR; DDx: hemorrhoids vs. adenocarcinoma vs. polyp   -B12 and folate replacement   -Iron studies ordered   -Monitor CBC, keep active T&S  -Patient to be transfused 1 unit PRBCs for optimization for colonoscopy tomorrow per anesthesia recommendations     #Chronic Bilateral LE Weakness   -Patient transferred from , to return to  once medically stable  -PT/rehab on board   -TLSO brace, pregabalin 100mg BID for neuropathy. Completed prednisone course.     #Recent osteomyelitis of the left foot s/p excisional debridement   -Ceftriaxone 2g IV q24h  -Followed by podiatry   -Per ID, patient will require extended IV abx, will follow     #CAD s/p CABG  -Aspirin 81mg daily, holding Plavix 2/2 LGIB requiring colonoscopy   -Atorvastatin 40mg qhs     #HTN   -Monitor BP   -Metoprolol on hold for borderline BP      #DM  -A1C 7   -Monitor fingersticks, carbohydrate consistent diet   -Lantus 27, Lispro 9, sliding scale PRN     #Thiamine Deficiency   -Thiamine 100    #Vitamin B12 Deficiency  -Cyanocobalamin 1000 daily    #Folate Deficiency  -Folic acid 1mg PO daily     #Urinary Retention  -Urology: Flomax, ambulation, alleviate constipation, straight cath 5x/day and document output. Teach patient to self-catheterize. If he is unable, he can have a Woods catheter inserted on the day of discharge to home.     DVT ppx: Lovenox 40   GI ppx: Protonix 40mg PO BID   Diet: Clear liquid diet -> NPO after midnight   Activity: ambulate as tolerated with assistance   Dispo: 4A after colonoscopy if stable   Code status: DNR/DNR

## 2018-11-29 NOTE — CHART NOTE - NSCHARTNOTEFT_GEN_A_CORE
Nurse notified that the patient felt dizzy while walking from the bathroom with assistance during bowel prep for colonscopy and fell backwards and hit his lower back, sacrum, and R leg on the ground. Per nurse and patient he did not hit his head and there was no loss of consciousness. This was a witnessed fall, nurse was at bedside. Patient was seen states he feels fine at this time. An attempt to notify daughter Vicky Morris was made at 5:30am.    ICU Vital Signs Last 24 Hrs  T(C): 36.7 (28 Nov 2018 23:30), Max: 36.8 (28 Nov 2018 16:00)  T(F): 98 (28 Nov 2018 23:30), Max: 98.3 (28 Nov 2018 16:00)  HR: 88 (28 Nov 2018 23:30) (80 - 88)  BP: 179/82 (28 Nov 2018 23:30) (105/55 - 179/82)  BP(mean): --  ABP: --  ABP(mean): --  RR: 18 (28 Nov 2018 23:30) (18 - 18)  SpO2: --    On physical exam patient had mild tenderness L5/S1 and a L knee abrasion, which patient states he scratched himself.    Assessement:   Fall 2/2 to Vasovagal episode vs Orthostatic Hypotension vs Medication induced    Plan:  Xray lumbar Sacral Spine, R knee, R tibial-fibular, Ankle    Check Orthostatics as blood pressure trends are very labile   Consider limiting sedatives

## 2018-11-29 NOTE — PROVIDER CONTACT NOTE (OTHER) - SITUATION
MD Gonzalez contacted about ordered 1u of blood transfusion during day shift that was not completed. MD Gonzalez stated that it will be ordered if AM Hgb is less than 8.

## 2018-11-30 ENCOUNTER — TRANSCRIPTION ENCOUNTER (OUTPATIENT)
Age: 63
End: 2018-11-30

## 2018-11-30 ENCOUNTER — RESULT REVIEW (OUTPATIENT)
Age: 63
End: 2018-11-30

## 2018-11-30 LAB
ANION GAP SERPL CALC-SCNC: 13 MMOL/L — SIGNIFICANT CHANGE UP (ref 7–14)
BASOPHILS # BLD AUTO: 0.04 K/UL — SIGNIFICANT CHANGE UP (ref 0–0.2)
BASOPHILS NFR BLD AUTO: 0.6 % — SIGNIFICANT CHANGE UP (ref 0–1)
BUN SERPL-MCNC: 10 MG/DL — SIGNIFICANT CHANGE UP (ref 10–20)
CALCIUM SERPL-MCNC: 8.7 MG/DL — SIGNIFICANT CHANGE UP (ref 8.5–10.1)
CHLORIDE SERPL-SCNC: 102 MMOL/L — SIGNIFICANT CHANGE UP (ref 98–110)
CO2 SERPL-SCNC: 27 MMOL/L — SIGNIFICANT CHANGE UP (ref 17–32)
CREAT SERPL-MCNC: 0.9 MG/DL — SIGNIFICANT CHANGE UP (ref 0.7–1.5)
EOSINOPHIL # BLD AUTO: 0.47 K/UL — SIGNIFICANT CHANGE UP (ref 0–0.7)
EOSINOPHIL NFR BLD AUTO: 6.8 % — SIGNIFICANT CHANGE UP (ref 0–8)
GLUCOSE BLDC GLUCOMTR-MCNC: 135 MG/DL — HIGH (ref 70–99)
GLUCOSE BLDC GLUCOMTR-MCNC: 138 MG/DL — HIGH (ref 70–99)
GLUCOSE BLDC GLUCOMTR-MCNC: 171 MG/DL — HIGH (ref 70–99)
GLUCOSE BLDC GLUCOMTR-MCNC: 179 MG/DL — HIGH (ref 70–99)
GLUCOSE SERPL-MCNC: 117 MG/DL — HIGH (ref 70–99)
HCT VFR BLD CALC: 26.3 % — LOW (ref 42–52)
HGB BLD-MCNC: 8.4 G/DL — LOW (ref 14–18)
IMM GRANULOCYTES NFR BLD AUTO: 0.3 % — SIGNIFICANT CHANGE UP (ref 0.1–0.3)
LYMPHOCYTES # BLD AUTO: 1.84 K/UL — SIGNIFICANT CHANGE UP (ref 1.2–3.4)
LYMPHOCYTES # BLD AUTO: 26.5 % — SIGNIFICANT CHANGE UP (ref 20.5–51.1)
MAGNESIUM SERPL-MCNC: 1.8 MG/DL — SIGNIFICANT CHANGE UP (ref 1.8–2.4)
MCHC RBC-ENTMCNC: 23.5 PG — LOW (ref 27–31)
MCHC RBC-ENTMCNC: 31.9 G/DL — LOW (ref 32–37)
MCV RBC AUTO: 73.5 FL — LOW (ref 80–94)
MONOCYTES # BLD AUTO: 0.75 K/UL — HIGH (ref 0.1–0.6)
MONOCYTES NFR BLD AUTO: 10.8 % — HIGH (ref 1.7–9.3)
NEUTROPHILS # BLD AUTO: 3.83 K/UL — SIGNIFICANT CHANGE UP (ref 1.4–6.5)
NEUTROPHILS NFR BLD AUTO: 55 % — SIGNIFICANT CHANGE UP (ref 42.2–75.2)
NRBC # BLD: 0 /100 WBCS — SIGNIFICANT CHANGE UP (ref 0–0)
PLATELET # BLD AUTO: 234 K/UL — SIGNIFICANT CHANGE UP (ref 130–400)
POTASSIUM SERPL-MCNC: 4.1 MMOL/L — SIGNIFICANT CHANGE UP (ref 3.5–5)
POTASSIUM SERPL-SCNC: 4.1 MMOL/L — SIGNIFICANT CHANGE UP (ref 3.5–5)
RBC # BLD: 3.58 M/UL — LOW (ref 4.7–6.1)
RBC # FLD: 17.6 % — HIGH (ref 11.5–14.5)
SODIUM SERPL-SCNC: 142 MMOL/L — SIGNIFICANT CHANGE UP (ref 135–146)
WBC # BLD: 6.95 K/UL — SIGNIFICANT CHANGE UP (ref 4.8–10.8)
WBC # FLD AUTO: 6.95 K/UL — SIGNIFICANT CHANGE UP (ref 4.8–10.8)

## 2018-11-30 RX ORDER — FOLIC ACID 0.8 MG
1 TABLET ORAL
Qty: 0 | Refills: 0 | COMMUNITY
Start: 2018-11-30

## 2018-11-30 RX ORDER — DOCUSATE SODIUM 100 MG
1 CAPSULE ORAL
Qty: 0 | Refills: 0 | COMMUNITY
Start: 2018-11-30

## 2018-11-30 RX ORDER — PANTOPRAZOLE SODIUM 20 MG/1
1 TABLET, DELAYED RELEASE ORAL
Qty: 0 | Refills: 0 | COMMUNITY
Start: 2018-11-30

## 2018-11-30 RX ORDER — CLOPIDOGREL BISULFATE 75 MG/1
1 TABLET, FILM COATED ORAL
Qty: 0 | Refills: 0 | COMMUNITY

## 2018-11-30 RX ORDER — MESALAMINE 400 MG
60 TABLET, DELAYED RELEASE (ENTERIC COATED) ORAL
Qty: 0 | Refills: 0 | COMMUNITY
Start: 2018-11-30

## 2018-11-30 RX ORDER — CEFTRIAXONE 500 MG/1
2 INJECTION, POWDER, FOR SOLUTION INTRAMUSCULAR; INTRAVENOUS
Qty: 0 | Refills: 0 | COMMUNITY
Start: 2018-11-30

## 2018-11-30 RX ORDER — ATORVASTATIN CALCIUM 80 MG/1
1 TABLET, FILM COATED ORAL
Qty: 0 | Refills: 0 | COMMUNITY

## 2018-11-30 RX ORDER — ASPIRIN/CALCIUM CARB/MAGNESIUM 324 MG
1 TABLET ORAL
Qty: 0 | Refills: 0 | COMMUNITY
Start: 2018-11-30

## 2018-11-30 RX ORDER — THIAMINE MONONITRATE (VIT B1) 100 MG
1 TABLET ORAL
Qty: 0 | Refills: 0 | COMMUNITY
Start: 2018-11-30

## 2018-11-30 RX ORDER — PREGABALIN 225 MG/1
1 CAPSULE ORAL
Qty: 0 | Refills: 0 | COMMUNITY
Start: 2018-11-30

## 2018-11-30 RX ORDER — MESALAMINE 400 MG
4 TABLET, DELAYED RELEASE (ENTERIC COATED) ORAL AT BEDTIME
Qty: 0 | Refills: 0 | Status: DISCONTINUED | OUTPATIENT
Start: 2018-11-30 | End: 2018-12-01

## 2018-11-30 RX ORDER — METOPROLOL TARTRATE 50 MG
1 TABLET ORAL
Qty: 0 | Refills: 0 | COMMUNITY

## 2018-11-30 RX ORDER — METOPROLOL TARTRATE 50 MG
1 TABLET ORAL
Qty: 0 | Refills: 0 | COMMUNITY
Start: 2018-11-30

## 2018-11-30 RX ORDER — INSULIN LISPRO 100/ML
9 VIAL (ML) SUBCUTANEOUS
Qty: 0 | Refills: 0 | COMMUNITY
Start: 2018-11-30

## 2018-11-30 RX ORDER — SENNA PLUS 8.6 MG/1
2 TABLET ORAL
Qty: 0 | Refills: 0 | COMMUNITY
Start: 2018-11-30

## 2018-11-30 RX ORDER — ATORVASTATIN CALCIUM 80 MG/1
1 TABLET, FILM COATED ORAL
Qty: 0 | Refills: 0 | COMMUNITY
Start: 2018-11-30

## 2018-11-30 RX ORDER — SIMETHICONE 80 MG/1
1 TABLET, CHEWABLE ORAL
Qty: 0 | Refills: 0 | COMMUNITY
Start: 2018-11-30

## 2018-11-30 RX ORDER — TRAMADOL HYDROCHLORIDE 50 MG/1
1 TABLET ORAL
Qty: 0 | Refills: 0 | COMMUNITY
Start: 2018-11-30

## 2018-11-30 RX ORDER — TAMSULOSIN HYDROCHLORIDE 0.4 MG/1
1 CAPSULE ORAL
Qty: 0 | Refills: 0 | COMMUNITY
Start: 2018-11-30

## 2018-11-30 RX ORDER — SODIUM CHLORIDE 9 MG/ML
1000 INJECTION, SOLUTION INTRAVENOUS
Qty: 0 | Refills: 0 | Status: DISCONTINUED | OUTPATIENT
Start: 2018-11-30 | End: 2018-11-30

## 2018-11-30 RX ADMIN — ENOXAPARIN SODIUM 40 MILLIGRAM(S): 100 INJECTION SUBCUTANEOUS at 13:10

## 2018-11-30 RX ADMIN — TRAMADOL HYDROCHLORIDE 50 MILLIGRAM(S): 50 TABLET ORAL at 06:55

## 2018-11-30 RX ADMIN — Medication 100 MILLIGRAM(S): at 17:30

## 2018-11-30 RX ADMIN — PANTOPRAZOLE SODIUM 40 MILLIGRAM(S): 20 TABLET, DELAYED RELEASE ORAL at 05:21

## 2018-11-30 RX ADMIN — CEFTRIAXONE 100 GRAM(S): 500 INJECTION, POWDER, FOR SOLUTION INTRAMUSCULAR; INTRAVENOUS at 16:38

## 2018-11-30 RX ADMIN — SIMETHICONE 80 MILLIGRAM(S): 80 TABLET, CHEWABLE ORAL at 17:30

## 2018-11-30 RX ADMIN — CHLORHEXIDINE GLUCONATE 1 APPLICATION(S): 213 SOLUTION TOPICAL at 05:25

## 2018-11-30 RX ADMIN — INSULIN GLARGINE 27 UNIT(S): 100 INJECTION, SOLUTION SUBCUTANEOUS at 21:08

## 2018-11-30 RX ADMIN — SIMETHICONE 80 MILLIGRAM(S): 80 TABLET, CHEWABLE ORAL at 05:21

## 2018-11-30 RX ADMIN — Medication 100 MILLIGRAM(S): at 05:22

## 2018-11-30 RX ADMIN — Medication 100 MILLIGRAM(S): at 13:10

## 2018-11-30 RX ADMIN — TRAMADOL HYDROCHLORIDE 50 MILLIGRAM(S): 50 TABLET ORAL at 23:13

## 2018-11-30 RX ADMIN — Medication 100 MILLIGRAM(S): at 21:08

## 2018-11-30 RX ADMIN — Medication 9 UNIT(S): at 17:30

## 2018-11-30 RX ADMIN — SENNA PLUS 2 TABLET(S): 8.6 TABLET ORAL at 21:08

## 2018-11-30 RX ADMIN — PANTOPRAZOLE SODIUM 40 MILLIGRAM(S): 20 TABLET, DELAYED RELEASE ORAL at 17:31

## 2018-11-30 RX ADMIN — Medication 100 MILLIGRAM(S): at 05:21

## 2018-11-30 RX ADMIN — Medication 1 MILLIGRAM(S): at 13:11

## 2018-11-30 RX ADMIN — TRAMADOL HYDROCHLORIDE 50 MILLIGRAM(S): 50 TABLET ORAL at 07:25

## 2018-11-30 RX ADMIN — PREGABALIN 1000 MICROGRAM(S): 225 CAPSULE ORAL at 13:10

## 2018-11-30 RX ADMIN — TAMSULOSIN HYDROCHLORIDE 0.4 MILLIGRAM(S): 0.4 CAPSULE ORAL at 21:08

## 2018-11-30 RX ADMIN — ATORVASTATIN CALCIUM 40 MILLIGRAM(S): 80 TABLET, FILM COATED ORAL at 21:08

## 2018-11-30 RX ADMIN — Medication 1 TABLET(S): at 13:11

## 2018-11-30 RX ADMIN — TRAMADOL HYDROCHLORIDE 50 MILLIGRAM(S): 50 TABLET ORAL at 23:43

## 2018-11-30 RX ADMIN — Medication 81 MILLIGRAM(S): at 13:10

## 2018-11-30 NOTE — PHYSICAL THERAPY INITIAL EVALUATION ADULT - PERTINENT HX OF CURRENT PROBLEM, REHAB EVAL
63/M admitted for left DFU, OM; recently in Inpatient Rehabilitaion Unit (4A); re admitted to med unit due to LGIB
see below

## 2018-11-30 NOTE — DISCHARGE NOTE ADULT - PLAN OF CARE
Manage and prevent complications -Acute bleeding has resolved  -Patient is hemodynamically stable. Monitor hemoglobin.  -Colonoscopy performed on 11/30/18 revealed evidence of proctitis. Gastroenterologist was unable to visualize the entirety of the right colon due to insufficient bowel preparation.  -Continue Protonix daily  -Nightly Rowasa enemas   -Follow up with gastroenterologist as an outpatient. Recommend colonoscopy in 1-3 months for re-evaluation. Follow up biopsy results. Recommend proctology consult. -Patient seen and evaluated by urology. Straight catheterization 5x/day for urinary retention. Try to teach patient to self-catheterize. If he is unable to self catheterize, he will need a Woods catheter inserted on the day of discharge from  if patient is going home.   -Follow up with urology as an outpatient for urodynamic studies. -Monitor blood pressure -Continue Ceftriaxone 2g IV q24h per infectious disease recommendations for osteomyelitis of the left foot -Patient seen and evaluated by neurology on prior admission. Prednisone course was completed.  -Continue pregabalin as directed.   -Continue physical therapy -Monitor hemoglobin  -Monitor bowel movements for any additional hematochezia/melena

## 2018-11-30 NOTE — DISCHARGE NOTE ADULT - PROVIDER TOKENS
TOKEN:'[id number]:'[id number]',FREE:[LAST:[Brady],FIRST:[Dallin],PHONE:[phone]],FAX:[(   [phone]    -],ADDRESS:[[address] [place] [address]]],TOKEN:'[id number]'

## 2018-11-30 NOTE — PHYSICAL THERAPY INITIAL EVALUATION ADULT - GENERAL OBSERVATIONS, REHAB EVAL
230-258 pm Chart reviewed. Pt. seen semirecline in bed, in No apparent distress, + IV lock, denies pain; agreed to therapy
PT IE 2-2:30pm. Pt supine in NAD. +call bell, +bed alarm. No c/o pain.

## 2018-11-30 NOTE — DISCHARGE NOTE ADULT - HOSPITAL COURSE
Rishabh Morris is a 63M with PMH of CAD, NSTEMI s/p 5 vessel CABG (March 2016), DM, diabetic neuropathy, HTN, HLD, and left diabetic foot ulcer complicated by IV antibiotics who was recently admitted for lower extremity weakness and discharged to  for rehabilitation. Patient was readmitted to the medical floor for an episode of bright red blood per rectum. During this admission, patient was hemodynamically stable and needed 1 unit of PRBCs for optimization prior to colonoscopy. Patient was seen and evaluated by the GI team and taken for colonoscopy on 11/30/18. The first scheduled colonoscopy on 11/29/18 was cancelled due to insufficient bowel preparation. Colonoscopy on 11/30/18 revealed evidence of proctitis. There was insufficient visualization of the right colon due to poor bowel preparation. Recommendations include regular diet, repeat colonoscopy in 1-3 months, Rowasa enema qhs, follow up biopsy results, follow up with GI in the clinic, and proctology consult.    Recommend  to monitor patient's hemoglobin and keep active type and screen in case patient requires additional transfusion.    Patient was previously seen and evaluated by neurology and neurosurgery for bilateral lower extremity weakness and diabetic neuropathy. Continue pregabalin 100mg BID. Prednisone course was completed.    For recent osteomyelitis of the left foot s/p excisional debridement with podiatry, patient will continue ceftriaxone 2g IV q24h. He is followed by podiatry and infectious disease.    For urinary retention, patient was seen and evaluated by urology team. They recommend straight catheterization with sterile technique 5x/day. Ideally, the patient should be taught to self-catheterize. If he is unable to self-catheterize, he will require Woods insertion on the day of discharge if patient is going to home. He can follow up outpatient with urology for urodynamic studies and should continue taking Flomax.    Patient is medically cleared for discharge to  for rehab. Patient has been instructed to follow up with his primary care doctor within 1 week and take all discharge medications as directed. Rishabh Morris is a 63M with PMH of CAD, NSTEMI s/p 5 vessel CABG (March 2016), DM, diabetic neuropathy, HTN, HLD, and left diabetic foot ulcer complicated by IV antibiotics who was recently admitted for lower extremity weakness and discharged to  for rehabilitation. Patient was readmitted to the medical floor for an episode of bright red blood per rectum. During this admission, patient was hemodynamically stable and needed 1 unit of PRBCs for optimization prior to colonoscopy. Patient was seen and evaluated by the GI team and taken for colonoscopy on 11/30/18. The first scheduled colonoscopy on 11/29/18 was cancelled due to insufficient bowel preparation. Colonoscopy on 11/30/18 revealed evidence of proctitis. There was insufficient visualization of the right colon due to poor bowel preparation. Recommendations include regular diet, repeat colonoscopy in 1-3 months, Rowasa enema qhs, follow up biopsy results, follow up with GI in the clinic, and proctology consult.    Recommend  to monitor patient's hemoglobin and keep active type and screen in case patient requires additional transfusion.    Patient was previously seen and evaluated by neurology and neurosurgery for bilateral lower extremity weakness and diabetic neuropathy. Continue pregabalin 100mg BID. Prednisone course was completed.    For recent osteomyelitis of the left foot s/p excisional debridement with podiatry, patient will continue ceftriaxone 2g IV q24h. He is followed by podiatry and infectious disease.    For urinary retention, patient was seen and evaluated by urology team. They recommend straight catheterization with sterile technique 5x/day. Ideally, the patient should be taught to self-catheterize. If he is unable to self-catheterize, he will require Woods insertion on the day of discharge if patient is going to home. He can follow up outpatient with urology for urodynamic studies and should continue taking Flomax.    Patient is medically cleared for discharge to  for rehab. Patient has been instructed to follow up with his primary care doctor within 1 week and take all discharge medications as directed.     32 minutes spent on discharge planning

## 2018-11-30 NOTE — DISCHARGE NOTE ADULT - CARE PROVIDER_API CALL
Lance Humphreys (MALATHI), Foot and Ankle Surgery; Podiatric Medicine and Surgery  [address] [place] [address]  Phone: [phone]  Fax: [phone]    Nilton Alegrai (MD), Surgical Physicians  [address] [facility]  [address]  [place] [address]  Phone: [phone]  Fax: [phone]    Dallin Abreu  [address] [place] [address]  Phone: [phone]  Fax: (   )    -    Jefe Mack), Gastroenterology; Internal Medicine  [address] [place] [address]  Phone: [phone]  Fax: [phone]

## 2018-11-30 NOTE — DISCHARGE NOTE ADULT - PATIENT PORTAL LINK FT
You can access the [facility] Patient Portal, offered by [facility], by registering with the following website: [email or web address]

## 2018-11-30 NOTE — DISCHARGE NOTE ADULT - ADDITIONAL INSTRUCTIONS
Follow up with primary care doctor within 1 week.  Follow up with urology for urinary retention.   Follow up with podiatry and infectious disease doctor for osteomyelitis of the left foot and for duration of IV antibiotic therapy.  Follow up with gastroenterologist at the GI clinic. Repeat colonoscopy recommended in 1-3 months. Proctology consult recommended.   Follow up with neurology as an outpatient as needed.  Continue physical therapy/rehabilitation.   Monitor CBC for anemia.   All discharge medications should be taken as directed.

## 2018-11-30 NOTE — PHYSICAL THERAPY INITIAL EVALUATION ADULT - PLANNED THERAPY INTERVENTIONS, PT EVAL
balance training/strengthening/gait training
gait training/strengthening/bed mobility training/balance training

## 2018-11-30 NOTE — DISCHARGE NOTE ADULT - MEDICATION SUMMARY - MEDICATIONS TO CHANGE
I will SWITCH the dose or number of times a day I take the medications listed below when I get home from the hospital:    insulin lispro  -- 6 unit(s) subcutaneously 3 times a day

## 2018-11-30 NOTE — DISCHARGE NOTE ADULT - MEDICATION SUMMARY - MEDICATIONS TO TAKE
I will START or STAY ON the medications listed below when I get home from the hospital:    mesalamine 4 g/60 mL rectal enema  -- 60 milliliter(s) rectally once a day (at bedtime)  -- Indication: For Proctitis    aspirin 81 mg oral tablet, chewable  -- 1 tab(s) by mouth once a day  -- Indication: For Anti-platelet    traMADol 50 mg oral tablet  -- 1 tab(s) by mouth every 8 hours, As needed, Moderate Pain (4 - 6)  -- Indication: For Pain    tamsulosin 0.4 mg oral capsule  -- 1 cap(s) by mouth once a day (at bedtime)  -- Indication: For BPH    pregabalin 100 mg oral capsule  -- 1 cap(s) by mouth 2 times a day  -- Indication: For Neuropathy    insulin lispro (concentrated) 200 units/mL subcutaneous solution  -- 9 unit(s) subcutaneous 3 times a day (before meals)  -- Indication: For Diabetes    insulin glargine  -- 27 unit(s) subcutaneous once a day (at bedtime)  -- Indication: For Diabetes    atorvastatin 40 mg oral tablet  -- 1 tab(s) by mouth once a day (at bedtime)  -- Indication: For Hyperlipidemia    Plavix 75 mg oral tablet  -- 1 tab(s) by mouth once a day  -- Indication: For Coronary Artery Disease     Metoprolol Succinate ER 25 mg oral tablet, extended release  -- 1 tab(s) by mouth once a day  -- Indication: For Hypertension    cefTRIAXone 2 g intravenous injection  -- 2 gram(s) intravenous every 24 hours  -- Indication: For Osteomyelitis    senna oral tablet  -- 2 tab(s) by mouth once a day (at bedtime)  -- Indication: For Constipation    docusate sodium 100 mg oral capsule  -- 1 cap(s) by mouth 3 times a day  -- Indication: For Constipation    simethicone 80 mg oral tablet, chewable  -- 1 tab(s) by mouth 2 times a day  -- Indication: For GERD    pantoprazole 40 mg oral delayed release tablet  -- 1 tab(s) by mouth every 12 hours  -- Indication: For GERD    Multiple Vitamins oral tablet  -- 1 tab(s) by mouth once a day  -- Indication: For Supplement    cyanocobalamin 1000 mcg oral tablet  -- 1 tab(s) by mouth once a day  -- Indication: For Supplement    folic acid 1 mg oral tablet  -- 1 tab(s) by mouth once a day  -- Indication: For Supplement    thiamine 100 mg oral tablet  -- 1 tab(s) by mouth once a day  -- Indication: For Supplement I will START or STAY ON the medications listed below when I get home from the hospital:    mesalamine 4 g/60 mL rectal enema  -- 60 milliliter(s) rectally once a day (at bedtime)  -- Indication: For Proctitis    traMADol 50 mg oral tablet  -- 1 tab(s) by mouth every 8 hours, As needed, Moderate Pain (4 - 6)  -- Indication: For Pain    aspirin 81 mg oral tablet, chewable  -- 1 tab(s) by mouth once a day  -- Indication: For Anti-platelet    tamsulosin 0.4 mg oral capsule  -- 1 cap(s) by mouth once a day (at bedtime)  -- Indication: For BPH    pregabalin 100 mg oral capsule  -- 1 cap(s) by mouth 2 times a day  -- Indication: For Neuropathy    insulin lispro (concentrated) 200 units/mL subcutaneous solution  -- 9 unit(s) subcutaneous 3 times a day (before meals)  -- Indication: For Diabetes    insulin glargine  -- 27 unit(s) subcutaneous once a day (at bedtime)  -- Indication: For Diabetes    atorvastatin 40 mg oral tablet  -- 1 tab(s) by mouth once a day (at bedtime)  -- Indication: For Hyperlipidemia    Plavix 75 mg oral tablet  -- 1 tab(s) by mouth once a day  -- Indication: For Coronary Artery Disease     Metoprolol Succinate ER 25 mg oral tablet, extended release  -- 1 tab(s) by mouth once a day  -- Indication: For Hypertension    cefTRIAXone 2 g intravenous injection  -- 2 gram(s) intravenous every 24 hours  -- Indication: For Osteomyelitis    senna oral tablet  -- 2 tab(s) by mouth once a day (at bedtime)  -- Indication: For Constipation    docusate sodium 100 mg oral capsule  -- 1 cap(s) by mouth 3 times a day  -- Indication: For Constipation    simethicone 80 mg oral tablet, chewable  -- 1 tab(s) by mouth 2 times a day  -- Indication: For GERD    pantoprazole 40 mg oral delayed release tablet  -- 1 tab(s) by mouth once a day  -- Indication: For GERD    Multiple Vitamins oral tablet  -- 1 tab(s) by mouth once a day  -- Indication: For Supplement    cyanocobalamin 1000 mcg oral tablet  -- 1 tab(s) by mouth once a day  -- Indication: For Supplement    folic acid 1 mg oral tablet  -- 1 tab(s) by mouth once a day  -- Indication: For Supplement    thiamine 100 mg oral tablet  -- 1 tab(s) by mouth once a day  -- Indication: For Supplement

## 2018-11-30 NOTE — DISCHARGE NOTE ADULT - FINDINGS/TREATMENT
-Evidence of proctitis  -Inability to fully visualize the right colon due to poor bowel preparation    Recommendations: Repeat colonoscopy in 1-3 months, regular diet, Rowasa enema at bedtime, follow up biopsy results, follow up at GI clinic, recommend proctology consult

## 2018-11-30 NOTE — PRE-OP CHECKLIST - AICD PRESENT
Patient needs an insurance referral to see: Kelley Jain NP  [facility] ([employer]     Insurance: [address] [employer] [place]      Appointment is on : Referral  17- Seen  &  and will be seen every week continuing.  Can we backdate for the -     What are they being seen for: wound care- 1 x week     NPI [id number]                     Tax ID [id number]     Fax 
[facility] referral completed Referral #[id number], referral information faxed
no

## 2018-11-30 NOTE — CHART NOTE - NSCHARTNOTEFT_GEN_A_CORE
PACU ANESTHESIA ADMISSION NOTE      Procedure:   Post op diagnosis:      ____  Intubated  TV:______       Rate: ______      FiO2: ______    __x__  Patent Airway    _x___  Full return of protective reflexes    _x___  Full recovery from anesthesia / back to baseline     Vitals:   T:           R:  16                BP: 98/63                 Sat:  100                 P: 77      Mental Status:  _x___ Awake  x _____ Alert   _____ Drowsy   _____ Sedated    Nausea/Vomiting:  _x___ NO  ______Yes,   See Post - Op Orders          Pain Scale (0-10):  __0___    Treatment: ____ None    ____ See Post - Op/PCA Orders    Post - Operative Fluids:   ____ Oral   ____ See Post - Op Orders    Plan: Discharge:   ____Home     x  _____Floor     _____Critical Care    _____  Other:_________________    Comments:

## 2018-11-30 NOTE — DISCHARGE NOTE ADULT - CARE PROVIDERS DIRECT ADDRESSES
,DirectAddress_Unknown,[email or web address],DirectAddress_Unknown,DirectAddress_Unknown

## 2018-11-30 NOTE — DISCHARGE NOTE ADULT - INSTRUCTIONS
Consistent carbohydrate (diabetic) diet, DASH/TLC (sodium and cholesterol restricted)  Ambulate as tolerated with assistance and assistive device (rolling walker) Consistent carbohydrate (diabetic) diet, DASH/TLC (sodium and cholesterol restricted)  Ambulate as tolerated with assistance and assistive device (rolling walker)  Maintain fall precautions

## 2018-11-30 NOTE — DISCHARGE NOTE ADULT - CARE PLAN
Principal Discharge DX:	Bright red blood per rectum  Goal:	Manage and prevent complications  Secondary Diagnosis:	Urinary retention  Goal:	Manage and prevent complications  Secondary Diagnosis:	Hypertension, unspecified type  Goal:	Manage and prevent complications  Secondary Diagnosis:	Osteomyelitis  Goal:	Manage and prevent complications  Secondary Diagnosis:	Lower extremity weakness  Goal:	Manage and prevent complications  Secondary Diagnosis:	Anemia  Goal:	Manage and prevent complications Principal Discharge DX:	Bright red blood per rectum  Goal:	Manage and prevent complications  Assessment and plan of treatment:	-Acute bleeding has resolved  -Patient is hemodynamically stable. Monitor hemoglobin.  -Colonoscopy performed on 11/30/18 revealed evidence of proctitis. Gastroenterologist was unable to visualize the entirety of the right colon due to insufficient bowel preparation.  -Continue Protonix daily  -Nightly Rowasa enemas   -Follow up with gastroenterologist as an outpatient. Recommend colonoscopy in 1-3 months for re-evaluation. Follow up biopsy results. Recommend proctology consult.  Secondary Diagnosis:	Urinary retention  Goal:	Manage and prevent complications  Assessment and plan of treatment:	-Patient seen and evaluated by urology. Straight catheterization 5x/day for urinary retention. Try to teach patient to self-catheterize. If he is unable to self catheterize, he will need a Woods catheter inserted on the day of discharge from  if patient is going home.   -Follow up with urology as an outpatient for urodynamic studies.  Secondary Diagnosis:	Hypertension, unspecified type  Goal:	Manage and prevent complications  Assessment and plan of treatment:	-Monitor blood pressure  Secondary Diagnosis:	Osteomyelitis  Goal:	Manage and prevent complications  Assessment and plan of treatment:	-Continue Ceftriaxone 2g IV q24h per infectious disease recommendations for osteomyelitis of the left foot  Secondary Diagnosis:	Lower extremity weakness  Goal:	Manage and prevent complications  Assessment and plan of treatment:	-Patient seen and evaluated by neurology on prior admission. Prednisone course was completed.  -Continue pregabalin as directed.   -Continue physical therapy  Secondary Diagnosis:	Anemia  Goal:	Manage and prevent complications  Assessment and plan of treatment:	-Monitor hemoglobin  -Monitor bowel movements for any additional hematochezia/melena

## 2018-11-30 NOTE — CHART NOTE - NSCHARTNOTEFT_GEN_A_CORE
<<<RESIDENT DISCHARGE NOTE>>>     ROSALIE ESCOBEDO  MRN-[id number]    VITAL SIGNS:  T(F): 96.3 (11-30-18 @ 12:25), Max: 98.5 (11-30-18 @ 09:29)  HR: 77 (11-30-18 @ 12:25)  BP: 129/72 (11-30-18 @ 12:25)  SpO2: 100% (11-30-18 @ 11:44)  Weight (kg): 110.3 (11-30-18 @ 09:40)  BMI (kg/m2): 30.4 (11-30-18 @ 09:40)    PHYSICAL EXAM  GEN: No acute distress, lying comfortably in bed   LUNGS: Clear to auscultation bilaterally, no labored breathing   HEART: S1/S2 present. RRR.   ABD: Soft, non-tender, non-distended. Bowel sounds present.   EXT: Wound dressing on left lower extremity dry clean and intact, no peripheral edema   NEURO: AAOX3, CN II-XII grossly intact     TEST RESULTS:                        8.4    6.95  )-----------( 234      ( 30 Nov 2018 05:05 )             26.3     11-30    142  |  102  |  10  ----------------------------<  117<H>  4.1   |  27  |  0.9    Ca    8.7      30 Nov 2018 05:05  Mg     1.8     11-30    FINAL DISCHARGE INTERVIEW:  Resident(s) Present: (Name: Riana GAYTAN RN Present: (Name: Sung)    DISCHARGE MEDICATION RECONCILIATION  reviewed with Attending (Name: Dr. Agee)    DISPOSITION:   [  ] Home,    [  ] Home with Visiting Nursing Services,   [    ]  SNF/ NH,    [  X ] Acute Rehab (4A),   [   ] Other (Specify:_________) <<<RESIDENT DISCHARGE NOTE>>>     ROSALIE ESCOBEDO  [id number]    VITAL SIGNS:  T(F): 96.3 (11-30-18 @ 12:25), Max: 98.5 (11-30-18 @ 09:29)  HR: 77 (11-30-18 @ 12:25)  BP: 129/72 (11-30-18 @ 12:25)  SpO2: 100% (11-30-18 @ 11:44)  Weight (kg): 110.3 (11-30-18 @ 09:40)  BMI (kg/m2): 30.4 (11-30-18 @ 09:40)    PHYSICAL EXAM  GEN: No acute distress, lying comfortably in bed   LUNGS: Clear to auscultation bilaterally, no labored breathing   HEART: S1/S2 present. RRR.   ABD: Soft, non-tender, non-distended. Bowel sounds present.   EXT: Wound dressing on left lower extremity dry clean and intact, no peripheral edema   NEURO: AAOX3, CN II-XII grossly intact     TEST RESULTS:                        8.4    6.95  )-----------( 234      ( 30 Nov 2018 05:05 )             26.3     11-30    142  |  102  |  10  ----------------------------<  117<H>  4.1   |  27  |  0.9    Ca    8.7      30 Nov 2018 05:05  Mg     1.8     11-30    FINAL DISCHARGE INTERVIEW:  Resident(s) Present: (Name: Riana GAYTAN RN Present: (Name: Sung)    DISCHARGE MEDICATION RECONCILIATION  reviewed with Attending (Name: Dr. Agee)    DISPOSITION:   [  ] Home,    [  ] Home with Visiting Nursing Services,   [    ]  SNF/ NH,    [  X ] Acute Rehab (4A),   [   ] Other (Specify:_________)    attending: s/p colonoscopy. stable for d/c to 4a. needs gi and pmd f/u outpt in one week.

## 2018-11-30 NOTE — PRE-ANESTHESIA EVALUATION ADULT - NSANTHOSAYNRD_GEN_A_CORE
No. SWETHA screening performed.  STOP BANG Legend: 0-2 = LOW Risk; 3-4 = INTERMEDIATE Risk; 5-8 = HIGH Risk
No. SWETHA screening performed.  STOP BANG Legend: 0-2 = LOW Risk; 3-4 = INTERMEDIATE Risk; 5-8 = HIGH Risk

## 2018-12-01 ENCOUNTER — TRANSCRIPTION ENCOUNTER (OUTPATIENT)
Age: 63
End: 2018-12-01

## 2018-12-01 ENCOUNTER — INPATIENT (INPATIENT)
Facility: HOSPITAL | Age: 63
LOS: 16 days | Discharge: HOME HEALTH | End: 2018-12-18
Attending: PHYSICAL MEDICINE & REHABILITATION | Admitting: PHYSICAL MEDICINE & REHABILITATION

## 2018-12-01 VITALS
SYSTOLIC BLOOD PRESSURE: 151 MMHG | DIASTOLIC BLOOD PRESSURE: 85 MMHG | HEART RATE: 91 BPM | TEMPERATURE: 97 F | RESPIRATION RATE: 18 BRPM

## 2018-12-01 DIAGNOSIS — Z95.1 PRESENCE OF AORTOCORONARY BYPASS GRAFT: Chronic | ICD-10-CM

## 2018-12-01 LAB
ANION GAP SERPL CALC-SCNC: 11 MMOL/L — SIGNIFICANT CHANGE UP (ref 7–14)
BASOPHILS # BLD AUTO: 0.06 K/UL — SIGNIFICANT CHANGE UP (ref 0–0.2)
BASOPHILS NFR BLD AUTO: 1 % — SIGNIFICANT CHANGE UP (ref 0–1)
BLD GP AB SCN SERPL QL: SIGNIFICANT CHANGE UP
BUN SERPL-MCNC: 8 MG/DL — LOW (ref 10–20)
CALCIUM SERPL-MCNC: 8.7 MG/DL — SIGNIFICANT CHANGE UP (ref 8.5–10.1)
CHLORIDE SERPL-SCNC: 100 MMOL/L — SIGNIFICANT CHANGE UP (ref 98–110)
CO2 SERPL-SCNC: 28 MMOL/L — SIGNIFICANT CHANGE UP (ref 17–32)
CREAT SERPL-MCNC: 0.9 MG/DL — SIGNIFICANT CHANGE UP (ref 0.7–1.5)
EOSINOPHIL # BLD AUTO: 0.4 K/UL — SIGNIFICANT CHANGE UP (ref 0–0.7)
EOSINOPHIL NFR BLD AUTO: 6.7 % — SIGNIFICANT CHANGE UP (ref 0–8)
GLUCOSE BLDC GLUCOMTR-MCNC: 119 MG/DL — HIGH (ref 70–99)
GLUCOSE BLDC GLUCOMTR-MCNC: 141 MG/DL — HIGH (ref 70–99)
GLUCOSE BLDC GLUCOMTR-MCNC: 208 MG/DL — HIGH (ref 70–99)
GLUCOSE BLDC GLUCOMTR-MCNC: 279 MG/DL — HIGH (ref 70–99)
GLUCOSE SERPL-MCNC: 144 MG/DL — HIGH (ref 70–99)
HCT VFR BLD CALC: 28.8 % — LOW (ref 42–52)
HGB BLD-MCNC: 9 G/DL — LOW (ref 14–18)
IMM GRANULOCYTES NFR BLD AUTO: 0.3 % — SIGNIFICANT CHANGE UP (ref 0.1–0.3)
LYMPHOCYTES # BLD AUTO: 1.68 K/UL — SIGNIFICANT CHANGE UP (ref 1.2–3.4)
LYMPHOCYTES # BLD AUTO: 28.1 % — SIGNIFICANT CHANGE UP (ref 20.5–51.1)
MAGNESIUM SERPL-MCNC: 1.9 MG/DL — SIGNIFICANT CHANGE UP (ref 1.8–2.4)
MCHC RBC-ENTMCNC: 23.5 PG — LOW (ref 27–31)
MCHC RBC-ENTMCNC: 31.3 G/DL — LOW (ref 32–37)
MCV RBC AUTO: 75.2 FL — LOW (ref 80–94)
MONOCYTES # BLD AUTO: 0.58 K/UL — SIGNIFICANT CHANGE UP (ref 0.1–0.6)
MONOCYTES NFR BLD AUTO: 9.7 % — HIGH (ref 1.7–9.3)
NEUTROPHILS # BLD AUTO: 3.23 K/UL — SIGNIFICANT CHANGE UP (ref 1.4–6.5)
NEUTROPHILS NFR BLD AUTO: 54.2 % — SIGNIFICANT CHANGE UP (ref 42.2–75.2)
NRBC # BLD: 0 /100 WBCS — SIGNIFICANT CHANGE UP (ref 0–0)
PLATELET # BLD AUTO: 195 K/UL — SIGNIFICANT CHANGE UP (ref 130–400)
POTASSIUM SERPL-MCNC: 4.2 MMOL/L — SIGNIFICANT CHANGE UP (ref 3.5–5)
POTASSIUM SERPL-SCNC: 4.2 MMOL/L — SIGNIFICANT CHANGE UP (ref 3.5–5)
RBC # BLD: 3.83 M/UL — LOW (ref 4.7–6.1)
RBC # FLD: 17.7 % — HIGH (ref 11.5–14.5)
SODIUM SERPL-SCNC: 139 MMOL/L — SIGNIFICANT CHANGE UP (ref 135–146)
TYPE + AB SCN PNL BLD: SIGNIFICANT CHANGE UP
WBC # BLD: 5.97 K/UL — SIGNIFICANT CHANGE UP (ref 4.8–10.8)
WBC # FLD AUTO: 5.97 K/UL — SIGNIFICANT CHANGE UP (ref 4.8–10.8)

## 2018-12-01 RX ORDER — INSULIN GLARGINE 100 [IU]/ML
27 INJECTION, SOLUTION SUBCUTANEOUS AT BEDTIME
Qty: 0 | Refills: 0 | Status: DISCONTINUED | OUTPATIENT
Start: 2018-12-01 | End: 2018-12-18

## 2018-12-01 RX ORDER — SIMETHICONE 80 MG/1
80 TABLET, CHEWABLE ORAL
Qty: 0 | Refills: 0 | Status: DISCONTINUED | OUTPATIENT
Start: 2018-12-01 | End: 2018-12-18

## 2018-12-01 RX ORDER — MESALAMINE 400 MG
4 TABLET, DELAYED RELEASE (ENTERIC COATED) ORAL AT BEDTIME
Qty: 0 | Refills: 0 | Status: DISCONTINUED | OUTPATIENT
Start: 2018-12-01 | End: 2018-12-18

## 2018-12-01 RX ORDER — INSULIN LISPRO 100/ML
9 VIAL (ML) SUBCUTANEOUS
Qty: 0 | Refills: 0 | Status: DISCONTINUED | OUTPATIENT
Start: 2018-12-01 | End: 2018-12-01

## 2018-12-01 RX ORDER — TAMSULOSIN HYDROCHLORIDE 0.4 MG/1
0.4 CAPSULE ORAL AT BEDTIME
Qty: 0 | Refills: 0 | Status: DISCONTINUED | OUTPATIENT
Start: 2018-12-01 | End: 2018-12-18

## 2018-12-01 RX ORDER — INSULIN LISPRO 100/ML
9 VIAL (ML) SUBCUTANEOUS
Qty: 0 | Refills: 0 | Status: DISCONTINUED | OUTPATIENT
Start: 2018-12-01 | End: 2018-12-18

## 2018-12-01 RX ORDER — SENNA PLUS 8.6 MG/1
2 TABLET ORAL AT BEDTIME
Qty: 0 | Refills: 0 | Status: DISCONTINUED | OUTPATIENT
Start: 2018-12-01 | End: 2018-12-18

## 2018-12-01 RX ORDER — CEFTRIAXONE 500 MG/1
2 INJECTION, POWDER, FOR SOLUTION INTRAMUSCULAR; INTRAVENOUS EVERY 24 HOURS
Qty: 0 | Refills: 0 | Status: DISCONTINUED | OUTPATIENT
Start: 2018-12-02 | End: 2018-12-18

## 2018-12-01 RX ORDER — CLOPIDOGREL BISULFATE 75 MG/1
75 TABLET, FILM COATED ORAL DAILY
Qty: 0 | Refills: 0 | Status: DISCONTINUED | OUTPATIENT
Start: 2018-12-01 | End: 2018-12-18

## 2018-12-01 RX ORDER — TRAMADOL HYDROCHLORIDE 50 MG/1
50 TABLET ORAL EVERY 8 HOURS
Qty: 0 | Refills: 0 | Status: DISCONTINUED | OUTPATIENT
Start: 2018-12-01 | End: 2018-12-03

## 2018-12-01 RX ORDER — FOLIC ACID 0.8 MG
1 TABLET ORAL DAILY
Qty: 0 | Refills: 0 | Status: DISCONTINUED | OUTPATIENT
Start: 2018-12-01 | End: 2018-12-18

## 2018-12-01 RX ORDER — PREGABALIN 225 MG/1
1000 CAPSULE ORAL DAILY
Qty: 0 | Refills: 0 | Status: DISCONTINUED | OUTPATIENT
Start: 2018-12-01 | End: 2018-12-18

## 2018-12-01 RX ORDER — LANOLIN ALCOHOL/MO/W.PET/CERES
5 CREAM (GRAM) TOPICAL AT BEDTIME
Qty: 0 | Refills: 0 | Status: DISCONTINUED | OUTPATIENT
Start: 2018-12-01 | End: 2018-12-18

## 2018-12-01 RX ORDER — ASPIRIN/CALCIUM CARB/MAGNESIUM 324 MG
81 TABLET ORAL DAILY
Qty: 0 | Refills: 0 | Status: DISCONTINUED | OUTPATIENT
Start: 2018-12-01 | End: 2018-12-18

## 2018-12-01 RX ORDER — ATORVASTATIN CALCIUM 80 MG/1
40 TABLET, FILM COATED ORAL AT BEDTIME
Qty: 0 | Refills: 0 | Status: DISCONTINUED | OUTPATIENT
Start: 2018-12-01 | End: 2018-12-18

## 2018-12-01 RX ORDER — THIAMINE MONONITRATE (VIT B1) 100 MG
100 TABLET ORAL DAILY
Qty: 0 | Refills: 0 | Status: DISCONTINUED | OUTPATIENT
Start: 2018-12-01 | End: 2018-12-18

## 2018-12-01 RX ORDER — CEFTRIAXONE 500 MG/1
INJECTION, POWDER, FOR SOLUTION INTRAMUSCULAR; INTRAVENOUS
Qty: 0 | Refills: 0 | Status: DISCONTINUED | OUTPATIENT
Start: 2018-12-01 | End: 2018-12-18

## 2018-12-01 RX ORDER — ACETAMINOPHEN 500 MG
650 TABLET ORAL EVERY 6 HOURS
Qty: 0 | Refills: 0 | Status: DISCONTINUED | OUTPATIENT
Start: 2018-12-01 | End: 2018-12-18

## 2018-12-01 RX ORDER — DOCUSATE SODIUM 100 MG
100 CAPSULE ORAL THREE TIMES A DAY
Qty: 0 | Refills: 0 | Status: DISCONTINUED | OUTPATIENT
Start: 2018-12-01 | End: 2018-12-18

## 2018-12-01 RX ORDER — METOPROLOL TARTRATE 50 MG
25 TABLET ORAL DAILY
Qty: 0 | Refills: 0 | Status: DISCONTINUED | OUTPATIENT
Start: 2018-12-01 | End: 2018-12-18

## 2018-12-01 RX ORDER — ERGOCALCIFEROL 1.25 MG/1
50000 CAPSULE ORAL
Qty: 0 | Refills: 0 | Status: DISCONTINUED | OUTPATIENT
Start: 2018-12-01 | End: 2018-12-18

## 2018-12-01 RX ORDER — MAGNESIUM HYDROXIDE 400 MG/1
30 TABLET, CHEWABLE ORAL DAILY
Qty: 0 | Refills: 0 | Status: DISCONTINUED | OUTPATIENT
Start: 2018-12-01 | End: 2018-12-18

## 2018-12-01 RX ORDER — PANTOPRAZOLE SODIUM 20 MG/1
40 TABLET, DELAYED RELEASE ORAL
Qty: 0 | Refills: 0 | Status: DISCONTINUED | OUTPATIENT
Start: 2018-12-01 | End: 2018-12-18

## 2018-12-01 RX ORDER — HEPARIN SODIUM 5000 [USP'U]/ML
5000 INJECTION INTRAVENOUS; SUBCUTANEOUS EVERY 8 HOURS
Qty: 0 | Refills: 0 | Status: DISCONTINUED | OUTPATIENT
Start: 2018-12-01 | End: 2018-12-18

## 2018-12-01 RX ORDER — CEFTRIAXONE 500 MG/1
2 INJECTION, POWDER, FOR SOLUTION INTRAMUSCULAR; INTRAVENOUS ONCE
Qty: 0 | Refills: 0 | Status: COMPLETED | OUTPATIENT
Start: 2018-12-01 | End: 2018-12-01

## 2018-12-01 RX ORDER — INSULIN LISPRO 100/ML
VIAL (ML) SUBCUTANEOUS
Qty: 0 | Refills: 0 | Status: DISCONTINUED | OUTPATIENT
Start: 2018-12-01 | End: 2018-12-18

## 2018-12-01 RX ADMIN — Medication 100 MILLIGRAM(S): at 05:19

## 2018-12-01 RX ADMIN — HEPARIN SODIUM 5000 UNIT(S): 5000 INJECTION INTRAVENOUS; SUBCUTANEOUS at 21:18

## 2018-12-01 RX ADMIN — Medication 100 MILLIGRAM(S): at 11:28

## 2018-12-01 RX ADMIN — Medication 100 MILLIGRAM(S): at 05:20

## 2018-12-01 RX ADMIN — Medication 5 MILLIGRAM(S): at 21:21

## 2018-12-01 RX ADMIN — SENNA PLUS 2 TABLET(S): 8.6 TABLET ORAL at 21:16

## 2018-12-01 RX ADMIN — TAMSULOSIN HYDROCHLORIDE 0.4 MILLIGRAM(S): 0.4 CAPSULE ORAL at 21:16

## 2018-12-01 RX ADMIN — SIMETHICONE 80 MILLIGRAM(S): 80 TABLET, CHEWABLE ORAL at 05:19

## 2018-12-01 RX ADMIN — Medication 1 MILLIGRAM(S): at 11:27

## 2018-12-01 RX ADMIN — PREGABALIN 1000 MICROGRAM(S): 225 CAPSULE ORAL at 11:28

## 2018-12-01 RX ADMIN — Medication 100 MILLIGRAM(S): at 17:43

## 2018-12-01 RX ADMIN — SIMETHICONE 80 MILLIGRAM(S): 80 TABLET, CHEWABLE ORAL at 17:44

## 2018-12-01 RX ADMIN — Medication 9 UNIT(S): at 17:36

## 2018-12-01 RX ADMIN — Medication 9 UNIT(S): at 11:27

## 2018-12-01 RX ADMIN — CHLORHEXIDINE GLUCONATE 1 APPLICATION(S): 213 SOLUTION TOPICAL at 05:19

## 2018-12-01 RX ADMIN — Medication 81 MILLIGRAM(S): at 11:27

## 2018-12-01 RX ADMIN — PANTOPRAZOLE SODIUM 40 MILLIGRAM(S): 20 TABLET, DELAYED RELEASE ORAL at 17:44

## 2018-12-01 RX ADMIN — ENOXAPARIN SODIUM 40 MILLIGRAM(S): 100 INJECTION SUBCUTANEOUS at 11:28

## 2018-12-01 RX ADMIN — Medication 9 UNIT(S): at 08:51

## 2018-12-01 RX ADMIN — ATORVASTATIN CALCIUM 40 MILLIGRAM(S): 80 TABLET, FILM COATED ORAL at 21:16

## 2018-12-01 RX ADMIN — CEFTRIAXONE 100 GRAM(S): 500 INJECTION, POWDER, FOR SOLUTION INTRAMUSCULAR; INTRAVENOUS at 11:27

## 2018-12-01 RX ADMIN — PANTOPRAZOLE SODIUM 40 MILLIGRAM(S): 20 TABLET, DELAYED RELEASE ORAL at 05:19

## 2018-12-01 RX ADMIN — Medication 1 TABLET(S): at 11:27

## 2018-12-01 RX ADMIN — Medication 100 MILLIGRAM(S): at 21:16

## 2018-12-01 RX ADMIN — INSULIN GLARGINE 27 UNIT(S): 100 INJECTION, SOLUTION SUBCUTANEOUS at 21:22

## 2018-12-01 NOTE — CHART NOTE - NSCHARTNOTEFT_GEN_A_CORE
<<<RESIDENT DISCHARGE NOTE>>>     ROSALIE ESCOBEDO  [id number]    VITAL SIGNS:  T(F): 97.7 (12-01-18 @ 08:00), Max: 97.7 (12-01-18 @ 08:00)  HR: 79 (12-01-18 @ 08:00)  BP: 118/58 (12-01-18 @ 08:00)  SpO2: --    PHYSICAL EXAM  GENERAL: No acute distress, well-developed  CHEST/LUNG: CTAB; No wheezes, rales, or rhonchi  HEART: Regular rate and rhythm; No murmurs, rubs, or gallops  ABDOMEN: Soft, non-tender, non-distended; normal bowel sounds, no organomegaly  EXTREMITIES:  Wound dressing on left lower extremity dry clean and intact, no peripheral edema   NEUROLOGY: A&O x 3, no focal deficits  SKIN: No rashes or lesions    TEST RESULTS:                        9.0    5.97  )-----------( 195      ( 01 Dec 2018 06:12 )             28.8     12-01    139  |  100  |  8<L>  ----------------------------<  144<H>  4.2   |  28  |  0.9    Ca    8.7      01 Dec 2018 06:12  Mg     1.9     12-01    FINAL DISCHARGE INTERVIEW:  Resident(s) Present: (Name: Riana GAYTAN, RN Present: (Name:  Arleth)    DISCHARGE MEDICATION RECONCILIATION  reviewed with Attending (Name: Dr. Agee)    DISPOSITION:   [  ] Home,    [  ] Home with Visiting Nursing Services,   [    ]  SNF/ NH,    [ x  ] Acute Rehab (4A),   [   ] Other (Specify:_________) <<<RESIDENT DISCHARGE NOTE>>>     ROSALIE ESCOBEDO  MRN-[id number]    VITAL SIGNS:  T(F): 97.7 (12-01-18 @ 08:00), Max: 97.7 (12-01-18 @ 08:00)  HR: 79 (12-01-18 @ 08:00)  BP: 118/58 (12-01-18 @ 08:00)  SpO2: --    PHYSICAL EXAM  GENERAL: No acute distress, well-developed  CHEST/LUNG: CTAB; No wheezes, rales, or rhonchi  HEART: Regular rate and rhythm; No murmurs, rubs, or gallops  ABDOMEN: Soft, non-tender, non-distended; normal bowel sounds, no organomegaly  EXTREMITIES:  Wound dressing on left lower extremity dry clean and intact, no peripheral edema   NEUROLOGY: A&O x 3, no focal deficits  SKIN: No rashes or lesions    TEST RESULTS:                        9.0    5.97  )-----------( 195      ( 01 Dec 2018 06:12 )             28.8     12-01    139  |  100  |  8<L>  ----------------------------<  144<H>  4.2   |  28  |  0.9    Ca    8.7      01 Dec 2018 06:12  Mg     1.9     12-01    FINAL DISCHARGE INTERVIEW:  Resident(s) Present: (Name: Riana GAYTAN, RN Present: (Name:  Arleth)    DISCHARGE MEDICATION RECONCILIATION  reviewed with Attending (Name: Dr. Agee)    DISPOSITION:   [  ] Home,    [  ] Home with Visiting Nursing Services,   [    ]  SNF/ NH,    [ x  ] Acute Rehab (4A),   [   ] Other (Specify:_________)    attending: stable for d/c to 4A, needs outpt f/u pmd and GI.

## 2018-12-01 NOTE — DISCHARGE NOTE ADULT - CARE PROVIDER_API CALL
Nilton Alegria), Surgical Physicians  [address] [facility] [facility]  [address]  [place] [address]  Phone: [phone]  Fax: [phone]    Raul Pierce), Gastroenterology; Internal Medicine  [address] [place] [address]  Phone: [phone]  Fax: [phone]    Lance Humphreys (MALATHI), Foot and Ankle Surgery; Podiatric Medicine and Surgery  [address] [place] [address]  Phone: [phone]  Fax: [phone]    Donny Huggins), Infectious Disease; Internal Medicine  [address] [place] [address]  Phone: [phone]  Fax: [phone]

## 2018-12-01 NOTE — PROGRESS NOTE ADULT - REASON FOR ADMISSION
Bright red bleeding per rectum x 1 episode

## 2018-12-01 NOTE — DISCHARGE NOTE ADULT - PATIENT PORTAL LINK FT
You can access the [facility] Patient Portal, offered by [facility], by registering with the following website: [email or web address]

## 2018-12-01 NOTE — DISCHARGE NOTE ADULT - NURSING SECTION COMPLETE
Patient/Caregiver provided printed discharge information.
Breathing spontaneous and unlabored. Breath sounds clear and equal bilaterally with regular rhythm.

## 2018-12-01 NOTE — PROGRESS NOTE ADULT - ASSESSMENT
Assessment:  - medial and plantar sx incision left foot. No signs of infection @ this time     Plan:   - pt seen/evaluated @ bedside  - dressed w/ iodoform packing/DSD  - c/w LWC. No further sx intervention  - pt stable from podiatric standpoint and should f/u w/ Dr. Lance Humphreys upon d/c  - plan discussed w/ attending   - podiatry will f/u

## 2018-12-01 NOTE — DISCHARGE NOTE ADULT - CARE PROVIDERS DIRECT ADDRESSES
,[email or web address],DirectAddress_Unknown,DirectAddress_Unknown

## 2018-12-01 NOTE — DISCHARGE NOTE ADULT - HOSPITAL COURSE
HPI:   63M with PMH of CAD, NSTEMI s/p 5 vessel CABG (March 2016), DM, diabetic neuropathy, HTN, HLD, and left diabetic foot ulcer complicated by IV antibiotics who was recently admitted for lower extremity weakness and discharged to  for rehabilitation. Patient was readmitted to the medical floor for an episode of bright red blood per rectum. During this admission, patient was hemodynamically stable and needed 1 unit of PRBCs for optimization prior to colonoscopy. Patient was seen and evaluated by the GI team and taken for colonoscopy on 11/30/18. The first scheduled colonoscopy on 11/29/18 was cancelled due to insufficient bowel preparation. Colonoscopy on 11/30/18 revealed evidence of proctitis. There was insufficient visualization of the right colon due to poor bowel preparation. Recommendations include regular diet, repeat colonoscopy in 1-3 months, Rowasa enema qhs, follow up biopsy results, follow up with GI in the clinic, and proctology consult.    Recommend  to monitor patient's hemoglobin and keep active type and screen in case patient requires additional transfusion.    Patient was previously seen and evaluated by neurology and neurosurgery for bilateral lower extremity weakness and diabetic neuropathy. Continue pregabalin 100mg BID. Prednisone course was completed.    For recent osteomyelitis of the left foot s/p excisional debridement with podiatry, patient will continue ceftriaxone 2g IV q24h. He is followed by podiatry and infectious disease.    For urinary retention, patient was seen and evaluated by urology team. They recommend straight catheterization with sterile technique 5x/day. Ideally, the patient should be taught to self-catheterize. If he is unable to self-catheterize, he will require Woods insertion on the day of discharge if patient is going to home. He can follow up outpatient with urology for urodynamic studies and should continue taking Flomax.    Patient is medically cleared for discharge to  for rehab. Patient has been instructed to follow up with his primary care doctor within 1 week and take all discharge medications as directed.     Prior functional status:  mod A for ADLs and transfers, ambulates 1 block with straight cane  Admission Physical Exam:  Vital signs stable. Afebrile  Gen: alert, NAD  Cardio: RRR  Lungs: clear  Abd: soft, NT  Extremities: without edema. PROM wnl.  Motor Power: 5-/5 throughout  Sensation: decreased sensation b/l feet    Hospital Course: The patient was admitted to the acute inpatient rehab unit presenting with a decline in functional status. The patient participated in three hours of multidisciplinary therapy 5-6 days per week. The patient was continued on all home medications or equivalent alternatives as deemed appropriate. The patient received prophylactic anticoagulation medication and was monitored closely with no complications. During the stay on the inpatient unit, the patient showed as much progress as had been anticipated and was cleared for discharge by a multidisciplinary team.  Midline was placed 12/7.  On 12/11 midodrine increased to BID, 1g salt tabs with meals for continued orthostatic hypotension.      Discharge functional status:  Independent with ADLs.  Ambulation: 125' x 2 with mod I  Discharge disposition:  Home with Home Care HPI:   63M with PMH of CAD, NSTEMI s/p 5 vessel CABG (March 2016), DM, diabetic neuropathy, HTN, HLD, and left diabetic foot ulcer complicated by IV antibiotics who was recently admitted for lower extremity weakness and discharged to  for rehabilitation. Patient was readmitted to the medical floor for an episode of bright red blood per rectum. During this admission, patient was hemodynamically stable and needed 1 unit of PRBCs for optimization prior to colonoscopy. Patient was seen and evaluated by the GI team and taken for colonoscopy on 11/30/18. The first scheduled colonoscopy on 11/29/18 was cancelled due to insufficient bowel preparation. Colonoscopy on 11/30/18 revealed evidence of proctitis. There was insufficient visualization of the right colon due to poor bowel preparation. Recommendations include regular diet, repeat colonoscopy in 1-3 months, Rowasa enema qhs, follow up biopsy results, follow up with GI in the clinic, and proctology consult.    Recommend  to monitor patient's hemoglobin and keep active type and screen in case patient requires additional transfusion.    Patient was previously seen and evaluated by neurology and neurosurgery for bilateral lower extremity weakness and diabetic neuropathy. Continue pregabalin 100mg BID. Prednisone course was completed.    For recent osteomyelitis of the left foot s/p excisional debridement with podiatry, patient will continue ceftriaxone 2g IV q24h. He is followed by podiatry and infectious disease.    For urinary retention, patient was seen and evaluated by urology team. They recommend straight catheterization with sterile technique 5x/day. Ideally, the patient should be taught to self-catheterize. If he is unable to self-catheterize, he will require Woods insertion on the day of discharge if patient is going to home. He can follow up outpatient with urology for urodynamic studies and should continue taking Flomax.    Patient is medically cleared for discharge to  for rehab. Patient has been instructed to follow up with his primary care doctor within 1 week and take all discharge medications as directed.     Prior functional status:  mod A for ADLs and transfers, ambulates 1 block with straight cane  Admission Physical Exam:  Vital signs stable. Afebrile  Gen: alert, NAD  Cardio: RRR  Lungs: clear  Abd: soft, NT  Extremities: without edema. PROM wnl.  Motor Power: 5-/5 throughout  Sensation: decreased sensation b/l feet    Hospital Course: The patient was admitted to the acute inpatient rehab unit presenting with a decline in functional status. The patient participated in three hours of multidisciplinary therapy 5-6 days per week. The patient was continued on all home medications or equivalent alternatives as deemed appropriate. The patient received prophylactic anticoagulation medication and was monitored closely with no complications. During the stay on the inpatient unit, the patient showed as much progress as had been anticipated and was cleared for discharge by a multidisciplinary team.  Midline was placed 12/7.  On 12/11 midodrine increased to BID, 1g salt tabs with meals for continued orthostatic hypotension.    The patient is on Ceftriaxone 2g Q24 for six weeks, this antibiotic was started on 11/20/18; the last day of treatment should be 1/1/19.  Patient will follow up with Dr. Arellano- Infectious disease within one week of discharge.  His foot wound dressing should be changed daily.  The area should be cleaned with betadine, then have a 4x4 gauze applied, followed by kerlix.    Discharge functional status:  Independent with ADLs.  Ambulation: 125' x 2 with mod I  Discharge disposition:  Home with Home Care HPI: 63M with PMH of CAD, NSTEMI s/p 5 vessel CABG (March 2016), DM, diabetic neuropathy, HTN, HLD, and left diabetic foot ulcer complicated by IV antibiotics who was recently admitted for lower extremity weakness and discharged to  for rehabilitation. Patient was readmitted to the medical floor for an episode of bright red blood per rectum. During this admission, patient was hemodynamically stable and needed 1 unit of PRBCs for optimization prior to colonoscopy. Patient was seen and evaluated by the GI team and taken for colonoscopy on 11/30/18. The first scheduled colonoscopy on 11/29/18 was cancelled due to insufficient bowel preparation. Colonoscopy on 11/30/18 revealed evidence of proctitis. There was insufficient visualization of the right colon due to poor bowel preparation. Recommendations include regular diet, repeat colonoscopy in 1-3 months, Rowasa enema qhs, follow up biopsy results, follow up with GI in the clinic, and proctology consult.    Recommend  to monitor patient's hemoglobin and keep active type and screen in case patient requires additional transfusion.    Patient was previously seen and evaluated by neurology and neurosurgery for bilateral lower extremity weakness and diabetic neuropathy. Continue pregabalin 100mg BID. Prednisone course was completed.    For recent osteomyelitis of the left foot s/p excisional debridement with podiatry, patient will continue ceftriaxone 2g IV q24h. He is followed by podiatry and infectious disease.    For urinary retention, patient was seen and evaluated by urology team. They recommend straight catheterization with sterile technique 5x/day. Ideally, the patient should be taught to self-catheterize. If he is unable to self-catheterize, he will require Woods insertion on the day of discharge if patient is going to home. He can follow up outpatient with urology for urodynamic studies and should continue taking Flomax.    Patient is medically cleared for discharge to  for rehab. Patient has been instructed to follow up with his primary care doctor within 1 week and take all discharge medications as directed.     Prior functional status:mod A for ADLs and transfers, ambulates 1 block with straight cane    Admission Physical Exam:  Vital signs stable. Afebrile  Gen: alert, NAD  Cardio: RRR  Lungs: clear  Abd: soft, NT  Extremities: without edema. PROM wnl.  Motor Power: 5-/5 throughout  Sensation: decreased sensation b/l feet    Hospital Course: The patient was admitted to the acute inpatient rehab unit presenting with a decline in functional status. The patient participated in three hours of multidisciplinary therapy 5-6 days per week. The patient was continued on all home medications or equivalent alternatives as deemed appropriate. The patient received prophylactic anticoagulation medication and was monitored closely with no complications. During the stay on the inpatient unit, the patient showed as much progress as had been anticipated and was cleared for discharge by a multidisciplinary team.  Midline was placed 12/7.  On 12/11 midodrine increased to BID, 1g salt tabs with meals for continued orthostatic hypotension.    The patient is on Ceftriaxone 2g Q24 for six weeks, this antibiotic was started on 11/20/18; the last day of treatment should be 1/1/19.  Patient will follow up with Dr. Arellano- Infectious disease within one week of discharge.  His foot wound dressing should be changed daily.  The area should be cleaned with betadine, then have a 4x4 gauze applied, followed by kerlix.    Discharge functional status:  Independent with ADLs.  Ambulation: 125' x 2 with mod I  Discharge disposition:  Home with Home Care

## 2018-12-01 NOTE — PROGRESS NOTE ADULT - SUBJECTIVE AND OBJECTIVE BOX
PROGRESS NOTE   Patient is a 63y old  Male who presents with a chief complaint of Bright red bleeding per rectum x 1 episode (01 Dec 2018 14:34)      HPI:  63y male with PMH of CAD, NSTEMI s/p CABG (5 vessel according to patient; March 2016); DM with peripheral neuropathy, Left DFU, HTN, HLD and recently admitted for lower extremity weakness and a complete Stroke workup was done with CT head negative for acute infarct; and  MRI brain didn't show any new ischemic changes. MRI lumbar and sacral spine showed diffuse disc bulges in lumbar and sacral spine. Patient was followed by Neurosurgery, neurology, ID, podiatry.During his stay ID and podiatry followed the patient for osteomyelitis of left foot and he was discharged to  for intensive rehab and on IV antibiotics on 11/20/2018 . Today on day of presentation , patient reports that he was having crampy abdominal pain, sudden in onset, 6/10 intensity located in the lower abdomen , non radiating and had a sudden urge to defecate for which he called the PCA to help him out to go to a bathroom. He went to the bathroom and reports bright red colored watery stool .patient was taken back to the bed .Denies sob, fever, nausea, vomiting, hemoptysis, lightheadedness , dysuria .The nurse called the attending Dr. Hall who assessed the patient and did a DASHAWN which showed active bleeding from rectum with prince bright red blood, no melena reported .She spoke with the GI fellow on call and no acute intervention was suggested and patient was admitted to medicine service for further evaluation.Since he came to the medicine floor he has had 2 similar episodes   Patient never had a colonoscopy ,is on plavix and asa , no h/o colon c/a in family . Does not have a GI physician (24 Nov 2018 23:20)      GASTROINESTINAL BLEEDING  ^GASTROINESTINAL BLEEDING  Yes  Family history of throat cancer (Father)  Family history of lung cancer (Mother)  No pertinent family history in first degree relatives  Handoff  MEWS Score  CAD (coronary artery disease)  Dyslipidemia  NSTEMI (non-ST elevated myocardial infarction)  Diabetic foot ulcer  PVD (peripheral vascular disease)  Diabetes  Other hyperlipidemia  Hypertension, unspecified type  Amputated toe, unspecified laterality  Colonoscopy  Bright red blood per rectum  S/P CABG (coronary artery bypass graft)  Deep vein thrombosis associated with coronary artery bypass graft surgery  Anemia  Lower extremity weakness  Osteomyelitis  Hypertension, unspecified type  Urinary retention      VITALS:  Vital Signs Last 24 Hrs  T(C): 36.5 (01 Dec 2018 08:00), Max: 36.5 (01 Dec 2018 08:00)  T(F): 97.7 (01 Dec 2018 08:00), Max: 97.7 (01 Dec 2018 08:00)  HR: 79 (01 Dec 2018 08:00) (79 - 92)  BP: 118/58 (01 Dec 2018 08:00) (113/68 - 140/57)  BP(mean): --  RR: 18 (01 Dec 2018 08:00) (18 - 18)  SpO2: --    LABS:                        9.0    5.97  )-----------( 195      ( 01 Dec 2018 06:12 )             28.8     12-01    139  |  100  |  8<L>  ----------------------------<  144<H>  4.2   |  28  |  0.9    Ca    8.7      01 Dec 2018 06:12  Mg     1.9     12-01        Hemoglobin A1C     PHYSICAL EXAM  GEN: ROSALIE ESCOBEDO is a pleasant well-nourished, well developed 63y Male in no acute distress, alert awake, and oriented to person, place and time.     Medication(s):   aspirin  chewable 81 milliGRAM(s) Oral daily  atorvastatin 40 milliGRAM(s) Oral at bedtime  cefTRIAXone   IVPB 2 Gram(s) IV Intermittent every 24 hours  chlorhexidine 2% Cloths 1 Application(s) Topical <User Schedule>  chlorhexidine 4% Liquid 1 Application(s) Topical <User Schedule>  cyanocobalamin 1000 MICROGram(s) Oral daily  docusate sodium 100 milliGRAM(s) Oral three times a day  enoxaparin Injectable 40 milliGRAM(s) SubCutaneous daily  folic acid 1 milliGRAM(s) Oral daily  insulin glargine Injectable (LANTUS) 27 Unit(s) SubCutaneous at bedtime  insulin lispro Injectable (HumaLOG) 9 Unit(s) SubCutaneous three times a day before meals  mesalamine Enema 4 Gram(s) Rectal at bedtime  multivitamin 1 Tablet(s) Oral daily  pantoprazole    Tablet 40 milliGRAM(s) Oral every 12 hours  pregabalin 100 milliGRAM(s) Oral two times a day  senna 2 Tablet(s) Oral at bedtime  simethicone 80 milliGRAM(s) Chew two times a day  tamsulosin 0.4 milliGRAM(s) Oral at bedtime  thiamine 100 milliGRAM(s) Oral daily  traMADol 50 milliGRAM(s) Oral every 8 hours PRN

## 2018-12-01 NOTE — PROGRESS NOTE ADULT - SUBJECTIVE AND OBJECTIVE BOX
S/P colonoscopy yesterday.  Pleased to accept to 4[facility] today.  Had been on 4A before went to medical service.  Tolerating PT nicelyyesterday  and amb. with walker to bathroom today.

## 2018-12-01 NOTE — DISCHARGE NOTE ADULT - CARE PLAN
Principal Discharge DX:	Osteomyelitis  Goal:	healing and rehabilitation  Assessment and plan of treatment:	Take medication as directed, follow up at appointments.

## 2018-12-01 NOTE — DISCHARGE NOTE ADULT - ADDITIONAL INSTRUCTIONS
Please follow up at doctors appointments as listed Please follow up at doctors appointments as listed  Please straight catheterize as needed for urinary retention and please follow up with Urology as an outpatient (Dr. Alegria)  Follow up with Infectious Disease as an outpatient (Dr. Huggins)  Follow up with Podiatry as an outpatient (Dr. Humphreys) Please follow up at doctors appointments as listed  Please straight catheterize as needed for urinary retention and please follow up with Urology as an outpatient (Dr. Alegria)  Follow up with Infectious Disease as an outpatient (Dr. Huggins)  Follow up with Podiatry as an outpatient (Dr. Humphreys)  His foot wound dressing should be changed daily.  The area should be cleaned with betadine, then have a 4x4 gauze applied, followed by kerlix.

## 2018-12-01 NOTE — PROGRESS NOTE ADULT - PROVIDER SPECIALTY LIST ADULT
Gastroenterology
Internal Medicine
Physiatry
Physiatry
Podiatry
Internal Medicine

## 2018-12-01 NOTE — DISCHARGE NOTE ADULT - MEDICATION SUMMARY - MEDICATIONS TO TAKE
I will START or STAY ON the medications listed below when I get home from the hospital:    mesalamine 4 g/60 mL rectal enema  -- 60 milliliter(s) rectally once a day (at bedtime)  -- Indication: For as needed for constipation    aspirin 81 mg oral tablet, chewable  -- 1 tab(s) by mouth once a day  -- Indication: For anti platelet    acetaminophen 325 mg oral tablet  -- 2 tab(s) by mouth every 6 hours, As needed, Temp greater or equal to 38.5C (101.3F), Mild Pain (1 - 3)  -- Indication: For as needed for temperature    tamsulosin 0.4 mg oral capsule  -- 1 cap(s) by mouth once a day (at bedtime)  -- Indication: For for urinary retention    pregabalin 100 mg oral capsule  -- 1 cap(s) by mouth 2 times a day MDD:2 tabs  -- Indication: For for neuropathic pain    insulin lispro  -- 9 unit(s) subcutaneous 3 times a day (before meals)  -- Indication: For for diabetes    insulin glargine  -- 27 unit(s) subcutaneous once a day (at bedtime)  -- Indication: For for diabetes    atorvastatin 40 mg oral tablet  -- 1 tab(s) by mouth once a day (at bedtime)  -- Indication: For for high cholesterol    Plavix 75 mg oral tablet  -- 1 tab(s) by mouth once a day  -- Indication: For antiplatelet    Metoprolol Succinate ER 25 mg oral tablet, extended release  -- 1 tab(s) by mouth once a day  -- Indication: For for high blood pressure    cefTRIAXone 2 g intravenous injection  -- 2 gram(s) intravenous every 24 hours  -- Indication: For for Osteomyelitis of the foot    docusate sodium 100 mg oral capsule  -- 1 cap(s) by mouth 3 times a day, As Needed for constipation  -- Indication: For as needed for constipation    senna oral tablet  -- 2 tab(s) by mouth once a day (at bedtime), As Needed for constipation  -- Indication: For as needed for constipation    midodrine 5 mg oral tablet  -- 1 tab(s) by mouth 2 times a day before getting out of bed in the morning, and at 1 pm  -- Indication: For for orthostatic hypotension    cyanocobalamin 1000 mcg oral tablet  -- 1 tab(s) by mouth once a day  -- Indication: For for low vitamin    folic acid 1 mg oral tablet  -- 1 tab(s) by mouth once a day  -- Indication: For for low folate

## 2018-12-02 LAB
ALBUMIN SERPL ELPH-MCNC: 2.6 G/DL — LOW (ref 3.5–5.2)
ALP SERPL-CCNC: 130 U/L — HIGH (ref 30–115)
ALT FLD-CCNC: 31 U/L — SIGNIFICANT CHANGE UP (ref 0–41)
ANION GAP SERPL CALC-SCNC: 10 MMOL/L — SIGNIFICANT CHANGE UP (ref 7–14)
AST SERPL-CCNC: 22 U/L — SIGNIFICANT CHANGE UP (ref 0–41)
BILIRUB SERPL-MCNC: 0.4 MG/DL — SIGNIFICANT CHANGE UP (ref 0.2–1.2)
BLD GP AB SCN SERPL QL: SIGNIFICANT CHANGE UP
BUN SERPL-MCNC: 8 MG/DL — LOW (ref 10–20)
CALCIUM SERPL-MCNC: 8.9 MG/DL — SIGNIFICANT CHANGE UP (ref 8.5–10.1)
CHLORIDE SERPL-SCNC: 107 MMOL/L — SIGNIFICANT CHANGE UP (ref 98–110)
CO2 SERPL-SCNC: 26 MMOL/L — SIGNIFICANT CHANGE UP (ref 17–32)
CREAT SERPL-MCNC: 1 MG/DL — SIGNIFICANT CHANGE UP (ref 0.7–1.5)
GLUCOSE BLDC GLUCOMTR-MCNC: 157 MG/DL — HIGH (ref 70–99)
GLUCOSE BLDC GLUCOMTR-MCNC: 192 MG/DL — HIGH (ref 70–99)
GLUCOSE BLDC GLUCOMTR-MCNC: 97 MG/DL — SIGNIFICANT CHANGE UP (ref 70–99)
GLUCOSE SERPL-MCNC: 160 MG/DL — HIGH (ref 70–99)
HCT VFR BLD CALC: 27.8 % — LOW (ref 42–52)
HGB BLD-MCNC: 8.7 G/DL — LOW (ref 14–18)
MAGNESIUM SERPL-MCNC: 1.8 MG/DL — SIGNIFICANT CHANGE UP (ref 1.8–2.4)
MCHC RBC-ENTMCNC: 23.3 PG — LOW (ref 27–31)
MCHC RBC-ENTMCNC: 31.3 G/DL — LOW (ref 32–37)
MCV RBC AUTO: 74.3 FL — LOW (ref 80–94)
NRBC # BLD: 0 /100 WBCS — SIGNIFICANT CHANGE UP (ref 0–0)
PLATELET # BLD AUTO: 217 K/UL — SIGNIFICANT CHANGE UP (ref 130–400)
POTASSIUM SERPL-MCNC: 4.4 MMOL/L — SIGNIFICANT CHANGE UP (ref 3.5–5)
POTASSIUM SERPL-SCNC: 4.4 MMOL/L — SIGNIFICANT CHANGE UP (ref 3.5–5)
PROT SERPL-MCNC: 5.9 G/DL — LOW (ref 6–8)
RBC # BLD: 3.74 M/UL — LOW (ref 4.7–6.1)
RBC # FLD: 17.6 % — HIGH (ref 11.5–14.5)
SODIUM SERPL-SCNC: 143 MMOL/L — SIGNIFICANT CHANGE UP (ref 135–146)
TYPE + AB SCN PNL BLD: SIGNIFICANT CHANGE UP
WBC # BLD: 6.95 K/UL — SIGNIFICANT CHANGE UP (ref 4.8–10.8)
WBC # FLD AUTO: 6.95 K/UL — SIGNIFICANT CHANGE UP (ref 4.8–10.8)

## 2018-12-02 RX ORDER — CHLORHEXIDINE GLUCONATE 213 G/1000ML
1 SOLUTION TOPICAL
Qty: 0 | Refills: 0 | Status: DISCONTINUED | OUTPATIENT
Start: 2018-12-02 | End: 2018-12-18

## 2018-12-02 RX ADMIN — PREGABALIN 1000 MICROGRAM(S): 225 CAPSULE ORAL at 11:41

## 2018-12-02 RX ADMIN — HEPARIN SODIUM 5000 UNIT(S): 5000 INJECTION INTRAVENOUS; SUBCUTANEOUS at 12:29

## 2018-12-02 RX ADMIN — Medication 6: at 07:50

## 2018-12-02 RX ADMIN — Medication 100 MILLIGRAM(S): at 06:19

## 2018-12-02 RX ADMIN — TRAMADOL HYDROCHLORIDE 50 MILLIGRAM(S): 50 TABLET ORAL at 13:14

## 2018-12-02 RX ADMIN — Medication 100 MILLIGRAM(S): at 17:06

## 2018-12-02 RX ADMIN — Medication 100 MILLIGRAM(S): at 21:03

## 2018-12-02 RX ADMIN — ATORVASTATIN CALCIUM 40 MILLIGRAM(S): 80 TABLET, FILM COATED ORAL at 21:03

## 2018-12-02 RX ADMIN — SENNA PLUS 2 TABLET(S): 8.6 TABLET ORAL at 21:03

## 2018-12-02 RX ADMIN — TRAMADOL HYDROCHLORIDE 50 MILLIGRAM(S): 50 TABLET ORAL at 16:47

## 2018-12-02 RX ADMIN — Medication 100 MILLIGRAM(S): at 12:30

## 2018-12-02 RX ADMIN — Medication 2: at 12:28

## 2018-12-02 RX ADMIN — Medication 9 UNIT(S): at 07:48

## 2018-12-02 RX ADMIN — HEPARIN SODIUM 5000 UNIT(S): 5000 INJECTION INTRAVENOUS; SUBCUTANEOUS at 06:20

## 2018-12-02 RX ADMIN — CEFTRIAXONE 100 GRAM(S): 500 INJECTION, POWDER, FOR SOLUTION INTRAMUSCULAR; INTRAVENOUS at 17:46

## 2018-12-02 RX ADMIN — Medication 650 MILLIGRAM(S): at 19:45

## 2018-12-02 RX ADMIN — HEPARIN SODIUM 5000 UNIT(S): 5000 INJECTION INTRAVENOUS; SUBCUTANEOUS at 21:04

## 2018-12-02 RX ADMIN — CLOPIDOGREL BISULFATE 75 MILLIGRAM(S): 75 TABLET, FILM COATED ORAL at 11:41

## 2018-12-02 RX ADMIN — PANTOPRAZOLE SODIUM 40 MILLIGRAM(S): 20 TABLET, DELAYED RELEASE ORAL at 06:21

## 2018-12-02 RX ADMIN — Medication 100 MILLIGRAM(S): at 06:24

## 2018-12-02 RX ADMIN — INSULIN GLARGINE 27 UNIT(S): 100 INJECTION, SOLUTION SUBCUTANEOUS at 21:03

## 2018-12-02 RX ADMIN — Medication 1 TABLET(S): at 11:41

## 2018-12-02 RX ADMIN — SIMETHICONE 80 MILLIGRAM(S): 80 TABLET, CHEWABLE ORAL at 06:19

## 2018-12-02 RX ADMIN — SIMETHICONE 80 MILLIGRAM(S): 80 TABLET, CHEWABLE ORAL at 17:06

## 2018-12-02 RX ADMIN — TRAMADOL HYDROCHLORIDE 50 MILLIGRAM(S): 50 TABLET ORAL at 17:02

## 2018-12-02 RX ADMIN — Medication 81 MILLIGRAM(S): at 11:42

## 2018-12-02 RX ADMIN — TRAMADOL HYDROCHLORIDE 50 MILLIGRAM(S): 50 TABLET ORAL at 02:32

## 2018-12-02 RX ADMIN — Medication 650 MILLIGRAM(S): at 19:44

## 2018-12-02 RX ADMIN — ERGOCALCIFEROL 50000 UNIT(S): 1.25 CAPSULE ORAL at 11:41

## 2018-12-02 RX ADMIN — Medication 1 MILLIGRAM(S): at 11:42

## 2018-12-02 RX ADMIN — TAMSULOSIN HYDROCHLORIDE 0.4 MILLIGRAM(S): 0.4 CAPSULE ORAL at 21:03

## 2018-12-02 RX ADMIN — Medication 9 UNIT(S): at 12:27

## 2018-12-02 RX ADMIN — Medication 5 MILLIGRAM(S): at 21:03

## 2018-12-02 RX ADMIN — Medication 25 MILLIGRAM(S): at 06:23

## 2018-12-02 RX ADMIN — Medication 100 MILLIGRAM(S): at 11:41

## 2018-12-03 LAB
ALBUMIN SERPL ELPH-MCNC: 3 G/DL — LOW (ref 3.5–5.2)
ALP SERPL-CCNC: 128 U/L — HIGH (ref 30–115)
ALT FLD-CCNC: 34 U/L — SIGNIFICANT CHANGE UP (ref 0–41)
ANION GAP SERPL CALC-SCNC: 13 MMOL/L — SIGNIFICANT CHANGE UP (ref 7–14)
AST SERPL-CCNC: 25 U/L — SIGNIFICANT CHANGE UP (ref 0–41)
BILIRUB SERPL-MCNC: 0.4 MG/DL — SIGNIFICANT CHANGE UP (ref 0.2–1.2)
BUN SERPL-MCNC: 14 MG/DL — SIGNIFICANT CHANGE UP (ref 10–20)
CALCIUM SERPL-MCNC: 9 MG/DL — SIGNIFICANT CHANGE UP (ref 8.5–10.1)
CHLORIDE SERPL-SCNC: 101 MMOL/L — SIGNIFICANT CHANGE UP (ref 98–110)
CO2 SERPL-SCNC: 26 MMOL/L — SIGNIFICANT CHANGE UP (ref 17–32)
CREAT SERPL-MCNC: 1 MG/DL — SIGNIFICANT CHANGE UP (ref 0.7–1.5)
GLUCOSE BLDC GLUCOMTR-MCNC: 105 MG/DL — HIGH (ref 70–99)
GLUCOSE BLDC GLUCOMTR-MCNC: 139 MG/DL — HIGH (ref 70–99)
GLUCOSE BLDC GLUCOMTR-MCNC: 153 MG/DL — HIGH (ref 70–99)
GLUCOSE BLDC GLUCOMTR-MCNC: 169 MG/DL — HIGH (ref 70–99)
GLUCOSE BLDC GLUCOMTR-MCNC: 208 MG/DL — HIGH (ref 70–99)
GLUCOSE SERPL-MCNC: 141 MG/DL — HIGH (ref 70–99)
HCT VFR BLD CALC: 29.6 % — LOW (ref 42–52)
HGB BLD-MCNC: 9.1 G/DL — LOW (ref 14–18)
MAGNESIUM SERPL-MCNC: 1.8 MG/DL — SIGNIFICANT CHANGE UP (ref 1.8–2.4)
MCHC RBC-ENTMCNC: 23.2 PG — LOW (ref 27–31)
MCHC RBC-ENTMCNC: 30.7 G/DL — LOW (ref 32–37)
MCV RBC AUTO: 75.5 FL — LOW (ref 80–94)
NRBC # BLD: 0 /100 WBCS — SIGNIFICANT CHANGE UP (ref 0–0)
PLATELET # BLD AUTO: 229 K/UL — SIGNIFICANT CHANGE UP (ref 130–400)
POTASSIUM SERPL-MCNC: 4.1 MMOL/L — SIGNIFICANT CHANGE UP (ref 3.5–5)
POTASSIUM SERPL-SCNC: 4.1 MMOL/L — SIGNIFICANT CHANGE UP (ref 3.5–5)
PROT SERPL-MCNC: 6.4 G/DL — SIGNIFICANT CHANGE UP (ref 6–8)
RBC # BLD: 3.92 M/UL — LOW (ref 4.7–6.1)
RBC # FLD: 17.9 % — HIGH (ref 11.5–14.5)
SODIUM SERPL-SCNC: 140 MMOL/L — SIGNIFICANT CHANGE UP (ref 135–146)
SURGICAL PATHOLOGY STUDY: SIGNIFICANT CHANGE UP
WBC # BLD: 7.76 K/UL — SIGNIFICANT CHANGE UP (ref 4.8–10.8)
WBC # FLD AUTO: 7.76 K/UL — SIGNIFICANT CHANGE UP (ref 4.8–10.8)

## 2018-12-03 RX ORDER — OXYCODONE HYDROCHLORIDE 5 MG/1
5 TABLET ORAL EVERY 4 HOURS
Qty: 0 | Refills: 0 | Status: DISCONTINUED | OUTPATIENT
Start: 2018-12-03 | End: 2018-12-10

## 2018-12-03 RX ADMIN — TRAMADOL HYDROCHLORIDE 50 MILLIGRAM(S): 50 TABLET ORAL at 01:01

## 2018-12-03 RX ADMIN — Medication 9 UNIT(S): at 12:01

## 2018-12-03 RX ADMIN — Medication 1 MILLIGRAM(S): at 12:05

## 2018-12-03 RX ADMIN — Medication 100 MILLIGRAM(S): at 06:30

## 2018-12-03 RX ADMIN — OXYCODONE HYDROCHLORIDE 5 MILLIGRAM(S): 5 TABLET ORAL at 12:57

## 2018-12-03 RX ADMIN — PANTOPRAZOLE SODIUM 40 MILLIGRAM(S): 20 TABLET, DELAYED RELEASE ORAL at 07:44

## 2018-12-03 RX ADMIN — OXYCODONE HYDROCHLORIDE 5 MILLIGRAM(S): 5 TABLET ORAL at 12:58

## 2018-12-03 RX ADMIN — Medication 81 MILLIGRAM(S): at 12:05

## 2018-12-03 RX ADMIN — Medication 1 TABLET(S): at 12:04

## 2018-12-03 RX ADMIN — TAMSULOSIN HYDROCHLORIDE 0.4 MILLIGRAM(S): 0.4 CAPSULE ORAL at 21:23

## 2018-12-03 RX ADMIN — HEPARIN SODIUM 5000 UNIT(S): 5000 INJECTION INTRAVENOUS; SUBCUTANEOUS at 21:23

## 2018-12-03 RX ADMIN — Medication 100 MILLIGRAM(S): at 06:29

## 2018-12-03 RX ADMIN — SIMETHICONE 80 MILLIGRAM(S): 80 TABLET, CHEWABLE ORAL at 17:10

## 2018-12-03 RX ADMIN — HEPARIN SODIUM 5000 UNIT(S): 5000 INJECTION INTRAVENOUS; SUBCUTANEOUS at 06:30

## 2018-12-03 RX ADMIN — Medication 100 MILLIGRAM(S): at 12:05

## 2018-12-03 RX ADMIN — OXYCODONE HYDROCHLORIDE 5 MILLIGRAM(S): 5 TABLET ORAL at 18:57

## 2018-12-03 RX ADMIN — HEPARIN SODIUM 5000 UNIT(S): 5000 INJECTION INTRAVENOUS; SUBCUTANEOUS at 12:03

## 2018-12-03 RX ADMIN — OXYCODONE HYDROCHLORIDE 5 MILLIGRAM(S): 5 TABLET ORAL at 18:56

## 2018-12-03 RX ADMIN — CLOPIDOGREL BISULFATE 75 MILLIGRAM(S): 75 TABLET, FILM COATED ORAL at 12:05

## 2018-12-03 RX ADMIN — OXYCODONE HYDROCHLORIDE 5 MILLIGRAM(S): 5 TABLET ORAL at 08:07

## 2018-12-03 RX ADMIN — Medication 100 MILLIGRAM(S): at 17:10

## 2018-12-03 RX ADMIN — ATORVASTATIN CALCIUM 40 MILLIGRAM(S): 80 TABLET, FILM COATED ORAL at 21:23

## 2018-12-03 RX ADMIN — PREGABALIN 1000 MICROGRAM(S): 225 CAPSULE ORAL at 12:04

## 2018-12-03 RX ADMIN — Medication 9 UNIT(S): at 07:41

## 2018-12-03 RX ADMIN — Medication 25 MILLIGRAM(S): at 06:30

## 2018-12-03 RX ADMIN — Medication 9 UNIT(S): at 17:08

## 2018-12-03 RX ADMIN — Medication 100 MILLIGRAM(S): at 21:23

## 2018-12-03 RX ADMIN — CEFTRIAXONE 100 GRAM(S): 500 INJECTION, POWDER, FOR SOLUTION INTRAMUSCULAR; INTRAVENOUS at 18:17

## 2018-12-03 RX ADMIN — OXYCODONE HYDROCHLORIDE 5 MILLIGRAM(S): 5 TABLET ORAL at 08:06

## 2018-12-03 RX ADMIN — SIMETHICONE 80 MILLIGRAM(S): 80 TABLET, CHEWABLE ORAL at 06:30

## 2018-12-03 RX ADMIN — Medication 4: at 12:01

## 2018-12-03 RX ADMIN — INSULIN GLARGINE 27 UNIT(S): 100 INJECTION, SOLUTION SUBCUTANEOUS at 21:47

## 2018-12-03 RX ADMIN — Medication 5 MILLIGRAM(S): at 21:23

## 2018-12-03 RX ADMIN — SENNA PLUS 2 TABLET(S): 8.6 TABLET ORAL at 21:23

## 2018-12-03 RX ADMIN — OXYCODONE HYDROCHLORIDE 5 MILLIGRAM(S): 5 TABLET ORAL at 23:19

## 2018-12-04 LAB
GLUCOSE BLDC GLUCOMTR-MCNC: 115 MG/DL — HIGH (ref 70–99)
GLUCOSE BLDC GLUCOMTR-MCNC: 135 MG/DL — HIGH (ref 70–99)
GLUCOSE BLDC GLUCOMTR-MCNC: 235 MG/DL — HIGH (ref 70–99)
GLUCOSE BLDC GLUCOMTR-MCNC: 93 MG/DL — SIGNIFICANT CHANGE UP (ref 70–99)

## 2018-12-04 RX ORDER — MIDODRINE HYDROCHLORIDE 2.5 MG/1
5 TABLET ORAL
Qty: 0 | Refills: 0 | Status: DISCONTINUED | OUTPATIENT
Start: 2018-12-04 | End: 2018-12-11

## 2018-12-04 RX ADMIN — HEPARIN SODIUM 5000 UNIT(S): 5000 INJECTION INTRAVENOUS; SUBCUTANEOUS at 14:54

## 2018-12-04 RX ADMIN — OXYCODONE HYDROCHLORIDE 5 MILLIGRAM(S): 5 TABLET ORAL at 18:30

## 2018-12-04 RX ADMIN — OXYCODONE HYDROCHLORIDE 5 MILLIGRAM(S): 5 TABLET ORAL at 10:30

## 2018-12-04 RX ADMIN — Medication 1 TABLET(S): at 12:30

## 2018-12-04 RX ADMIN — CEFTRIAXONE 100 GRAM(S): 500 INJECTION, POWDER, FOR SOLUTION INTRAMUSCULAR; INTRAVENOUS at 17:49

## 2018-12-04 RX ADMIN — PANTOPRAZOLE SODIUM 40 MILLIGRAM(S): 20 TABLET, DELAYED RELEASE ORAL at 06:06

## 2018-12-04 RX ADMIN — OXYCODONE HYDROCHLORIDE 5 MILLIGRAM(S): 5 TABLET ORAL at 09:31

## 2018-12-04 RX ADMIN — Medication 9 UNIT(S): at 08:09

## 2018-12-04 RX ADMIN — SIMETHICONE 80 MILLIGRAM(S): 80 TABLET, CHEWABLE ORAL at 06:06

## 2018-12-04 RX ADMIN — HEPARIN SODIUM 5000 UNIT(S): 5000 INJECTION INTRAVENOUS; SUBCUTANEOUS at 21:09

## 2018-12-04 RX ADMIN — Medication 100 MILLIGRAM(S): at 06:06

## 2018-12-04 RX ADMIN — PREGABALIN 1000 MICROGRAM(S): 225 CAPSULE ORAL at 12:30

## 2018-12-04 RX ADMIN — INSULIN GLARGINE 27 UNIT(S): 100 INJECTION, SOLUTION SUBCUTANEOUS at 21:38

## 2018-12-04 RX ADMIN — Medication 5 MILLIGRAM(S): at 21:10

## 2018-12-04 RX ADMIN — CLOPIDOGREL BISULFATE 75 MILLIGRAM(S): 75 TABLET, FILM COATED ORAL at 12:30

## 2018-12-04 RX ADMIN — HEPARIN SODIUM 5000 UNIT(S): 5000 INJECTION INTRAVENOUS; SUBCUTANEOUS at 06:09

## 2018-12-04 RX ADMIN — OXYCODONE HYDROCHLORIDE 5 MILLIGRAM(S): 5 TABLET ORAL at 17:46

## 2018-12-04 RX ADMIN — Medication 100 MILLIGRAM(S): at 12:30

## 2018-12-04 RX ADMIN — Medication 25 MILLIGRAM(S): at 06:06

## 2018-12-04 RX ADMIN — Medication 9 UNIT(S): at 12:29

## 2018-12-04 RX ADMIN — TAMSULOSIN HYDROCHLORIDE 0.4 MILLIGRAM(S): 0.4 CAPSULE ORAL at 21:10

## 2018-12-04 RX ADMIN — Medication 1 MILLIGRAM(S): at 12:30

## 2018-12-04 RX ADMIN — SIMETHICONE 80 MILLIGRAM(S): 80 TABLET, CHEWABLE ORAL at 17:46

## 2018-12-04 RX ADMIN — ATORVASTATIN CALCIUM 40 MILLIGRAM(S): 80 TABLET, FILM COATED ORAL at 21:09

## 2018-12-04 RX ADMIN — Medication 100 MILLIGRAM(S): at 17:46

## 2018-12-04 RX ADMIN — CHLORHEXIDINE GLUCONATE 1 APPLICATION(S): 213 SOLUTION TOPICAL at 06:05

## 2018-12-04 RX ADMIN — Medication 81 MILLIGRAM(S): at 12:30

## 2018-12-04 RX ADMIN — Medication 100 MILLIGRAM(S): at 14:54

## 2018-12-04 RX ADMIN — Medication 100 MILLIGRAM(S): at 21:09

## 2018-12-04 RX ADMIN — SENNA PLUS 2 TABLET(S): 8.6 TABLET ORAL at 21:10

## 2018-12-04 RX ADMIN — OXYCODONE HYDROCHLORIDE 5 MILLIGRAM(S): 5 TABLET ORAL at 00:40

## 2018-12-05 LAB
GLUCOSE BLDC GLUCOMTR-MCNC: 133 MG/DL — HIGH (ref 70–99)
GLUCOSE BLDC GLUCOMTR-MCNC: 142 MG/DL — HIGH (ref 70–99)
GLUCOSE BLDC GLUCOMTR-MCNC: 82 MG/DL — SIGNIFICANT CHANGE UP (ref 70–99)
GLUCOSE BLDC GLUCOMTR-MCNC: 91 MG/DL — SIGNIFICANT CHANGE UP (ref 70–99)

## 2018-12-05 RX ORDER — METHOCARBAMOL 500 MG/1
750 TABLET, FILM COATED ORAL EVERY 6 HOURS
Qty: 0 | Refills: 0 | Status: DISCONTINUED | OUTPATIENT
Start: 2018-12-05 | End: 2018-12-18

## 2018-12-05 RX ADMIN — ATORVASTATIN CALCIUM 40 MILLIGRAM(S): 80 TABLET, FILM COATED ORAL at 21:18

## 2018-12-05 RX ADMIN — METHOCARBAMOL 750 MILLIGRAM(S): 500 TABLET, FILM COATED ORAL at 21:17

## 2018-12-05 RX ADMIN — SENNA PLUS 2 TABLET(S): 8.6 TABLET ORAL at 21:18

## 2018-12-05 RX ADMIN — OXYCODONE HYDROCHLORIDE 5 MILLIGRAM(S): 5 TABLET ORAL at 00:40

## 2018-12-05 RX ADMIN — Medication 100 MILLIGRAM(S): at 12:16

## 2018-12-05 RX ADMIN — CLOPIDOGREL BISULFATE 75 MILLIGRAM(S): 75 TABLET, FILM COATED ORAL at 12:16

## 2018-12-05 RX ADMIN — HEPARIN SODIUM 5000 UNIT(S): 5000 INJECTION INTRAVENOUS; SUBCUTANEOUS at 21:18

## 2018-12-05 RX ADMIN — PREGABALIN 1000 MICROGRAM(S): 225 CAPSULE ORAL at 15:03

## 2018-12-05 RX ADMIN — MIDODRINE HYDROCHLORIDE 5 MILLIGRAM(S): 2.5 TABLET ORAL at 05:48

## 2018-12-05 RX ADMIN — PANTOPRAZOLE SODIUM 40 MILLIGRAM(S): 20 TABLET, DELAYED RELEASE ORAL at 05:48

## 2018-12-05 RX ADMIN — METHOCARBAMOL 750 MILLIGRAM(S): 500 TABLET, FILM COATED ORAL at 12:17

## 2018-12-05 RX ADMIN — SIMETHICONE 80 MILLIGRAM(S): 80 TABLET, CHEWABLE ORAL at 05:47

## 2018-12-05 RX ADMIN — CEFTRIAXONE 100 GRAM(S): 500 INJECTION, POWDER, FOR SOLUTION INTRAMUSCULAR; INTRAVENOUS at 17:38

## 2018-12-05 RX ADMIN — CHLORHEXIDINE GLUCONATE 1 APPLICATION(S): 213 SOLUTION TOPICAL at 05:42

## 2018-12-05 RX ADMIN — Medication 9 UNIT(S): at 08:10

## 2018-12-05 RX ADMIN — Medication 1 TABLET(S): at 12:16

## 2018-12-05 RX ADMIN — Medication 1 MILLIGRAM(S): at 12:16

## 2018-12-05 RX ADMIN — Medication 9 UNIT(S): at 11:49

## 2018-12-05 RX ADMIN — SIMETHICONE 80 MILLIGRAM(S): 80 TABLET, CHEWABLE ORAL at 17:36

## 2018-12-05 RX ADMIN — Medication 100 MILLIGRAM(S): at 21:18

## 2018-12-05 RX ADMIN — HEPARIN SODIUM 5000 UNIT(S): 5000 INJECTION INTRAVENOUS; SUBCUTANEOUS at 15:03

## 2018-12-05 RX ADMIN — Medication 100 MILLIGRAM(S): at 17:37

## 2018-12-05 RX ADMIN — Medication 100 MILLIGRAM(S): at 05:50

## 2018-12-05 RX ADMIN — Medication 81 MILLIGRAM(S): at 12:16

## 2018-12-05 RX ADMIN — Medication 25 MILLIGRAM(S): at 05:46

## 2018-12-05 RX ADMIN — TAMSULOSIN HYDROCHLORIDE 0.4 MILLIGRAM(S): 0.4 CAPSULE ORAL at 21:18

## 2018-12-05 RX ADMIN — Medication 100 MILLIGRAM(S): at 05:46

## 2018-12-05 RX ADMIN — OXYCODONE HYDROCHLORIDE 5 MILLIGRAM(S): 5 TABLET ORAL at 18:12

## 2018-12-05 RX ADMIN — OXYCODONE HYDROCHLORIDE 5 MILLIGRAM(S): 5 TABLET ORAL at 05:46

## 2018-12-05 RX ADMIN — INSULIN GLARGINE 27 UNIT(S): 100 INJECTION, SOLUTION SUBCUTANEOUS at 21:19

## 2018-12-05 RX ADMIN — OXYCODONE HYDROCHLORIDE 5 MILLIGRAM(S): 5 TABLET ORAL at 05:48

## 2018-12-05 RX ADMIN — Medication 100 MILLIGRAM(S): at 15:03

## 2018-12-05 RX ADMIN — HEPARIN SODIUM 5000 UNIT(S): 5000 INJECTION INTRAVENOUS; SUBCUTANEOUS at 05:48

## 2018-12-06 LAB
GLUCOSE BLDC GLUCOMTR-MCNC: 139 MG/DL — HIGH (ref 70–99)
GLUCOSE BLDC GLUCOMTR-MCNC: 167 MG/DL — HIGH (ref 70–99)
GLUCOSE BLDC GLUCOMTR-MCNC: 79 MG/DL — SIGNIFICANT CHANGE UP (ref 70–99)
GLUCOSE BLDC GLUCOMTR-MCNC: 98 MG/DL — SIGNIFICANT CHANGE UP (ref 70–99)

## 2018-12-06 RX ORDER — BACITRACIN ZINC 500 UNIT/G
1 OINTMENT IN PACKET (EA) TOPICAL DAILY
Qty: 0 | Refills: 0 | Status: DISCONTINUED | OUTPATIENT
Start: 2018-12-06 | End: 2018-12-18

## 2018-12-06 RX ADMIN — Medication 100 MILLIGRAM(S): at 21:19

## 2018-12-06 RX ADMIN — Medication 5 MILLIGRAM(S): at 21:27

## 2018-12-06 RX ADMIN — OXYCODONE HYDROCHLORIDE 5 MILLIGRAM(S): 5 TABLET ORAL at 17:45

## 2018-12-06 RX ADMIN — OXYCODONE HYDROCHLORIDE 5 MILLIGRAM(S): 5 TABLET ORAL at 09:21

## 2018-12-06 RX ADMIN — CEFTRIAXONE 100 GRAM(S): 500 INJECTION, POWDER, FOR SOLUTION INTRAMUSCULAR; INTRAVENOUS at 17:45

## 2018-12-06 RX ADMIN — Medication 1 TABLET(S): at 11:56

## 2018-12-06 RX ADMIN — METHOCARBAMOL 750 MILLIGRAM(S): 500 TABLET, FILM COATED ORAL at 08:28

## 2018-12-06 RX ADMIN — Medication 100 MILLIGRAM(S): at 17:45

## 2018-12-06 RX ADMIN — METHOCARBAMOL 750 MILLIGRAM(S): 500 TABLET, FILM COATED ORAL at 20:39

## 2018-12-06 RX ADMIN — Medication 9 UNIT(S): at 11:56

## 2018-12-06 RX ADMIN — Medication 2: at 11:57

## 2018-12-06 RX ADMIN — Medication 1 APPLICATION(S): at 12:02

## 2018-12-06 RX ADMIN — INSULIN GLARGINE 27 UNIT(S): 100 INJECTION, SOLUTION SUBCUTANEOUS at 21:18

## 2018-12-06 RX ADMIN — ATORVASTATIN CALCIUM 40 MILLIGRAM(S): 80 TABLET, FILM COATED ORAL at 21:18

## 2018-12-06 RX ADMIN — Medication 100 MILLIGRAM(S): at 06:35

## 2018-12-06 RX ADMIN — TAMSULOSIN HYDROCHLORIDE 0.4 MILLIGRAM(S): 0.4 CAPSULE ORAL at 21:20

## 2018-12-06 RX ADMIN — OXYCODONE HYDROCHLORIDE 5 MILLIGRAM(S): 5 TABLET ORAL at 08:13

## 2018-12-06 RX ADMIN — SENNA PLUS 2 TABLET(S): 8.6 TABLET ORAL at 21:20

## 2018-12-06 RX ADMIN — OXYCODONE HYDROCHLORIDE 5 MILLIGRAM(S): 5 TABLET ORAL at 13:36

## 2018-12-06 RX ADMIN — Medication 100 MILLIGRAM(S): at 11:56

## 2018-12-06 RX ADMIN — Medication 1 MILLIGRAM(S): at 11:56

## 2018-12-06 RX ADMIN — CLOPIDOGREL BISULFATE 75 MILLIGRAM(S): 75 TABLET, FILM COATED ORAL at 12:01

## 2018-12-06 RX ADMIN — CHLORHEXIDINE GLUCONATE 1 APPLICATION(S): 213 SOLUTION TOPICAL at 06:25

## 2018-12-06 RX ADMIN — OXYCODONE HYDROCHLORIDE 5 MILLIGRAM(S): 5 TABLET ORAL at 00:08

## 2018-12-06 RX ADMIN — PREGABALIN 1000 MICROGRAM(S): 225 CAPSULE ORAL at 17:48

## 2018-12-06 RX ADMIN — OXYCODONE HYDROCHLORIDE 5 MILLIGRAM(S): 5 TABLET ORAL at 18:10

## 2018-12-06 RX ADMIN — Medication 100 MILLIGRAM(S): at 06:31

## 2018-12-06 RX ADMIN — OXYCODONE HYDROCHLORIDE 5 MILLIGRAM(S): 5 TABLET ORAL at 00:07

## 2018-12-06 RX ADMIN — Medication 5 MILLIGRAM(S): at 00:07

## 2018-12-06 RX ADMIN — HEPARIN SODIUM 5000 UNIT(S): 5000 INJECTION INTRAVENOUS; SUBCUTANEOUS at 13:37

## 2018-12-06 RX ADMIN — SIMETHICONE 80 MILLIGRAM(S): 80 TABLET, CHEWABLE ORAL at 06:31

## 2018-12-06 RX ADMIN — MIDODRINE HYDROCHLORIDE 5 MILLIGRAM(S): 2.5 TABLET ORAL at 06:31

## 2018-12-06 RX ADMIN — OXYCODONE HYDROCHLORIDE 5 MILLIGRAM(S): 5 TABLET ORAL at 17:46

## 2018-12-06 RX ADMIN — Medication 100 MILLIGRAM(S): at 13:36

## 2018-12-06 RX ADMIN — HEPARIN SODIUM 5000 UNIT(S): 5000 INJECTION INTRAVENOUS; SUBCUTANEOUS at 06:30

## 2018-12-06 RX ADMIN — Medication 81 MILLIGRAM(S): at 11:56

## 2018-12-06 RX ADMIN — Medication 25 MILLIGRAM(S): at 06:31

## 2018-12-06 RX ADMIN — SIMETHICONE 80 MILLIGRAM(S): 80 TABLET, CHEWABLE ORAL at 17:41

## 2018-12-06 RX ADMIN — Medication 9 UNIT(S): at 08:20

## 2018-12-06 RX ADMIN — HEPARIN SODIUM 5000 UNIT(S): 5000 INJECTION INTRAVENOUS; SUBCUTANEOUS at 21:18

## 2018-12-06 RX ADMIN — PANTOPRAZOLE SODIUM 40 MILLIGRAM(S): 20 TABLET, DELAYED RELEASE ORAL at 06:31

## 2018-12-07 LAB
GLUCOSE BLDC GLUCOMTR-MCNC: 123 MG/DL — HIGH (ref 70–99)
GLUCOSE BLDC GLUCOMTR-MCNC: 145 MG/DL — HIGH (ref 70–99)
GLUCOSE BLDC GLUCOMTR-MCNC: 198 MG/DL — HIGH (ref 70–99)
GLUCOSE BLDC GLUCOMTR-MCNC: 204 MG/DL — HIGH (ref 70–99)

## 2018-12-07 RX ADMIN — Medication 25 MILLIGRAM(S): at 05:58

## 2018-12-07 RX ADMIN — SIMETHICONE 80 MILLIGRAM(S): 80 TABLET, CHEWABLE ORAL at 05:58

## 2018-12-07 RX ADMIN — CEFTRIAXONE 100 GRAM(S): 500 INJECTION, POWDER, FOR SOLUTION INTRAMUSCULAR; INTRAVENOUS at 18:13

## 2018-12-07 RX ADMIN — ATORVASTATIN CALCIUM 40 MILLIGRAM(S): 80 TABLET, FILM COATED ORAL at 21:28

## 2018-12-07 RX ADMIN — Medication 4: at 12:43

## 2018-12-07 RX ADMIN — OXYCODONE HYDROCHLORIDE 5 MILLIGRAM(S): 5 TABLET ORAL at 00:36

## 2018-12-07 RX ADMIN — Medication 1 MILLIGRAM(S): at 12:51

## 2018-12-07 RX ADMIN — PREGABALIN 1000 MICROGRAM(S): 225 CAPSULE ORAL at 12:51

## 2018-12-07 RX ADMIN — SIMETHICONE 80 MILLIGRAM(S): 80 TABLET, CHEWABLE ORAL at 18:14

## 2018-12-07 RX ADMIN — OXYCODONE HYDROCHLORIDE 5 MILLIGRAM(S): 5 TABLET ORAL at 09:04

## 2018-12-07 RX ADMIN — Medication 100 MILLIGRAM(S): at 18:34

## 2018-12-07 RX ADMIN — INSULIN GLARGINE 27 UNIT(S): 100 INJECTION, SOLUTION SUBCUTANEOUS at 21:27

## 2018-12-07 RX ADMIN — Medication 100 MILLIGRAM(S): at 21:28

## 2018-12-07 RX ADMIN — SENNA PLUS 2 TABLET(S): 8.6 TABLET ORAL at 21:28

## 2018-12-07 RX ADMIN — HEPARIN SODIUM 5000 UNIT(S): 5000 INJECTION INTRAVENOUS; SUBCUTANEOUS at 05:58

## 2018-12-07 RX ADMIN — MIDODRINE HYDROCHLORIDE 5 MILLIGRAM(S): 2.5 TABLET ORAL at 05:58

## 2018-12-07 RX ADMIN — Medication 100 MILLIGRAM(S): at 12:52

## 2018-12-07 RX ADMIN — Medication 81 MILLIGRAM(S): at 12:52

## 2018-12-07 RX ADMIN — OXYCODONE HYDROCHLORIDE 5 MILLIGRAM(S): 5 TABLET ORAL at 18:09

## 2018-12-07 RX ADMIN — Medication 9 UNIT(S): at 18:33

## 2018-12-07 RX ADMIN — PANTOPRAZOLE SODIUM 40 MILLIGRAM(S): 20 TABLET, DELAYED RELEASE ORAL at 05:59

## 2018-12-07 RX ADMIN — Medication 100 MILLIGRAM(S): at 18:13

## 2018-12-07 RX ADMIN — TAMSULOSIN HYDROCHLORIDE 0.4 MILLIGRAM(S): 0.4 CAPSULE ORAL at 21:28

## 2018-12-07 RX ADMIN — Medication 100 MILLIGRAM(S): at 05:58

## 2018-12-07 RX ADMIN — HEPARIN SODIUM 5000 UNIT(S): 5000 INJECTION INTRAVENOUS; SUBCUTANEOUS at 21:27

## 2018-12-07 RX ADMIN — Medication 5 MILLIGRAM(S): at 21:28

## 2018-12-07 RX ADMIN — Medication 9 UNIT(S): at 08:13

## 2018-12-07 RX ADMIN — Medication 9 UNIT(S): at 12:43

## 2018-12-07 RX ADMIN — CLOPIDOGREL BISULFATE 75 MILLIGRAM(S): 75 TABLET, FILM COATED ORAL at 12:51

## 2018-12-07 RX ADMIN — HEPARIN SODIUM 5000 UNIT(S): 5000 INJECTION INTRAVENOUS; SUBCUTANEOUS at 16:22

## 2018-12-07 RX ADMIN — OXYCODONE HYDROCHLORIDE 5 MILLIGRAM(S): 5 TABLET ORAL at 18:34

## 2018-12-07 RX ADMIN — Medication 1 TABLET(S): at 12:51

## 2018-12-07 RX ADMIN — OXYCODONE HYDROCHLORIDE 5 MILLIGRAM(S): 5 TABLET ORAL at 08:16

## 2018-12-07 RX ADMIN — Medication 1 APPLICATION(S): at 13:00

## 2018-12-08 LAB
GLUCOSE BLDC GLUCOMTR-MCNC: 121 MG/DL — HIGH (ref 70–99)
GLUCOSE BLDC GLUCOMTR-MCNC: 142 MG/DL — HIGH (ref 70–99)
GLUCOSE BLDC GLUCOMTR-MCNC: 157 MG/DL — HIGH (ref 70–99)
GLUCOSE BLDC GLUCOMTR-MCNC: 81 MG/DL — SIGNIFICANT CHANGE UP (ref 70–99)

## 2018-12-08 RX ADMIN — SENNA PLUS 2 TABLET(S): 8.6 TABLET ORAL at 21:48

## 2018-12-08 RX ADMIN — Medication 1 MILLIGRAM(S): at 11:40

## 2018-12-08 RX ADMIN — HEPARIN SODIUM 5000 UNIT(S): 5000 INJECTION INTRAVENOUS; SUBCUTANEOUS at 21:47

## 2018-12-08 RX ADMIN — SIMETHICONE 80 MILLIGRAM(S): 80 TABLET, CHEWABLE ORAL at 18:05

## 2018-12-08 RX ADMIN — TAMSULOSIN HYDROCHLORIDE 0.4 MILLIGRAM(S): 0.4 CAPSULE ORAL at 21:47

## 2018-12-08 RX ADMIN — CEFTRIAXONE 100 GRAM(S): 500 INJECTION, POWDER, FOR SOLUTION INTRAMUSCULAR; INTRAVENOUS at 18:04

## 2018-12-08 RX ADMIN — Medication 1 APPLICATION(S): at 18:05

## 2018-12-08 RX ADMIN — MIDODRINE HYDROCHLORIDE 5 MILLIGRAM(S): 2.5 TABLET ORAL at 06:23

## 2018-12-08 RX ADMIN — HEPARIN SODIUM 5000 UNIT(S): 5000 INJECTION INTRAVENOUS; SUBCUTANEOUS at 06:22

## 2018-12-08 RX ADMIN — Medication 100 MILLIGRAM(S): at 11:40

## 2018-12-08 RX ADMIN — Medication 9 UNIT(S): at 16:30

## 2018-12-08 RX ADMIN — Medication 9 UNIT(S): at 08:25

## 2018-12-08 RX ADMIN — Medication 100 MILLIGRAM(S): at 18:14

## 2018-12-08 RX ADMIN — INSULIN GLARGINE 27 UNIT(S): 100 INJECTION, SOLUTION SUBCUTANEOUS at 21:49

## 2018-12-08 RX ADMIN — PANTOPRAZOLE SODIUM 40 MILLIGRAM(S): 20 TABLET, DELAYED RELEASE ORAL at 06:22

## 2018-12-08 RX ADMIN — Medication 100 MILLIGRAM(S): at 06:23

## 2018-12-08 RX ADMIN — Medication 81 MILLIGRAM(S): at 11:38

## 2018-12-08 RX ADMIN — Medication 100 MILLIGRAM(S): at 21:48

## 2018-12-08 RX ADMIN — ATORVASTATIN CALCIUM 40 MILLIGRAM(S): 80 TABLET, FILM COATED ORAL at 21:47

## 2018-12-08 RX ADMIN — CLOPIDOGREL BISULFATE 75 MILLIGRAM(S): 75 TABLET, FILM COATED ORAL at 11:39

## 2018-12-08 RX ADMIN — HEPARIN SODIUM 5000 UNIT(S): 5000 INJECTION INTRAVENOUS; SUBCUTANEOUS at 14:15

## 2018-12-08 RX ADMIN — SIMETHICONE 80 MILLIGRAM(S): 80 TABLET, CHEWABLE ORAL at 06:22

## 2018-12-08 RX ADMIN — OXYCODONE HYDROCHLORIDE 5 MILLIGRAM(S): 5 TABLET ORAL at 10:55

## 2018-12-08 RX ADMIN — PREGABALIN 1000 MICROGRAM(S): 225 CAPSULE ORAL at 11:40

## 2018-12-08 RX ADMIN — OXYCODONE HYDROCHLORIDE 5 MILLIGRAM(S): 5 TABLET ORAL at 12:58

## 2018-12-08 RX ADMIN — Medication 1 TABLET(S): at 11:39

## 2018-12-08 RX ADMIN — Medication 9 UNIT(S): at 11:37

## 2018-12-08 RX ADMIN — CHLORHEXIDINE GLUCONATE 1 APPLICATION(S): 213 SOLUTION TOPICAL at 06:18

## 2018-12-08 RX ADMIN — OXYCODONE HYDROCHLORIDE 5 MILLIGRAM(S): 5 TABLET ORAL at 20:18

## 2018-12-08 RX ADMIN — Medication 25 MILLIGRAM(S): at 06:23

## 2018-12-08 RX ADMIN — Medication 100 MILLIGRAM(S): at 06:22

## 2018-12-09 LAB
GLUCOSE BLDC GLUCOMTR-MCNC: 137 MG/DL — HIGH (ref 70–99)
GLUCOSE BLDC GLUCOMTR-MCNC: 150 MG/DL — HIGH (ref 70–99)
GLUCOSE BLDC GLUCOMTR-MCNC: 178 MG/DL — HIGH (ref 70–99)
GLUCOSE BLDC GLUCOMTR-MCNC: 179 MG/DL — HIGH (ref 70–99)

## 2018-12-09 RX ADMIN — SENNA PLUS 2 TABLET(S): 8.6 TABLET ORAL at 21:57

## 2018-12-09 RX ADMIN — OXYCODONE HYDROCHLORIDE 5 MILLIGRAM(S): 5 TABLET ORAL at 21:57

## 2018-12-09 RX ADMIN — ERGOCALCIFEROL 50000 UNIT(S): 1.25 CAPSULE ORAL at 12:48

## 2018-12-09 RX ADMIN — Medication 100 MILLIGRAM(S): at 06:33

## 2018-12-09 RX ADMIN — OXYCODONE HYDROCHLORIDE 5 MILLIGRAM(S): 5 TABLET ORAL at 00:11

## 2018-12-09 RX ADMIN — Medication 1 TABLET(S): at 12:50

## 2018-12-09 RX ADMIN — Medication 100 MILLIGRAM(S): at 14:25

## 2018-12-09 RX ADMIN — SIMETHICONE 80 MILLIGRAM(S): 80 TABLET, CHEWABLE ORAL at 18:17

## 2018-12-09 RX ADMIN — HEPARIN SODIUM 5000 UNIT(S): 5000 INJECTION INTRAVENOUS; SUBCUTANEOUS at 16:08

## 2018-12-09 RX ADMIN — MIDODRINE HYDROCHLORIDE 5 MILLIGRAM(S): 2.5 TABLET ORAL at 06:33

## 2018-12-09 RX ADMIN — ATORVASTATIN CALCIUM 40 MILLIGRAM(S): 80 TABLET, FILM COATED ORAL at 21:57

## 2018-12-09 RX ADMIN — Medication 9 UNIT(S): at 07:58

## 2018-12-09 RX ADMIN — Medication 1 APPLICATION(S): at 12:49

## 2018-12-09 RX ADMIN — Medication 5 MILLIGRAM(S): at 00:11

## 2018-12-09 RX ADMIN — Medication 9 UNIT(S): at 16:59

## 2018-12-09 RX ADMIN — HEPARIN SODIUM 5000 UNIT(S): 5000 INJECTION INTRAVENOUS; SUBCUTANEOUS at 21:56

## 2018-12-09 RX ADMIN — SIMETHICONE 80 MILLIGRAM(S): 80 TABLET, CHEWABLE ORAL at 06:35

## 2018-12-09 RX ADMIN — HEPARIN SODIUM 5000 UNIT(S): 5000 INJECTION INTRAVENOUS; SUBCUTANEOUS at 06:32

## 2018-12-09 RX ADMIN — OXYCODONE HYDROCHLORIDE 5 MILLIGRAM(S): 5 TABLET ORAL at 21:59

## 2018-12-09 RX ADMIN — Medication 100 MILLIGRAM(S): at 21:57

## 2018-12-09 RX ADMIN — INSULIN GLARGINE 27 UNIT(S): 100 INJECTION, SOLUTION SUBCUTANEOUS at 21:56

## 2018-12-09 RX ADMIN — PREGABALIN 1000 MICROGRAM(S): 225 CAPSULE ORAL at 12:48

## 2018-12-09 RX ADMIN — CHLORHEXIDINE GLUCONATE 1 APPLICATION(S): 213 SOLUTION TOPICAL at 06:29

## 2018-12-09 RX ADMIN — Medication 2: at 11:30

## 2018-12-09 RX ADMIN — Medication 9 UNIT(S): at 11:30

## 2018-12-09 RX ADMIN — Medication 25 MILLIGRAM(S): at 06:33

## 2018-12-09 RX ADMIN — Medication 81 MILLIGRAM(S): at 12:47

## 2018-12-09 RX ADMIN — Medication 100 MILLIGRAM(S): at 18:33

## 2018-12-09 RX ADMIN — CEFTRIAXONE 100 GRAM(S): 500 INJECTION, POWDER, FOR SOLUTION INTRAMUSCULAR; INTRAVENOUS at 18:16

## 2018-12-09 RX ADMIN — CLOPIDOGREL BISULFATE 75 MILLIGRAM(S): 75 TABLET, FILM COATED ORAL at 12:49

## 2018-12-09 RX ADMIN — Medication 1 MILLIGRAM(S): at 12:49

## 2018-12-09 RX ADMIN — Medication 2: at 07:57

## 2018-12-09 RX ADMIN — Medication 100 MILLIGRAM(S): at 12:51

## 2018-12-09 RX ADMIN — Medication 5 MILLIGRAM(S): at 21:57

## 2018-12-09 RX ADMIN — OXYCODONE HYDROCHLORIDE 5 MILLIGRAM(S): 5 TABLET ORAL at 00:08

## 2018-12-09 RX ADMIN — PANTOPRAZOLE SODIUM 40 MILLIGRAM(S): 20 TABLET, DELAYED RELEASE ORAL at 06:33

## 2018-12-09 RX ADMIN — TAMSULOSIN HYDROCHLORIDE 0.4 MILLIGRAM(S): 0.4 CAPSULE ORAL at 21:57

## 2018-12-10 LAB
ALBUMIN SERPL ELPH-MCNC: 3.3 G/DL — LOW (ref 3.5–5.2)
ALP SERPL-CCNC: 154 U/L — HIGH (ref 30–115)
ALT FLD-CCNC: 54 U/L — HIGH (ref 0–41)
ANION GAP SERPL CALC-SCNC: 12 MMOL/L — SIGNIFICANT CHANGE UP (ref 7–14)
AST SERPL-CCNC: 51 U/L — HIGH (ref 0–41)
BILIRUB SERPL-MCNC: 0.3 MG/DL — SIGNIFICANT CHANGE UP (ref 0.2–1.2)
BUN SERPL-MCNC: 13 MG/DL — SIGNIFICANT CHANGE UP (ref 10–20)
CALCIUM SERPL-MCNC: 8.8 MG/DL — SIGNIFICANT CHANGE UP (ref 8.5–10.1)
CHLORIDE SERPL-SCNC: 105 MMOL/L — SIGNIFICANT CHANGE UP (ref 98–110)
CO2 SERPL-SCNC: 26 MMOL/L — SIGNIFICANT CHANGE UP (ref 17–32)
CREAT SERPL-MCNC: 0.9 MG/DL — SIGNIFICANT CHANGE UP (ref 0.7–1.5)
GLUCOSE BLDC GLUCOMTR-MCNC: 126 MG/DL — HIGH (ref 70–99)
GLUCOSE BLDC GLUCOMTR-MCNC: 150 MG/DL — HIGH (ref 70–99)
GLUCOSE BLDC GLUCOMTR-MCNC: 207 MG/DL — HIGH (ref 70–99)
GLUCOSE BLDC GLUCOMTR-MCNC: 220 MG/DL — HIGH (ref 70–99)
GLUCOSE BLDC GLUCOMTR-MCNC: 228 MG/DL — HIGH (ref 70–99)
GLUCOSE BLDC GLUCOMTR-MCNC: 93 MG/DL — SIGNIFICANT CHANGE UP (ref 70–99)
GLUCOSE SERPL-MCNC: 111 MG/DL — HIGH (ref 70–99)
HCT VFR BLD CALC: 27.9 % — LOW (ref 42–52)
HGB BLD-MCNC: 8.5 G/DL — LOW (ref 14–18)
MAGNESIUM SERPL-MCNC: 1.9 MG/DL — SIGNIFICANT CHANGE UP (ref 1.8–2.4)
MCHC RBC-ENTMCNC: 23 PG — LOW (ref 27–31)
MCHC RBC-ENTMCNC: 30.5 G/DL — LOW (ref 32–37)
MCV RBC AUTO: 75.4 FL — LOW (ref 80–94)
NRBC # BLD: 0 /100 WBCS — SIGNIFICANT CHANGE UP (ref 0–0)
PLATELET # BLD AUTO: 177 K/UL — SIGNIFICANT CHANGE UP (ref 130–400)
POTASSIUM SERPL-MCNC: 4.3 MMOL/L — SIGNIFICANT CHANGE UP (ref 3.5–5)
POTASSIUM SERPL-SCNC: 4.3 MMOL/L — SIGNIFICANT CHANGE UP (ref 3.5–5)
PROT SERPL-MCNC: 6 G/DL — SIGNIFICANT CHANGE UP (ref 6–8)
RBC # BLD: 3.7 M/UL — LOW (ref 4.7–6.1)
RBC # FLD: 17.2 % — HIGH (ref 11.5–14.5)
SODIUM SERPL-SCNC: 143 MMOL/L — SIGNIFICANT CHANGE UP (ref 135–146)
WBC # BLD: 6.51 K/UL — SIGNIFICANT CHANGE UP (ref 4.8–10.8)
WBC # FLD AUTO: 6.51 K/UL — SIGNIFICANT CHANGE UP (ref 4.8–10.8)

## 2018-12-10 RX ADMIN — SIMETHICONE 80 MILLIGRAM(S): 80 TABLET, CHEWABLE ORAL at 18:21

## 2018-12-10 RX ADMIN — Medication 9 UNIT(S): at 11:52

## 2018-12-10 RX ADMIN — Medication 5 MILLIGRAM(S): at 21:04

## 2018-12-10 RX ADMIN — OXYCODONE HYDROCHLORIDE 5 MILLIGRAM(S): 5 TABLET ORAL at 05:01

## 2018-12-10 RX ADMIN — HEPARIN SODIUM 5000 UNIT(S): 5000 INJECTION INTRAVENOUS; SUBCUTANEOUS at 21:04

## 2018-12-10 RX ADMIN — INSULIN GLARGINE 27 UNIT(S): 100 INJECTION, SOLUTION SUBCUTANEOUS at 21:04

## 2018-12-10 RX ADMIN — HEPARIN SODIUM 5000 UNIT(S): 5000 INJECTION INTRAVENOUS; SUBCUTANEOUS at 05:55

## 2018-12-10 RX ADMIN — Medication 100 MILLIGRAM(S): at 11:55

## 2018-12-10 RX ADMIN — Medication 100 MILLIGRAM(S): at 21:04

## 2018-12-10 RX ADMIN — OXYCODONE HYDROCHLORIDE 5 MILLIGRAM(S): 5 TABLET ORAL at 05:04

## 2018-12-10 RX ADMIN — Medication 1 TABLET(S): at 11:54

## 2018-12-10 RX ADMIN — Medication 100 MILLIGRAM(S): at 14:47

## 2018-12-10 RX ADMIN — CLOPIDOGREL BISULFATE 75 MILLIGRAM(S): 75 TABLET, FILM COATED ORAL at 11:55

## 2018-12-10 RX ADMIN — Medication 1 MILLIGRAM(S): at 11:55

## 2018-12-10 RX ADMIN — OXYCODONE HYDROCHLORIDE 5 MILLIGRAM(S): 5 TABLET ORAL at 08:38

## 2018-12-10 RX ADMIN — Medication 100 MILLIGRAM(S): at 05:59

## 2018-12-10 RX ADMIN — SIMETHICONE 80 MILLIGRAM(S): 80 TABLET, CHEWABLE ORAL at 05:56

## 2018-12-10 RX ADMIN — MIDODRINE HYDROCHLORIDE 5 MILLIGRAM(S): 2.5 TABLET ORAL at 05:56

## 2018-12-10 RX ADMIN — Medication 100 MILLIGRAM(S): at 05:56

## 2018-12-10 RX ADMIN — TAMSULOSIN HYDROCHLORIDE 0.4 MILLIGRAM(S): 0.4 CAPSULE ORAL at 21:04

## 2018-12-10 RX ADMIN — PREGABALIN 1000 MICROGRAM(S): 225 CAPSULE ORAL at 14:47

## 2018-12-10 RX ADMIN — OXYCODONE HYDROCHLORIDE 5 MILLIGRAM(S): 5 TABLET ORAL at 22:00

## 2018-12-10 RX ADMIN — OXYCODONE HYDROCHLORIDE 5 MILLIGRAM(S): 5 TABLET ORAL at 22:27

## 2018-12-10 RX ADMIN — Medication 9 UNIT(S): at 08:30

## 2018-12-10 RX ADMIN — Medication 25 MILLIGRAM(S): at 05:56

## 2018-12-10 RX ADMIN — ATORVASTATIN CALCIUM 40 MILLIGRAM(S): 80 TABLET, FILM COATED ORAL at 21:04

## 2018-12-10 RX ADMIN — HEPARIN SODIUM 5000 UNIT(S): 5000 INJECTION INTRAVENOUS; SUBCUTANEOUS at 14:47

## 2018-12-10 RX ADMIN — OXYCODONE HYDROCHLORIDE 5 MILLIGRAM(S): 5 TABLET ORAL at 08:37

## 2018-12-10 RX ADMIN — SENNA PLUS 2 TABLET(S): 8.6 TABLET ORAL at 21:06

## 2018-12-10 RX ADMIN — Medication 100 MILLIGRAM(S): at 18:28

## 2018-12-10 RX ADMIN — CEFTRIAXONE 100 GRAM(S): 500 INJECTION, POWDER, FOR SOLUTION INTRAMUSCULAR; INTRAVENOUS at 18:36

## 2018-12-10 RX ADMIN — Medication 81 MILLIGRAM(S): at 11:53

## 2018-12-10 RX ADMIN — PANTOPRAZOLE SODIUM 40 MILLIGRAM(S): 20 TABLET, DELAYED RELEASE ORAL at 06:19

## 2018-12-10 RX ADMIN — Medication 1 APPLICATION(S): at 11:54

## 2018-12-11 LAB
GLUCOSE BLDC GLUCOMTR-MCNC: 102 MG/DL — HIGH (ref 70–99)
GLUCOSE BLDC GLUCOMTR-MCNC: 138 MG/DL — HIGH (ref 70–99)
GLUCOSE BLDC GLUCOMTR-MCNC: 143 MG/DL — HIGH (ref 70–99)
GLUCOSE BLDC GLUCOMTR-MCNC: 146 MG/DL — HIGH (ref 70–99)

## 2018-12-11 RX ORDER — OXYCODONE HYDROCHLORIDE 5 MG/1
5 TABLET ORAL EVERY 4 HOURS
Qty: 0 | Refills: 0 | Status: DISCONTINUED | OUTPATIENT
Start: 2018-12-11 | End: 2018-12-12

## 2018-12-11 RX ORDER — MIDODRINE HYDROCHLORIDE 2.5 MG/1
5 TABLET ORAL
Qty: 0 | Refills: 0 | Status: DISCONTINUED | OUTPATIENT
Start: 2018-12-11 | End: 2018-12-18

## 2018-12-11 RX ORDER — TRAMADOL HYDROCHLORIDE 50 MG/1
50 TABLET ORAL EVERY 6 HOURS
Qty: 0 | Refills: 0 | Status: DISCONTINUED | OUTPATIENT
Start: 2018-12-11 | End: 2018-12-12

## 2018-12-11 RX ORDER — SODIUM CHLORIDE 9 MG/ML
1 INJECTION INTRAMUSCULAR; INTRAVENOUS; SUBCUTANEOUS THREE TIMES A DAY
Qty: 0 | Refills: 0 | Status: DISCONTINUED | OUTPATIENT
Start: 2018-12-11 | End: 2018-12-18

## 2018-12-11 RX ADMIN — PANTOPRAZOLE SODIUM 40 MILLIGRAM(S): 20 TABLET, DELAYED RELEASE ORAL at 07:59

## 2018-12-11 RX ADMIN — OXYCODONE HYDROCHLORIDE 5 MILLIGRAM(S): 5 TABLET ORAL at 20:23

## 2018-12-11 RX ADMIN — SIMETHICONE 80 MILLIGRAM(S): 80 TABLET, CHEWABLE ORAL at 05:17

## 2018-12-11 RX ADMIN — SODIUM CHLORIDE 1 GRAM(S): 9 INJECTION INTRAMUSCULAR; INTRAVENOUS; SUBCUTANEOUS at 21:53

## 2018-12-11 RX ADMIN — HEPARIN SODIUM 5000 UNIT(S): 5000 INJECTION INTRAVENOUS; SUBCUTANEOUS at 13:23

## 2018-12-11 RX ADMIN — HEPARIN SODIUM 5000 UNIT(S): 5000 INJECTION INTRAVENOUS; SUBCUTANEOUS at 21:51

## 2018-12-11 RX ADMIN — Medication 100 MILLIGRAM(S): at 18:33

## 2018-12-11 RX ADMIN — Medication 100 MILLIGRAM(S): at 21:52

## 2018-12-11 RX ADMIN — Medication 100 MILLIGRAM(S): at 05:16

## 2018-12-11 RX ADMIN — Medication 81 MILLIGRAM(S): at 11:42

## 2018-12-11 RX ADMIN — Medication 100 MILLIGRAM(S): at 11:59

## 2018-12-11 RX ADMIN — SIMETHICONE 80 MILLIGRAM(S): 80 TABLET, CHEWABLE ORAL at 18:28

## 2018-12-11 RX ADMIN — ATORVASTATIN CALCIUM 40 MILLIGRAM(S): 80 TABLET, FILM COATED ORAL at 21:52

## 2018-12-11 RX ADMIN — SODIUM CHLORIDE 1 GRAM(S): 9 INJECTION INTRAMUSCULAR; INTRAVENOUS; SUBCUTANEOUS at 18:27

## 2018-12-11 RX ADMIN — PREGABALIN 1000 MICROGRAM(S): 225 CAPSULE ORAL at 12:00

## 2018-12-11 RX ADMIN — Medication 1 TABLET(S): at 11:59

## 2018-12-11 RX ADMIN — Medication 100 MILLIGRAM(S): at 13:25

## 2018-12-11 RX ADMIN — Medication 9 UNIT(S): at 11:56

## 2018-12-11 RX ADMIN — MIDODRINE HYDROCHLORIDE 5 MILLIGRAM(S): 2.5 TABLET ORAL at 05:50

## 2018-12-11 RX ADMIN — Medication 5 MILLIGRAM(S): at 21:52

## 2018-12-11 RX ADMIN — INSULIN GLARGINE 27 UNIT(S): 100 INJECTION, SOLUTION SUBCUTANEOUS at 21:52

## 2018-12-11 RX ADMIN — Medication 25 MILLIGRAM(S): at 05:16

## 2018-12-11 RX ADMIN — CLOPIDOGREL BISULFATE 75 MILLIGRAM(S): 75 TABLET, FILM COATED ORAL at 11:58

## 2018-12-11 RX ADMIN — HEPARIN SODIUM 5000 UNIT(S): 5000 INJECTION INTRAVENOUS; SUBCUTANEOUS at 05:17

## 2018-12-11 RX ADMIN — TAMSULOSIN HYDROCHLORIDE 0.4 MILLIGRAM(S): 0.4 CAPSULE ORAL at 21:53

## 2018-12-11 RX ADMIN — Medication 100 MILLIGRAM(S): at 05:18

## 2018-12-11 RX ADMIN — CEFTRIAXONE 100 GRAM(S): 500 INJECTION, POWDER, FOR SOLUTION INTRAMUSCULAR; INTRAVENOUS at 18:31

## 2018-12-11 RX ADMIN — Medication 1 MILLIGRAM(S): at 11:59

## 2018-12-11 RX ADMIN — Medication 1 APPLICATION(S): at 11:42

## 2018-12-11 RX ADMIN — Medication 9 UNIT(S): at 07:58

## 2018-12-11 RX ADMIN — OXYCODONE HYDROCHLORIDE 5 MILLIGRAM(S): 5 TABLET ORAL at 20:24

## 2018-12-11 RX ADMIN — SENNA PLUS 2 TABLET(S): 8.6 TABLET ORAL at 21:53

## 2018-12-11 RX ADMIN — MIDODRINE HYDROCHLORIDE 5 MILLIGRAM(S): 2.5 TABLET ORAL at 12:01

## 2018-12-12 LAB
GLUCOSE BLDC GLUCOMTR-MCNC: 158 MG/DL — HIGH (ref 70–99)
GLUCOSE BLDC GLUCOMTR-MCNC: 173 MG/DL — HIGH (ref 70–99)
GLUCOSE BLDC GLUCOMTR-MCNC: 177 MG/DL — HIGH (ref 70–99)
GLUCOSE BLDC GLUCOMTR-MCNC: 80 MG/DL — SIGNIFICANT CHANGE UP (ref 70–99)

## 2018-12-12 RX ORDER — OXYCODONE HYDROCHLORIDE 5 MG/1
5 TABLET ORAL EVERY 4 HOURS
Qty: 0 | Refills: 0 | Status: DISCONTINUED | OUTPATIENT
Start: 2018-12-12 | End: 2018-12-16

## 2018-12-12 RX ORDER — TRAMADOL HYDROCHLORIDE 50 MG/1
50 TABLET ORAL EVERY 6 HOURS
Qty: 0 | Refills: 0 | Status: DISCONTINUED | OUTPATIENT
Start: 2018-12-12 | End: 2018-12-18

## 2018-12-12 RX ADMIN — Medication 9 UNIT(S): at 12:33

## 2018-12-12 RX ADMIN — HEPARIN SODIUM 5000 UNIT(S): 5000 INJECTION INTRAVENOUS; SUBCUTANEOUS at 15:30

## 2018-12-12 RX ADMIN — HEPARIN SODIUM 5000 UNIT(S): 5000 INJECTION INTRAVENOUS; SUBCUTANEOUS at 21:59

## 2018-12-12 RX ADMIN — SIMETHICONE 80 MILLIGRAM(S): 80 TABLET, CHEWABLE ORAL at 06:32

## 2018-12-12 RX ADMIN — CHLORHEXIDINE GLUCONATE 1 APPLICATION(S): 213 SOLUTION TOPICAL at 06:26

## 2018-12-12 RX ADMIN — Medication 100 MILLIGRAM(S): at 06:32

## 2018-12-12 RX ADMIN — Medication 650 MILLIGRAM(S): at 12:18

## 2018-12-12 RX ADMIN — SODIUM CHLORIDE 1 GRAM(S): 9 INJECTION INTRAMUSCULAR; INTRAVENOUS; SUBCUTANEOUS at 21:58

## 2018-12-12 RX ADMIN — Medication 2: at 12:32

## 2018-12-12 RX ADMIN — HEPARIN SODIUM 5000 UNIT(S): 5000 INJECTION INTRAVENOUS; SUBCUTANEOUS at 06:32

## 2018-12-12 RX ADMIN — SODIUM CHLORIDE 1 GRAM(S): 9 INJECTION INTRAMUSCULAR; INTRAVENOUS; SUBCUTANEOUS at 15:30

## 2018-12-12 RX ADMIN — SENNA PLUS 2 TABLET(S): 8.6 TABLET ORAL at 21:57

## 2018-12-12 RX ADMIN — PREGABALIN 1000 MICROGRAM(S): 225 CAPSULE ORAL at 12:24

## 2018-12-12 RX ADMIN — SODIUM CHLORIDE 1 GRAM(S): 9 INJECTION INTRAMUSCULAR; INTRAVENOUS; SUBCUTANEOUS at 06:32

## 2018-12-12 RX ADMIN — Medication 5 MILLIGRAM(S): at 23:05

## 2018-12-12 RX ADMIN — Medication 100 MILLIGRAM(S): at 18:20

## 2018-12-12 RX ADMIN — Medication 100 MILLIGRAM(S): at 12:22

## 2018-12-12 RX ADMIN — Medication 25 MILLIGRAM(S): at 06:33

## 2018-12-12 RX ADMIN — Medication 1 MILLIGRAM(S): at 12:22

## 2018-12-12 RX ADMIN — CLOPIDOGREL BISULFATE 75 MILLIGRAM(S): 75 TABLET, FILM COATED ORAL at 12:22

## 2018-12-12 RX ADMIN — OXYCODONE HYDROCHLORIDE 5 MILLIGRAM(S): 5 TABLET ORAL at 08:48

## 2018-12-12 RX ADMIN — MIDODRINE HYDROCHLORIDE 5 MILLIGRAM(S): 2.5 TABLET ORAL at 06:32

## 2018-12-12 RX ADMIN — Medication 2: at 08:10

## 2018-12-12 RX ADMIN — INSULIN GLARGINE 27 UNIT(S): 100 INJECTION, SOLUTION SUBCUTANEOUS at 23:05

## 2018-12-12 RX ADMIN — Medication 650 MILLIGRAM(S): at 15:35

## 2018-12-12 RX ADMIN — CEFTRIAXONE 100 GRAM(S): 500 INJECTION, POWDER, FOR SOLUTION INTRAMUSCULAR; INTRAVENOUS at 18:14

## 2018-12-12 RX ADMIN — ATORVASTATIN CALCIUM 40 MILLIGRAM(S): 80 TABLET, FILM COATED ORAL at 22:23

## 2018-12-12 RX ADMIN — SIMETHICONE 80 MILLIGRAM(S): 80 TABLET, CHEWABLE ORAL at 18:18

## 2018-12-12 RX ADMIN — MIDODRINE HYDROCHLORIDE 5 MILLIGRAM(S): 2.5 TABLET ORAL at 12:23

## 2018-12-12 RX ADMIN — Medication 9 UNIT(S): at 08:10

## 2018-12-12 RX ADMIN — OXYCODONE HYDROCHLORIDE 5 MILLIGRAM(S): 5 TABLET ORAL at 15:34

## 2018-12-12 RX ADMIN — Medication 1 APPLICATION(S): at 12:24

## 2018-12-12 RX ADMIN — TAMSULOSIN HYDROCHLORIDE 0.4 MILLIGRAM(S): 0.4 CAPSULE ORAL at 21:58

## 2018-12-12 RX ADMIN — Medication 100 MILLIGRAM(S): at 15:30

## 2018-12-12 RX ADMIN — Medication 1 TABLET(S): at 12:24

## 2018-12-12 RX ADMIN — Medication 81 MILLIGRAM(S): at 12:23

## 2018-12-12 RX ADMIN — Medication 100 MILLIGRAM(S): at 21:57

## 2018-12-12 RX ADMIN — OXYCODONE HYDROCHLORIDE 5 MILLIGRAM(S): 5 TABLET ORAL at 21:57

## 2018-12-12 RX ADMIN — PANTOPRAZOLE SODIUM 40 MILLIGRAM(S): 20 TABLET, DELAYED RELEASE ORAL at 06:32

## 2018-12-13 LAB
GLUCOSE BLDC GLUCOMTR-MCNC: 104 MG/DL — HIGH (ref 70–99)
GLUCOSE BLDC GLUCOMTR-MCNC: 126 MG/DL — HIGH (ref 70–99)
GLUCOSE BLDC GLUCOMTR-MCNC: 209 MG/DL — HIGH (ref 70–99)
GLUCOSE BLDC GLUCOMTR-MCNC: 75 MG/DL — SIGNIFICANT CHANGE UP (ref 70–99)

## 2018-12-13 RX ADMIN — Medication 1 APPLICATION(S): at 12:47

## 2018-12-13 RX ADMIN — CLOPIDOGREL BISULFATE 75 MILLIGRAM(S): 75 TABLET, FILM COATED ORAL at 12:46

## 2018-12-13 RX ADMIN — MIDODRINE HYDROCHLORIDE 5 MILLIGRAM(S): 2.5 TABLET ORAL at 05:05

## 2018-12-13 RX ADMIN — OXYCODONE HYDROCHLORIDE 5 MILLIGRAM(S): 5 TABLET ORAL at 13:46

## 2018-12-13 RX ADMIN — Medication 100 MILLIGRAM(S): at 05:07

## 2018-12-13 RX ADMIN — SENNA PLUS 2 TABLET(S): 8.6 TABLET ORAL at 21:10

## 2018-12-13 RX ADMIN — HEPARIN SODIUM 5000 UNIT(S): 5000 INJECTION INTRAVENOUS; SUBCUTANEOUS at 21:12

## 2018-12-13 RX ADMIN — Medication 25 MILLIGRAM(S): at 05:08

## 2018-12-13 RX ADMIN — SIMETHICONE 80 MILLIGRAM(S): 80 TABLET, CHEWABLE ORAL at 05:04

## 2018-12-13 RX ADMIN — HEPARIN SODIUM 5000 UNIT(S): 5000 INJECTION INTRAVENOUS; SUBCUTANEOUS at 13:00

## 2018-12-13 RX ADMIN — OXYCODONE HYDROCHLORIDE 5 MILLIGRAM(S): 5 TABLET ORAL at 22:08

## 2018-12-13 RX ADMIN — Medication 100 MILLIGRAM(S): at 12:46

## 2018-12-13 RX ADMIN — TAMSULOSIN HYDROCHLORIDE 0.4 MILLIGRAM(S): 0.4 CAPSULE ORAL at 21:09

## 2018-12-13 RX ADMIN — CEFTRIAXONE 100 GRAM(S): 500 INJECTION, POWDER, FOR SOLUTION INTRAMUSCULAR; INTRAVENOUS at 17:39

## 2018-12-13 RX ADMIN — SODIUM CHLORIDE 1 GRAM(S): 9 INJECTION INTRAMUSCULAR; INTRAVENOUS; SUBCUTANEOUS at 14:26

## 2018-12-13 RX ADMIN — Medication 100 MILLIGRAM(S): at 21:12

## 2018-12-13 RX ADMIN — Medication 100 MILLIGRAM(S): at 05:05

## 2018-12-13 RX ADMIN — SIMETHICONE 80 MILLIGRAM(S): 80 TABLET, CHEWABLE ORAL at 17:41

## 2018-12-13 RX ADMIN — ATORVASTATIN CALCIUM 40 MILLIGRAM(S): 80 TABLET, FILM COATED ORAL at 21:09

## 2018-12-13 RX ADMIN — MIDODRINE HYDROCHLORIDE 5 MILLIGRAM(S): 2.5 TABLET ORAL at 12:46

## 2018-12-13 RX ADMIN — Medication 100 MILLIGRAM(S): at 12:59

## 2018-12-13 RX ADMIN — PANTOPRAZOLE SODIUM 40 MILLIGRAM(S): 20 TABLET, DELAYED RELEASE ORAL at 05:05

## 2018-12-13 RX ADMIN — Medication 81 MILLIGRAM(S): at 12:47

## 2018-12-13 RX ADMIN — Medication 9 UNIT(S): at 08:32

## 2018-12-13 RX ADMIN — Medication 100 MILLIGRAM(S): at 17:46

## 2018-12-13 RX ADMIN — Medication 5 MILLIGRAM(S): at 21:13

## 2018-12-13 RX ADMIN — Medication 1 TABLET(S): at 12:46

## 2018-12-13 RX ADMIN — OXYCODONE HYDROCHLORIDE 5 MILLIGRAM(S): 5 TABLET ORAL at 12:59

## 2018-12-13 RX ADMIN — SODIUM CHLORIDE 1 GRAM(S): 9 INJECTION INTRAMUSCULAR; INTRAVENOUS; SUBCUTANEOUS at 21:12

## 2018-12-13 RX ADMIN — HEPARIN SODIUM 5000 UNIT(S): 5000 INJECTION INTRAVENOUS; SUBCUTANEOUS at 05:05

## 2018-12-13 RX ADMIN — Medication 9 UNIT(S): at 12:41

## 2018-12-13 RX ADMIN — SODIUM CHLORIDE 1 GRAM(S): 9 INJECTION INTRAMUSCULAR; INTRAVENOUS; SUBCUTANEOUS at 05:04

## 2018-12-13 RX ADMIN — INSULIN GLARGINE 27 UNIT(S): 100 INJECTION, SOLUTION SUBCUTANEOUS at 21:13

## 2018-12-13 RX ADMIN — OXYCODONE HYDROCHLORIDE 5 MILLIGRAM(S): 5 TABLET ORAL at 08:36

## 2018-12-13 RX ADMIN — PREGABALIN 1000 MICROGRAM(S): 225 CAPSULE ORAL at 12:45

## 2018-12-13 RX ADMIN — Medication 1 MILLIGRAM(S): at 12:46

## 2018-12-14 LAB
GLUCOSE BLDC GLUCOMTR-MCNC: 105 MG/DL — HIGH (ref 70–99)
GLUCOSE BLDC GLUCOMTR-MCNC: 111 MG/DL — HIGH (ref 70–99)
GLUCOSE BLDC GLUCOMTR-MCNC: 130 MG/DL — HIGH (ref 70–99)
GLUCOSE BLDC GLUCOMTR-MCNC: 177 MG/DL — HIGH (ref 70–99)

## 2018-12-14 RX ADMIN — ATORVASTATIN CALCIUM 40 MILLIGRAM(S): 80 TABLET, FILM COATED ORAL at 21:58

## 2018-12-14 RX ADMIN — HEPARIN SODIUM 5000 UNIT(S): 5000 INJECTION INTRAVENOUS; SUBCUTANEOUS at 05:49

## 2018-12-14 RX ADMIN — Medication 100 MILLIGRAM(S): at 12:09

## 2018-12-14 RX ADMIN — TAMSULOSIN HYDROCHLORIDE 0.4 MILLIGRAM(S): 0.4 CAPSULE ORAL at 21:58

## 2018-12-14 RX ADMIN — OXYCODONE HYDROCHLORIDE 5 MILLIGRAM(S): 5 TABLET ORAL at 02:31

## 2018-12-14 RX ADMIN — SODIUM CHLORIDE 1 GRAM(S): 9 INJECTION INTRAMUSCULAR; INTRAVENOUS; SUBCUTANEOUS at 05:46

## 2018-12-14 RX ADMIN — OXYCODONE HYDROCHLORIDE 5 MILLIGRAM(S): 5 TABLET ORAL at 02:29

## 2018-12-14 RX ADMIN — OXYCODONE HYDROCHLORIDE 5 MILLIGRAM(S): 5 TABLET ORAL at 13:00

## 2018-12-14 RX ADMIN — Medication 25 MILLIGRAM(S): at 05:47

## 2018-12-14 RX ADMIN — PANTOPRAZOLE SODIUM 40 MILLIGRAM(S): 20 TABLET, DELAYED RELEASE ORAL at 08:24

## 2018-12-14 RX ADMIN — PREGABALIN 1000 MICROGRAM(S): 225 CAPSULE ORAL at 12:08

## 2018-12-14 RX ADMIN — Medication 100 MILLIGRAM(S): at 21:59

## 2018-12-14 RX ADMIN — SENNA PLUS 2 TABLET(S): 8.6 TABLET ORAL at 21:59

## 2018-12-14 RX ADMIN — HEPARIN SODIUM 5000 UNIT(S): 5000 INJECTION INTRAVENOUS; SUBCUTANEOUS at 22:00

## 2018-12-14 RX ADMIN — MIDODRINE HYDROCHLORIDE 5 MILLIGRAM(S): 2.5 TABLET ORAL at 08:01

## 2018-12-14 RX ADMIN — Medication 100 MILLIGRAM(S): at 17:23

## 2018-12-14 RX ADMIN — Medication 100 MILLIGRAM(S): at 05:46

## 2018-12-14 RX ADMIN — CLOPIDOGREL BISULFATE 75 MILLIGRAM(S): 75 TABLET, FILM COATED ORAL at 12:08

## 2018-12-14 RX ADMIN — OXYCODONE HYDROCHLORIDE 5 MILLIGRAM(S): 5 TABLET ORAL at 08:24

## 2018-12-14 RX ADMIN — SIMETHICONE 80 MILLIGRAM(S): 80 TABLET, CHEWABLE ORAL at 17:25

## 2018-12-14 RX ADMIN — OXYCODONE HYDROCHLORIDE 5 MILLIGRAM(S): 5 TABLET ORAL at 08:23

## 2018-12-14 RX ADMIN — Medication 9 UNIT(S): at 07:58

## 2018-12-14 RX ADMIN — Medication 81 MILLIGRAM(S): at 12:08

## 2018-12-14 RX ADMIN — CEFTRIAXONE 100 GRAM(S): 500 INJECTION, POWDER, FOR SOLUTION INTRAMUSCULAR; INTRAVENOUS at 20:52

## 2018-12-14 RX ADMIN — INSULIN GLARGINE 27 UNIT(S): 100 INJECTION, SOLUTION SUBCUTANEOUS at 22:14

## 2018-12-14 RX ADMIN — Medication 1 MILLIGRAM(S): at 12:10

## 2018-12-14 RX ADMIN — Medication 100 MILLIGRAM(S): at 12:08

## 2018-12-14 RX ADMIN — OXYCODONE HYDROCHLORIDE 5 MILLIGRAM(S): 5 TABLET ORAL at 03:35

## 2018-12-14 RX ADMIN — Medication 5 MILLIGRAM(S): at 21:58

## 2018-12-14 RX ADMIN — SODIUM CHLORIDE 1 GRAM(S): 9 INJECTION INTRAMUSCULAR; INTRAVENOUS; SUBCUTANEOUS at 12:08

## 2018-12-14 RX ADMIN — Medication 1 APPLICATION(S): at 12:06

## 2018-12-14 RX ADMIN — SIMETHICONE 80 MILLIGRAM(S): 80 TABLET, CHEWABLE ORAL at 05:48

## 2018-12-14 RX ADMIN — Medication 1 TABLET(S): at 12:08

## 2018-12-14 RX ADMIN — Medication 100 MILLIGRAM(S): at 05:48

## 2018-12-14 RX ADMIN — MIDODRINE HYDROCHLORIDE 5 MILLIGRAM(S): 2.5 TABLET ORAL at 12:09

## 2018-12-14 RX ADMIN — Medication 9 UNIT(S): at 12:07

## 2018-12-14 RX ADMIN — Medication 9 UNIT(S): at 17:24

## 2018-12-14 RX ADMIN — HEPARIN SODIUM 5000 UNIT(S): 5000 INJECTION INTRAVENOUS; SUBCUTANEOUS at 12:10

## 2018-12-14 RX ADMIN — OXYCODONE HYDROCHLORIDE 5 MILLIGRAM(S): 5 TABLET ORAL at 12:59

## 2018-12-15 LAB
GLUCOSE BLDC GLUCOMTR-MCNC: 106 MG/DL — HIGH (ref 70–99)
GLUCOSE BLDC GLUCOMTR-MCNC: 112 MG/DL — HIGH (ref 70–99)
GLUCOSE BLDC GLUCOMTR-MCNC: 116 MG/DL — HIGH (ref 70–99)
GLUCOSE BLDC GLUCOMTR-MCNC: 208 MG/DL — HIGH (ref 70–99)

## 2018-12-15 RX ORDER — OXYCODONE HYDROCHLORIDE 5 MG/1
5 TABLET ORAL EVERY 4 HOURS
Qty: 0 | Refills: 0 | Status: DISCONTINUED | OUTPATIENT
Start: 2018-12-15 | End: 2018-12-18

## 2018-12-15 RX ORDER — TRAMADOL HYDROCHLORIDE 50 MG/1
50 TABLET ORAL EVERY 6 HOURS
Qty: 0 | Refills: 0 | Status: CANCELLED | OUTPATIENT
Start: 2018-12-20 | End: 2018-12-18

## 2018-12-15 RX ADMIN — PANTOPRAZOLE SODIUM 40 MILLIGRAM(S): 20 TABLET, DELAYED RELEASE ORAL at 06:11

## 2018-12-15 RX ADMIN — PREGABALIN 1000 MICROGRAM(S): 225 CAPSULE ORAL at 11:56

## 2018-12-15 RX ADMIN — Medication 100 MILLIGRAM(S): at 21:06

## 2018-12-15 RX ADMIN — Medication 9 UNIT(S): at 08:07

## 2018-12-15 RX ADMIN — Medication 1 MILLIGRAM(S): at 11:56

## 2018-12-15 RX ADMIN — SENNA PLUS 2 TABLET(S): 8.6 TABLET ORAL at 21:06

## 2018-12-15 RX ADMIN — Medication 100 MILLIGRAM(S): at 11:56

## 2018-12-15 RX ADMIN — SODIUM CHLORIDE 1 GRAM(S): 9 INJECTION INTRAMUSCULAR; INTRAVENOUS; SUBCUTANEOUS at 21:06

## 2018-12-15 RX ADMIN — Medication 1 APPLICATION(S): at 11:56

## 2018-12-15 RX ADMIN — SODIUM CHLORIDE 1 GRAM(S): 9 INJECTION INTRAMUSCULAR; INTRAVENOUS; SUBCUTANEOUS at 14:26

## 2018-12-15 RX ADMIN — HEPARIN SODIUM 5000 UNIT(S): 5000 INJECTION INTRAVENOUS; SUBCUTANEOUS at 21:05

## 2018-12-15 RX ADMIN — OXYCODONE HYDROCHLORIDE 5 MILLIGRAM(S): 5 TABLET ORAL at 20:54

## 2018-12-15 RX ADMIN — Medication 81 MILLIGRAM(S): at 11:56

## 2018-12-15 RX ADMIN — Medication 4: at 11:55

## 2018-12-15 RX ADMIN — OXYCODONE HYDROCHLORIDE 5 MILLIGRAM(S): 5 TABLET ORAL at 01:21

## 2018-12-15 RX ADMIN — Medication 100 MILLIGRAM(S): at 17:07

## 2018-12-15 RX ADMIN — MIDODRINE HYDROCHLORIDE 5 MILLIGRAM(S): 2.5 TABLET ORAL at 06:11

## 2018-12-15 RX ADMIN — SIMETHICONE 80 MILLIGRAM(S): 80 TABLET, CHEWABLE ORAL at 17:07

## 2018-12-15 RX ADMIN — CLOPIDOGREL BISULFATE 75 MILLIGRAM(S): 75 TABLET, FILM COATED ORAL at 11:56

## 2018-12-15 RX ADMIN — Medication 100 MILLIGRAM(S): at 06:11

## 2018-12-15 RX ADMIN — ATORVASTATIN CALCIUM 40 MILLIGRAM(S): 80 TABLET, FILM COATED ORAL at 21:06

## 2018-12-15 RX ADMIN — OXYCODONE HYDROCHLORIDE 5 MILLIGRAM(S): 5 TABLET ORAL at 14:26

## 2018-12-15 RX ADMIN — INSULIN GLARGINE 27 UNIT(S): 100 INJECTION, SOLUTION SUBCUTANEOUS at 21:06

## 2018-12-15 RX ADMIN — Medication 9 UNIT(S): at 11:55

## 2018-12-15 RX ADMIN — SODIUM CHLORIDE 1 GRAM(S): 9 INJECTION INTRAMUSCULAR; INTRAVENOUS; SUBCUTANEOUS at 06:11

## 2018-12-15 RX ADMIN — SIMETHICONE 80 MILLIGRAM(S): 80 TABLET, CHEWABLE ORAL at 06:11

## 2018-12-15 RX ADMIN — TAMSULOSIN HYDROCHLORIDE 0.4 MILLIGRAM(S): 0.4 CAPSULE ORAL at 21:06

## 2018-12-15 RX ADMIN — MIDODRINE HYDROCHLORIDE 5 MILLIGRAM(S): 2.5 TABLET ORAL at 11:56

## 2018-12-15 RX ADMIN — Medication 5 MILLIGRAM(S): at 21:07

## 2018-12-15 RX ADMIN — HEPARIN SODIUM 5000 UNIT(S): 5000 INJECTION INTRAVENOUS; SUBCUTANEOUS at 06:12

## 2018-12-15 RX ADMIN — CEFTRIAXONE 100 GRAM(S): 500 INJECTION, POWDER, FOR SOLUTION INTRAMUSCULAR; INTRAVENOUS at 17:38

## 2018-12-15 RX ADMIN — Medication 1 TABLET(S): at 11:56

## 2018-12-15 RX ADMIN — Medication 25 MILLIGRAM(S): at 06:11

## 2018-12-15 RX ADMIN — CHLORHEXIDINE GLUCONATE 1 APPLICATION(S): 213 SOLUTION TOPICAL at 06:16

## 2018-12-15 RX ADMIN — OXYCODONE HYDROCHLORIDE 5 MILLIGRAM(S): 5 TABLET ORAL at 01:23

## 2018-12-15 RX ADMIN — Medication 100 MILLIGRAM(S): at 14:26

## 2018-12-15 RX ADMIN — HEPARIN SODIUM 5000 UNIT(S): 5000 INJECTION INTRAVENOUS; SUBCUTANEOUS at 14:26

## 2018-12-15 RX ADMIN — Medication 100 MILLIGRAM(S): at 06:21

## 2018-12-16 LAB
GLUCOSE BLDC GLUCOMTR-MCNC: 103 MG/DL — HIGH (ref 70–99)
GLUCOSE BLDC GLUCOMTR-MCNC: 135 MG/DL — HIGH (ref 70–99)
GLUCOSE BLDC GLUCOMTR-MCNC: 187 MG/DL — HIGH (ref 70–99)
GLUCOSE BLDC GLUCOMTR-MCNC: 89 MG/DL — SIGNIFICANT CHANGE UP (ref 70–99)

## 2018-12-16 RX ADMIN — Medication 100 MILLIGRAM(S): at 17:32

## 2018-12-16 RX ADMIN — SODIUM CHLORIDE 1 GRAM(S): 9 INJECTION INTRAMUSCULAR; INTRAVENOUS; SUBCUTANEOUS at 14:50

## 2018-12-16 RX ADMIN — Medication 9 UNIT(S): at 08:05

## 2018-12-16 RX ADMIN — HEPARIN SODIUM 5000 UNIT(S): 5000 INJECTION INTRAVENOUS; SUBCUTANEOUS at 21:06

## 2018-12-16 RX ADMIN — HEPARIN SODIUM 5000 UNIT(S): 5000 INJECTION INTRAVENOUS; SUBCUTANEOUS at 14:50

## 2018-12-16 RX ADMIN — Medication 100 MILLIGRAM(S): at 06:00

## 2018-12-16 RX ADMIN — MIDODRINE HYDROCHLORIDE 5 MILLIGRAM(S): 2.5 TABLET ORAL at 11:53

## 2018-12-16 RX ADMIN — ERGOCALCIFEROL 50000 UNIT(S): 1.25 CAPSULE ORAL at 11:53

## 2018-12-16 RX ADMIN — TAMSULOSIN HYDROCHLORIDE 0.4 MILLIGRAM(S): 0.4 CAPSULE ORAL at 21:07

## 2018-12-16 RX ADMIN — OXYCODONE HYDROCHLORIDE 5 MILLIGRAM(S): 5 TABLET ORAL at 19:39

## 2018-12-16 RX ADMIN — CEFTRIAXONE 100 GRAM(S): 500 INJECTION, POWDER, FOR SOLUTION INTRAMUSCULAR; INTRAVENOUS at 17:32

## 2018-12-16 RX ADMIN — SODIUM CHLORIDE 1 GRAM(S): 9 INJECTION INTRAMUSCULAR; INTRAVENOUS; SUBCUTANEOUS at 06:00

## 2018-12-16 RX ADMIN — MIDODRINE HYDROCHLORIDE 5 MILLIGRAM(S): 2.5 TABLET ORAL at 08:05

## 2018-12-16 RX ADMIN — SENNA PLUS 2 TABLET(S): 8.6 TABLET ORAL at 21:07

## 2018-12-16 RX ADMIN — Medication 1 MILLIGRAM(S): at 11:53

## 2018-12-16 RX ADMIN — Medication 81 MILLIGRAM(S): at 11:53

## 2018-12-16 RX ADMIN — Medication 100 MILLIGRAM(S): at 11:53

## 2018-12-16 RX ADMIN — Medication 100 MILLIGRAM(S): at 21:07

## 2018-12-16 RX ADMIN — HEPARIN SODIUM 5000 UNIT(S): 5000 INJECTION INTRAVENOUS; SUBCUTANEOUS at 06:00

## 2018-12-16 RX ADMIN — OXYCODONE HYDROCHLORIDE 5 MILLIGRAM(S): 5 TABLET ORAL at 03:54

## 2018-12-16 RX ADMIN — Medication 1 TABLET(S): at 11:53

## 2018-12-16 RX ADMIN — Medication 5 MILLIGRAM(S): at 21:07

## 2018-12-16 RX ADMIN — Medication 1 APPLICATION(S): at 14:56

## 2018-12-16 RX ADMIN — SODIUM CHLORIDE 1 GRAM(S): 9 INJECTION INTRAMUSCULAR; INTRAVENOUS; SUBCUTANEOUS at 21:06

## 2018-12-16 RX ADMIN — OXYCODONE HYDROCHLORIDE 5 MILLIGRAM(S): 5 TABLET ORAL at 12:31

## 2018-12-16 RX ADMIN — PREGABALIN 1000 MICROGRAM(S): 225 CAPSULE ORAL at 11:53

## 2018-12-16 RX ADMIN — SIMETHICONE 80 MILLIGRAM(S): 80 TABLET, CHEWABLE ORAL at 17:33

## 2018-12-16 RX ADMIN — SIMETHICONE 80 MILLIGRAM(S): 80 TABLET, CHEWABLE ORAL at 06:00

## 2018-12-16 RX ADMIN — Medication 25 MILLIGRAM(S): at 06:01

## 2018-12-16 RX ADMIN — ATORVASTATIN CALCIUM 40 MILLIGRAM(S): 80 TABLET, FILM COATED ORAL at 21:07

## 2018-12-16 RX ADMIN — OXYCODONE HYDROCHLORIDE 5 MILLIGRAM(S): 5 TABLET ORAL at 13:00

## 2018-12-16 RX ADMIN — PANTOPRAZOLE SODIUM 40 MILLIGRAM(S): 20 TABLET, DELAYED RELEASE ORAL at 08:05

## 2018-12-16 RX ADMIN — INSULIN GLARGINE 27 UNIT(S): 100 INJECTION, SOLUTION SUBCUTANEOUS at 21:07

## 2018-12-16 RX ADMIN — Medication 9 UNIT(S): at 11:52

## 2018-12-16 RX ADMIN — OXYCODONE HYDROCHLORIDE 5 MILLIGRAM(S): 5 TABLET ORAL at 03:55

## 2018-12-16 RX ADMIN — CLOPIDOGREL BISULFATE 75 MILLIGRAM(S): 75 TABLET, FILM COATED ORAL at 11:53

## 2018-12-17 LAB
ALBUMIN SERPL ELPH-MCNC: 3.6 G/DL — SIGNIFICANT CHANGE UP (ref 3.5–5.2)
ALP SERPL-CCNC: 147 U/L — HIGH (ref 30–115)
ALT FLD-CCNC: 36 U/L — SIGNIFICANT CHANGE UP (ref 0–41)
ANION GAP SERPL CALC-SCNC: 13 MMOL/L — SIGNIFICANT CHANGE UP (ref 7–14)
AST SERPL-CCNC: 32 U/L — SIGNIFICANT CHANGE UP (ref 0–41)
BILIRUB SERPL-MCNC: 0.2 MG/DL — SIGNIFICANT CHANGE UP (ref 0.2–1.2)
BUN SERPL-MCNC: 16 MG/DL — SIGNIFICANT CHANGE UP (ref 10–20)
CALCIUM SERPL-MCNC: 9.1 MG/DL — SIGNIFICANT CHANGE UP (ref 8.5–10.1)
CHLORIDE SERPL-SCNC: 101 MMOL/L — SIGNIFICANT CHANGE UP (ref 98–110)
CO2 SERPL-SCNC: 25 MMOL/L — SIGNIFICANT CHANGE UP (ref 17–32)
CREAT SERPL-MCNC: 1 MG/DL — SIGNIFICANT CHANGE UP (ref 0.7–1.5)
GLUCOSE BLDC GLUCOMTR-MCNC: 170 MG/DL — HIGH (ref 70–99)
GLUCOSE BLDC GLUCOMTR-MCNC: 208 MG/DL — HIGH (ref 70–99)
GLUCOSE BLDC GLUCOMTR-MCNC: 209 MG/DL — HIGH (ref 70–99)
GLUCOSE BLDC GLUCOMTR-MCNC: 99 MG/DL — SIGNIFICANT CHANGE UP (ref 70–99)
GLUCOSE SERPL-MCNC: 186 MG/DL — HIGH (ref 70–99)
HCT VFR BLD CALC: 28.5 % — LOW (ref 42–52)
HGB BLD-MCNC: 8.5 G/DL — LOW (ref 14–18)
MAGNESIUM SERPL-MCNC: 1.9 MG/DL — SIGNIFICANT CHANGE UP (ref 1.8–2.4)
MCHC RBC-ENTMCNC: 22.5 PG — LOW (ref 27–31)
MCHC RBC-ENTMCNC: 29.8 G/DL — LOW (ref 32–37)
MCV RBC AUTO: 75.6 FL — LOW (ref 80–94)
NRBC # BLD: 0 /100 WBCS — SIGNIFICANT CHANGE UP (ref 0–0)
PLATELET # BLD AUTO: 194 K/UL — SIGNIFICANT CHANGE UP (ref 130–400)
POTASSIUM SERPL-MCNC: 4.1 MMOL/L — SIGNIFICANT CHANGE UP (ref 3.5–5)
POTASSIUM SERPL-SCNC: 4.1 MMOL/L — SIGNIFICANT CHANGE UP (ref 3.5–5)
PROT SERPL-MCNC: 6.9 G/DL — SIGNIFICANT CHANGE UP (ref 6–8)
RBC # BLD: 3.77 M/UL — LOW (ref 4.7–6.1)
RBC # FLD: 17.1 % — HIGH (ref 11.5–14.5)
SODIUM SERPL-SCNC: 139 MMOL/L — SIGNIFICANT CHANGE UP (ref 135–146)
WBC # BLD: 6.32 K/UL — SIGNIFICANT CHANGE UP (ref 4.8–10.8)
WBC # FLD AUTO: 6.32 K/UL — SIGNIFICANT CHANGE UP (ref 4.8–10.8)

## 2018-12-17 RX ORDER — ACETAMINOPHEN 500 MG
2 TABLET ORAL
Qty: 0 | Refills: 0 | COMMUNITY
Start: 2018-12-17

## 2018-12-17 RX ADMIN — MIDODRINE HYDROCHLORIDE 5 MILLIGRAM(S): 2.5 TABLET ORAL at 07:39

## 2018-12-17 RX ADMIN — Medication 81 MILLIGRAM(S): at 12:04

## 2018-12-17 RX ADMIN — Medication 100 MILLIGRAM(S): at 06:11

## 2018-12-17 RX ADMIN — ATORVASTATIN CALCIUM 40 MILLIGRAM(S): 80 TABLET, FILM COATED ORAL at 21:35

## 2018-12-17 RX ADMIN — Medication 100 MILLIGRAM(S): at 06:10

## 2018-12-17 RX ADMIN — Medication 9 UNIT(S): at 12:01

## 2018-12-17 RX ADMIN — METHOCARBAMOL 750 MILLIGRAM(S): 500 TABLET, FILM COATED ORAL at 01:28

## 2018-12-17 RX ADMIN — TAMSULOSIN HYDROCHLORIDE 0.4 MILLIGRAM(S): 0.4 CAPSULE ORAL at 21:35

## 2018-12-17 RX ADMIN — Medication 1 MILLIGRAM(S): at 12:04

## 2018-12-17 RX ADMIN — Medication 9 UNIT(S): at 07:37

## 2018-12-17 RX ADMIN — SODIUM CHLORIDE 1 GRAM(S): 9 INJECTION INTRAMUSCULAR; INTRAVENOUS; SUBCUTANEOUS at 12:04

## 2018-12-17 RX ADMIN — Medication 25 MILLIGRAM(S): at 06:11

## 2018-12-17 RX ADMIN — PANTOPRAZOLE SODIUM 40 MILLIGRAM(S): 20 TABLET, DELAYED RELEASE ORAL at 07:39

## 2018-12-17 RX ADMIN — SENNA PLUS 2 TABLET(S): 8.6 TABLET ORAL at 21:35

## 2018-12-17 RX ADMIN — Medication 2: at 07:38

## 2018-12-17 RX ADMIN — Medication 1 APPLICATION(S): at 12:06

## 2018-12-17 RX ADMIN — OXYCODONE HYDROCHLORIDE 5 MILLIGRAM(S): 5 TABLET ORAL at 01:28

## 2018-12-17 RX ADMIN — CLOPIDOGREL BISULFATE 75 MILLIGRAM(S): 75 TABLET, FILM COATED ORAL at 12:04

## 2018-12-17 RX ADMIN — SIMETHICONE 80 MILLIGRAM(S): 80 TABLET, CHEWABLE ORAL at 16:55

## 2018-12-17 RX ADMIN — SODIUM CHLORIDE 1 GRAM(S): 9 INJECTION INTRAMUSCULAR; INTRAVENOUS; SUBCUTANEOUS at 21:35

## 2018-12-17 RX ADMIN — HEPARIN SODIUM 5000 UNIT(S): 5000 INJECTION INTRAVENOUS; SUBCUTANEOUS at 12:05

## 2018-12-17 RX ADMIN — OXYCODONE HYDROCHLORIDE 5 MILLIGRAM(S): 5 TABLET ORAL at 07:57

## 2018-12-17 RX ADMIN — SIMETHICONE 80 MILLIGRAM(S): 80 TABLET, CHEWABLE ORAL at 06:10

## 2018-12-17 RX ADMIN — HEPARIN SODIUM 5000 UNIT(S): 5000 INJECTION INTRAVENOUS; SUBCUTANEOUS at 06:10

## 2018-12-17 RX ADMIN — Medication 1 TABLET(S): at 12:05

## 2018-12-17 RX ADMIN — CEFTRIAXONE 100 GRAM(S): 500 INJECTION, POWDER, FOR SOLUTION INTRAMUSCULAR; INTRAVENOUS at 16:56

## 2018-12-17 RX ADMIN — OXYCODONE HYDROCHLORIDE 5 MILLIGRAM(S): 5 TABLET ORAL at 09:00

## 2018-12-17 RX ADMIN — Medication 100 MILLIGRAM(S): at 12:05

## 2018-12-17 RX ADMIN — Medication 100 MILLIGRAM(S): at 21:35

## 2018-12-17 RX ADMIN — Medication 5 MILLIGRAM(S): at 21:35

## 2018-12-17 RX ADMIN — PREGABALIN 1000 MICROGRAM(S): 225 CAPSULE ORAL at 12:04

## 2018-12-17 RX ADMIN — INSULIN GLARGINE 27 UNIT(S): 100 INJECTION, SOLUTION SUBCUTANEOUS at 21:35

## 2018-12-17 RX ADMIN — OXYCODONE HYDROCHLORIDE 5 MILLIGRAM(S): 5 TABLET ORAL at 20:08

## 2018-12-17 RX ADMIN — MIDODRINE HYDROCHLORIDE 5 MILLIGRAM(S): 2.5 TABLET ORAL at 12:05

## 2018-12-17 RX ADMIN — Medication 4: at 12:02

## 2018-12-17 RX ADMIN — HEPARIN SODIUM 5000 UNIT(S): 5000 INJECTION INTRAVENOUS; SUBCUTANEOUS at 21:35

## 2018-12-17 RX ADMIN — Medication 100 MILLIGRAM(S): at 16:58

## 2018-12-17 RX ADMIN — SODIUM CHLORIDE 1 GRAM(S): 9 INJECTION INTRAMUSCULAR; INTRAVENOUS; SUBCUTANEOUS at 06:11

## 2018-12-17 RX ADMIN — Medication 9 UNIT(S): at 16:55

## 2018-12-18 LAB
GLUCOSE BLDC GLUCOMTR-MCNC: 111 MG/DL — HIGH (ref 70–99)
GLUCOSE BLDC GLUCOMTR-MCNC: 157 MG/DL — HIGH (ref 70–99)

## 2018-12-18 RX ORDER — MIDODRINE HYDROCHLORIDE 2.5 MG/1
1 TABLET ORAL
Qty: 60 | Refills: 0 | OUTPATIENT
Start: 2018-12-18

## 2018-12-18 RX ORDER — TAMSULOSIN HYDROCHLORIDE 0.4 MG/1
1 CAPSULE ORAL
Qty: 30 | Refills: 0 | OUTPATIENT
Start: 2018-12-18

## 2018-12-18 RX ORDER — MESALAMINE 400 MG
60 TABLET, DELAYED RELEASE (ENTERIC COATED) ORAL
Qty: 0 | Refills: 0 | COMMUNITY
Start: 2018-12-18

## 2018-12-18 RX ORDER — INSULIN LISPRO 100/ML
9 VIAL (ML) SUBCUTANEOUS
Qty: 0 | Refills: 0 | COMMUNITY
Start: 2018-12-18

## 2018-12-18 RX ADMIN — Medication 1 TABLET(S): at 12:22

## 2018-12-18 RX ADMIN — OXYCODONE HYDROCHLORIDE 5 MILLIGRAM(S): 5 TABLET ORAL at 02:32

## 2018-12-18 RX ADMIN — PANTOPRAZOLE SODIUM 40 MILLIGRAM(S): 20 TABLET, DELAYED RELEASE ORAL at 06:10

## 2018-12-18 RX ADMIN — MIDODRINE HYDROCHLORIDE 5 MILLIGRAM(S): 2.5 TABLET ORAL at 12:22

## 2018-12-18 RX ADMIN — Medication 100 MILLIGRAM(S): at 12:22

## 2018-12-18 RX ADMIN — Medication 2: at 12:21

## 2018-12-18 RX ADMIN — HEPARIN SODIUM 5000 UNIT(S): 5000 INJECTION INTRAVENOUS; SUBCUTANEOUS at 05:35

## 2018-12-18 RX ADMIN — SODIUM CHLORIDE 1 GRAM(S): 9 INJECTION INTRAMUSCULAR; INTRAVENOUS; SUBCUTANEOUS at 05:35

## 2018-12-18 RX ADMIN — HEPARIN SODIUM 5000 UNIT(S): 5000 INJECTION INTRAVENOUS; SUBCUTANEOUS at 12:24

## 2018-12-18 RX ADMIN — Medication 100 MILLIGRAM(S): at 12:23

## 2018-12-18 RX ADMIN — SIMETHICONE 80 MILLIGRAM(S): 80 TABLET, CHEWABLE ORAL at 05:35

## 2018-12-18 RX ADMIN — Medication 1 APPLICATION(S): at 12:23

## 2018-12-18 RX ADMIN — CLOPIDOGREL BISULFATE 75 MILLIGRAM(S): 75 TABLET, FILM COATED ORAL at 12:22

## 2018-12-18 RX ADMIN — Medication 9 UNIT(S): at 12:21

## 2018-12-18 RX ADMIN — SODIUM CHLORIDE 1 GRAM(S): 9 INJECTION INTRAMUSCULAR; INTRAVENOUS; SUBCUTANEOUS at 12:23

## 2018-12-18 RX ADMIN — Medication 100 MILLIGRAM(S): at 05:35

## 2018-12-18 RX ADMIN — Medication 25 MILLIGRAM(S): at 05:35

## 2018-12-18 RX ADMIN — MIDODRINE HYDROCHLORIDE 5 MILLIGRAM(S): 2.5 TABLET ORAL at 08:11

## 2018-12-18 RX ADMIN — CEFTRIAXONE 100 GRAM(S): 500 INJECTION, POWDER, FOR SOLUTION INTRAMUSCULAR; INTRAVENOUS at 14:18

## 2018-12-18 RX ADMIN — PREGABALIN 1000 MICROGRAM(S): 225 CAPSULE ORAL at 12:23

## 2018-12-18 RX ADMIN — Medication 1 MILLIGRAM(S): at 12:22

## 2018-12-18 RX ADMIN — Medication 81 MILLIGRAM(S): at 12:22

## 2018-12-18 RX ADMIN — Medication 9 UNIT(S): at 08:11

## 2018-12-28 DIAGNOSIS — I25.10 ATHEROSCLEROTIC HEART DISEASE OF NATIVE CORONARY ARTERY WITHOUT ANGINA PECTORIS: ICD-10-CM

## 2018-12-28 DIAGNOSIS — R33.9 RETENTION OF URINE, UNSPECIFIED: ICD-10-CM

## 2018-12-28 DIAGNOSIS — R26.89 OTHER ABNORMALITIES OF GAIT AND MOBILITY: ICD-10-CM

## 2018-12-28 DIAGNOSIS — R26.9 UNSPECIFIED ABNORMALITIES OF GAIT AND MOBILITY: ICD-10-CM

## 2018-12-28 DIAGNOSIS — I10 ESSENTIAL (PRIMARY) HYPERTENSION: ICD-10-CM

## 2018-12-28 DIAGNOSIS — M86.672 OTHER CHRONIC OSTEOMYELITIS, LEFT ANKLE AND FOOT: ICD-10-CM

## 2018-12-28 DIAGNOSIS — Z89.422 ACQUIRED ABSENCE OF OTHER LEFT TOE(S): ICD-10-CM

## 2018-12-28 DIAGNOSIS — Z79.84 LONG TERM (CURRENT) USE OF ORAL HYPOGLYCEMIC DRUGS: ICD-10-CM

## 2018-12-28 DIAGNOSIS — E11.621 TYPE 2 DIABETES MELLITUS WITH FOOT ULCER: ICD-10-CM

## 2018-12-28 DIAGNOSIS — I73.9 PERIPHERAL VASCULAR DISEASE, UNSPECIFIED: ICD-10-CM

## 2018-12-28 DIAGNOSIS — E78.5 HYPERLIPIDEMIA, UNSPECIFIED: ICD-10-CM

## 2018-12-28 DIAGNOSIS — D64.9 ANEMIA, UNSPECIFIED: ICD-10-CM

## 2018-12-28 DIAGNOSIS — E11.69 TYPE 2 DIABETES MELLITUS WITH OTHER SPECIFIED COMPLICATION: ICD-10-CM

## 2018-12-28 DIAGNOSIS — E11.40 TYPE 2 DIABETES MELLITUS WITH DIABETIC NEUROPATHY, UNSPECIFIED: ICD-10-CM

## 2018-12-28 DIAGNOSIS — I95.1 ORTHOSTATIC HYPOTENSION: ICD-10-CM

## 2018-12-28 DIAGNOSIS — L97.529 NON-PRESSURE CHRONIC ULCER OF OTHER PART OF LEFT FOOT WITH UNSPECIFIED SEVERITY: ICD-10-CM

## 2018-12-28 DIAGNOSIS — I25.2 OLD MYOCARDIAL INFARCTION: ICD-10-CM

## 2018-12-28 DIAGNOSIS — Z89.421 ACQUIRED ABSENCE OF OTHER RIGHT TOE(S): ICD-10-CM

## 2018-12-28 DIAGNOSIS — Z95.1 PRESENCE OF AORTOCORONARY BYPASS GRAFT: ICD-10-CM

## 2019-02-22 ENCOUNTER — INPATIENT (INPATIENT)
Facility: HOSPITAL | Age: 64
LOS: 3 days | Discharge: NURSING, REHAB OR LONG-TERM CARE | End: 2019-02-26
Attending: INTERNAL MEDICINE | Admitting: INTERNAL MEDICINE
Payer: SELF-PAY

## 2019-02-22 VITALS
OXYGEN SATURATION: 100 % | DIASTOLIC BLOOD PRESSURE: 51 MMHG | SYSTOLIC BLOOD PRESSURE: 94 MMHG | TEMPERATURE: 98 F | RESPIRATION RATE: 16 BRPM | HEART RATE: 89 BPM

## 2019-02-22 DIAGNOSIS — Z95.1 PRESENCE OF AORTOCORONARY BYPASS GRAFT: Chronic | ICD-10-CM

## 2019-02-22 LAB
ALBUMIN SERPL ELPH-MCNC: 3.7 G/DL — SIGNIFICANT CHANGE UP (ref 3.5–5.2)
ALP SERPL-CCNC: 155 U/L — HIGH (ref 30–115)
ALT FLD-CCNC: 46 U/L — HIGH (ref 0–41)
ANION GAP SERPL CALC-SCNC: 14 MMOL/L — SIGNIFICANT CHANGE UP (ref 7–14)
APPEARANCE UR: ABNORMAL
APTT BLD: 27.7 SEC — SIGNIFICANT CHANGE UP (ref 27–39.2)
AST SERPL-CCNC: 31 U/L — SIGNIFICANT CHANGE UP (ref 0–41)
BACTERIA # UR AUTO: ABNORMAL /HPF
BASE EXCESS BLDV CALC-SCNC: 0.3 MMOL/L — SIGNIFICANT CHANGE UP (ref -2–2)
BASOPHILS # BLD AUTO: 0.05 K/UL — SIGNIFICANT CHANGE UP (ref 0–0.2)
BASOPHILS NFR BLD AUTO: 0.6 % — SIGNIFICANT CHANGE UP (ref 0–1)
BILIRUB SERPL-MCNC: 0.3 MG/DL — SIGNIFICANT CHANGE UP (ref 0.2–1.2)
BILIRUB UR-MCNC: NEGATIVE — SIGNIFICANT CHANGE UP
BUN SERPL-MCNC: 37 MG/DL — HIGH (ref 10–20)
CA-I SERPL-SCNC: 1.27 MMOL/L — SIGNIFICANT CHANGE UP (ref 1.12–1.3)
CALCIUM SERPL-MCNC: 9.4 MG/DL — SIGNIFICANT CHANGE UP (ref 8.5–10.1)
CHLORIDE SERPL-SCNC: 99 MMOL/L — SIGNIFICANT CHANGE UP (ref 98–110)
CK MB CFR SERPL CALC: 2.3 NG/ML — SIGNIFICANT CHANGE UP (ref 0.6–6.3)
CK SERPL-CCNC: 140 U/L — SIGNIFICANT CHANGE UP (ref 0–225)
CK SERPL-CCNC: 156 U/L — SIGNIFICANT CHANGE UP (ref 0–225)
CO2 SERPL-SCNC: 23 MMOL/L — SIGNIFICANT CHANGE UP (ref 17–32)
COLOR SPEC: YELLOW — SIGNIFICANT CHANGE UP
COMMENT - URINE: SIGNIFICANT CHANGE UP
CREAT SERPL-MCNC: 1.7 MG/DL — HIGH (ref 0.7–1.5)
DIFF PNL FLD: ABNORMAL
EOSINOPHIL # BLD AUTO: 0.52 K/UL — SIGNIFICANT CHANGE UP (ref 0–0.7)
EOSINOPHIL NFR BLD AUTO: 6.3 % — SIGNIFICANT CHANGE UP (ref 0–8)
ERYTHROCYTE [SEDIMENTATION RATE] IN BLOOD: 56 MM/HR — HIGH (ref 0–10)
GAS PNL BLDV: 138 MMOL/L — SIGNIFICANT CHANGE UP (ref 136–145)
GAS PNL BLDV: SIGNIFICANT CHANGE UP
GLUCOSE BLDC GLUCOMTR-MCNC: 158 MG/DL — HIGH (ref 70–99)
GLUCOSE BLDC GLUCOMTR-MCNC: 263 MG/DL — HIGH (ref 70–99)
GLUCOSE SERPL-MCNC: 236 MG/DL — HIGH (ref 70–99)
GLUCOSE UR QL: >=1000 MG/DL
HCO3 BLDV-SCNC: 27 MMOL/L — SIGNIFICANT CHANGE UP (ref 22–29)
HCT VFR BLD CALC: 31.3 % — LOW (ref 42–52)
HCT VFR BLDA CALC: 28.7 % — LOW (ref 34–44)
HGB BLD CALC-MCNC: 9.4 G/DL — LOW (ref 14–18)
HGB BLD-MCNC: 9.4 G/DL — LOW (ref 14–18)
IMM GRANULOCYTES NFR BLD AUTO: 0.4 % — HIGH (ref 0.1–0.3)
INR BLD: 0.97 RATIO — SIGNIFICANT CHANGE UP (ref 0.65–1.3)
KETONES UR-MCNC: NEGATIVE — SIGNIFICANT CHANGE UP
LACTATE BLDV-MCNC: 0.8 MMOL/L — SIGNIFICANT CHANGE UP (ref 0.5–1.6)
LEUKOCYTE ESTERASE UR-ACNC: ABNORMAL
LYMPHOCYTES # BLD AUTO: 1.92 K/UL — SIGNIFICANT CHANGE UP (ref 1.2–3.4)
LYMPHOCYTES # BLD AUTO: 23.4 % — SIGNIFICANT CHANGE UP (ref 20.5–51.1)
MCHC RBC-ENTMCNC: 21.9 PG — LOW (ref 27–31)
MCHC RBC-ENTMCNC: 30 G/DL — LOW (ref 32–37)
MCV RBC AUTO: 72.8 FL — LOW (ref 80–94)
MONOCYTES # BLD AUTO: 0.73 K/UL — HIGH (ref 0.1–0.6)
MONOCYTES NFR BLD AUTO: 8.9 % — SIGNIFICANT CHANGE UP (ref 1.7–9.3)
NEUTROPHILS # BLD AUTO: 4.94 K/UL — SIGNIFICANT CHANGE UP (ref 1.4–6.5)
NEUTROPHILS NFR BLD AUTO: 60.4 % — SIGNIFICANT CHANGE UP (ref 42.2–75.2)
NITRITE UR-MCNC: NEGATIVE — SIGNIFICANT CHANGE UP
NRBC # BLD: 0 /100 WBCS — SIGNIFICANT CHANGE UP (ref 0–0)
PCO2 BLDV: 52 MMHG — HIGH (ref 41–51)
PH BLDV: 7.32 — SIGNIFICANT CHANGE UP (ref 7.26–7.43)
PH UR: 6 — SIGNIFICANT CHANGE UP (ref 5–8)
PLATELET # BLD AUTO: 140 K/UL — SIGNIFICANT CHANGE UP (ref 130–400)
PO2 BLDV: 16 MMHG — LOW (ref 20–40)
POTASSIUM BLDV-SCNC: 4.8 MMOL/L — SIGNIFICANT CHANGE UP (ref 3.3–5.6)
POTASSIUM SERPL-MCNC: 4.9 MMOL/L — SIGNIFICANT CHANGE UP (ref 3.5–5)
POTASSIUM SERPL-SCNC: 4.9 MMOL/L — SIGNIFICANT CHANGE UP (ref 3.5–5)
PROT SERPL-MCNC: 7.1 G/DL — SIGNIFICANT CHANGE UP (ref 6–8)
PROT UR-MCNC: NEGATIVE MG/DL — SIGNIFICANT CHANGE UP
PROTHROM AB SERPL-ACNC: 11.2 SEC — SIGNIFICANT CHANGE UP (ref 9.95–12.87)
RBC # BLD: 4.3 M/UL — LOW (ref 4.7–6.1)
RBC # FLD: 17.6 % — HIGH (ref 11.5–14.5)
RBC CASTS # UR COMP ASSIST: SIGNIFICANT CHANGE UP /HPF
SAO2 % BLDV: 16 % — SIGNIFICANT CHANGE UP
SODIUM SERPL-SCNC: 136 MMOL/L — SIGNIFICANT CHANGE UP (ref 135–146)
SP GR SPEC: 1.01 — SIGNIFICANT CHANGE UP (ref 1.01–1.03)
TROPONIN T SERPL-MCNC: 0.02 NG/ML — HIGH
TROPONIN T SERPL-MCNC: 0.02 NG/ML — HIGH
UROBILINOGEN FLD QL: 0.2 MG/DL — SIGNIFICANT CHANGE UP (ref 0.2–0.2)
WBC # BLD: 8.19 K/UL — SIGNIFICANT CHANGE UP (ref 4.8–10.8)
WBC # FLD AUTO: 8.19 K/UL — SIGNIFICANT CHANGE UP (ref 4.8–10.8)
WBC UR QL: ABNORMAL /HPF

## 2019-02-22 RX ORDER — PREGABALIN 225 MG/1
1000 CAPSULE ORAL DAILY
Qty: 0 | Refills: 0 | Status: DISCONTINUED | OUTPATIENT
Start: 2019-02-22 | End: 2019-02-26

## 2019-02-22 RX ORDER — SODIUM CHLORIDE 9 MG/ML
1000 INJECTION, SOLUTION INTRAVENOUS
Qty: 0 | Refills: 0 | Status: DISCONTINUED | OUTPATIENT
Start: 2019-02-22 | End: 2019-02-26

## 2019-02-22 RX ORDER — CHLORHEXIDINE GLUCONATE 213 G/1000ML
1 SOLUTION TOPICAL
Qty: 0 | Refills: 0 | Status: DISCONTINUED | OUTPATIENT
Start: 2019-02-22 | End: 2019-02-26

## 2019-02-22 RX ORDER — DOCUSATE SODIUM 100 MG
100 CAPSULE ORAL THREE TIMES A DAY
Qty: 0 | Refills: 0 | Status: DISCONTINUED | OUTPATIENT
Start: 2019-02-22 | End: 2019-02-26

## 2019-02-22 RX ORDER — PANTOPRAZOLE SODIUM 20 MG/1
40 TABLET, DELAYED RELEASE ORAL
Qty: 0 | Refills: 0 | Status: DISCONTINUED | OUTPATIENT
Start: 2019-02-22 | End: 2019-02-26

## 2019-02-22 RX ORDER — HEPARIN SODIUM 5000 [USP'U]/ML
5000 INJECTION INTRAVENOUS; SUBCUTANEOUS EVERY 8 HOURS
Qty: 0 | Refills: 0 | Status: DISCONTINUED | OUTPATIENT
Start: 2019-02-22 | End: 2019-02-26

## 2019-02-22 RX ORDER — SODIUM CHLORIDE 9 MG/ML
1000 INJECTION, SOLUTION INTRAVENOUS ONCE
Qty: 0 | Refills: 0 | Status: COMPLETED | OUTPATIENT
Start: 2019-02-22 | End: 2019-02-22

## 2019-02-22 RX ORDER — MESALAMINE 400 MG
4 TABLET, DELAYED RELEASE (ENTERIC COATED) ORAL AT BEDTIME
Qty: 0 | Refills: 0 | Status: DISCONTINUED | OUTPATIENT
Start: 2019-02-22 | End: 2019-02-26

## 2019-02-22 RX ORDER — DEXTROSE 50 % IN WATER 50 %
25 SYRINGE (ML) INTRAVENOUS ONCE
Qty: 0 | Refills: 0 | Status: DISCONTINUED | OUTPATIENT
Start: 2019-02-22 | End: 2019-02-26

## 2019-02-22 RX ORDER — GLUCAGON INJECTION, SOLUTION 0.5 MG/.1ML
1 INJECTION, SOLUTION SUBCUTANEOUS ONCE
Qty: 0 | Refills: 0 | Status: DISCONTINUED | OUTPATIENT
Start: 2019-02-22 | End: 2019-02-26

## 2019-02-22 RX ORDER — MIDODRINE HYDROCHLORIDE 2.5 MG/1
5 TABLET ORAL THREE TIMES A DAY
Qty: 0 | Refills: 0 | Status: DISCONTINUED | OUTPATIENT
Start: 2019-02-22 | End: 2019-02-26

## 2019-02-22 RX ORDER — SODIUM CHLORIDE 9 MG/ML
1000 INJECTION, SOLUTION INTRAVENOUS
Qty: 0 | Refills: 0 | Status: DISCONTINUED | OUTPATIENT
Start: 2019-02-22 | End: 2019-02-25

## 2019-02-22 RX ORDER — CLOPIDOGREL BISULFATE 75 MG/1
75 TABLET, FILM COATED ORAL DAILY
Qty: 0 | Refills: 0 | Status: DISCONTINUED | OUTPATIENT
Start: 2019-02-22 | End: 2019-02-26

## 2019-02-22 RX ORDER — INSULIN LISPRO 100/ML
8 VIAL (ML) SUBCUTANEOUS
Qty: 0 | Refills: 0 | Status: DISCONTINUED | OUTPATIENT
Start: 2019-02-22 | End: 2019-02-26

## 2019-02-22 RX ORDER — ASPIRIN/CALCIUM CARB/MAGNESIUM 324 MG
81 TABLET ORAL DAILY
Qty: 0 | Refills: 0 | Status: DISCONTINUED | OUTPATIENT
Start: 2019-02-22 | End: 2019-02-26

## 2019-02-22 RX ORDER — FOLIC ACID 0.8 MG
1 TABLET ORAL DAILY
Qty: 0 | Refills: 0 | Status: DISCONTINUED | OUTPATIENT
Start: 2019-02-22 | End: 2019-02-26

## 2019-02-22 RX ORDER — SENNA PLUS 8.6 MG/1
2 TABLET ORAL AT BEDTIME
Qty: 0 | Refills: 0 | Status: DISCONTINUED | OUTPATIENT
Start: 2019-02-22 | End: 2019-02-26

## 2019-02-22 RX ORDER — INSULIN GLARGINE 100 [IU]/ML
25 INJECTION, SOLUTION SUBCUTANEOUS AT BEDTIME
Qty: 0 | Refills: 0 | Status: DISCONTINUED | OUTPATIENT
Start: 2019-02-22 | End: 2019-02-26

## 2019-02-22 RX ORDER — DEXTROSE 50 % IN WATER 50 %
15 SYRINGE (ML) INTRAVENOUS ONCE
Qty: 0 | Refills: 0 | Status: DISCONTINUED | OUTPATIENT
Start: 2019-02-22 | End: 2019-02-26

## 2019-02-22 RX ORDER — DEXTROSE 50 % IN WATER 50 %
12.5 SYRINGE (ML) INTRAVENOUS ONCE
Qty: 0 | Refills: 0 | Status: DISCONTINUED | OUTPATIENT
Start: 2019-02-22 | End: 2019-02-26

## 2019-02-22 RX ORDER — INSULIN LISPRO 100/ML
VIAL (ML) SUBCUTANEOUS
Qty: 0 | Refills: 0 | Status: DISCONTINUED | OUTPATIENT
Start: 2019-02-22 | End: 2019-02-26

## 2019-02-22 RX ORDER — METOPROLOL TARTRATE 50 MG
25 TABLET ORAL DAILY
Qty: 0 | Refills: 0 | Status: DISCONTINUED | OUTPATIENT
Start: 2019-02-22 | End: 2019-02-26

## 2019-02-22 RX ORDER — ACETAMINOPHEN 500 MG
650 TABLET ORAL EVERY 6 HOURS
Qty: 0 | Refills: 0 | Status: DISCONTINUED | OUTPATIENT
Start: 2019-02-22 | End: 2019-02-26

## 2019-02-22 RX ORDER — TAMSULOSIN HYDROCHLORIDE 0.4 MG/1
0.4 CAPSULE ORAL AT BEDTIME
Qty: 0 | Refills: 0 | Status: DISCONTINUED | OUTPATIENT
Start: 2019-02-22 | End: 2019-02-26

## 2019-02-22 RX ADMIN — MIDODRINE HYDROCHLORIDE 5 MILLIGRAM(S): 2.5 TABLET ORAL at 22:27

## 2019-02-22 RX ADMIN — INSULIN GLARGINE 25 UNIT(S): 100 INJECTION, SOLUTION SUBCUTANEOUS at 22:42

## 2019-02-22 RX ADMIN — TAMSULOSIN HYDROCHLORIDE 0.4 MILLIGRAM(S): 0.4 CAPSULE ORAL at 22:26

## 2019-02-22 RX ADMIN — Medication 8 UNIT(S): at 19:18

## 2019-02-22 RX ADMIN — Medication 6: at 19:18

## 2019-02-22 RX ADMIN — SODIUM CHLORIDE 75 MILLILITER(S): 9 INJECTION, SOLUTION INTRAVENOUS at 19:19

## 2019-02-22 RX ADMIN — HEPARIN SODIUM 5000 UNIT(S): 5000 INJECTION INTRAVENOUS; SUBCUTANEOUS at 22:27

## 2019-02-22 RX ADMIN — SODIUM CHLORIDE 1000 MILLILITER(S): 9 INJECTION, SOLUTION INTRAVENOUS at 11:00

## 2019-02-22 RX ADMIN — Medication 100 MILLIGRAM(S): at 20:08

## 2019-02-22 NOTE — ED ADULT NURSE NOTE - OBJECTIVE STATEMENT
pt 62 y/o male presents to ED c/o dizziness and weakness. pt states that he has been falling and feeling weak. pt denies hitting his head and states that he fell on his butt. pt placed on bed alarm and family got upset , and RN educated family member and pt that as pt is high risk for fall that pt needs to be on bed alarm for safety, pt than verbalized understanding.

## 2019-02-22 NOTE — ED PROVIDER NOTE - OBJECTIVE STATEMENT
64 yo male with PMHx CAD s/p 5 vessel CABG, NSTEMI, HTN, HLD, DM, meuropathy presents for B/L LE weakness, 3 falls from legs giving out over last 3 days and episodes of near-syncope upon standing. Patient states he was just admitted to hospital 2 months ago for similar symptoms of LE weakness and frequent falls and was admitted to rehab which greatly increased his strength but for last few days has been feeling the weakness again. (+) urinary frequency. Patient/family denies head trauma, LOC, syncope, fevers, chest pain, SOB, abdominal pain, nausea, vomiting, diarrhea, constipation, vertiginous symptoms, leg pain/swelling, urinary burning/urgency, cough, congestion, changes in PO intake, changes in urine output, changes in mental status.

## 2019-02-22 NOTE — H&P ADULT - NSHPPHYSICALEXAM_GEN_ALL_CORE
T(C): 36 (02-22-19 @ 10:43), Max: 36.4 (02-22-19 @ 09:21)  HR: 72 (02-22-19 @ 12:22) (72 - 89)  BP: 133/72 (02-22-19 @ 12:22) (94/51 - 133/72)  RR: 16 (02-22-19 @ 12:22) (16 - 16)  SpO2: 100% (02-22-19 @ 12:22) (100% - 100%)    PHYSICAL EXAM:  GENERAL: NAD, well-developed  HEAD:  Atraumatic, Normocephalic  EYES: EOMI, PERRLA, conjunctiva and sclera clear  ENT:No nasal obstruction or discharge. No tonsillar exudate, swelling or erythema.  NECK: Supple, No JVD  CHEST/LUNG: Clear to auscultation bilaterally; No wheeze  HEART: Regular rate and rhythm; No murmurs, rubs, or gallops  ABDOMEN: Soft, Nontender, Nondistended; Bowel sounds present  EXTREMITIES:  2+ Peripheral Pulses, b/l LE tenderness   PSYCH: AAOx3  NEUROLOGY: non-focal  SKIN: No rashes or lesions

## 2019-02-22 NOTE — ED PROVIDER NOTE - PHYSICAL EXAMINATION
GEN: Well appearing, in no apparent distress.    HEAD:  Normocephalic, atraumatic.    EYES:  Clear conjunctivae without injection, drainage or discharge.    ENMT:  Moist MM.    NECK:  Supple, no masses. Normal ROM.    CARDIAC:  RRR, normal S1 and S2, no murmurs, rubs or gallops.    RESP:  Respiratory rate and effort appear normal; lungs are clear to auscultation bilaterally; no rhonchi, rales or wheezes.    ABDOMEN:  Soft, non-tender, non-distended, no masses. Normal BS throughout.    MUSCULOSKELETAL: No leg swelling, no calf tenderness.   NEURO:  AAO x 3. Motor 4/5 B/L in LE. CN II – XII intact.     SKIN:  Normal skin color for age and race, well-perfused; warm and dry.

## 2019-02-22 NOTE — H&P ADULT - NSHPLABSRESULTS_GEN_ALL_CORE
9.4    8.19  )-----------( 140      ( 22 Feb 2019 10:03 )             31.3       02-22    136  |  99  |  37<H>  ----------------------------<  236<H>  4.9   |  23  |  1.7<H>    Ca    9.4      22 Feb 2019 10:03    TPro  7.1  /  Alb  3.7  /  TBili  0.3  /  DBili  x   /  AST  31  /  ALT  46<H>  /  AlkPhos  155<H>  02-22            PT/INR - ( 22 Feb 2019 10:03 )   PT: 11.20 sec;   INR: 0.97 ratio         PTT - ( 22 Feb 2019 10:03 )  PTT:27.7 sec    Lactate Trend      CARDIAC MARKERS ( 22 Feb 2019 10:03 )  x     / 0.02 ng/mL / x     / x     / x                < from: Transthoracic Echocardiogram (11.09.18 @ 15:14) >      Summary:   1. LV Ejection Fraction by Coles's Method with a biplane EF of 54 %.   2. Normal left ventricular size and wall thicknesses, with normal   systolic function.   3. Spectral Doppler shows impaired relaxation pattern of left   ventricular myocardial filling (Grade I diastolic dysfunction).   4. Mild tricuspid regurgitation.   5. Aortic root mildly dilated.   6. LA volume Index is 23.3 ml/m² ml/m2.

## 2019-02-22 NOTE — H&P ADULT - ASSESSMENT
64 y/o male with PMHx of HTN, DLD, CAD s/p CABG, DM with peripheral neuropathy, hx of Left DFU and hemorrhoids p/w dizziness and b/l LE pain and weakness x 4 days. 62 y/o male with PMHx of HTN, DLD, CAD s/p CABG, DM with peripheral neuropathy, hx of Left DFU and hemorrhoids p/w dizziness and b/l LE pain and weakness x 4 days. In the ED patient was found to have SAKINA.      #Dizziness/ presyncope  probably orthostatic hypotension   typical symptoms of orthostatic when he gets out of bed  Orthostatic vital signs  Midodrine as per last discharge    #B/L KE weakness and pain  r/o rhabdomyolysis  r/o muscular disease  r/o DVT  monitor VS  ESR, CK, ANASTASIA  LE venous duplex  hold statin for now  PT and rehab    #Acute kidney injury  baseline creatinine is 1.0  mostly prerenal 2/2 dehydration  monitor BMP  urine lytes  s/p IV fluids  will start on IV fluids 75 cc/ hr    #DM   insulin protocol  f/u finger sticks    # Hx of L GIB and proctitis   stable CBC  monitor CBC  c/w mesalamine    #HTN, DLD and CAD  C/w aspirin, plavix and BB  hold statin for now    #Others  Consistent carbohydrate diet  DVT> Heparin subq  GI ppx  From home  Full code 64 y/o male with PMHx of HTN, DLD, CAD s/p CABG, DM with peripheral neuropathy, hx of Left DFU and hemorrhoids p/w dizziness and b/l LE pain and weakness x 4 days. In the ED patient was found to have SAKINA.      #Dizziness/ presyncope  probably orthostatic hypotension   typical symptoms of orthostatic when he gets out of bed  Orthostatic vital signs  Midodrine as per last discharge    #B/L KE weakness and pain  r/o rhabdomyolysis  r/o muscular disease  r/o DVT  monitor VS  ESR, CK, ANASTASIA  LE venous duplex  hold statin for now  PT and rehab    #Acute kidney injury  baseline creatinine is 1.0  mostly prerenal 2/2 dehydration  monitor BMP  urine lytes  s/p IV fluids  will start on IV fluids 75 cc/ hr    #elevated troponin  EKG is at baseline since last admission  patient has no chest pain  Trend CE  repeat EKG in am    #DM   insulin protocol  f/u finger sticks    # Hx of L GIB and proctitis   stable CBC  monitor CBC  c/w mesalamine    #HTN, DLD and CAD  C/w aspirin, plavix and BB  hold statin for now    #Others  Consistent carbohydrate diet  DVT> Heparin subq  GI ppx  From home  Full code 62 y/o male with PMHx of HTN, DLD, CAD s/p CABG, DM with peripheral neuropathy, hx of Left DFU and hemorrhoids p/w dizziness and b/l LE pain and weakness x 4 days. In the ED patient was found to have SAKINA.      # Functional decline with recurrent fall at home   probably orthostatic hypotension   typical symptoms of orthostatic when he gets out of bed  Orthostatic vital signs >>Positive  Midodrine as per last discharge    #B/L KE weakness and pain  r/o rhabdomyolysis  r/o muscular disease  r/o DVT  monitor VS  ESR, CK, ANASTASIA  LE venous duplex  hold statin for now  PT and rehab    #Acute kidney injury  baseline creatinine is 1.0  mostly prerenal 2/2 dehydration  monitor BMP  urine lytes  c/w  IV fluids 75 cc/ hr    #elevated troponin  EKG is at baseline since last admission  patient has no chest pain  Trend CE  repeat EKG in am    #DM   insulin protocol  f/u finger sticks    # Hx of L GIB and proctitis   stable CBC  monitor CBC  c/w mesalamine    #HTN, DLD and CAD  C/w aspirin, plavix and BB  hold statin for now    #Others  Consistent carbohydrate diet  DVT> Heparin subq  GI ppx  From home  Full code

## 2019-02-22 NOTE — H&P ADULT - HISTORY OF PRESENT ILLNESS
62 y/o male with PMHx of HTN, DLD, CAD s/p CABG, DM with peripheral neuropathy, hx of Left DFU and hemorrhoids p/w dizziness and b/l LE pain and weakness x 4 days.   patient states he feels dizzy and lightheaded whenever he stands up but he didn't pass out. He also states he can barely ambulates as he has pain in his legs whenever he do so. pain is 8/10, dull, radiating to his legs. He fell many times but there was no LOC or head trauma. He hasn't been eating and drinking well recently 64 y/o male with PMHx of HTN, DLD, CAD s/p CABG, DM with peripheral neuropathy, hx of Left DFU and hemorrhoids p/w dizziness and b/l LE pain and weakness x 4 days.   patient states he feels dizzy and lightheaded whenever he stands up but he didn't pass out. He also states he can barely ambulates as he has pain in his legs whenever he do so. pain is 8/10, dull, radiating to his legs. He fell many times but there was no LOC or head trauma. He hasn't been eating and drinking well recently. 64 y/o male with PMHx of HTN, DLD, CAD s/p CABG, DM with peripheral neuropathy, hx of Left DFU and hemorrhoids p/w dizziness and b/l LE pain and weakness x 4 days.   patient states he feels dizzy and lightheaded whenever he stands up but he didn't pass out. He also states he can barely ambulates as he has pain in his legs whenever he do so. pain is 8/10, dull, radiating to his legs. He fell many times but there was no LOC or head trauma. patient is unable to ambulate. He denies new back pain, numbness and tingling. He hasn't been eating and drinking well recently. patient denies fever, CP, SOB, n/v/d, bowel and urinary symptoms.  At baseline patient walks with a walker or a cane but now he is barely able to ambulate.   Patient was recently admitted for b/l LE weakness and lower GI bleed then discharged to  for rehabilitations.  In the ED, patient was HD stable. His lab were significant for elevated creatinine 62 y/o male with PMHx of HTN, DLD, CAD s/p CABG, DM with peripheral neuropathy, hx of Left DFU and hemorrhoids p/w dizziness and b/l LE pain and weakness x 4 days.   patient states he feels dizzy and lightheaded whenever he stands up but he didn't pass out. He also states he can barely ambulates as he has pain in his legs whenever he do so. pain is 8/10, dull, radiating to his legs. He fell many times but there was no LOC or head trauma. patient is unable to ambulate. He denies new back pain, numbness and tingling. He hasn't been eating and drinking well recently. patient denies fever, CP, SOB, n/v/d, bowel and urinary symptoms.  At baseline patient walks with a walker or a cane but now he is barely able to ambulate.   Patient was recently admitted for b/l LE weakness and lower GI bleed then discharged to  for rehabilitations.  In the ED, patient was HD stable. His lab were significant for elevated creatinine at 1.7. 64 y/o male with PMHx of HTN, DLD, CAD s/p CABG, DM with peripheral neuropathy, hx of Left DFU and hemorrhoids p/w dizziness and b/l LE pain and weakness x 4 days.   patient states he feels dizzy and lightheaded whenever he stands up but he didn't pass out. He also states he can barely ambulates as he has pain in his legs whenever he do so. pain is 8/10, dull, radiating to his legs. He fell many times but there was no LOC or head trauma. patient is unable to ambulate. He denies new back pain, numbness and tingling. He hasn't been eating and drinking well recently. patient denies fever, CP, SOB, n/v/d, bowel and urinary symptoms.  At baseline patient walks with a walker or a cane but now he is barely able to ambulate.   Patient was recently admitted for b/l LE weakness and lower GI bleed then discharged to  for rehabilitations.  In the ED, patient was HD stable. His lab were significant for elevated creatinine at 1.7.   Cardiac enzymes were elevated 0.02.

## 2019-02-22 NOTE — ED PROVIDER NOTE - ATTENDING CONTRIBUTION TO CARE
63 yoM cad, cabg, htn, hld, dm, now with near syncope, weakness, when getting up. BLE giving out occ. no fever. no cp, no abd pain.  vss, chronically ill appearing, PERRL, pink conj, anicteric, dry MM, neck supple, CTAB, RRR, equal radial pulses bilat, abd soft/nt/nd, no cva tend. no calves tend, no edema, no fnd. + abrasions to bilat tib/fib.  a/p: labs, imaging, reassess

## 2019-02-22 NOTE — CONSULT NOTE ADULT - SUBJECTIVE AND OBJECTIVE BOX
HPI:  64 y/o male with PMHx of HTN, DLD, CAD s/p CABG, DM with peripheral neuropathy, hx of Left DFU and hemorrhoids p/w dizziness and b/l LE pain and weakness x 4 days.   patient states he feels dizzy and lightheaded whenever he stands up but he didn't pass out. He also states he can barely ambulates as he has pain in his legs whenever he do so. pain is 8/10, dull, radiating to his legs. He fell many times but there was no LOC or head trauma. patient is unable to ambulate. He denies new back pain, numbness and tingling. He hasn't been eating and drinking well recently. patient denies fever, CP, SOB, n/v/d, bowel and urinary symptoms.  At baseline patient walks with a walker or a cane but now he is barely able to ambulate.   Patient was recently admitted for b/l LE weakness and lower GI bleed then discharged to  for rehabilitations.  In the ED, patient was HD stable. His lab were significant for elevated creatinine at 1.7.   Cardiac enzymes were elevated 0.02. (22 Feb 2019 14:22)      PAST MEDICAL & SURGICAL HISTORY:  CAD (coronary artery disease)  Dyslipidemia  Diabetic foot ulcer  PVD (peripheral vascular disease)  Diabetes  Other hyperlipidemia  Hypertension, unspecified type  Amputated toe, unspecified laterality  S/P CABG (coronary artery bypass graft)      Hospital Course:    TODAY'S SUBJECTIVE & REVIEW OF SYMPTOMS:     Constitutional WNL   Cardio WNL   Resp WNL   GI WNL  Heme WNL  Endo WNL  Skin WNL  MSK Weakness  Neuro WNL  Cognitive WNL  Psych WNL      MEDICATIONS  (STANDING):  aspirin  chewable 81 milliGRAM(s) Oral daily  chlorhexidine 4% Liquid 1 Application(s) Topical <User Schedule>  clopidogrel Tablet 75 milliGRAM(s) Oral daily  cyanocobalamin 1000 MICROGram(s) Oral daily  dextrose 5%. 1000 milliLiter(s) (50 mL/Hr) IV Continuous <Continuous>  dextrose 50% Injectable 12.5 Gram(s) IV Push once  dextrose 50% Injectable 25 Gram(s) IV Push once  dextrose 50% Injectable 25 Gram(s) IV Push once  folic acid 1 milliGRAM(s) Oral daily  heparin  Injectable 5000 Unit(s) SubCutaneous every 8 hours  insulin glargine Injectable (LANTUS) 25 Unit(s) SubCutaneous at bedtime  insulin lispro (HumaLOG) corrective regimen sliding scale   SubCutaneous three times a day before meals  insulin lispro Injectable (HumaLOG) 8 Unit(s) SubCutaneous three times a day before meals  lactated ringers. 1000 milliLiter(s) (75 mL/Hr) IV Continuous <Continuous>  mesalamine Enema 4 Gram(s) Rectal at bedtime  metoprolol succinate ER 25 milliGRAM(s) Oral daily  midodrine 5 milliGRAM(s) Oral three times a day  pantoprazole    Tablet 40 milliGRAM(s) Oral before breakfast  pregabalin 100 milliGRAM(s) Oral two times a day  tamsulosin 0.4 milliGRAM(s) Oral at bedtime    MEDICATIONS  (PRN):  acetaminophen   Tablet .. 650 milliGRAM(s) Oral every 6 hours PRN Moderate Pain (4 - 6)  dextrose 40% Gel 15 Gram(s) Oral once PRN Blood Glucose LESS THAN 70 milliGRAM(s)/deciliter  docusate sodium 100 milliGRAM(s) Oral three times a day PRN Constipation  glucagon  Injectable 1 milliGRAM(s) IntraMuscular once PRN Glucose LESS THAN 70 milligrams/deciliter  senna 2 Tablet(s) Oral at bedtime PRN Constipation      FAMILY HISTORY:  Family history of throat cancer (Father)  Family history of lung cancer (Mother)      Allergies    Cipro (Unknown)  IV contrast (Short breath)  Keflex (Unknown)  shellfish (Anaphylaxis)    Intolerances        SOCIAL HISTORY:    [  ] Etoh  [  ] Smoking  [  ] Substance abuse     Home Environment:  [ x ] Home Alone  [  ] Lives with Family  [  ] Home Health Aid    Dwelling:  [  ] Apartment  [x  ] Private House  [  ] Adult Home  [  ] Skilled Nursing Facility      [  ] Short Term  [  ] Long Term  [ x ] Stairs       Elevator [  ]    FUNCTIONAL STATUS PTA: (Check all that apply)  Ambulation: [ x  ]Independent    [  ] Dependent     [  ] Non-Ambulatory  Assistive Device: [x  ] SA Cane  [  ]  Q Cane  [  ] Walker  [  ]  Wheelchair  ADL : [ x ] Independent  [  ]  Dependent       Vital Signs Last 24 Hrs  T(C): 36 (22 Feb 2019 10:43), Max: 36.4 (22 Feb 2019 09:21)  T(F): 96.8 (22 Feb 2019 10:43), Max: 97.5 (22 Feb 2019 09:21)  HR: 72 (22 Feb 2019 12:22) (72 - 89)  BP: 133/72 (22 Feb 2019 12:22) (94/51 - 133/72)  BP(mean): --  RR: 16 (22 Feb 2019 12:22) (16 - 16)  SpO2: 100% (22 Feb 2019 12:22) (100% - 100%)      PHYSICAL EXAM: Alert & Oriented X3  GENERAL: NAD, well-groomed, well-developed  HEAD:  Atraumatic, Normocephalic  CHEST/LUNG: Clear   HEART: S1S2+  ABDOMEN: Soft, Nontender  EXTREMITIES:  no calf tenderness    NERVOUS SYSTEM:  Cranial Nerves 2-12 intact [  ] Abnormal  [  ]  ROM: WFL all extremities [x  ]  Abnormal [  ]  Motor Strength: WFL all extremities  [  ]  Abnormal [x  ]4/5 all ext  Sensation: intact to light touch [  ] Abnormal [  ]  Reflexes: Symmetric [  ]  Abnormal [  ]    FUNCTIONAL STATUS:  Bed Mobility: Independent [  ]  Supervision [  ]  Needs Assistance [x  ]  N/A [  ]  Transfers: Independent [  ]  Supervision [  ]  Needs Assistance [ x ]  N/A [  ]   Ambulation: Independent [  ]  Supervision [  ]  Needs Assistance [  ]  N/A [  ]  ADL: Independent [  ] Requires Assistance [  ] N/A [  ]      LABS:                        9.4    8.19  )-----------( 140      ( 22 Feb 2019 10:03 )             31.3     02-22    136  |  99  |  37<H>  ----------------------------<  236<H>  4.9   |  23  |  1.7<H>    Ca    9.4      22 Feb 2019 10:03    TPro  7.1  /  Alb  3.7  /  TBili  0.3  /  DBili  x   /  AST  31  /  ALT  46<H>  /  AlkPhos  155<H>  02-22    PT/INR - ( 22 Feb 2019 10:03 )   PT: 11.20 sec;   INR: 0.97 ratio         PTT - ( 22 Feb 2019 10:03 )  PTT:27.7 sec      RADIOLOGY & ADDITIONAL STUDIES:    Assesment:

## 2019-02-22 NOTE — ED PROVIDER NOTE - NS ED ROS FT
Constitutional: No fevers/chills.    Head: No injury, headache.    Eyes:  No eye pain or discharge. No injury.    ENMT:  No pain, discharge or infections. No neck pain or stiffness. No loss ROM.    Cardiac:  No chest pain, SOB or edema.    Respiratory:  No cough or respiratory distress.     GI:  No nausea, vomiting, diarrhea or abdominal pain. No change in PO intake.    :  (+) frequency, (-) urgency or burning. No change in urine output.    MS:  No leg pain, leg swelling, myalgia, (+) muscle weakness, (-) joint pain (+) chronic back pain. No loss ROM.    Neuro:  No dizziness (+) weakness.  No LOC.    Skin:  No skin rash.

## 2019-02-22 NOTE — CONSULT NOTE ADULT - ASSESSMENT

## 2019-02-22 NOTE — H&P ADULT - ATTENDING COMMENTS
The patient was seen and examined at bedside independently.  Agree with a/p above by resident except the portion edited by me.    Will follow.

## 2019-02-23 LAB
ANION GAP SERPL CALC-SCNC: 17 MMOL/L — HIGH (ref 7–14)
BASOPHILS # BLD AUTO: 0.08 K/UL — SIGNIFICANT CHANGE UP (ref 0–0.2)
BASOPHILS NFR BLD AUTO: 1 % — SIGNIFICANT CHANGE UP (ref 0–1)
BUN SERPL-MCNC: 30 MG/DL — HIGH (ref 10–20)
CALCIUM SERPL-MCNC: 9.6 MG/DL — SIGNIFICANT CHANGE UP (ref 8.5–10.1)
CHLORIDE SERPL-SCNC: 101 MMOL/L — SIGNIFICANT CHANGE UP (ref 98–110)
CO2 SERPL-SCNC: 23 MMOL/L — SIGNIFICANT CHANGE UP (ref 17–32)
CREAT SERPL-MCNC: 1.4 MG/DL — SIGNIFICANT CHANGE UP (ref 0.7–1.5)
EOSINOPHIL # BLD AUTO: 0.5 K/UL — SIGNIFICANT CHANGE UP (ref 0–0.7)
EOSINOPHIL NFR BLD AUTO: 6 % — SIGNIFICANT CHANGE UP (ref 0–8)
ESTIMATED AVERAGE GLUCOSE: 134 MG/DL — HIGH (ref 68–114)
GLUCOSE BLDC GLUCOMTR-MCNC: 102 MG/DL — HIGH (ref 70–99)
GLUCOSE BLDC GLUCOMTR-MCNC: 102 MG/DL — HIGH (ref 70–99)
GLUCOSE BLDC GLUCOMTR-MCNC: 122 MG/DL — HIGH (ref 70–99)
GLUCOSE BLDC GLUCOMTR-MCNC: 223 MG/DL — HIGH (ref 70–99)
GLUCOSE SERPL-MCNC: 103 MG/DL — HIGH (ref 70–99)
HBA1C BLD-MCNC: 6.3 % — HIGH (ref 4–5.6)
HCT VFR BLD CALC: 33.1 % — LOW (ref 42–52)
HGB BLD-MCNC: 10 G/DL — LOW (ref 14–18)
IMM GRANULOCYTES NFR BLD AUTO: 0.2 % — SIGNIFICANT CHANGE UP (ref 0.1–0.3)
LYMPHOCYTES # BLD AUTO: 2.24 K/UL — SIGNIFICANT CHANGE UP (ref 1.2–3.4)
LYMPHOCYTES # BLD AUTO: 27.1 % — SIGNIFICANT CHANGE UP (ref 20.5–51.1)
MAGNESIUM SERPL-MCNC: 1.8 MG/DL — SIGNIFICANT CHANGE UP (ref 1.8–2.4)
MCHC RBC-ENTMCNC: 21.8 PG — LOW (ref 27–31)
MCHC RBC-ENTMCNC: 30.2 G/DL — LOW (ref 32–37)
MCV RBC AUTO: 72.3 FL — LOW (ref 80–94)
MONOCYTES # BLD AUTO: 0.62 K/UL — HIGH (ref 0.1–0.6)
MONOCYTES NFR BLD AUTO: 7.5 % — SIGNIFICANT CHANGE UP (ref 1.7–9.3)
NEUTROPHILS # BLD AUTO: 4.81 K/UL — SIGNIFICANT CHANGE UP (ref 1.4–6.5)
NEUTROPHILS NFR BLD AUTO: 58.2 % — SIGNIFICANT CHANGE UP (ref 42.2–75.2)
NRBC # BLD: 0 /100 WBCS — SIGNIFICANT CHANGE UP (ref 0–0)
PLATELET # BLD AUTO: 154 K/UL — SIGNIFICANT CHANGE UP (ref 130–400)
POTASSIUM SERPL-MCNC: 4.8 MMOL/L — SIGNIFICANT CHANGE UP (ref 3.5–5)
POTASSIUM SERPL-SCNC: 4.8 MMOL/L — SIGNIFICANT CHANGE UP (ref 3.5–5)
RBC # BLD: 4.58 M/UL — LOW (ref 4.7–6.1)
RBC # FLD: 17.7 % — HIGH (ref 11.5–14.5)
SODIUM SERPL-SCNC: 141 MMOL/L — SIGNIFICANT CHANGE UP (ref 135–146)
TROPONIN T SERPL-MCNC: <0.01 NG/ML — SIGNIFICANT CHANGE UP
TSH SERPL-MCNC: 2.92 UIU/ML — SIGNIFICANT CHANGE UP (ref 0.27–4.2)
WBC # BLD: 8.27 K/UL — SIGNIFICANT CHANGE UP (ref 4.8–10.8)
WBC # FLD AUTO: 8.27 K/UL — SIGNIFICANT CHANGE UP (ref 4.8–10.8)

## 2019-02-23 PROCEDURE — 93970 EXTREMITY STUDY: CPT | Mod: 26

## 2019-02-23 RX ORDER — LANOLIN ALCOHOL/MO/W.PET/CERES
5 CREAM (GRAM) TOPICAL AT BEDTIME
Qty: 0 | Refills: 0 | Status: DISCONTINUED | OUTPATIENT
Start: 2019-02-23 | End: 2019-02-26

## 2019-02-23 RX ADMIN — Medication 8 UNIT(S): at 17:37

## 2019-02-23 RX ADMIN — Medication 8 UNIT(S): at 11:58

## 2019-02-23 RX ADMIN — INSULIN GLARGINE 25 UNIT(S): 100 INJECTION, SOLUTION SUBCUTANEOUS at 22:11

## 2019-02-23 RX ADMIN — Medication 5 MILLIGRAM(S): at 22:11

## 2019-02-23 RX ADMIN — TAMSULOSIN HYDROCHLORIDE 0.4 MILLIGRAM(S): 0.4 CAPSULE ORAL at 22:11

## 2019-02-23 RX ADMIN — SODIUM CHLORIDE 75 MILLILITER(S): 9 INJECTION, SOLUTION INTRAVENOUS at 08:23

## 2019-02-23 RX ADMIN — Medication 0: at 08:22

## 2019-02-23 RX ADMIN — MIDODRINE HYDROCHLORIDE 5 MILLIGRAM(S): 2.5 TABLET ORAL at 06:03

## 2019-02-23 RX ADMIN — Medication 25 MILLIGRAM(S): at 06:03

## 2019-02-23 RX ADMIN — HEPARIN SODIUM 5000 UNIT(S): 5000 INJECTION INTRAVENOUS; SUBCUTANEOUS at 13:25

## 2019-02-23 RX ADMIN — Medication 0: at 11:58

## 2019-02-23 RX ADMIN — MIDODRINE HYDROCHLORIDE 5 MILLIGRAM(S): 2.5 TABLET ORAL at 22:11

## 2019-02-23 RX ADMIN — PANTOPRAZOLE SODIUM 40 MILLIGRAM(S): 20 TABLET, DELAYED RELEASE ORAL at 06:03

## 2019-02-23 RX ADMIN — Medication 81 MILLIGRAM(S): at 11:59

## 2019-02-23 RX ADMIN — Medication 8 UNIT(S): at 08:21

## 2019-02-23 RX ADMIN — Medication 5 MILLIGRAM(S): at 01:49

## 2019-02-23 RX ADMIN — Medication 1 MILLIGRAM(S): at 11:59

## 2019-02-23 RX ADMIN — HEPARIN SODIUM 5000 UNIT(S): 5000 INJECTION INTRAVENOUS; SUBCUTANEOUS at 22:10

## 2019-02-23 RX ADMIN — MIDODRINE HYDROCHLORIDE 5 MILLIGRAM(S): 2.5 TABLET ORAL at 13:25

## 2019-02-23 RX ADMIN — Medication 100 MILLIGRAM(S): at 06:03

## 2019-02-23 RX ADMIN — Medication 100 MILLIGRAM(S): at 17:38

## 2019-02-23 RX ADMIN — PREGABALIN 1000 MICROGRAM(S): 225 CAPSULE ORAL at 11:59

## 2019-02-23 RX ADMIN — HEPARIN SODIUM 5000 UNIT(S): 5000 INJECTION INTRAVENOUS; SUBCUTANEOUS at 06:04

## 2019-02-23 RX ADMIN — Medication 0: at 17:37

## 2019-02-23 RX ADMIN — CHLORHEXIDINE GLUCONATE 1 APPLICATION(S): 213 SOLUTION TOPICAL at 06:03

## 2019-02-23 RX ADMIN — CLOPIDOGREL BISULFATE 75 MILLIGRAM(S): 75 TABLET, FILM COATED ORAL at 11:59

## 2019-02-23 NOTE — PROGRESS NOTE ADULT - ASSESSMENT
62 y/o male with PMHx of HTN, DLD, CAD s/p CABG, DM with peripheral neuropathy, hx of Left DFU and hemorrhoids p/w dizziness and b/l LE pain and weakness x 4 days complicated by multiple falls. (Recent dc from 4A) In the ED patient was found to have SAKINA.    # Acute kidney injury baseline creatinine is 1.0  - prerenal 2/2 dehydration. Improved on IVF back to 1.4 from 1.7  - monitor BMP  - continue IVF 1 more day recheck BMP 2/24 if back baseline DC IVF    # Dizziness/ presyncope secondary to orthostatic hypotension and dehydration  - typical symptoms of orthostatic when he gets out of bed  - Orthostatic vital signs this AM instruted RN to obtain  - Midodrine as per last discharge    # B/L KE weakness and pain - PT and rehab  # Elevated troponin - EKG is at baseline since last admission patient has no chest pain Trend CE  # DM - insulin protocol  # Hx of L GIB and proctitis - stable CBC monitor. c/w mesalamine  #HTN, DLD and CAD - C/w aspirin, plavix and BB hold statin for now  # Diet - Consistent carbohydrate diet  # DVT> Heparin subq  # GI ppx  # From home  # DNR / DNI - MOLST to be signed by Attending 64 y/o male with PMHx of HTN, DLD, CAD s/p CABG, DM with peripheral neuropathy, hx of Left DFU and hemorrhoids p/w dizziness and b/l LE pain and weakness x 4 days complicated by multiple falls. (Recent dc from 4A) In the ED patient was found to have SAKINA.    # Acute kidney injury baseline creatinine is 1.0  - prerenal 2/2 dehydration. Improved on IVF back to 1.4 from 1.7  - monitor BMP  - continue IVF 1 more day recheck BMP 2/24 if back baseline DC IVF    # Dizziness/ presyncope secondary to orthostatic hypotension and dehydration  - typical symptoms of orthostatic when he gets out of bed  - Orthostatic vital signs this AM instruted RN to obtain  - Midodrine as per last discharge    # B/L KE weakness and pain - PT and rehab. CK' negative doubt rhabdo. pending duplex  # Elevated troponin - EKG is at baseline since last admission and repeat trop <.1   # DM - insulin protocol  # Hx of L GIB and proctitis - stable CBC monitor. c/w mesalamine  #HTN, DLD and CAD - C/w aspirin, plavix and BB hold statin for now  # Diet - Consistent carbohydrate diet  # DVT> Heparin subq  # GI ppx  # From home  # DNR / DNI - MOLST to be signed by Attending

## 2019-02-23 NOTE — CHART NOTE - NSCHARTNOTEFT_GEN_A_CORE
Following goals of care discussion with patient, patient decided on DNR/DNI. MOLST form completed and placed in chart, pending attending signature.

## 2019-02-24 LAB
ANION GAP SERPL CALC-SCNC: 15 MMOL/L — HIGH (ref 7–14)
BUN SERPL-MCNC: 25 MG/DL — HIGH (ref 10–20)
CALCIUM SERPL-MCNC: 9.2 MG/DL — SIGNIFICANT CHANGE UP (ref 8.5–10.1)
CHLORIDE SERPL-SCNC: 101 MMOL/L — SIGNIFICANT CHANGE UP (ref 98–110)
CHLORIDE UR-SCNC: 51 — SIGNIFICANT CHANGE UP
CO2 SERPL-SCNC: 23 MMOL/L — SIGNIFICANT CHANGE UP (ref 17–32)
CREAT ?TM UR-MCNC: 39 MG/DL — SIGNIFICANT CHANGE UP
CREAT SERPL-MCNC: 1.3 MG/DL — SIGNIFICANT CHANGE UP (ref 0.7–1.5)
GLUCOSE BLDC GLUCOMTR-MCNC: 124 MG/DL — HIGH (ref 70–99)
GLUCOSE BLDC GLUCOMTR-MCNC: 134 MG/DL — HIGH (ref 70–99)
GLUCOSE BLDC GLUCOMTR-MCNC: 168 MG/DL — HIGH (ref 70–99)
GLUCOSE BLDC GLUCOMTR-MCNC: 91 MG/DL — SIGNIFICANT CHANGE UP (ref 70–99)
GLUCOSE BLDC GLUCOMTR-MCNC: 97 MG/DL — SIGNIFICANT CHANGE UP (ref 70–99)
GLUCOSE SERPL-MCNC: 150 MG/DL — HIGH (ref 70–99)
POTASSIUM SERPL-MCNC: 5.1 MMOL/L — HIGH (ref 3.5–5)
POTASSIUM SERPL-SCNC: 5.1 MMOL/L — HIGH (ref 3.5–5)
POTASSIUM UR-SCNC: 25 MMOL/L — SIGNIFICANT CHANGE UP
PROT ?TM UR-MCNC: 21 MG/DLG/24H — SIGNIFICANT CHANGE UP
PROT/CREAT UR-RTO: 0.5 RATIO — HIGH (ref 0–0.2)
SODIUM SERPL-SCNC: 139 MMOL/L — SIGNIFICANT CHANGE UP (ref 135–146)
SODIUM UR-SCNC: 63 MMOL/L — SIGNIFICANT CHANGE UP

## 2019-02-24 RX ORDER — SODIUM CHLORIDE 9 MG/ML
1000 INJECTION INTRAMUSCULAR; INTRAVENOUS; SUBCUTANEOUS
Qty: 0 | Refills: 0 | Status: DISCONTINUED | OUTPATIENT
Start: 2019-02-24 | End: 2019-02-25

## 2019-02-24 RX ADMIN — Medication 8 UNIT(S): at 08:25

## 2019-02-24 RX ADMIN — Medication 0: at 17:34

## 2019-02-24 RX ADMIN — Medication 81 MILLIGRAM(S): at 12:07

## 2019-02-24 RX ADMIN — Medication 0: at 08:26

## 2019-02-24 RX ADMIN — Medication 100 MILLIGRAM(S): at 17:33

## 2019-02-24 RX ADMIN — INSULIN GLARGINE 25 UNIT(S): 100 INJECTION, SOLUTION SUBCUTANEOUS at 21:18

## 2019-02-24 RX ADMIN — HEPARIN SODIUM 5000 UNIT(S): 5000 INJECTION INTRAVENOUS; SUBCUTANEOUS at 05:38

## 2019-02-24 RX ADMIN — Medication 1 MILLIGRAM(S): at 12:07

## 2019-02-24 RX ADMIN — MIDODRINE HYDROCHLORIDE 5 MILLIGRAM(S): 2.5 TABLET ORAL at 21:18

## 2019-02-24 RX ADMIN — HEPARIN SODIUM 5000 UNIT(S): 5000 INJECTION INTRAVENOUS; SUBCUTANEOUS at 21:18

## 2019-02-24 RX ADMIN — CLOPIDOGREL BISULFATE 75 MILLIGRAM(S): 75 TABLET, FILM COATED ORAL at 12:07

## 2019-02-24 RX ADMIN — MIDODRINE HYDROCHLORIDE 5 MILLIGRAM(S): 2.5 TABLET ORAL at 13:26

## 2019-02-24 RX ADMIN — Medication 8 UNIT(S): at 12:06

## 2019-02-24 RX ADMIN — Medication 2: at 12:06

## 2019-02-24 RX ADMIN — SODIUM CHLORIDE 75 MILLILITER(S): 9 INJECTION INTRAMUSCULAR; INTRAVENOUS; SUBCUTANEOUS at 17:33

## 2019-02-24 RX ADMIN — Medication 25 MILLIGRAM(S): at 05:42

## 2019-02-24 RX ADMIN — Medication 5 MILLIGRAM(S): at 21:19

## 2019-02-24 RX ADMIN — MIDODRINE HYDROCHLORIDE 5 MILLIGRAM(S): 2.5 TABLET ORAL at 05:42

## 2019-02-24 RX ADMIN — CHLORHEXIDINE GLUCONATE 1 APPLICATION(S): 213 SOLUTION TOPICAL at 05:38

## 2019-02-24 RX ADMIN — PANTOPRAZOLE SODIUM 40 MILLIGRAM(S): 20 TABLET, DELAYED RELEASE ORAL at 06:20

## 2019-02-24 RX ADMIN — PREGABALIN 1000 MICROGRAM(S): 225 CAPSULE ORAL at 12:08

## 2019-02-24 RX ADMIN — TAMSULOSIN HYDROCHLORIDE 0.4 MILLIGRAM(S): 0.4 CAPSULE ORAL at 21:19

## 2019-02-24 RX ADMIN — Medication 100 MILLIGRAM(S): at 05:44

## 2019-02-24 RX ADMIN — HEPARIN SODIUM 5000 UNIT(S): 5000 INJECTION INTRAVENOUS; SUBCUTANEOUS at 13:26

## 2019-02-24 RX ADMIN — Medication 8 UNIT(S): at 17:33

## 2019-02-24 NOTE — PROGRESS NOTE ADULT - ASSESSMENT
# SAKINA  - In light of orthostatics, continue IVF. Cr  still not at baseline but approaching.    # Orthostatic hypotension 2/2 dehydration  - NS  - Midodrine    # B/L KE weakness and pain   - PT     # DM   - Controlled for inpt    May need SNF if candidate

## 2019-02-24 NOTE — PHYSICAL THERAPY INITIAL EVALUATION ADULT - GENERAL OBSERVATIONS, REHAB EVAL
Pt was seen from 11:25-11:50 am for PT evaluation. Pt was encountered lying in bed, NAD, agreeable to participate in PT.

## 2019-02-25 LAB
ALBUMIN SERPL ELPH-MCNC: 3.7 G/DL — SIGNIFICANT CHANGE UP (ref 3.5–5.2)
ALP SERPL-CCNC: 156 U/L — HIGH (ref 30–115)
ALT FLD-CCNC: 41 U/L — SIGNIFICANT CHANGE UP (ref 0–41)
ANION GAP SERPL CALC-SCNC: 10 MMOL/L — SIGNIFICANT CHANGE UP (ref 7–14)
AST SERPL-CCNC: 23 U/L — SIGNIFICANT CHANGE UP (ref 0–41)
BILIRUB SERPL-MCNC: 0.4 MG/DL — SIGNIFICANT CHANGE UP (ref 0.2–1.2)
BUN SERPL-MCNC: 24 MG/DL — HIGH (ref 10–20)
CALCIUM SERPL-MCNC: 9.3 MG/DL — SIGNIFICANT CHANGE UP (ref 8.5–10.1)
CHLORIDE SERPL-SCNC: 107 MMOL/L — SIGNIFICANT CHANGE UP (ref 98–110)
CO2 SERPL-SCNC: 23 MMOL/L — SIGNIFICANT CHANGE UP (ref 17–32)
CREAT SERPL-MCNC: 1.3 MG/DL — SIGNIFICANT CHANGE UP (ref 0.7–1.5)
CULTURE RESULTS: SIGNIFICANT CHANGE UP
GLUCOSE BLDC GLUCOMTR-MCNC: 120 MG/DL — HIGH (ref 70–99)
GLUCOSE BLDC GLUCOMTR-MCNC: 197 MG/DL — HIGH (ref 70–99)
GLUCOSE BLDC GLUCOMTR-MCNC: 198 MG/DL — HIGH (ref 70–99)
GLUCOSE BLDC GLUCOMTR-MCNC: 209 MG/DL — HIGH (ref 70–99)
GLUCOSE BLDC GLUCOMTR-MCNC: 250 MG/DL — HIGH (ref 70–99)
GLUCOSE BLDC GLUCOMTR-MCNC: 87 MG/DL — SIGNIFICANT CHANGE UP (ref 70–99)
GLUCOSE SERPL-MCNC: 109 MG/DL — HIGH (ref 70–99)
HCT VFR BLD CALC: 31 % — LOW (ref 42–52)
HGB BLD-MCNC: 9.3 G/DL — LOW (ref 14–18)
MCHC RBC-ENTMCNC: 21.8 PG — LOW (ref 27–31)
MCHC RBC-ENTMCNC: 30 G/DL — LOW (ref 32–37)
MCV RBC AUTO: 72.6 FL — LOW (ref 80–94)
NRBC # BLD: 0 /100 WBCS — SIGNIFICANT CHANGE UP (ref 0–0)
PLATELET # BLD AUTO: 150 K/UL — SIGNIFICANT CHANGE UP (ref 130–400)
POTASSIUM SERPL-MCNC: 4.4 MMOL/L — SIGNIFICANT CHANGE UP (ref 3.5–5)
POTASSIUM SERPL-SCNC: 4.4 MMOL/L — SIGNIFICANT CHANGE UP (ref 3.5–5)
PROT SERPL-MCNC: 7 G/DL — SIGNIFICANT CHANGE UP (ref 6–8)
RBC # BLD: 4.27 M/UL — LOW (ref 4.7–6.1)
RBC # FLD: 18 % — HIGH (ref 11.5–14.5)
SODIUM SERPL-SCNC: 140 MMOL/L — SIGNIFICANT CHANGE UP (ref 135–146)
SPECIMEN SOURCE: SIGNIFICANT CHANGE UP
WBC # BLD: 8.88 K/UL — SIGNIFICANT CHANGE UP (ref 4.8–10.8)
WBC # FLD AUTO: 8.88 K/UL — SIGNIFICANT CHANGE UP (ref 4.8–10.8)

## 2019-02-25 RX ORDER — DIPHENHYDRAMINE HCL 50 MG
25 CAPSULE ORAL EVERY 6 HOURS
Qty: 0 | Refills: 0 | Status: DISCONTINUED | OUTPATIENT
Start: 2019-02-25 | End: 2019-02-26

## 2019-02-25 RX ADMIN — Medication 81 MILLIGRAM(S): at 11:32

## 2019-02-25 RX ADMIN — Medication 100 MILLIGRAM(S): at 05:31

## 2019-02-25 RX ADMIN — Medication 650 MILLIGRAM(S): at 09:03

## 2019-02-25 RX ADMIN — PREGABALIN 1000 MICROGRAM(S): 225 CAPSULE ORAL at 11:32

## 2019-02-25 RX ADMIN — Medication 1 MILLIGRAM(S): at 11:32

## 2019-02-25 RX ADMIN — Medication 25 MILLIGRAM(S): at 05:29

## 2019-02-25 RX ADMIN — Medication 25 MILLIGRAM(S): at 22:04

## 2019-02-25 RX ADMIN — HEPARIN SODIUM 5000 UNIT(S): 5000 INJECTION INTRAVENOUS; SUBCUTANEOUS at 14:14

## 2019-02-25 RX ADMIN — Medication 100 MILLIGRAM(S): at 18:04

## 2019-02-25 RX ADMIN — Medication 8 UNIT(S): at 17:58

## 2019-02-25 RX ADMIN — PANTOPRAZOLE SODIUM 40 MILLIGRAM(S): 20 TABLET, DELAYED RELEASE ORAL at 06:25

## 2019-02-25 RX ADMIN — HEPARIN SODIUM 5000 UNIT(S): 5000 INJECTION INTRAVENOUS; SUBCUTANEOUS at 05:28

## 2019-02-25 RX ADMIN — Medication 4: at 18:00

## 2019-02-25 RX ADMIN — MIDODRINE HYDROCHLORIDE 5 MILLIGRAM(S): 2.5 TABLET ORAL at 05:29

## 2019-02-25 RX ADMIN — CHLORHEXIDINE GLUCONATE 1 APPLICATION(S): 213 SOLUTION TOPICAL at 05:28

## 2019-02-25 RX ADMIN — HEPARIN SODIUM 5000 UNIT(S): 5000 INJECTION INTRAVENOUS; SUBCUTANEOUS at 22:04

## 2019-02-25 RX ADMIN — MIDODRINE HYDROCHLORIDE 5 MILLIGRAM(S): 2.5 TABLET ORAL at 14:13

## 2019-02-25 RX ADMIN — Medication 8 UNIT(S): at 12:26

## 2019-02-25 RX ADMIN — Medication 650 MILLIGRAM(S): at 08:03

## 2019-02-25 RX ADMIN — TAMSULOSIN HYDROCHLORIDE 0.4 MILLIGRAM(S): 0.4 CAPSULE ORAL at 22:04

## 2019-02-25 RX ADMIN — INSULIN GLARGINE 25 UNIT(S): 100 INJECTION, SOLUTION SUBCUTANEOUS at 22:22

## 2019-02-25 RX ADMIN — CLOPIDOGREL BISULFATE 75 MILLIGRAM(S): 75 TABLET, FILM COATED ORAL at 11:32

## 2019-02-25 RX ADMIN — Medication 2: at 12:26

## 2019-02-25 NOTE — PROGRESS NOTE ADULT - ASSESSMENT
64 y/o male with PMHx of HTN, DLD, CAD s/p CABG, DM with peripheral neuropathy, hx of Left DFU and hemorrhoids p/w dizziness and b/l LE pain and weakness x 4 days complicated by multiple falls. (Recent dc from 4A) In the ED patient was found to have SAKINA.    # Acute kidney injury baseline creatinine is 1.0  - prerenal 2/2 dehydration.   - monitor BMP  -  In light of orthostatics continue IVF. Cr  still not at baseline but approaching.    # Dizziness/ presyncope secondary to orthostatic hypotension and dehydration  - typical symptoms of orthostatic when he gets out of bed  - Orthostatic vital signs this AM instruted RN to obtain  - Midodrine as per last discharge and NS    # B/L KE weakness and pain - PT and rehab. CK' negative doubt rhabdo. pending duplex  # Elevated troponin - EKG is at baseline since last admission and repeat trop <.1   # DM - insulin protocol  # Hx of L GIB and proctitis - stable CBC monitor. c/w mesalamine  # HTN, DLD and CAD - C/w aspirin, plavix and BB hold statin for now  # Diet - Consistent carbohydrate diet  # DVT> Heparin subq  # GI ppx  # From home - wants SNF  # DNR / DNI - MOLST to be signed by Attending

## 2019-02-25 NOTE — PROGRESS NOTE ADULT - ATTENDING COMMENTS
Patient was evaluated and examined by bedside, no c/o abdominal pain, tolerating diet well.    All labs, radiology studies, VS was reviewed  I agree with medical plan outlined by Medical resident as stated above.  -mild SAKINA due to orthostasis, with vasomotor nephropathy- renal function has improved  -continue IVF for next 24 hrs  -frequent falls- maintain fall precautions, daily PT/OT as tolerated, patient is a good candidate for   STR  - Microcytic anemia -microcytic anemia  -continue folate tx.    for chronic medical conditions- resumed on home regimen tx.    #Progress Note Handoff: Pending Consults_________,Tests________,Test Results_renal function monitoring______,Other;  Family discussion:Disposition: Home___/SNF___/Other___STR_____/to be determined

## 2019-02-26 ENCOUNTER — TRANSCRIPTION ENCOUNTER (OUTPATIENT)
Age: 64
End: 2019-02-26

## 2019-02-26 VITALS — TEMPERATURE: 98 F | RESPIRATION RATE: 18 BRPM | WEIGHT: 227.52 LBS

## 2019-02-26 LAB
ANA TITR SER: NEGATIVE — SIGNIFICANT CHANGE UP
ANION GAP SERPL CALC-SCNC: 12 MMOL/L — SIGNIFICANT CHANGE UP (ref 7–14)
BUN SERPL-MCNC: 20 MG/DL — SIGNIFICANT CHANGE UP (ref 10–20)
CALCIUM SERPL-MCNC: 8.9 MG/DL — SIGNIFICANT CHANGE UP (ref 8.5–10.1)
CHLORIDE SERPL-SCNC: 104 MMOL/L — SIGNIFICANT CHANGE UP (ref 98–110)
CO2 SERPL-SCNC: 25 MMOL/L — SIGNIFICANT CHANGE UP (ref 17–32)
CREAT SERPL-MCNC: 1.2 MG/DL — SIGNIFICANT CHANGE UP (ref 0.7–1.5)
GLUCOSE BLDC GLUCOMTR-MCNC: 149 MG/DL — HIGH (ref 70–99)
GLUCOSE BLDC GLUCOMTR-MCNC: 156 MG/DL — HIGH (ref 70–99)
GLUCOSE BLDC GLUCOMTR-MCNC: 172 MG/DL — HIGH (ref 70–99)
GLUCOSE SERPL-MCNC: 154 MG/DL — HIGH (ref 70–99)
HCT VFR BLD CALC: 29.9 % — LOW (ref 42–52)
HGB BLD-MCNC: 8.9 G/DL — LOW (ref 14–18)
MAGNESIUM SERPL-MCNC: 1.8 MG/DL — SIGNIFICANT CHANGE UP (ref 1.8–2.4)
MCHC RBC-ENTMCNC: 21.8 PG — LOW (ref 27–31)
MCHC RBC-ENTMCNC: 29.8 G/DL — LOW (ref 32–37)
MCV RBC AUTO: 73.3 FL — LOW (ref 80–94)
NRBC # BLD: 0 /100 WBCS — SIGNIFICANT CHANGE UP (ref 0–0)
PHOSPHATE SERPL-MCNC: 3 MG/DL — SIGNIFICANT CHANGE UP (ref 2.1–4.9)
PLATELET # BLD AUTO: 140 K/UL — SIGNIFICANT CHANGE UP (ref 130–400)
POTASSIUM SERPL-MCNC: 4.5 MMOL/L — SIGNIFICANT CHANGE UP (ref 3.5–5)
POTASSIUM SERPL-SCNC: 4.5 MMOL/L — SIGNIFICANT CHANGE UP (ref 3.5–5)
RBC # BLD: 4.08 M/UL — LOW (ref 4.7–6.1)
RBC # FLD: 17.9 % — HIGH (ref 11.5–14.5)
SODIUM SERPL-SCNC: 141 MMOL/L — SIGNIFICANT CHANGE UP (ref 135–146)
WBC # BLD: 6.94 K/UL — SIGNIFICANT CHANGE UP (ref 4.8–10.8)
WBC # FLD AUTO: 6.94 K/UL — SIGNIFICANT CHANGE UP (ref 4.8–10.8)

## 2019-02-26 RX ADMIN — Medication 25 MILLIGRAM(S): at 11:22

## 2019-02-26 RX ADMIN — HEPARIN SODIUM 5000 UNIT(S): 5000 INJECTION INTRAVENOUS; SUBCUTANEOUS at 13:17

## 2019-02-26 RX ADMIN — Medication 1 MILLIGRAM(S): at 11:19

## 2019-02-26 RX ADMIN — Medication 2: at 17:16

## 2019-02-26 RX ADMIN — Medication 8 UNIT(S): at 17:16

## 2019-02-26 RX ADMIN — Medication 100 MILLIGRAM(S): at 17:15

## 2019-02-26 RX ADMIN — Medication 100 MILLIGRAM(S): at 05:39

## 2019-02-26 RX ADMIN — MIDODRINE HYDROCHLORIDE 5 MILLIGRAM(S): 2.5 TABLET ORAL at 05:37

## 2019-02-26 RX ADMIN — Medication 8 UNIT(S): at 08:24

## 2019-02-26 RX ADMIN — Medication 8 UNIT(S): at 12:39

## 2019-02-26 RX ADMIN — CLOPIDOGREL BISULFATE 75 MILLIGRAM(S): 75 TABLET, FILM COATED ORAL at 11:19

## 2019-02-26 RX ADMIN — MIDODRINE HYDROCHLORIDE 5 MILLIGRAM(S): 2.5 TABLET ORAL at 13:20

## 2019-02-26 RX ADMIN — HEPARIN SODIUM 5000 UNIT(S): 5000 INJECTION INTRAVENOUS; SUBCUTANEOUS at 05:38

## 2019-02-26 RX ADMIN — PREGABALIN 1000 MICROGRAM(S): 225 CAPSULE ORAL at 11:20

## 2019-02-26 RX ADMIN — Medication 2: at 12:38

## 2019-02-26 RX ADMIN — CHLORHEXIDINE GLUCONATE 1 APPLICATION(S): 213 SOLUTION TOPICAL at 05:36

## 2019-02-26 RX ADMIN — Medication 81 MILLIGRAM(S): at 11:19

## 2019-02-26 RX ADMIN — PANTOPRAZOLE SODIUM 40 MILLIGRAM(S): 20 TABLET, DELAYED RELEASE ORAL at 05:37

## 2019-02-26 RX ADMIN — Medication 25 MILLIGRAM(S): at 05:37

## 2019-02-26 NOTE — DISCHARGE NOTE ADULT - CARE PLAN
Principal Discharge DX:	SAKINA (acute kidney injury)  Goal:	Continue [place] Hydration  Assessment and plan of treatment:	Follow up with PCP  Repeat BM within 1 week to monitor Cr.

## 2019-02-26 NOTE — DISCHARGE NOTE ADULT - MEDICATION SUMMARY - MEDICATIONS TO TAKE
I will START or STAY ON the medications listed below when I get home from the hospital:    mesalamine 4 g/60 mL rectal enema  -- 60 milliliter(s) rectally once a day (at bedtime)  -- Indication: For recta enema    aspirin 81 mg oral tablet, chewable  -- 1 tab(s) by mouth once a day  -- Indication: For CAD    tamsulosin 0.4 mg oral capsule  -- 1 cap(s) by mouth once a day (at bedtime)  -- Indication: For BPH    pregabalin 100 mg oral capsule  -- 1 cap(s) by mouth 2 times a day MDD:2 tabs  -- Indication: For Pain    insulin lispro  -- 9 unit(s) subcutaneous 3 times a day (before meals)  -- Indication: For Diabetes    insulin glargine  -- 27 unit(s) subcutaneous once a day (at bedtime)  -- Indication: For Diabetes    atorvastatin 40 mg oral tablet  -- 1 tab(s) by mouth once a day (at bedtime)  -- Indication: For CAD    Plavix 75 mg oral tablet  -- 1 tab(s) by mouth once a day  -- Indication: For CAD    Metoprolol Succinate ER 25 mg oral tablet, extended release  -- 1 tab(s) by mouth once a day  -- Indication: For CAD    docusate sodium 100 mg oral capsule  -- 1 cap(s) by mouth 3 times a day, As Needed for constipation  -- Indication: For Constpation    senna oral tablet  -- 2 tab(s) by mouth once a day (at bedtime), As Needed for constipation  -- Indication: For constipation    midodrine 5 mg oral tablet  -- 1 tab(s) by mouth 2 times a day before getting out of bed in the morning, and at 1 pm  -- Indication: For orhtotatic hypotension    cyanocobalamin 1000 mcg oral tablet  -- 1 tab(s) by mouth once a day  -- Indication: For supplement    folic acid 1 mg oral tablet  -- 1 tab(s) by mouth once a day  -- Indication: For supplement

## 2019-02-26 NOTE — PROGRESS NOTE ADULT - SUBJECTIVE AND OBJECTIVE BOX
DAILY PROGRESS NOTE  ===========================================================  Patient Information:   Hospital Day:</ROSALIE ESCOBEDO  /  63y  /  Male  /b> 1d /  MRN#: [id number]  Working / Admitting Diagnosis:  1. frequent falls    |:::::::::::::::::::::::::::::| SUBJECTIVE |:::::::::::::::::::::::::::::::|  OVERNIGHT EVENTS:   No acute events noted. Cr improving    Denies chest pain / SOB / palpitations / Nausea / Vomiting / constipation / diarrhea or abdominal pain.   ROS otherwise negative.    Past Medical History:   Amputated toe, unspecified laterality    CAD (coronary artery disease)    Diabetes    Diabetic foot ulcer    Dyslipidemia    Hypertension, unspecified type    Other hyperlipidemia    PVD (peripheral vascular disease).  CABG    ALLERGIES:  Cipro (Unknown)  IV contrast (Short breath)  Keflex (Unknown)  shellfish (Anaphylaxis)    HOME MEDICATIONS:  acetaminophen 325 mg oral tablet: 2 tab(s) orally every 6 hours, As needed, Temp greater or equal to 38.5C (101.3F), Mild Pain (1 - 3) (17 Dec 2018 13:08)  aspirin 81 mg oral tablet, chewable: 1 tab(s) orally once a day (2018 15:39)  atorvastatin 40 mg oral tablet: 1 tab(s) orally once a day (at bedtime) (2018 15:39)  cyanocobalamin 1000 mcg oral tablet: 1 tab(s) orally once a day (2018 15:39)  docusate sodium 100 mg oral capsule: 1 cap(s) orally 3 times a day, As Needed for constipation (18 Dec 2018 13:38)  folic acid 1 mg oral tablet: 1 tab(s) orally once a day (2018 15:39)  insulin glargine: 27 unit(s) subcutaneous once a day (at bedtime) (2018 19:44)  insulin lispro: 9 unit(s) subcutaneous 3 times a day (before meals) (18 Dec 2018 13:38)  mesalamine 4 g/60 mL rectal enema: 60 milliliter(s) rectal once a day (at bedtime) (18 Dec 2018 13:38)  Metoprolol Succinate ER 25 mg oral tablet, extended release: 1 tab(s) orally once a day (2018 15:44)  Plavix 75 mg oral tablet: 1 tab(s) orally once a day (2018 15:44)  senna oral tablet: 2 tab(s) orally once a day (at bedtime), As Needed for constipation (18 Dec 2018 13:38)      |:::::::::::::::::::::::::::| OBJECTIVE |:::::::::::::::::::::::::::|    VITAL SIGNS: Last 24 Hours  Vital Signs Last 24 Hrs  T(C): 35.5 (2019 13:21), Max: 36.7 (2019 05:53)  T(F): 95.9 (2019 13:21), Max: 98 (2019 05:53)  HR: 80 (2019 13:21) (70 - 80)  BP: 92/54 (2019 13:21) (92/54 - 126/65)  BP(mean): --  RR: 18 (2019 13:21) (18 - 18)  SpO2: 98% (2019 19:34) (98% - 98%)    PHYSICAL EXAM:  GENERAL:   Awake, alert; NAD.  HEENT:  Head NC/AT; Conjunctivae pink, Sclera anicteric & non-injected; Oral mucosa moist.  NECK:   Supple.  CARDIO:   RRR; S1 & S2 audible  RESP:  No respiratory distress or accessory muscle use. CTAB  GI:   Soft/ NT/ND / No guarding; No rebound tenderness.  EXT:   Without any cyanosis, clubbing, rash, lesions or edema.     LAB RESULTS:                                   9.3    8.88  )-----------( 150      ( 2019 08:54 )             31.0   02-25    140  |  107  |  24<H>  ----------------------------<  109<H>  4.4   |  23  |  1.3    Ca    9.3      2019 08:54    TPro  7.0  /  Alb  3.7  /  TBili  0.4  /  DBili  x   /  AST  23  /  ALT  41  /  AlkPhos  156<H>        Color: Yellow / Appearance: Cloudy / S.015 / pH: x  Gluc: x / Ketone: Negative  / Bili: Negative / Urobili: 0.2 mg/dL   Blood: x / Protein: Negative mg/dL / Nitrite: Negative   Leuk Esterase: Moderate / RBC: 1-2 /HPF / WBC 26-50 /HPF   Sq Epi: x / Non Sq Epi: x / Bacteria: Few /HPF        Troponin T, Serum: <0.01 ng/mL (19 @ 01:48)  Creatine Kinase, Serum: 156 U/L (19 @ 21:24)  Troponin T, Serum: 0.02 ng/mL <H> (19 @ 21:24)  Creatine Kinase, Serum: 140 U/L (19 @ 18:18)  Sedimentation Rate, Erythrocyte: 56 mm/Hr <H> (19 @ 18:18)    CARDIAC MARKERS ( 2019 01:48 )  x     / <0.01 ng/mL / x     / x     / x      CARDIAC MARKERS ( 2019 21:24 )  x     / 0.02 ng/mL / 156 U/L / x     / 2.3 ng/mL  CARDIAC MARKERS ( 2019 18:18 )  x     / x     / 140 U/L / x     / x      CARDIAC MARKERS ( 2019 10:03 )  x     / 0.02 ng/mL / x     / x     / x        INPATIENT MEDICATIONS:  aspirin  chewable 81 milliGRAM(s) Oral daily  chlorhexidine 4% Liquid 1 Application(s) Topical <User Schedule>  clopidogrel Tablet 75 milliGRAM(s) Oral daily  cyanocobalamin 1000 MICROGram(s) Oral daily  dextrose 5%. 1000 milliLiter(s) IV Continuous <Continuous>  dextrose 50% Injectable 12.5 Gram(s) IV Push once  dextrose 50% Injectable 25 Gram(s) IV Push once  dextrose 50% Injectable 25 Gram(s) IV Push once  folic acid 1 milliGRAM(s) Oral daily  heparin  Injectable 5000 Unit(s) SubCutaneous every 8 hours  insulin glargine Injectable (LANTUS) 25 Unit(s) SubCutaneous at bedtime  insulin lispro (HumaLOG) corrective regimen sliding scale   SubCutaneous three times a day before meals  insulin lispro Injectable (HumaLOG) 8 Unit(s) SubCutaneous three times a day before meals  lactated ringers. 1000 milliLiter(s) IV Continuous <Continuous>  melatonin 5 milliGRAM(s) Oral at bedtime  mesalamine Enema 4 Gram(s) Rectal at bedtime  metoprolol succinate ER 25 milliGRAM(s) Oral daily  midodrine 5 milliGRAM(s) Oral three times a day  pantoprazole    Tablet 40 milliGRAM(s) Oral before breakfast  pregabalin 100 milliGRAM(s) Oral two times a day  tamsulosin 0.4 milliGRAM(s) Oral at bedtime    PRN MEDICATIONS  acetaminophen   Tablet .. 650 milliGRAM(s) Oral every 6 hours PRN  dextrose 40% Gel 15 Gram(s) Oral once PRN  docusate sodium 100 milliGRAM(s) Oral three times a day PRN  glucagon  Injectable 1 milliGRAM(s) IntraMuscular once PRN  senna 2 Tablet(s) Oral at bedtime PRN    ------------------------------------------------------------------------------------------------------------
DAILY PROGRESS NOTE  ===========================================================  Patient Information:   Hospital Day:</ROSALIE ESCOBEDO  /  63y  /  Male  /b> 1d /  MRN#: [id number]  Working / Admitting Diagnosis:  1. frequent falls    |:::::::::::::::::::::::::::::| SUBJECTIVE |:::::::::::::::::::::::::::::::|  OVERNIGHT EVENTS:   No acute events noted. Cr improved on IVF 1.4 from 1.7  States has jerking movement of hands random times?  Denies chest pain / SOB / palpitations / Nausea / Vomiting / constipation / diarrhea or abdominal pain.   ROS otherwise negative.    Past Medical History:   Amputated toe, unspecified laterality    CAD (coronary artery disease)    Diabetes    Diabetic foot ulcer    Dyslipidemia    Hypertension, unspecified type    Other hyperlipidemia    PVD (peripheral vascular disease).  CABG    ALLERGIES:  Cipro (Unknown)  IV contrast (Short breath)  Keflex (Unknown)  shellfish (Anaphylaxis)    HOME MEDICATIONS:  acetaminophen 325 mg oral tablet: 2 tab(s) orally every 6 hours, As needed, Temp greater or equal to 38.5C (101.3F), Mild Pain (1 - 3) (17 Dec 2018 13:08)  aspirin 81 mg oral tablet, chewable: 1 tab(s) orally once a day (2018 15:39)  atorvastatin 40 mg oral tablet: 1 tab(s) orally once a day (at bedtime) (2018 15:39)  cyanocobalamin 1000 mcg oral tablet: 1 tab(s) orally once a day (2018 15:39)  docusate sodium 100 mg oral capsule: 1 cap(s) orally 3 times a day, As Needed for constipation (18 Dec 2018 13:38)  folic acid 1 mg oral tablet: 1 tab(s) orally once a day (2018 15:39)  insulin glargine: 27 unit(s) subcutaneous once a day (at bedtime) (2018 19:44)  insulin lispro: 9 unit(s) subcutaneous 3 times a day (before meals) (18 Dec 2018 13:38)  mesalamine 4 g/60 mL rectal enema: 60 milliliter(s) rectal once a day (at bedtime) (18 Dec 2018 13:38)  Metoprolol Succinate ER 25 mg oral tablet, extended release: 1 tab(s) orally once a day (2018 15:44)  Plavix 75 mg oral tablet: 1 tab(s) orally once a day (2018 15:44)  senna oral tablet: 2 tab(s) orally once a day (at bedtime), As Needed for constipation (18 Dec 2018 13:38)      |:::::::::::::::::::::::::::| OBJECTIVE |:::::::::::::::::::::::::::|    VITAL SIGNS: Last 24 Hours  T(C): 35.7 (2019 05:35), Max: 36.1 (2019 17:21)  T(F): 96.2 (2019 05:35), Max: 97 (2019 17:21)  HR: 72 (2019 05:35) (72 - 91)  BP: 105/58 (2019 06:07) (79/41 - 152/69)  BP(mean): 98 (2019 20:30) (69 - 98)  RR: 18 (2019 05:35) (16 - 18)  SpO2: 100% (2019 18:00) (100% - 100%)    19 @ 07:01  -  19 @ 07:00  --------------------------------------------------------  IN: 600 mL / OUT: 0 mL / NET: 600 mL      PHYSICAL EXAM:  GENERAL:   Awake, alert; NAD.  HEENT:  Head NC/AT; Conjunctivae pink, Sclera anicteric & non-injected; Oral mucosa moist.  NECK:   Supple.  CARDIO:   RRR; S1 & S2 audible  RESP:  No respiratory distress or accessory muscle use. CTAB  GI:   Soft/ NT/ND / No guarding; No rebound tenderness.  EXT:   Without any cyanosis, clubbing, rash, lesions or edema.     LAB RESULTS:                        10.0   8.27  )-----------( 154      ( 2019 08:24 )             33.1         141  |  101  |  30<H>  ----------------------------<  103<H>  4.8   |  23  |  1.4    Ca    9.6      2019 08:24  Mg     1.8         TPro  7.1  /  Alb  3.7  /  TBili  0.3  /  DBili  x   /  AST  31  /  ALT  46<H>  /  AlkPhos  155<H>      PT/INR - ( 2019 10:03 )   PT: 11.20 sec;   INR: 0.97 ratio         PTT - ( 2019 10:03 )  PTT:27.7 sec  Urinalysis Basic - ( 2019 17:25 )    Color: Yellow / Appearance: Cloudy / S.015 / pH: x  Gluc: x / Ketone: Negative  / Bili: Negative / Urobili: 0.2 mg/dL   Blood: x / Protein: Negative mg/dL / Nitrite: Negative   Leuk Esterase: Moderate / RBC: 1-2 /HPF / WBC 26-50 /HPF   Sq Epi: x / Non Sq Epi: x / Bacteria: Few /HPF        Troponin T, Serum: <0.01 ng/mL (19 @ 01:48)  Creatine Kinase, Serum: 156 U/L (19 @ 21:24)  Troponin T, Serum: 0.02 ng/mL <H> (19 @ 21:24)  Creatine Kinase, Serum: 140 U/L (19 @ 18:18)  Sedimentation Rate, Erythrocyte: 56 mm/Hr <H> (19 @ 18:18)    CARDIAC MARKERS ( 2019 01:48 )  x     / <0.01 ng/mL / x     / x     / x      CARDIAC MARKERS ( 2019 21:24 )  x     / 0.02 ng/mL / 156 U/L / x     / 2.3 ng/mL  CARDIAC MARKERS ( 2019 18:18 )  x     / x     / 140 U/L / x     / x      CARDIAC MARKERS ( 2019 10:03 )  x     / 0.02 ng/mL / x     / x     / x        INPATIENT MEDICATIONS:  aspirin  chewable 81 milliGRAM(s) Oral daily  chlorhexidine 4% Liquid 1 Application(s) Topical <User Schedule>  clopidogrel Tablet 75 milliGRAM(s) Oral daily  cyanocobalamin 1000 MICROGram(s) Oral daily  dextrose 5%. 1000 milliLiter(s) IV Continuous <Continuous>  dextrose 50% Injectable 12.5 Gram(s) IV Push once  dextrose 50% Injectable 25 Gram(s) IV Push once  dextrose 50% Injectable 25 Gram(s) IV Push once  folic acid 1 milliGRAM(s) Oral daily  heparin  Injectable 5000 Unit(s) SubCutaneous every 8 hours  insulin glargine Injectable (LANTUS) 25 Unit(s) SubCutaneous at bedtime  insulin lispro (HumaLOG) corrective regimen sliding scale   SubCutaneous three times a day before meals  insulin lispro Injectable (HumaLOG) 8 Unit(s) SubCutaneous three times a day before meals  lactated ringers. 1000 milliLiter(s) IV Continuous <Continuous>  melatonin 5 milliGRAM(s) Oral at bedtime  mesalamine Enema 4 Gram(s) Rectal at bedtime  metoprolol succinate ER 25 milliGRAM(s) Oral daily  midodrine 5 milliGRAM(s) Oral three times a day  pantoprazole    Tablet 40 milliGRAM(s) Oral before breakfast  pregabalin 100 milliGRAM(s) Oral two times a day  tamsulosin 0.4 milliGRAM(s) Oral at bedtime    PRN MEDICATIONS  acetaminophen   Tablet .. 650 milliGRAM(s) Oral every 6 hours PRN  dextrose 40% Gel 15 Gram(s) Oral once PRN  docusate sodium 100 milliGRAM(s) Oral three times a day PRN  glucagon  Injectable 1 milliGRAM(s) IntraMuscular once PRN  senna 2 Tablet(s) Oral at bedtime PRN    ------------------------------------------------------------------------------------------------------------
DAILY PROGRESS NOTE  ===========================================================  Patient Information:   Hospital Day:</ROSALIE ESCOBEDO  /  63y  /  Male  /b> 1d /  MRN#: [id number]  Working / Admitting Diagnosis:  1. frequent falls    |:::::::::::::::::::::::::::::| SUBJECTIVE |:::::::::::::::::::::::::::::::|  OVERNIGHT EVENTS:   No acute events noted. Cr improving. SLIV.     Denies chest pain / SOB / palpitations / Nausea / Vomiting / constipation / diarrhea or abdominal pain.   ROS otherwise negative.    Past Medical History:   Amputated toe, unspecified laterality    CAD (coronary artery disease)    Diabetes    Diabetic foot ulcer    Dyslipidemia    Hypertension, unspecified type    Other hyperlipidemia    PVD (peripheral vascular disease).  CABG    ALLERGIES:  Cipro (Unknown)  IV contrast (Short breath)  Keflex (Unknown)  shellfish (Anaphylaxis)    HOME MEDICATIONS:  acetaminophen 325 mg oral tablet: 2 tab(s) orally every 6 hours, As needed, Temp greater or equal to 38.5C (101.3F), Mild Pain (1 - 3) (17 Dec 2018 13:08)  aspirin 81 mg oral tablet, chewable: 1 tab(s) orally once a day (30 Nov 2018 15:39)  atorvastatin 40 mg oral tablet: 1 tab(s) orally once a day (at bedtime) (30 Nov 2018 15:39)  cyanocobalamin 1000 mcg oral tablet: 1 tab(s) orally once a day (30 Nov 2018 15:39)  docusate sodium 100 mg oral capsule: 1 cap(s) orally 3 times a day, As Needed for constipation (18 Dec 2018 13:38)  folic acid 1 mg oral tablet: 1 tab(s) orally once a day (30 Nov 2018 15:39)  insulin glargine: 27 unit(s) subcutaneous once a day (at bedtime) (24 Nov 2018 19:44)  insulin lispro: 9 unit(s) subcutaneous 3 times a day (before meals) (18 Dec 2018 13:38)  mesalamine 4 g/60 mL rectal enema: 60 milliliter(s) rectal once a day (at bedtime) (18 Dec 2018 13:38)  Metoprolol Succinate ER 25 mg oral tablet, extended release: 1 tab(s) orally once a day (30 Nov 2018 15:44)  Plavix 75 mg oral tablet: 1 tab(s) orally once a day (30 Nov 2018 15:44)  senna oral tablet: 2 tab(s) orally once a day (at bedtime), As Needed for constipation (18 Dec 2018 13:38)      |:::::::::::::::::::::::::::| OBJECTIVE |:::::::::::::::::::::::::::|    VITAL SIGNS: Last 24 Hours  T(C): 36.4 (26 Feb 2019 05:29), Max: 36.4 (26 Feb 2019 05:29)  T(F): 97.6 (26 Feb 2019 05:29), Max: 97.6 (26 Feb 2019 05:29)  HR: 80 (25 Feb 2019 22:36) (80 - 80)  BP: 154/72 (25 Feb 2019 22:36) (92/54 - 154/72)  RR: 18 (26 Feb 2019 05:29) (18 - 18)    PHYSICAL EXAM:  GENERAL:   Awake, alert; NAD.  HEENT:  Head NC/AT; Conjunctivae pink, Sclera anicteric & non-injected; Oral mucosa moist.  NECK:   Supple.  CARDIO:   RRR; S1 & S2 audible  RESP:  No respiratory distress or accessory muscle use. CTAB  GI:   Soft/ NT/ND / No guarding; No rebound tenderness.  EXT:   Without any cyanosis, clubbing, rash, lesions or edema.     LAB RESULTS:                                   8.9    6.94  )-----------( 140      ( 26 Feb 2019 07:45 )             29.9   02-26    141  |  104  |  20  ----------------------------<  154<H>  4.5   |  25  |  1.2    Ca    8.9      26 Feb 2019 07:45  Phos  3.0     02-26  Mg     1.8     02-26    TPro  7.0  /  Alb  3.7  /  TBili  0.4  /  DBili  x   /  AST  23  /  ALT  41  /  AlkPhos  156<H>  02-25      INPATIENT MEDICATIONS:  aspirin  chewable 81 milliGRAM(s) Oral daily  chlorhexidine 4% Liquid 1 Application(s) Topical <User Schedule>  clopidogrel Tablet 75 milliGRAM(s) Oral daily  cyanocobalamin 1000 MICROGram(s) Oral daily  dextrose 5%. 1000 milliLiter(s) IV Continuous <Continuous>  dextrose 50% Injectable 12.5 Gram(s) IV Push once  dextrose 50% Injectable 25 Gram(s) IV Push once  dextrose 50% Injectable 25 Gram(s) IV Push once  folic acid 1 milliGRAM(s) Oral daily  heparin  Injectable 5000 Unit(s) SubCutaneous every 8 hours  insulin glargine Injectable (LANTUS) 25 Unit(s) SubCutaneous at bedtime  insulin lispro (HumaLOG) corrective regimen sliding scale   SubCutaneous three times a day before meals  insulin lispro Injectable (HumaLOG) 8 Unit(s) SubCutaneous three times a day before meals  lactated ringers. 1000 milliLiter(s) IV Continuous <Continuous>  melatonin 5 milliGRAM(s) Oral at bedtime  mesalamine Enema 4 Gram(s) Rectal at bedtime  metoprolol succinate ER 25 milliGRAM(s) Oral daily  midodrine 5 milliGRAM(s) Oral three times a day  pantoprazole    Tablet 40 milliGRAM(s) Oral before breakfast  pregabalin 100 milliGRAM(s) Oral two times a day  tamsulosin 0.4 milliGRAM(s) Oral at bedtime    PRN MEDICATIONS  acetaminophen   Tablet .. 650 milliGRAM(s) Oral every 6 hours PRN  dextrose 40% Gel 15 Gram(s) Oral once PRN  docusate sodium 100 milliGRAM(s) Oral three times a day PRN  glucagon  Injectable 1 milliGRAM(s) IntraMuscular once PRN  senna 2 Tablet(s) Oral at bedtime PRN    ------------------------------------------------------------------------------------------------------------
Pt seen and examined. Pt c/o dizziness when trying to walk today. Found to be orthostatic on VS.    T(F): , Max: 97.4 (02-24-19 @ 14:59)  HR: 70 (02-24-19 @ 14:59) (70 - 70)  BP: 126/65 (02-24-19 @ 14:59)  RR: 18 (02-24-19 @ 14:59)  SpO2: 100% (02-23-19 @ 20:14)  General: No apparent distress  Cardiovascular: S1, S2  Gastrointestinal: Soft, Non-tender, Non-distended  Respiratory: Good air entry bilaterally  Musculoskeletal: Moves all extremities  Lymphatic: No edema  Neurologic: No gross motor deficit  Dermatologic: Skin dry                          10.0   8.27  )-----------( 154      ( 23 Feb 2019 08:24 )             33.1     02-24    139  |  101  |  25<H>  ----------------------------<  150<H>  5.1<H>   |  23  |  1.3    Ca    9.2      24 Feb 2019 12:00  Mg     1.8     02-23        Culture - Blood (collected 22 Feb 2019 10:03)  Source: .Blood Blood-Peripheral  Preliminary Report (23 Feb 2019 18:01):    No growth to date.    Culture - Blood (collected 22 Feb 2019 10:03)  Source: .Blood Blood-Peripheral  Preliminary Report (23 Feb 2019 18:01):    No growth to date.

## 2019-02-26 NOTE — DISCHARGE NOTE ADULT - MEDICATION SUMMARY - MEDICATIONS TO STOP TAKING
I will STOP taking the medications listed below when I get home from the hospital:    cefTRIAXone 2 g intravenous injection  -- 2 gram(s) intravenous every 24 hours    acetaminophen 325 mg oral tablet  -- 2 tab(s) by mouth every 6 hours, As needed, Temp greater or equal to 38.5C (101.3F), Mild Pain (1 - 3)

## 2019-02-26 NOTE — PROGRESS NOTE ADULT - ATTENDING COMMENTS
Patient was evaluated and examined by bedside, no c/o abdominal pain, tolerating diet well.    All labs, radiology studies, VS was reviewed  I agree with medical plan outlined by Medical resident as stated above.  -mild SAKINA due to orthostasis, with vasomotor nephropathy- renal function has improved, d/c IVF    -frequent falls- maintain fall precautions, daily PT/OT as tolerated, patient is a good candidate for   STR  - Microcytic anemia -microcytic anemia  -continue folate tx.    for chronic medical conditions- resumed on home regimen tx.    patient is medically stable for d/c to STR once bed available    #Progress Note Handoff: Pending Consults_________,Tests________,Test Results______,Other;  Family discussion:Disposition: Home___/SNF___/Other___STR___x__/

## 2019-02-26 NOTE — DISCHARGE NOTE ADULT - HOSPITAL COURSE
62 y/o male with PMHx of HTN, DLD, CAD s/p CABG, DM with peripheral neuropathy, hx of Left DFU and hemorrhoids p/w dizziness and b/l LE pain and weakness x 4 days complicated by multiple falls. (Recent dc from 4A) In the ED patient was found to have SAKINA. Acute kidney injury secondary prerenal dehydration baseline creatinine is 1.0 now Cr near baseline now. SLIV  His Dizziness/ presyncope secondary to orthostatic hypotension and dehydration s/p IV hydration on Midodrine as per last discharge and NS B/L KE weakness and pain - PT and rehab. CK' negative doubt rhabdo. DVt negative     Medically stable for discharge to Short term Rehab

## 2019-02-26 NOTE — PROGRESS NOTE ADULT - REASON FOR ADMISSION
dizziness and b/l LE pain and weakness x 4 days

## 2019-02-26 NOTE — DISCHARGE NOTE ADULT - COMPLETE THE FOLLOWING IF THE PATIENT REFUSES THE INFLUENZA VACCINE:
I will SWITCH the dose or number of times a day I take the medications listed below when I get home from the hospital:  None
Vaccine Information Sheet (VIS) provided-VIS date: 8/07/15/Risks/benefits discussed with patient/surrogate

## 2019-02-26 NOTE — PROGRESS NOTE ADULT - ASSESSMENT
64 y/o male with PMHx of HTN, DLD, CAD s/p CABG, DM with peripheral neuropathy, hx of Left DFU and hemorrhoids p/w dizziness and b/l LE pain and weakness x 4 days complicated by multiple falls. (Recent dc from 4A) In the ED patient was found to have SAKINA.    # Acute kidney injury secondary prerenal dehydration.    - baseline creatinine is 1.0  - Cr near baseline now. SLIV    # Dizziness/ presyncope secondary to orthostatic hypotension and dehydration  - s/p IV hydration  - Midodrine as per last discharge and NS    # B/L KE weakness and pain - PT and rehab. CK' negative doubt rhabdo. DVt negative   # Elevated troponin - EKG is at baseline since last admission and repeat trop <.1   # DM - insulin protocol  # Hx of L GIB and proctitis - stable CBC monitor. c/w mesalamine  # HTN, DLD and CAD - C/w aspirin, plavix and BB hold statin for now  # Diet - Consistent carbohydrate diet  # DVT> Heparin subq  # GI ppx  # From home -Awaiting Authorization   # DNR / DNI - MOLST to be signed by Attending

## 2019-02-26 NOTE — DISCHARGE NOTE ADULT - CARE PROVIDERS DIRECT ADDRESSES
,DirectAddress_Unknown,[email or web address]

## 2019-02-26 NOTE — DISCHARGE NOTE ADULT - PATIENT PORTAL LINK FT
You can access the [facility] Patient Portal, offered by [facility], by registering with the following website: [email or web address]

## 2019-02-27 LAB
CULTURE RESULTS: SIGNIFICANT CHANGE UP
CULTURE RESULTS: SIGNIFICANT CHANGE UP
SPECIMEN SOURCE: SIGNIFICANT CHANGE UP
SPECIMEN SOURCE: SIGNIFICANT CHANGE UP

## 2019-02-28 ENCOUNTER — OUTPATIENT (OUTPATIENT)
Dept: OUTPATIENT SERVICES | Facility: HOSPITAL | Age: 64
LOS: 1 days | Discharge: HOME | End: 2019-02-28

## 2019-02-28 DIAGNOSIS — Z95.1 PRESENCE OF AORTOCORONARY BYPASS GRAFT: Chronic | ICD-10-CM

## 2019-03-01 DIAGNOSIS — E10.9 TYPE 1 DIABETES MELLITUS WITHOUT COMPLICATIONS: ICD-10-CM

## 2019-03-01 DIAGNOSIS — E08.40 DIABETES MELLITUS DUE TO UNDERLYING CONDITION WITH DIABETIC NEUROPATHY, UNSPECIFIED: ICD-10-CM

## 2019-03-04 DIAGNOSIS — R42 DIZZINESS AND GIDDINESS: ICD-10-CM

## 2019-03-04 DIAGNOSIS — E78.5 HYPERLIPIDEMIA, UNSPECIFIED: ICD-10-CM

## 2019-03-04 DIAGNOSIS — E86.0 DEHYDRATION: ICD-10-CM

## 2019-03-04 DIAGNOSIS — Z95.1 PRESENCE OF AORTOCORONARY BYPASS GRAFT: ICD-10-CM

## 2019-03-04 DIAGNOSIS — R29.6 REPEATED FALLS: ICD-10-CM

## 2019-03-04 DIAGNOSIS — Z79.4 LONG TERM (CURRENT) USE OF INSULIN: ICD-10-CM

## 2019-03-04 DIAGNOSIS — Z66 DO NOT RESUSCITATE: ICD-10-CM

## 2019-03-04 DIAGNOSIS — M25.562 PAIN IN LEFT KNEE: ICD-10-CM

## 2019-03-04 DIAGNOSIS — R26.2 DIFFICULTY IN WALKING, NOT ELSEWHERE CLASSIFIED: ICD-10-CM

## 2019-03-04 DIAGNOSIS — R29.898 OTHER SYMPTOMS AND SIGNS INVOLVING THE MUSCULOSKELETAL SYSTEM: ICD-10-CM

## 2019-03-04 DIAGNOSIS — R79.89 OTHER SPECIFIED ABNORMAL FINDINGS OF BLOOD CHEMISTRY: ICD-10-CM

## 2019-03-04 DIAGNOSIS — M25.561 PAIN IN RIGHT KNEE: ICD-10-CM

## 2019-03-04 DIAGNOSIS — I25.10 ATHEROSCLEROTIC HEART DISEASE OF NATIVE CORONARY ARTERY WITHOUT ANGINA PECTORIS: ICD-10-CM

## 2019-03-04 DIAGNOSIS — E11.42 TYPE 2 DIABETES MELLITUS WITH DIABETIC POLYNEUROPATHY: ICD-10-CM

## 2019-03-04 DIAGNOSIS — I25.2 OLD MYOCARDIAL INFARCTION: ICD-10-CM

## 2019-03-04 DIAGNOSIS — I10 ESSENTIAL (PRIMARY) HYPERTENSION: ICD-10-CM

## 2019-03-04 DIAGNOSIS — Z87.19 PERSONAL HISTORY OF OTHER DISEASES OF THE DIGESTIVE SYSTEM: ICD-10-CM

## 2019-03-04 DIAGNOSIS — D64.9 ANEMIA, UNSPECIFIED: ICD-10-CM

## 2019-03-04 DIAGNOSIS — Z89.429 ACQUIRED ABSENCE OF OTHER TOE(S), UNSPECIFIED SIDE: ICD-10-CM

## 2019-03-04 DIAGNOSIS — N17.9 ACUTE KIDNEY FAILURE, UNSPECIFIED: ICD-10-CM

## 2019-03-04 DIAGNOSIS — I95.1 ORTHOSTATIC HYPOTENSION: ICD-10-CM

## 2020-02-12 NOTE — PROGRESS NOTE ADULT - SUBJECTIVE AND OBJECTIVE BOX
PODIATRY PROGRESS NOTE   [id number] ROSALIE ESCOBEDO is a pleasant well-nourished, well developed 63y Male in no acute distress, alert awake, and oriented to person, place and time.   Patient is a 63y old  Male who presents with a chief complaint of worsening chronic left foot ulcer (08 Nov 2018 07:53)    HPI:  63y male with PMH of CAD, NSTEMI s/p CABG (5 vessel according to patient; March 2016); DM with peripheral neuropathy, Left DFU, HTN, HLD was sent for worsening chronic left foot ulcer. For the past 2 yrs pt has chronic diabetic left foot ulcer that has been taken care by podiatry at [facility] and has wound care nurse (3 days/week) for dressing. Today during dressing, nurse notice significant bleeding from the ulcer site and Q tip goes down to bone and referred the patient to the ER. He also complains of frequent falls approx. once every 2 months. On his last fall, he hit his back on the side walk and now suffering from back pain since. Denies fever, chills, trauma, chest pain, palpitations, dizziness, changes in urination, or diarrhea. Patient was recently discharged from the ED (10/29/18) with UTI and sent home on Vantin. At baseline, patient walk with cane; wife passed away Feb 2018 - coping ok but finds it difficult to manage ADL's.    In ED, Xray L Foot: Erosive change to the medial cuneiform, new since prior examination consistent with osteomyelitis.  He also started to have some nausea with no vomiting or abd pain. US Abdomen Limited (11.04.18): Cholelithiasis without evidence of cholecystitis.    Pt received:  2L LR stat  Vancomycin 2gm IV stat  Zosyn 4.5gm IV stat  Zofran 4mg IV push  Morphine 4 mg IV push (04 Nov 2018 17:17)      Vital Signs Last 24 Hrs  T(C): 35.8 (08 Nov 2018 05:38), Max: 37 (07 Nov 2018 20:24)  T(F): 96.4 (08 Nov 2018 05:38), Max: 98.6 (07 Nov 2018 20:24)  HR: 66 (08 Nov 2018 05:38) (66 - 72)  BP: 158/82 (08 Nov 2018 05:38) (149/76 - 160/77)  RR: 17 (08 Nov 2018 05:38) (17 - 18)                        9.2    8.38  )-----------( 336      ( 07 Nov 2018 08:11 )             28.6                 11-07    138  |  99  |  9<L>  ----------------------------<  100<H>  4.3   |  27  |  0.9    Ca    9.0      07 Nov 2018 08:11  Mg     2.0     11-07    TPro  6.6  /  Alb  2.6<L>  /  TBili  0.8  /  DBili  x   /  AST  15  /  ALT  13  /  AlkPhos  199<H>  11-07  < from: Xray Foot AP + Lateral, Left (11.04.18 @ 13:21) >    EXAM:  XR FOOT 2 VIEWS LT            PROCEDURE DATE:  11/04/2018            INTERPRETATION:  Clinical History / Reason for exam: Evaluate for   infection.    Multiple views of left foot are provided for review and compared to   September 25, 2016.    The patient status post amputation of the first digit to the level of the   base of the first metatarsal. Again seen is amputation of the second   digit at the level of the proximal interphalangeal joint. There is no   acute displaced fracture or dislocation. There is erosive change to the   medial cuneiform, new since prior examination. There is adjacent soft   tissue swelling. There is soft tissue swelling along the dorsum of the   foot. There are chronic changes in the midfoot. There is a calcaneal spur   There are chronic changes at the Lisfranc joint.    Impression    Erosive change to the medial cuneiform, new since prior examination   consistent with osteomyelitis.    Diffuse soft tissue swelling consistent with cellulitis.    JUDI KEATING M.D., ATTENDING RADIOLOGIST  This document has been electronically signed. Nov 4 2018  2:25PM    < end of copied text>    < from: MR Foot No Cont, Left (11.06.18 @ 18:56) >    EXAM:  MR FOOT LT            PROCEDURE DATE:  11/06/2018            INTERPRETATION:  Clinical History / Reason for exam: Plantar midfoot   ulcer. Evaluate for osteomyelitis.    Technique: Multiplanar multisequence MRI of the left midfoot without   intravenous contrast was performed.    Comparison: Left foot radiographs November 4, 2018, and MRI of the   midfoot September 26, 2016    Findings:    Again seen is plantar medial midfoot ulcer, with subjacent large phlegmon   measuring 5.9 x 3.5 x 4.4 cm demonstrating direct extension into the   first tarsometatarsal joint. Patient is post trans-metatarsal amputation   of the first ray. There is septic arthritis of the first tarsometatarsal   joint, and osteomyelitis of the medial cuneiform with pathologic   fracture, new since 2016. There is osteomyelitis of the plantar aspect of   the first metatarsal base stump.    There is further increased neuropathic osteoarthropathy of the second   through fourth tarsometatarsal joints, with increased erosive changes   since 2016. There is small joint effusion continuation of the remainder   of the tarsometatarsal joints, and navicular cuneiform joints, consistent   with septic arthritis. There is associated bone marrow edema of the   middle and lateral cuneiforms, lateral navicula, second and third   metatarsal bases.    Focal hypointense T1 signal abnormality at the plantar aspect of the   second metatarsal base is noted, with superimposed osteomyelitis not   excluded.    Again noted is Marlow's neuroma at the second webspace. Patient is post   trans-proximal phalangeal amputation of the second toe.    Chronic atrophy and edema of the intrinsic muscles of the foot consistent   with chronic neuritis.    Impression:    Large plantar medial midfoot ulcer with subjacent 5.9 cm phlegmon,   increased in size since September 2016, demonstrating direct extension   into the first tarsometatarsal joint.    First tarsometatarsal joint septic arthritis with osteomyelitis and   pathologic fractureof the medial cuneiform, and osteomyelitis of the   plantar first metatarsal base stump.    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    Septic arthritis of the navicular-cuneiform joints.    TROY PITTS M.D., ATTENDING RADIOLOGIST  This document has been electronically signed. Nov 7 2018  9:23AM        Culture - Other (11.06.18 @ 11:31)    Specimen Source: .Other Wound (L foot ulcer)    Culture Results:   No growth to date.            < end of copied text >    Derm: fibrogranular wound base with yellowish drainage noted on the dressing, +tunneling, + PTB, macerated rim around the wound, measures about 3 cm x 2.8cm x 5cm, no clinical signs of infection, no malodor.  Neuro: Gross sensation diminished to level of digits left foot    assessment  - left sub 1st metatarsal DFU  - Foot xray consistent w/ osteomyelitis medial cuneiform  - MRI: OM first met base stump.   Plan:   - pt seen/evaluated @ bedside  - c/w IV abx as per ID  - dressed w/ iodoform packing/dsd/ABD/kerlix left foot  - wound culture: no growth  - MRI reviewed- Positive OM  - Ordered WBC Ceretec bone scan to r/o charcot vs acute om  - talked to attending and pt this morning, pt wants to have sx done while he is in house. consent signed. podiatry planning sx saturday 11/9, exc debridement of soft tissue and bone left foot.  - req cardiac clearance and OR strtification  - podiaty will f/u while in house.  11-08-18 @ 08:48 no

## 2020-10-08 NOTE — ED ADULT TRIAGE NOTE - HEIGHT IN FEET
6 Complex Repair And Burow's Graft Text: The defect edges were debeveled with a #15 scalpel blade.  The primary defect was closed partially with a complex linear closure.  Given the location of the defect, shape of the defect, the proximity to free margins and the presence of a standing cone deformity a Burow's graft was deemed most appropriate to repair the remaining defect.  The graft was trimmed to fit the size of the remaining defect.  The graft was then placed in the primary defect, oriented appropriately, and sutured into place.

## 2020-12-19 NOTE — PHYSICAL THERAPY INITIAL EVALUATION ADULT - PERTINENT HX OF CURRENT PROBLEM, REHAB EVAL
----- Message from Radha Melchor M.D. sent at 12/16/2020 10:49 AM PST -----  Attempted to call again regarding INR, hyponatremia, pt and pt's spouse VM full. Anticoagulation clinic aware of supratherapeutic INR.    Has appt next week with me but needs to be rescheduled and pt cannot be reached.   New diagnosis of diabetes  Elevated INR   Hyponatremic to 129, likely needs to increase diuresis  Needs to be seen soon. Please continue to try to reschedule this patient as soon as possible, can schedule in doc time.        admitted with LT foot OM. *** spoke to podiatry resident spec. [phone] to clarify WB and brace orders. *** patient with T11-12 disc space fx, followed by NS (no SC compression); ? may need to benefit from a TLSO for symptomatic mgmt.

## 2021-04-22 NOTE — PHYSICAL THERAPY INITIAL EVALUATION ADULT - GAIT DEVIATIONS NOTED, PT EVAL
decreased velocity of limb motion/decreased step length/decreased stride length/decreased anjali
Statement Selected

## 2021-05-10 NOTE — PATIENT PROFILE ADULT - FALL HARM RISK CONCLUSION
Daughter Meri called; patient missed Saturday dose and wants to take an extra half a pill tonight.    Fall with Harm Risk

## 2022-01-21 NOTE — INPATIENT CERTIFICATION FOR MEDICARE PATIENTS - NS ICMP CERT SIG IND
As I discussed with you during your ED stay, your CT scan had some abnormal and concerning findings. You must follow-up with gastroenterology. During your ED stay I discussed your case with Dr. Florentin Butcher (Gastroenterology), he has contacted his office and they will attempt to get you in early next week for outpatient visit and scheduling a colonoscopy. If you do not hear from them by Monday afternoon, contact their office. You have been provided their office information on your discharge paperwork. You have been sent home with pain medications. Take these as prescribed. Return here for any worsening or worrisome symptoms. I certify as stated above.

## 2022-04-25 NOTE — DISCHARGE NOTE ADULT - MEDICATION SUMMARY - MEDICATIONS TO STOP TAKING
[place] I will STOP taking the medications listed below when I get home from the hospital:    predniSONE 20 mg oral tablet  -- 2 tab(s) by mouth once a day    morphine  -- 2 milligram(s) intravenous every 8 hours, As Needed

## 2023-03-06 NOTE — DISCHARGE NOTE ADULT - MEDICATION SUMMARY - MEDICATIONS TO STOP TAKING
[FreeTextEntry1] : 83yrs. \par weaned off  in November 2021!!\par CRTD upgrade march 2022. \par admitted for small bowel obstruction March and June 13-7.5 2022. underwent laparotomy june 21 with bowel resection. reanastamosed. d/c 7.5.22 to rehab. \par admitted 9.29-10.4 altered MS and hypoglycemia one month Farxega in setting of UTI. \par seen last 2.21 c/o of more SOB and Fatigue. At that time MEMS were in low 20s but weight was below prior dry weight of 244. increased diuretics. increased losartan \par meds: coreg 6.5  bid, torsemide 10 QD,  HDZN 25 tid, ISDN 30 tid, ASA, Xarelto, Amnio 200. atorva, proscar. allopurinol. losartan 25 bid \par feels better. breathing better. \par 133/86, 62. 240 (240). MEMS 18 (ranging 15-22)\par appears euvolemic \par recent labs 3.1 : K 3.6, BUN 37/2.1 NTpro 3700(5100)\par clinically imporved on small increase in diuretics and losartan. \par Plan:\par TTE as scheduled. \par goal weight 240, goal mems 18-20. \par increase losartan 37.5 QHS then bid. then challenge with low dose entresto \par no need for RHC now. \par Virgilio Parrish \par NP visit 2 weeks \par see me 2 mths \par 40 mins  I will STOP taking the medications listed below when I get home from the hospital:  None

## 2023-11-06 NOTE — ED ADULT TRIAGE NOTE - MEANS OF ARRIVAL
wheelchair
Sunscreen Recommendations: 50 SPF+, sun protective clothing
Detail Level: Detailed
Detail Level: Generalized

## 2025-01-08 NOTE — CHART NOTE - NSCHARTNOTEFT_GEN_A_CORE
normal , pleasant, well nourished, in no acute distress nurse notified that patient bladder scan was 800 cc, bee was replaced.

## 2025-01-23 NOTE — BRIEF OPERATIVE NOTE - ASSISTANT(S)
Spoke with patient and he will keep his appointment on 02/05 for the [place] office   
Frederick Herrera, PGY2